# Patient Record
Sex: MALE | Race: WHITE | Employment: UNEMPLOYED | URBAN - NONMETROPOLITAN AREA
[De-identification: names, ages, dates, MRNs, and addresses within clinical notes are randomized per-mention and may not be internally consistent; named-entity substitution may affect disease eponyms.]

---

## 2020-04-24 ENCOUNTER — HOSPITAL ENCOUNTER (EMERGENCY)
Age: 65
Discharge: ANOTHER HOSPITAL | End: 2020-04-24
Attending: EMERGENCY MEDICINE
Payer: COMMERCIAL

## 2020-04-24 ENCOUNTER — APPOINTMENT (OUTPATIENT)
Dept: CT IMAGING | Age: 65
End: 2020-04-24
Payer: COMMERCIAL

## 2020-04-24 ENCOUNTER — APPOINTMENT (OUTPATIENT)
Dept: MRI IMAGING | Age: 65
DRG: 064 | End: 2020-04-24
Payer: COMMERCIAL

## 2020-04-24 ENCOUNTER — HOSPITAL ENCOUNTER (INPATIENT)
Age: 65
LOS: 3 days | Discharge: HOME | DRG: 064 | End: 2020-04-27
Attending: EMERGENCY MEDICINE | Admitting: PSYCHIATRY & NEUROLOGY
Payer: COMMERCIAL

## 2020-04-24 VITALS
RESPIRATION RATE: 18 BRPM | DIASTOLIC BLOOD PRESSURE: 73 MMHG | OXYGEN SATURATION: 97 % | TEMPERATURE: 96.9 F | SYSTOLIC BLOOD PRESSURE: 150 MMHG | WEIGHT: 235 LBS | HEART RATE: 78 BPM

## 2020-04-24 PROBLEM — I63.9 STROKE DETERMINED BY CLINICAL ASSESSMENT (HCC): Status: ACTIVE | Noted: 2020-04-24

## 2020-04-24 LAB
ABSOLUTE EOS #: 0 K/UL (ref 0–0.44)
ABSOLUTE IMMATURE GRANULOCYTE: 0 K/UL (ref 0–0.3)
ABSOLUTE LYMPH #: 0.86 K/UL (ref 1.1–3.7)
ABSOLUTE MONO #: 0.34 K/UL (ref 0.1–1.2)
ALBUMIN SERPL-MCNC: 4.5 G/DL (ref 3.5–5.2)
ALBUMIN/GLOBULIN RATIO: 2 (ref 1–2.5)
ALP BLD-CCNC: 64 U/L (ref 40–129)
ALT SERPL-CCNC: 38 U/L (ref 5–41)
ANION GAP SERPL CALCULATED.3IONS-SCNC: 16 MMOL/L (ref 9–17)
AST SERPL-CCNC: 26 U/L
ATYPICAL LYMPHOCYTE ABSOLUTE COUNT: 0.09 K/UL
ATYPICAL LYMPHOCYTES: 1 %
BASOPHILS # BLD: 0 % (ref 0–2)
BASOPHILS ABSOLUTE: 0 K/UL (ref 0–0.2)
BILIRUB SERPL-MCNC: 1.11 MG/DL (ref 0.3–1.2)
BUN BLDV-MCNC: 12 MG/DL (ref 8–23)
BUN/CREAT BLD: 14 (ref 9–20)
CALCIUM SERPL-MCNC: 9.4 MG/DL (ref 8.6–10.4)
CHLORIDE BLD-SCNC: 99 MMOL/L (ref 98–107)
CO2: 23 MMOL/L (ref 20–31)
CREAT SERPL-MCNC: 0.83 MG/DL (ref 0.7–1.2)
DIFFERENTIAL TYPE: ABNORMAL
EKG ATRIAL RATE: 74 BPM
EKG P AXIS: 47 DEGREES
EKG P-R INTERVAL: 180 MS
EKG Q-T INTERVAL: 458 MS
EKG QRS DURATION: 96 MS
EKG QTC CALCULATION (BAZETT): 508 MS
EKG R AXIS: 63 DEGREES
EKG T AXIS: 76 DEGREES
EKG VENTRICULAR RATE: 74 BPM
EOSINOPHILS RELATIVE PERCENT: 0 % (ref 1–4)
GFR AFRICAN AMERICAN: >60 ML/MIN
GFR NON-AFRICAN AMERICAN: >60 ML/MIN
GFR SERPL CREATININE-BSD FRML MDRD: ABNORMAL ML/MIN/{1.73_M2}
GFR SERPL CREATININE-BSD FRML MDRD: ABNORMAL ML/MIN/{1.73_M2}
GLUCOSE BLD-MCNC: 225 MG/DL (ref 70–99)
GLUCOSE BLD-MCNC: 251 MG/DL (ref 75–110)
GLUCOSE BLD-MCNC: 254 MG/DL (ref 75–110)
HCT VFR BLD CALC: 47.6 % (ref 40.7–50.3)
HEMOGLOBIN: 17.2 G/DL (ref 13–17)
IMMATURE GRANULOCYTES: 0 %
INR BLD: 1 (ref 0.9–1.2)
LYMPHOCYTES # BLD: 10 % (ref 24–43)
MAGNESIUM: 1.9 MG/DL (ref 1.6–2.6)
MCH RBC QN AUTO: 32.7 PG (ref 25.2–33.5)
MCHC RBC AUTO-ENTMCNC: 36.1 G/DL (ref 28.4–34.8)
MCV RBC AUTO: 90.5 FL (ref 82.6–102.9)
MONOCYTES # BLD: 4 % (ref 3–12)
MORPHOLOGY: ABNORMAL
NRBC AUTOMATED: 0 PER 100 WBC
PARTIAL THROMBOPLASTIN TIME: 21.7 SEC (ref 23.2–34.4)
PDW BLD-RTO: 11.9 % (ref 11.8–14.4)
PLATELET # BLD: ABNORMAL K/UL (ref 138–453)
PLATELET ESTIMATE: ABNORMAL
PLATELET, FLUORESCENCE: 83 K/UL (ref 138–453)
PLATELET, IMMATURE FRACTION: 2.4 % (ref 1.1–10.3)
PMV BLD AUTO: ABNORMAL FL (ref 8.1–13.5)
POTASSIUM SERPL-SCNC: 3.3 MMOL/L (ref 3.7–5.3)
PROTHROMBIN TIME: 10.6 SEC (ref 9.7–12.2)
RBC # BLD: 5.26 M/UL (ref 4.21–5.77)
RBC # BLD: ABNORMAL 10*6/UL
SEG NEUTROPHILS: 85 % (ref 36–65)
SEGMENTED NEUTROPHILS ABSOLUTE COUNT: 7.31 K/UL (ref 1.5–8.1)
SODIUM BLD-SCNC: 138 MMOL/L (ref 135–144)
TOTAL PROTEIN: 6.8 G/DL (ref 6.4–8.3)
WBC # BLD: 8.6 K/UL (ref 3.5–11.3)
WBC # BLD: ABNORMAL 10*3/UL

## 2020-04-24 PROCEDURE — 85025 COMPLETE CBC W/AUTO DIFF WBC: CPT

## 2020-04-24 PROCEDURE — 6370000000 HC RX 637 (ALT 250 FOR IP): Performed by: STUDENT IN AN ORGANIZED HEALTH CARE EDUCATION/TRAINING PROGRAM

## 2020-04-24 PROCEDURE — 85055 RETICULATED PLATELET ASSAY: CPT

## 2020-04-24 PROCEDURE — 2060000000 HC ICU INTERMEDIATE R&B

## 2020-04-24 PROCEDURE — 96376 TX/PRO/DX INJ SAME DRUG ADON: CPT

## 2020-04-24 PROCEDURE — 99285 EMERGENCY DEPT VISIT HI MDM: CPT

## 2020-04-24 PROCEDURE — 80053 COMPREHEN METABOLIC PANEL: CPT

## 2020-04-24 PROCEDURE — 6360000002 HC RX W HCPCS: Performed by: STUDENT IN AN ORGANIZED HEALTH CARE EDUCATION/TRAINING PROGRAM

## 2020-04-24 PROCEDURE — 6370000000 HC RX 637 (ALT 250 FOR IP): Performed by: EMERGENCY MEDICINE

## 2020-04-24 PROCEDURE — 70450 CT HEAD/BRAIN W/O DYE: CPT

## 2020-04-24 PROCEDURE — 82947 ASSAY GLUCOSE BLOOD QUANT: CPT

## 2020-04-24 PROCEDURE — 36415 COLL VENOUS BLD VENIPUNCTURE: CPT

## 2020-04-24 PROCEDURE — 83735 ASSAY OF MAGNESIUM: CPT

## 2020-04-24 PROCEDURE — 70551 MRI BRAIN STEM W/O DYE: CPT

## 2020-04-24 PROCEDURE — 96374 THER/PROPH/DIAG INJ IV PUSH: CPT

## 2020-04-24 PROCEDURE — 70547 MR ANGIOGRAPHY NECK W/O DYE: CPT

## 2020-04-24 PROCEDURE — 99254 IP/OBS CNSLTJ NEW/EST MOD 60: CPT | Performed by: PSYCHIATRY & NEUROLOGY

## 2020-04-24 PROCEDURE — 99222 1ST HOSP IP/OBS MODERATE 55: CPT | Performed by: PSYCHIATRY & NEUROLOGY

## 2020-04-24 PROCEDURE — 2580000003 HC RX 258: Performed by: STUDENT IN AN ORGANIZED HEALTH CARE EDUCATION/TRAINING PROGRAM

## 2020-04-24 PROCEDURE — 6360000002 HC RX W HCPCS: Performed by: EMERGENCY MEDICINE

## 2020-04-24 PROCEDURE — 93010 ELECTROCARDIOGRAM REPORT: CPT | Performed by: INTERNAL MEDICINE

## 2020-04-24 PROCEDURE — 93005 ELECTROCARDIOGRAM TRACING: CPT | Performed by: EMERGENCY MEDICINE

## 2020-04-24 PROCEDURE — 85610 PROTHROMBIN TIME: CPT

## 2020-04-24 PROCEDURE — 85730 THROMBOPLASTIN TIME PARTIAL: CPT

## 2020-04-24 PROCEDURE — 70544 MR ANGIOGRAPHY HEAD W/O DYE: CPT

## 2020-04-24 PROCEDURE — 72141 MRI NECK SPINE W/O DYE: CPT

## 2020-04-24 RX ORDER — PROMETHAZINE HYDROCHLORIDE 25 MG/ML
12.5 INJECTION, SOLUTION INTRAMUSCULAR; INTRAVENOUS ONCE
Status: COMPLETED | OUTPATIENT
Start: 2020-04-24 | End: 2020-04-24

## 2020-04-24 RX ORDER — ONDANSETRON 2 MG/ML
4 INJECTION INTRAMUSCULAR; INTRAVENOUS EVERY 6 HOURS PRN
Status: DISCONTINUED | OUTPATIENT
Start: 2020-04-24 | End: 2020-04-24 | Stop reason: SDUPTHER

## 2020-04-24 RX ORDER — CLOPIDOGREL BISULFATE 75 MG/1
75 TABLET ORAL DAILY
Status: DISCONTINUED | OUTPATIENT
Start: 2020-04-25 | End: 2020-04-25

## 2020-04-24 RX ORDER — MAGNESIUM SULFATE 1 G/100ML
2 INJECTION INTRAVENOUS ONCE
Status: COMPLETED | OUTPATIENT
Start: 2020-04-24 | End: 2020-04-24

## 2020-04-24 RX ORDER — SODIUM CHLORIDE 0.9 % (FLUSH) 0.9 %
10 SYRINGE (ML) INJECTION EVERY 12 HOURS SCHEDULED
Status: DISCONTINUED | OUTPATIENT
Start: 2020-04-24 | End: 2020-04-27 | Stop reason: HOSPADM

## 2020-04-24 RX ORDER — ACETAMINOPHEN 650 MG/1
650 SUPPOSITORY RECTAL EVERY 6 HOURS PRN
Status: DISCONTINUED | OUTPATIENT
Start: 2020-04-24 | End: 2020-04-27 | Stop reason: HOSPADM

## 2020-04-24 RX ORDER — POLYETHYLENE GLYCOL 3350 17 G/17G
17 POWDER, FOR SOLUTION ORAL DAILY PRN
Status: DISCONTINUED | OUTPATIENT
Start: 2020-04-24 | End: 2020-04-24 | Stop reason: SDUPTHER

## 2020-04-24 RX ORDER — SODIUM CHLORIDE 0.9 % (FLUSH) 0.9 %
10 SYRINGE (ML) INJECTION PRN
Status: DISCONTINUED | OUTPATIENT
Start: 2020-04-24 | End: 2020-04-27 | Stop reason: HOSPADM

## 2020-04-24 RX ORDER — ASPIRIN 81 MG/1
81 TABLET ORAL DAILY
Status: DISCONTINUED | OUTPATIENT
Start: 2020-04-24 | End: 2020-04-27 | Stop reason: HOSPADM

## 2020-04-24 RX ORDER — ONDANSETRON 2 MG/ML
4 INJECTION INTRAMUSCULAR; INTRAVENOUS EVERY 6 HOURS PRN
Status: DISCONTINUED | OUTPATIENT
Start: 2020-04-24 | End: 2020-04-26

## 2020-04-24 RX ORDER — CLOPIDOGREL BISULFATE 75 MG/1
300 TABLET ORAL ONCE
Status: COMPLETED | OUTPATIENT
Start: 2020-04-24 | End: 2020-04-24

## 2020-04-24 RX ORDER — ONDANSETRON 2 MG/ML
4 INJECTION INTRAMUSCULAR; INTRAVENOUS ONCE
Status: COMPLETED | OUTPATIENT
Start: 2020-04-24 | End: 2020-04-24

## 2020-04-24 RX ORDER — ASPIRIN 325 MG
325 TABLET ORAL ONCE
Status: COMPLETED | OUTPATIENT
Start: 2020-04-24 | End: 2020-04-24

## 2020-04-24 RX ORDER — ACETAMINOPHEN 325 MG/1
650 TABLET ORAL EVERY 6 HOURS PRN
Status: DISCONTINUED | OUTPATIENT
Start: 2020-04-24 | End: 2020-04-27 | Stop reason: HOSPADM

## 2020-04-24 RX ORDER — ATORVASTATIN CALCIUM 80 MG/1
80 TABLET, FILM COATED ORAL NIGHTLY
Status: DISCONTINUED | OUTPATIENT
Start: 2020-04-24 | End: 2020-04-27 | Stop reason: HOSPADM

## 2020-04-24 RX ORDER — ASPIRIN 300 MG/1
300 SUPPOSITORY RECTAL DAILY
Status: DISCONTINUED | OUTPATIENT
Start: 2020-04-24 | End: 2020-04-27 | Stop reason: HOSPADM

## 2020-04-24 RX ORDER — PROMETHAZINE HYDROCHLORIDE 25 MG/1
12.5 TABLET ORAL EVERY 6 HOURS PRN
Status: DISCONTINUED | OUTPATIENT
Start: 2020-04-24 | End: 2020-04-24 | Stop reason: SDUPTHER

## 2020-04-24 RX ORDER — PROMETHAZINE HYDROCHLORIDE 25 MG/1
12.5 TABLET ORAL EVERY 6 HOURS PRN
Status: DISCONTINUED | OUTPATIENT
Start: 2020-04-24 | End: 2020-04-27 | Stop reason: HOSPADM

## 2020-04-24 RX ORDER — POLYETHYLENE GLYCOL 3350 17 G/17G
17 POWDER, FOR SOLUTION ORAL DAILY PRN
Status: DISCONTINUED | OUTPATIENT
Start: 2020-04-24 | End: 2020-04-27 | Stop reason: HOSPADM

## 2020-04-24 RX ADMIN — CLOPIDOGREL BISULFATE 300 MG: 75 TABLET ORAL at 12:08

## 2020-04-24 RX ADMIN — ATORVASTATIN CALCIUM 80 MG: 80 TABLET, FILM COATED ORAL at 20:32

## 2020-04-24 RX ADMIN — Medication 81 MG: at 20:32

## 2020-04-24 RX ADMIN — ONDANSETRON 4 MG: 2 INJECTION INTRAMUSCULAR; INTRAVENOUS at 11:34

## 2020-04-24 RX ADMIN — ENOXAPARIN SODIUM 40 MG: 40 INJECTION SUBCUTANEOUS at 20:32

## 2020-04-24 RX ADMIN — ACETAMINOPHEN 650 MG: 325 TABLET ORAL at 20:33

## 2020-04-24 RX ADMIN — ASPIRIN 325 MG: 325 TABLET, FILM COATED ORAL at 12:08

## 2020-04-24 RX ADMIN — SODIUM CHLORIDE, PRESERVATIVE FREE 10 ML: 5 INJECTION INTRAVENOUS at 20:32

## 2020-04-24 RX ADMIN — ONDANSETRON 4 MG: 2 INJECTION INTRAMUSCULAR; INTRAVENOUS at 13:34

## 2020-04-24 RX ADMIN — MAGNESIUM SULFATE 2 G: 1 INJECTION INTRAVENOUS at 15:32

## 2020-04-24 RX ADMIN — PROMETHAZINE HYDROCHLORIDE 12.5 MG: 25 INJECTION INTRAMUSCULAR; INTRAVENOUS at 15:26

## 2020-04-24 ASSESSMENT — PAIN DESCRIPTION - DESCRIPTORS: DESCRIPTORS: HEADACHE

## 2020-04-24 ASSESSMENT — ENCOUNTER SYMPTOMS
WHEEZING: 0
PHOTOPHOBIA: 0
BACK PAIN: 0
COUGH: 0
STRIDOR: 0
EYE REDNESS: 0
ABDOMINAL PAIN: 0
CONSTIPATION: 0
NAUSEA: 1
SHORTNESS OF BREATH: 0
VOMITING: 0

## 2020-04-24 ASSESSMENT — PAIN SCALES - GENERAL
PAINLEVEL_OUTOF10: 7
PAINLEVEL_OUTOF10: 7
PAINLEVEL_OUTOF10: 0
PAINLEVEL_OUTOF10: 0

## 2020-04-24 ASSESSMENT — PAIN DESCRIPTION - ORIENTATION
ORIENTATION: RIGHT
ORIENTATION: POSTERIOR

## 2020-04-24 ASSESSMENT — PAIN DESCRIPTION - PAIN TYPE
TYPE: ACUTE PAIN
TYPE: CHRONIC PAIN

## 2020-04-24 ASSESSMENT — PAIN DESCRIPTION - LOCATION
LOCATION: NECK;SHOULDER
LOCATION: HEAD

## 2020-04-24 ASSESSMENT — PAIN DESCRIPTION - FREQUENCY: FREQUENCY: INTERMITTENT

## 2020-04-24 NOTE — ED NOTES
Bed: 26  Expected date:   Expected time:   Means of arrival:   Comments:  [address] Cristian Madera RN  04/24/20 9016

## 2020-04-24 NOTE — H&P
Neurology Inpatient History and Physical Note     History Obtained From:  patient, electronic medical record    CHIEF COMPLAINT:       Vertigo  Right sided weakness     HISTORY OF PRESENT ILLNESS:       The patient is a 60 y.o. male with history of Meniere's disease who presented with vertigo, right sided weakness and numbness for 2 weeks. LKW Wednesday 4/22/2020. He fell on his back from standing position on Wednesday. He didn't injure his head, no LOC. He has chronic neck pain but nothing unusual. Soon after that he noticed right sided weakness and numbness. He presented to [place] ED today with vertigo he frequently get due to Meniere's disease. NIHSS at [place] was 4 due to right sided weakness, facial droop, incoordination. He's not on blood thinners. CTH at [place] showed encephalomalacia over the left frontal area.       PAST MEDICAL HISTORY :       Past Medical History:        Diagnosis Date    [facility]     Meniere disease     MI (myocardial infarction) ([facility] [facility]        Past Surgical History:        Procedure Laterality Date    CORONARY ANGIOPLASTY WITH STENT PLACEMENT         Social History:   Social History     Socioeconomic History    Marital status: Single     Spouse name: Not on file    Number of children: Not on file    Years of education: Not on file    Highest education level: Not on file   Occupational History    Not on file   Social Needs    Financial resource strain: Not on file    Food insecurity     Worry: Not on file     Inability: Not on file    Transportation needs     Medical: Not on file     Non-medical: Not on file   Tobacco Use    Smoking status: Never Smoker    Smokeless tobacco: Never Used   Substance and Sexual Activity    Alcohol use: Not on file    Drug use: Not on file    Sexual activity: Not on file   Lifestyle    Physical activity     Days per week: Not on file     Minutes per session: Not on file    Stress: Not on file   Relationships Results   Component Value Date    WBC 8.6 04/24/2020    RBC 5.26 04/24/2020    HGB 17.2 04/24/2020    HCT 47.6 04/24/2020    PLT See Reflexed IPF Result 04/24/2020    MCV 90.5 04/24/2020    MCH 32.7 04/24/2020    MCHC 36.1 04/24/2020    RDW 11.9 04/24/2020    LYMPHOPCT 10 04/24/2020    MONOPCT 4 04/24/2020    BASOPCT 0 04/24/2020    MONOSABS 0.34 04/24/2020    LYMPHSABS 0.86 04/24/2020    EOSABS 0.00 04/24/2020    BASOSABS 0.00 04/24/2020    DIFFTYPE NOT REPORTED 04/24/2020     BMP:    Lab Results   Component Value Date     04/24/2020    K 3.3 04/24/2020    CL 99 04/24/2020    CO2 23 04/24/2020    BUN 12 04/24/2020    LABALBU 4.5 04/24/2020    CREATININE 0.83 04/24/2020    CALCIUM 9.4 04/24/2020    GFRAA >60 04/24/2020    LABGLOM >60 04/24/2020    GLUCOSE 225 04/24/2020       Radiology Review:    1.) CT brain without contrast:  No acute intracranial abnormality. Mild left frontal lobe encephalomalacia. ASSESSMENT AND PLAN:       Patient Active Problem List   Diagnosis    Stroke determined by clinical assessment [facility])       Assessment                 60 y.o. male who presents with right sided weakness, current NIHSS is zero. Possible TIA. ABCD2 Score  (Estimate Risk of Stroke after TIA)   POINTS   Age    < 60   ? 60     [] 0  [x] 1   BP:     SBP <140 or DBP < 90   SBP ? 140 or DBP ? 90       [] 0  [x] 1   Clinical Features of TIA     Other Symptoms                 Speech Disturbances W/O Weakness   Unilateral Weakness     [] 0  [] 1  [x] 2   Duration of symptoms     < 10 Minutes                                     10-59 Minutes   ?  61 Minutes     [] 0  [] 1  [x] 2   Diabetes     No                    Yes     [] 0  [x] 1   TOTAL 7   0-3 Points: Low Risk -> Work up could be done OPD   2-Day Stroke Risk: 1%   7- Day Stroke Risk: 1.2%   90 Days Stroke Risk: 3.1%    4-5 Points: Moderate Risk    2-Day Stroke Risk: 4.1%   7- Day Stroke Risk: 5.9%    90 Days Stroke Risk: 9.8%    6-7 Points: High Risk ->

## 2020-04-24 NOTE — ED NOTES
Pt has dizziness/nausea with movement, sitting up.  Pt states \"when I lay flat, I'm fine\"       Randell Wang, ALVA  04/24/20 4488

## 2020-04-24 NOTE — ED PROVIDER NOTES
vertigo or any ataxia. No dysarthria or speech changes. No headache. No head trauma or change in his chronic neck pain. No chest pain. He did have nausea and epigastric discomfort intermittently. He was seen in outside hospital for possible stroke symptoms and transferred here. CT brain without acute bleed or acute stroke symptoms but mild left frontal lobe encephalomalacia. On exam he is nontoxic afebrile vital signs normal with exception of significant hypertension. GCS is 15. Normal speech mentation memory. Normal pupils extraocular movements. Several beats of right fast beating nystagmus with rightward gaze. Abnormal finger-nose movements on the right. Abnormal heel-to-shin on the right. Negative drift of arms or legs. Tongue is midline. Face is symmetrical.  Right shoulder is tender anteriorly with palpation and passive range of movement. Impression is posterior fossa stroke, consider dissection, chronic Ménière's, hypertension, fall, right shoulder bursitis/arthritis, muscle strain. Plan is stroke consultation, I would recommend CT angiogram of head and neck to exclude dissection, MRI brain, admission with antiplatelet therapy. [place].  Janna Arthur MD, [address] [facility]  Attending Emergency  Physician                Donovan Alcantar MD  04/24/20 3387
were completed with a voice recognition program.Efforts were made to edit the dictations but occasionally words are mis-transcribed.)        Xin Owen,   Resident  04/24/20 6338 [facility] Suhail,   Resident  04/25/20 2102

## 2020-04-24 NOTE — ED PROVIDER NOTES
[facility] ED  [address] [facility]   Chief Complaint   Patient presents with    Fall     Onset 2 days ago, causing right neck/shoulder pain    Nausea     Pt states it may be related to his Miniere's Disease or hernia. Onset yesterday      HPI   Romulo Durham is a 60 y.o. male who presents with complaint of Ménière's flare. He says that he is dizzy. He also says he fell a few days ago and is not sure if he has had. Today in addition to the being dizzy he notes that he also has felt a little bit weak in the arm and the leg on the right. He also noted that his speech seemed a little bit weird to him. He tried to blame all these things on the dizziness he was experiencing. He, according to me actually    Full strength but he was ataxic on the right. No other current complaints except for some nausea. He has not had this exact constellation of symptoms previously    REVIEW OF SYSTEMS   Cardiac: No chest pain or palpitations  Respiratory: No shortness of breath or new cough  General: No fevers   : No dysuria or hematuria  GI: nausea  See HPI for further details. All other systems reviewed and are negative. Brooklyn Witt PAST MEDICAL OR SURGICAL HISTORY   Past Medical History:   Diagnosis Date    Diabetes mellitus ([address]     Meniere disease     MI (myocardial infarction) ([facility] [address]      Past Surgical History:   Procedure Laterality Date    CORONARY ANGIOPLASTY WITH STENT PLACEMENT         CURRENT MEDICATIONS   Current Outpatient Rx   Medication Sig Dispense Refill    insulin aspart protamine-insulin aspart (NOVOLOG 70/30) (70-30) 100 UNIT/ML injection Inject 30 Units into the skin 2 times daily (with meals)         ALLERGIES   No Known Allergies    FAMILY OR SOCIAL HISTORY   History reviewed. No pertinent family history.   Social History     Socioeconomic History    Marital status: Single     Spouse name: Not on file    Number of children: Not on file    Years of education: lip.  Full strength and sensation of the extremities. He was noted to be ataxic on finger-nose-finger on the right as well as heel slide on the right. And he had nystagmus and went to the right on exam  Psychiatric: pleasant affect, does not appear anxious     nihss = 4  Points for atxia, facial droop , dysarthria    EKG (Interpreted by me)  NSR , sinus rhythm at 74 bpm        RADIOLOGY/PROCEDURES   CT Head WO Contrast   Final Result   No acute intracranial abnormality. Mild left frontal lobe encephalomalacia. The results of the examination were discussed with Dr. Erika Gorman on 4/24/2020   at 11:09 a.m. ED COURSE & MEDICAL DECISION MAKING   Pertinent Labs & Imaging studies reviewed and interpreted. (See chart for details)  See chart for details of medications given during the ED stay. Vitals:    04/24/20 1115 04/24/20 1130 04/24/20 1145 04/24/20 1200   BP: (!) 151/81 (!) 184/92 (!) 145/77 (!) 150/74   Pulse: 75 82 74 77   Resp:       Temp:       TempSrc:       SpO2:       Weight:           Differential diagnosis: Dehydration, potentially/metabolic problem, anemia, infection, hypotension, Stroke, Structural CNS mass lesion, other    MDM: Based on patient's presentation I suspect the patient has had a stroke. I think this is an exacerbation of Ménière's disease. He will be transferred to [facility] for neuro work-up    FINAL IMPRESSION   1.  Cerebrovascular accident (CVA), unspecified mechanism ([facility] [address]        PLAN  xfer to [facility]          Genaro Sanchez MD  04/24/20 3307

## 2020-04-24 NOTE — ED NOTES
Pt updated that he is going to ER and transportation will be here approximately [address] [facility]  04/24/20 2195

## 2020-04-24 NOTE — CONSULTS
use: Not on file    Drug use: Not on file    Sexual activity: Not on file   Lifestyle    Physical activity     Days per week: Not on file     Minutes per session: Not on file    Stress: Not on file   Relationships    Social connections     Talks on phone: Not on file     Gets together: Not on file     Attends Amish service: Not on file     Active member of club or organization: Not on file     Attends meetings of clubs or organizations: Not on file     Relationship status: Not on file    Intimate partner violence     Fear of current or ex partner: Not on file     Emotionally abused: Not on file     Physically abused: Not on file     Forced sexual activity: Not on file   Other Topics Concern    Not on file   Social History Narrative    Not on file       Family History:   History reviewed. No pertinent family history. Allergies:  Patient has no known allergies. Home Medications:  Prior to Admission medications    Medication Sig Start Date End Date Taking? Authorizing Provider   insulin aspart protamine-insulin aspart (NOVOLOG 70/30) (70-30) 100 UNIT/ML injection Inject 30 Units into the skin 2 times daily (with meals)    Historical Provider, MD       Current Medications:   Current Facility-Administered Medications: magnesium sulfate 1 g in dextrose 5% 100 mL IVPB, 2 g, Intravenous, Once    REVIEW OF SYSTEMS:       Review of Systems   Constitutional: Negative for diaphoresis, fatigue, fever and unexpected weight change. Eyes: Negative for photophobia, redness and visual disturbance. Respiratory: Negative for cough, shortness of breath, wheezing and stridor. Cardiovascular: Negative for palpitations. Gastrointestinal: Positive for nausea. Negative for constipation and vomiting. Genitourinary: Negative. Musculoskeletal: Positive for arthralgias (right shoulder), gait problem and neck pain (chronic). Negative for back pain, joint swelling, myalgias and neck stiffness.    Neurological:

## 2020-04-24 NOTE — FLOWSHEET NOTE
[facility] CARE DEPARTMENT - [address]     Emergency/Trauma Note    PATIENT NAME: Suman Tubbs    Shift date:4/24/2020  Shift day: Friday   Shift # 2    Room # 26/26   Name: Suman Tubbs            Age: 60 y.o. Gender: male          Mormon: Shinto  Place of Hinduism:     Trauma/Incident type: Stroke Consult    Admit Date & Time: 4/24/2020  2:46 PM  TRAUMA NAME:         PATIENT/EVENT DESCRIPTION:  Suman Tubbs is a 60 y.o. male who arrived via [facility] transport from [place] [address] as a Stroke Consult. Patient fell on Wednesday, complaining of right sided weakness to his arm and leg. Patient stated his speech seems different to him. Patient to be admitted to 26/26. SPIRITUAL ASSESSMENT/INTERVENTION:   responded to Stroke Consult.  went to ED #26.  was ministry of presence. Patient was lying in bed awake and alert. Patient was trying to contact his sister Geovanny Dietz on his cell phone.  offered to  try to contact Geovanny Dietz at [phone] no response to initial efforts. Patient stated he is Shinto and professes [place].  offered prayer and patient accepted. Griselda Gate will follow up during patient's stay in the hospital.      PATIENT BELONGINGS:  With patient    ANY BELONGINGS OF SIGNIFICANT VALUE NOTED:  None noted    REGISTRATION STAFF NOTIFIED? No      WHAT IS YOUR SPIRITUAL CARE PLAN FOR THIS PATIENT?:   Chaplains will remain available for spiritual and emotional support as needed and chaplains may be paged for a specific request and or visit at any time.   Electronically signed by Yovany Giles Resident      04/24/20 2583   Encounter Summary   Services provided to: Patient   Referral/Consult From: Multi-disciplinary team   Support System Family members   Place of Catholic   (Shinto)   Continue Visiting   (4/24/2020)   Complexity of Encounter Moderate   Length of Encounter 30 minutes   Spiritual Assessment Completed Yes

## 2020-04-25 LAB
ABSOLUTE EOS #: 0.05 K/UL (ref 0–0.44)
ABSOLUTE EOS #: 0.1 K/UL (ref 0–0.44)
ABSOLUTE IMMATURE GRANULOCYTE: <0.03 K/UL (ref 0–0.3)
ABSOLUTE IMMATURE GRANULOCYTE: <0.03 K/UL (ref 0–0.3)
ABSOLUTE LYMPH #: 1.12 K/UL (ref 1.1–3.7)
ABSOLUTE LYMPH #: 1.45 K/UL (ref 1.1–3.7)
ABSOLUTE MONO #: 0.56 K/UL (ref 0.1–1.2)
ABSOLUTE MONO #: 0.59 K/UL (ref 0.1–1.2)
ABSOLUTE RETIC #: 0.14 M/UL (ref 0.03–0.08)
ABSOLUTE RETIC #: 0.15 M/UL (ref 0.03–0.08)
ANION GAP SERPL CALCULATED.3IONS-SCNC: 15 MMOL/L (ref 9–17)
BASOPHILS # BLD: 1 % (ref 0–2)
BASOPHILS # BLD: 1 % (ref 0–2)
BASOPHILS ABSOLUTE: 0.04 K/UL (ref 0–0.2)
BASOPHILS ABSOLUTE: 0.04 K/UL (ref 0–0.2)
BUN BLDV-MCNC: 14 MG/DL (ref 8–23)
BUN/CREAT BLD: ABNORMAL (ref 9–20)
CALCIUM SERPL-MCNC: 8.7 MG/DL (ref 8.6–10.4)
CHLORIDE BLD-SCNC: 101 MMOL/L (ref 98–107)
CHOLESTEROL/HDL RATIO: 4.7
CHOLESTEROL: 200 MG/DL
CO2: 22 MMOL/L (ref 20–31)
CREAT SERPL-MCNC: 0.86 MG/DL (ref 0.7–1.2)
DIFFERENTIAL TYPE: ABNORMAL
DIFFERENTIAL TYPE: ABNORMAL
EOSINOPHILS RELATIVE PERCENT: 1 % (ref 1–4)
EOSINOPHILS RELATIVE PERCENT: 2 % (ref 1–4)
FOLATE: 18.1 NG/ML
GFR AFRICAN AMERICAN: >60 ML/MIN
GFR NON-AFRICAN AMERICAN: >60 ML/MIN
GFR SERPL CREATININE-BSD FRML MDRD: ABNORMAL ML/MIN/{1.73_M2}
GFR SERPL CREATININE-BSD FRML MDRD: ABNORMAL ML/MIN/{1.73_M2}
GLUCOSE BLD-MCNC: 275 MG/DL (ref 70–99)
GLUCOSE BLD-MCNC: 290 MG/DL (ref 75–110)
GLUCOSE BLD-MCNC: 325 MG/DL (ref 75–110)
GLUCOSE BLD-MCNC: 386 MG/DL (ref 75–110)
HAPTOGLOBIN: 18 MG/DL (ref 30–200)
HCT VFR BLD CALC: 47.7 % (ref 40.7–50.3)
HCT VFR BLD CALC: 48.1 % (ref 40.7–50.3)
HCT VFR BLD CALC: 49.3 % (ref 40.7–50.3)
HDLC SERPL-MCNC: 43 MG/DL
HEMOGLOBIN: 16.3 G/DL (ref 13–17)
HEMOGLOBIN: 16.8 G/DL (ref 13–17)
HEMOGLOBIN: 17.6 G/DL (ref 13–17)
IMMATURE GRANULOCYTES: 0 %
IMMATURE GRANULOCYTES: 0 %
IMMATURE RETIC FRACT: 10.6 % (ref 2.7–18.3)
IMMATURE RETIC FRACT: 8.7 % (ref 2.7–18.3)
LACTATE DEHYDROGENASE: 209 U/L (ref 135–225)
LDL CHOLESTEROL: 114 MG/DL (ref 0–130)
LV EF: 35 %
LVEF MODALITY: NORMAL
LYMPHOCYTES # BLD: 22 % (ref 24–43)
LYMPHOCYTES # BLD: 31 % (ref 24–43)
MAGNESIUM: 2.3 MG/DL (ref 1.6–2.6)
MCH RBC QN AUTO: 32.5 PG (ref 25.2–33.5)
MCH RBC QN AUTO: 33.1 PG (ref 25.2–33.5)
MCH RBC QN AUTO: 33.3 PG (ref 25.2–33.5)
MCHC RBC AUTO-ENTMCNC: 34.2 G/DL (ref 28.4–34.8)
MCHC RBC AUTO-ENTMCNC: 34.9 G/DL (ref 28.4–34.8)
MCHC RBC AUTO-ENTMCNC: 35.7 G/DL (ref 28.4–34.8)
MCV RBC AUTO: 93.4 FL (ref 82.6–102.9)
MCV RBC AUTO: 94.7 FL (ref 82.6–102.9)
MCV RBC AUTO: 95.2 FL (ref 82.6–102.9)
MONOCYTES # BLD: 11 % (ref 3–12)
MONOCYTES # BLD: 12 % (ref 3–12)
NRBC AUTOMATED: 0 PER 100 WBC
PDW BLD-RTO: 12.1 % (ref 11.8–14.4)
PDW BLD-RTO: 12.2 % (ref 11.8–14.4)
PDW BLD-RTO: 12.2 % (ref 11.8–14.4)
PHOSPHORUS: 3 MG/DL (ref 2.5–4.5)
PLATELET # BLD: 86 K/UL (ref 138–453)
PLATELET # BLD: ABNORMAL K/UL (ref 138–453)
PLATELET # BLD: NORMAL K/UL (ref 138–453)
PLATELET ESTIMATE: ABNORMAL
PLATELET ESTIMATE: ABNORMAL
PLATELET, FLUORESCENCE: 93 K/UL (ref 138–453)
PLATELET, FLUORESCENCE: 96 K/UL (ref 138–453)
PLATELET, IMMATURE FRACTION: 1.7 % (ref 1.1–10.3)
PLATELET, IMMATURE FRACTION: 2.1 % (ref 1.1–10.3)
PMV BLD AUTO: 9.8 FL (ref 8.1–13.5)
PMV BLD AUTO: ABNORMAL FL (ref 8.1–13.5)
PMV BLD AUTO: NORMAL FL (ref 8.1–13.5)
POTASSIUM SERPL-SCNC: 3.8 MMOL/L (ref 3.7–5.3)
RBC # BLD: 5.01 M/UL (ref 4.21–5.77)
RBC # BLD: 5.08 M/UL (ref 4.21–5.77)
RBC # BLD: 5.28 M/UL (ref 4.21–5.77)
RBC # BLD: ABNORMAL 10*6/UL
RBC # BLD: ABNORMAL 10*6/UL
RETIC %: 2.8 % (ref 0.5–1.9)
RETIC %: 2.8 % (ref 0.5–1.9)
RETIC HEMOGLOBIN: 38.1 PG (ref 28.2–35.7)
RETIC HEMOGLOBIN: 38.3 PG (ref 28.2–35.7)
SEG NEUTROPHILS: 54 % (ref 36–65)
SEG NEUTROPHILS: 65 % (ref 36–65)
SEGMENTED NEUTROPHILS ABSOLUTE COUNT: 2.59 K/UL (ref 1.5–8.1)
SEGMENTED NEUTROPHILS ABSOLUTE COUNT: 3.35 K/UL (ref 1.5–8.1)
SODIUM BLD-SCNC: 138 MMOL/L (ref 135–144)
TRIGL SERPL-MCNC: 213 MG/DL
TSH SERPL DL<=0.05 MIU/L-ACNC: 1.58 MIU/L (ref 0.3–5)
VITAMIN B-12: 551 PG/ML (ref 232–1245)
VLDLC SERPL CALC-MCNC: ABNORMAL MG/DL (ref 1–30)
WBC # BLD: 4.8 K/UL (ref 3.5–11.3)
WBC # BLD: 5.2 K/UL (ref 3.5–11.3)
WBC # BLD: 7 K/UL (ref 3.5–11.3)
WBC # BLD: ABNORMAL 10*3/UL
WBC # BLD: ABNORMAL 10*3/UL

## 2020-04-25 PROCEDURE — 97166 OT EVAL MOD COMPLEX 45 MIN: CPT

## 2020-04-25 PROCEDURE — 99233 SBSQ HOSP IP/OBS HIGH 50: CPT | Performed by: PSYCHIATRY & NEUROLOGY

## 2020-04-25 PROCEDURE — 83010 ASSAY OF HAPTOGLOBIN QUANT: CPT

## 2020-04-25 PROCEDURE — 84443 ASSAY THYROID STIM HORMONE: CPT

## 2020-04-25 PROCEDURE — 93306 TTE W/DOPPLER COMPLETE: CPT

## 2020-04-25 PROCEDURE — 85055 RETICULATED PLATELET ASSAY: CPT

## 2020-04-25 PROCEDURE — 99223 1ST HOSP IP/OBS HIGH 75: CPT | Performed by: INTERNAL MEDICINE

## 2020-04-25 PROCEDURE — 82947 ASSAY GLUCOSE BLOOD QUANT: CPT

## 2020-04-25 PROCEDURE — 97162 PT EVAL MOD COMPLEX 30 MIN: CPT

## 2020-04-25 PROCEDURE — 84100 ASSAY OF PHOSPHORUS: CPT

## 2020-04-25 PROCEDURE — 36415 COLL VENOUS BLD VENIPUNCTURE: CPT

## 2020-04-25 PROCEDURE — 83036 HEMOGLOBIN GLYCOSYLATED A1C: CPT

## 2020-04-25 PROCEDURE — 99255 IP/OBS CONSLTJ NEW/EST HI 80: CPT | Performed by: INTERNAL MEDICINE

## 2020-04-25 PROCEDURE — 82746 ASSAY OF FOLIC ACID SERUM: CPT

## 2020-04-25 PROCEDURE — 2060000000 HC ICU INTERMEDIATE R&B

## 2020-04-25 PROCEDURE — 6360000002 HC RX W HCPCS: Performed by: STUDENT IN AN ORGANIZED HEALTH CARE EDUCATION/TRAINING PROGRAM

## 2020-04-25 PROCEDURE — 85025 COMPLETE CBC W/AUTO DIFF WBC: CPT

## 2020-04-25 PROCEDURE — 83735 ASSAY OF MAGNESIUM: CPT

## 2020-04-25 PROCEDURE — 85045 AUTOMATED RETICULOCYTE COUNT: CPT

## 2020-04-25 PROCEDURE — 82607 VITAMIN B-12: CPT

## 2020-04-25 PROCEDURE — 2580000003 HC RX 258: Performed by: STUDENT IN AN ORGANIZED HEALTH CARE EDUCATION/TRAINING PROGRAM

## 2020-04-25 PROCEDURE — 6370000000 HC RX 637 (ALT 250 FOR IP): Performed by: INTERNAL MEDICINE

## 2020-04-25 PROCEDURE — 97535 SELF CARE MNGMENT TRAINING: CPT

## 2020-04-25 PROCEDURE — 92523 SPEECH SOUND LANG COMPREHEN: CPT

## 2020-04-25 PROCEDURE — 97116 GAIT TRAINING THERAPY: CPT

## 2020-04-25 PROCEDURE — 6370000000 HC RX 637 (ALT 250 FOR IP): Performed by: STUDENT IN AN ORGANIZED HEALTH CARE EDUCATION/TRAINING PROGRAM

## 2020-04-25 PROCEDURE — 85027 COMPLETE CBC AUTOMATED: CPT

## 2020-04-25 PROCEDURE — 80048 BASIC METABOLIC PNL TOTAL CA: CPT

## 2020-04-25 PROCEDURE — 83615 LACTATE (LD) (LDH) ENZYME: CPT

## 2020-04-25 PROCEDURE — 80061 LIPID PANEL: CPT

## 2020-04-25 RX ORDER — DEXTROSE MONOHYDRATE 50 MG/ML
100 INJECTION, SOLUTION INTRAVENOUS PRN
Status: DISCONTINUED | OUTPATIENT
Start: 2020-04-25 | End: 2020-04-27 | Stop reason: HOSPADM

## 2020-04-25 RX ORDER — LISINOPRIL 10 MG/1
10 TABLET ORAL DAILY
Status: DISCONTINUED | OUTPATIENT
Start: 2020-04-25 | End: 2020-04-25

## 2020-04-25 RX ORDER — NICOTINE POLACRILEX 4 MG
15 LOZENGE BUCCAL PRN
Status: DISCONTINUED | OUTPATIENT
Start: 2020-04-25 | End: 2020-04-27 | Stop reason: HOSPADM

## 2020-04-25 RX ORDER — INSULIN GLARGINE 100 [IU]/ML
15 INJECTION, SOLUTION SUBCUTANEOUS NIGHTLY
Status: DISCONTINUED | OUTPATIENT
Start: 2020-04-25 | End: 2020-04-26

## 2020-04-25 RX ORDER — CLOPIDOGREL BISULFATE 75 MG/1
75 TABLET ORAL DAILY
Status: DISCONTINUED | OUTPATIENT
Start: 2020-04-26 | End: 2020-04-27 | Stop reason: HOSPADM

## 2020-04-25 RX ORDER — LISINOPRIL 10 MG/1
10 TABLET ORAL DAILY
Status: DISCONTINUED | OUTPATIENT
Start: 2020-04-25 | End: 2020-04-27 | Stop reason: HOSPADM

## 2020-04-25 RX ORDER — DEXTROSE MONOHYDRATE 25 G/50ML
12.5 INJECTION, SOLUTION INTRAVENOUS PRN
Status: DISCONTINUED | OUTPATIENT
Start: 2020-04-25 | End: 2020-04-27 | Stop reason: HOSPADM

## 2020-04-25 RX ADMIN — Medication 81 MG: at 09:34

## 2020-04-25 RX ADMIN — INSULIN LISPRO 30 UNITS: 100 INJECTION, SUSPENSION SUBCUTANEOUS at 16:58

## 2020-04-25 RX ADMIN — ENOXAPARIN SODIUM 40 MG: 40 INJECTION SUBCUTANEOUS at 09:34

## 2020-04-25 RX ADMIN — INSULIN LISPRO 10 UNITS: 100 INJECTION, SOLUTION INTRAVENOUS; SUBCUTANEOUS at 16:59

## 2020-04-25 RX ADMIN — INSULIN GLARGINE 15 UNITS: 100 INJECTION, SOLUTION SUBCUTANEOUS at 21:00

## 2020-04-25 RX ADMIN — INSULIN LISPRO 30 UNITS: 100 INJECTION, SUSPENSION SUBCUTANEOUS at 09:34

## 2020-04-25 RX ADMIN — LISINOPRIL 10 MG: 10 TABLET ORAL at 21:00

## 2020-04-25 RX ADMIN — CLOPIDOGREL 75 MG: 75 TABLET, FILM COATED ORAL at 09:34

## 2020-04-25 RX ADMIN — SODIUM CHLORIDE, PRESERVATIVE FREE 10 ML: 5 INJECTION INTRAVENOUS at 21:00

## 2020-04-25 RX ADMIN — ATORVASTATIN CALCIUM 80 MG: 80 TABLET, FILM COATED ORAL at 21:00

## 2020-04-25 RX ADMIN — SODIUM CHLORIDE, PRESERVATIVE FREE 10 ML: 5 INJECTION INTRAVENOUS at 09:34

## 2020-04-25 ASSESSMENT — PAIN SCALES - GENERAL
PAINLEVEL_OUTOF10: 8
PAINLEVEL_OUTOF10: 8
PAINLEVEL_OUTOF10: 3
PAINLEVEL_OUTOF10: 0
PAINLEVEL_OUTOF10: 8

## 2020-04-25 ASSESSMENT — PAIN DESCRIPTION - LOCATION
LOCATION: SHOULDER
LOCATION: NECK
LOCATION: SHOULDER
LOCATION: SHOULDER

## 2020-04-25 ASSESSMENT — PAIN DESCRIPTION - DESCRIPTORS
DESCRIPTORS: DISCOMFORT
DESCRIPTORS: DISCOMFORT

## 2020-04-25 ASSESSMENT — PAIN DESCRIPTION - PAIN TYPE
TYPE: CHRONIC PAIN

## 2020-04-25 ASSESSMENT — PAIN - FUNCTIONAL ASSESSMENT: PAIN_FUNCTIONAL_ASSESSMENT: 0-10

## 2020-04-25 NOTE — PROGRESS NOTES
level tile  Device: No Device  Assistance: Minimal assistance  Quality of Gait: Narrow MICHELLE; LE crossing midline, ataxic gait  Gait Deviations: Slow Rosa Elena; Increased MICHELLE;Shuffles  Distance: 50ft  Ambulation 2  Surface - 2: level tile  Device 2: Rolling Walker  Assistance 2: Contact guard assistance  Quality of Gait 2: Decreased step length RLE; increased steadiness; mild ataxia  Gait Deviations: Slow Rosa Elena  Distance: 100ft  Comments: Pt required verbal cues to maintain MICHELLE within RW with good return demo. Stairs/Curb  Stairs?: No     Balance  Posture: Fair  Sitting - Static: Good;-  Sitting - Dynamic: Good;-  Standing - Static: Fair;+  Standing - Dynamic: Fair;-  Comments: Standing balance assessed w/ RW        Plan   Plan  Times per week: 5x/week  Current Treatment Recommendations: Strengthening, Transfer Training, Endurance Training, Patient/Caregiver Education & Training, ROM, Balance Training, Gait Training, Home Exercise Program, Functional Mobility Training, Stair training, Safety Education & Training  Safety Devices  Type of devices: Gait belt, Call light within reach, Nurse notified, Patient at risk for falls, Left in bed  Restraints  Initially in place: No    AM-PAC Score     AM-PAC Inpatient Mobility without Stair Climbing Raw Score : 15 (04/25/20 1528)  AM-PAC Inpatient without Stair Climbing T-Scale Score : 43.03 (04/25/20 1528)  Mobility Inpatient CMS 0-100% Score: 47.43 (04/25/20 1528)  Mobility Inpatient without Stair CMS G-Code Modifier : CK (04/25/20 1528)       Goals  Short term goals  Time Frame for Short term goals: 14  Short term goal 1: Pt to perform bed mobility and functional transfers independently  Short term goal 2: Ambulate 300ft w/ least restrictive AD SBA  Short term goal 3: Tolerate 45 minutes of therapy to demo increased endurance   Short term goal 4: Demonstrate dynamic standing balance of good - to demo increased endurance  Patient Goals   Patient goals :  To go home Therapy Time   Individual Concurrent Group Co-treatment   Time In 9931         Time Out 1049         Minutes 31         Timed Code Treatment Minutes: 10 Minutes       Pawan Sarmiento PT

## 2020-04-25 NOTE — CONSULTS
[facility]     Department of Internal Medicine - Staff Internal Medicine Teaching Service          ADMISSION NOTE/HISTORY AND PHYSICAL EXAMINATION   Date: 4/25/2020  Patient Name: Valeriy Thompson  Date of admission: 4/24/2020  2:46 PM  YOB: 1955  PCP: No primary care provider on file. History Obtained From:  Patient    Consult Reason:     Consult Reason: Diabetic Management. HISTORY OF PRESENTING ILLNESS     The patient is a pleasant 60 y.o. male with a past medical history of Hypertension, Diabetes, Myocardial Infarctions and CAD s/p PCI presented to [facility] with a chief complaint of dizziness with associated right arm and leg weakness and dysarthria. CT head without contrast demonstrated no acute intracranial process but based on the patients presentation there was concern for CVA so he was transferred to [facility] for further evaluation. Stroke Alert was called and Neurology evaluated the patient. MRI brain, MRI Head and Neck and MRI C Spine were ordered. Demonstrating multiple acute infarcts in the right cerebellar hemisphere with additional punctate acute infarct in the right lateral kateryna. Neurology consulted Internal Medicine for management of uncontrolled diabetes. On examination the patient is resting comfortably in bed. States his strength has returned to his right arm and leg. Complains of tinnitus but attributes it to his Ménière disease. On further evaluation he admits to hypertension, diabetes and a history of myocardial infarctions with no cardiology follow up after PCI. He was given 5 cardiologist to choose from but instead chose natural medicine with a family medicine physician in [place]. Cardiology was consulted due to patients echo with EF 35%. Will start patient on Lantus 15 units nightly with medium dose sliding scale.  Denies fevers, chills, chest pain, shortness of breath, abdominal pain, nausea or No involuntary movements are noted. Skin:  Warm and dry. Good color, turgor and pigmentation. No lesions or scars.   No cyanosis or clubbing  Neurological/Psychiatric: The patient's general behavior, level of consciousness, thought content and emotional status is normal.      INVESTIGATIONS     Laboratory Testing:     Recent Results (from the past 24 hour(s))   POC Glucose Fingerstick    Collection Time: 04/24/20  6:33 PM   Result Value Ref Range    POC Glucose 251 (H) 75 - 110 mg/dL   POC Glucose Fingerstick    Collection Time: 04/24/20  7:38 PM   Result Value Ref Range    POC Glucose 254 (H) 75 - 110 mg/dL   Lipid panel - fasting    Collection Time: 04/25/20  4:30 AM   Result Value Ref Range    Cholesterol 200 (H) <200 mg/dL    HDL 43 >40 mg/dL    LDL Cholesterol 114 0 - 130 mg/dL    Chol/HDL Ratio 4.7 <5    Triglycerides 213 (H) <150 mg/dL    VLDL NOT REPORTED (H) 1 - 30 mg/dL   CBC    Collection Time: 04/25/20  4:30 AM   Result Value Ref Range    WBC 7.0 3.5 - 11.3 k/uL    RBC 5.01 4.21 - 5.77 m/uL    Hemoglobin 16.3 13.0 - 17.0 g/dL    Hematocrit 47.7 40.7 - 50.3 %    MCV 95.2 82.6 - 102.9 fL    MCH 32.5 25.2 - 33.5 pg    MCHC 34.2 28.4 - 34.8 g/dL    RDW 12.2 11.8 - 14.4 %    Platelets See Reflexed IPF Result 138 - 453 k/uL    MPV NOT REPORTED 8.1 - 13.5 fL    NRBC Automated 0.0 0.0 per 100 WBC   Immature Platelet Fraction    Collection Time: 04/25/20  4:30 AM   Result Value Ref Range    Platelet, Immature Fraction 2.1 1.1 - 10.3 %    Platelet, Fluorescence 96 (L) 138 - 453 k/uL   Basic Metabolic Panel    Collection Time: 04/25/20  4:30 AM   Result Value Ref Range    Glucose 275 (H) 70 - 99 mg/dL    BUN 14 8 - 23 mg/dL    CREATININE 0.86 0.70 - 1.20 mg/dL    Bun/Cre Ratio NOT REPORTED 9 - 20    Calcium 8.7 8.6 - 10.4 mg/dL    Sodium 138 135 - 144 mmol/L    Potassium 3.8 3.7 - 5.3 mmol/L    Chloride 101 98 - 107 mmol/L    CO2 22 20 - 31 mmol/L    Anion Gap 15 9 - 17 mmol/L    GFR Non-African American >60 >60 Collection Time: 04/25/20  3:23 PM   Result Value Ref Range    POC Glucose 386 (H) 75 - 110 mg/dL     Imaging:   Ct Head Wo Contrast    Result Date: 4/24/2020  No acute intracranial abnormality. Mild left frontal lobe encephalomalacia. The results of the examination were discussed with Dr. Shelle Rinne on 4/24/2020 at 11:09 a.m.     Mra Head Wo Contrast    Result Date: 4/24/2020  Head: Multiple acute infarcts in the right cerebellar hemisphere. Additional punctate acute infarct right lateral kateryna. Old infarct left frontal lobe Minimal small-vessel ischemic change No focal significant arterial narrowing in the head. Detail in the periphery is limited due to artifact No focal significant arterial narrowing in the neck. Detail is limited due to artifact     Mri Cervical Spine Wo Contrast    Result Date: 4/24/2020  Multilevel degenerative disc disease, greatest at C5-6 and C6-7. See above for details. Mra Neck Wo Contrast    Result Date: 4/24/2020  Head: Multiple acute infarcts in the right cerebellar hemisphere. Additional punctate acute infarct right lateral kateryna. Old infarct left frontal lobe Minimal small-vessel ischemic change No focal significant arterial narrowing in the head. Detail in the periphery is limited due to artifact No focal significant arterial narrowing in the neck. Detail is limited due to artifact     Mri Brain Wo Contrast    Result Date: 4/24/2020  Head: Multiple acute infarcts in the right cerebellar hemisphere. Additional punctate acute infarct right lateral kateryna. Old infarct left frontal lobe Minimal small-vessel ischemic change No focal significant arterial narrowing in the head. Detail in the periphery is limited due to artifact No focal significant arterial narrowing in the neck. Detail is limited due to artifact       ASSESSMENT & PLAN     ASSESSMENT / PLAN:     Thank you for allowing us to see Rashawn Huber in consultation for management of Diabetes.     1. Type II Diabetes Mellitus  - Lantus 15 units nightly. - Medium Dose Insulin Sliding Scale. - Will monitor and adjust accordingly. 2. Hypertensive Urgency, Improved. - Currently on Lisinopril 10 mg daily. 3. Thrombocytopenia  - Hematology Oncology consultation per Neuro. 4. Right Cerebellar Hemisphere Acute Infarct. - Management per Neurology. 5. Right Lateral Reina Acute Infarct. - Management per Neurology. Thank you for the consultation and allowing us to be a part of Mr Soto's care. Heidy Chawla DO  Internal Medicine Resident, PGY-1  [address], [place]  4/25/2020, 5:29 PM  Attending Physician Statement  I have discussed the care of Trisha Acuna  89. and I have examined the patient myselft and taken ros and hpi , including pertinent history and exam findings,  with the resident. I have reviewed the key elements of all parts of the encounter with the resident. I agree with the assessment, plan and orders as documented by the resident.       Electronically signed by Jocelyn Neal MD

## 2020-04-25 NOTE — PROGRESS NOTES
where it feels like throat closes up and/or doesn't clear. Recommend GI consultation to r/o/confirm esophageal dysfunction. Recommendations:  Requires SLP Intervention: No     D/C Recommendations: No therapy recommended at discharge. Referral To: GI    Subjective:    General  Chart Reviewed: Yes  Family / Caregiver Present: No     Vision  Vision: Impaired  Vision Exceptions: Wears glasses at all times  Hearing  Hearing: Within functional limits           Objective:     Oral/Motor  Oral Motor: Within functional limits    Auditory Comprehension  Comprehension: Within Functional Limits         Expression  Primary Mode of Expression: Verbal    Verbal Expression  Verbal Expression: Within functional limits         Motor Speech  Motor Speech:  Within Functional Limits         Cognition:      Orientation  Overall Orientation Status: Within Normal Limits  Attention  Attention: Within Functional Limits  Memory  Memory: Within Funtional Limits  Problem Solving  Problem Solving: Within Functional Limits  Abstract Reasoning  Abstract Reasoning: Within Functional Limits  Safety/Judgement  Safety/Judgement: Within Functional Limits  Verbal Sequencing: WFL  Thought Organization: WFL  Word Generation: WFL    Prognosis:  Speech Therapy Prognosis  Prognosis: Good  Individuals consulted  Consulted and agree with results and recommendations: Patient    Education:  Patient Education: yes  Patient Education Response: Verbalizes understanding          Therapy Time:   Individual Concurrent Group Co-treatment   Time In 0957         Time Out 1012         Minutes 15                 Yael Cruz M.S. [id number] [facility]    4/25/2020 11:10 AM

## 2020-04-25 NOTE — PROGRESS NOTES
NEUROLOGY INPATIENT PROGRESS NOTE    4/25/2020         Subjective: Alex Godoy is a  60 y.o. male admitted on 4/24/2020 with Stroke determined by clinical assessment [facility]) [I63.9]  [facility] [I63.9]    Briefly, this patient is a 71-year-old male patient past medical history of diabetes, myocardial infarction x 4 & 1 stent (2004), Ménière's disease, old stroke who presented with a complaint of right-sided weakness for about 2 days and continuous vertigo that is worse from his Ménière disease. He states that he fell 2 days ago and he describes it as a mechanical fall. No syncopal event. He has an association of nausea. At arrival to the [facility] ED blood pressure was in the 170s and he also was loaded with dual antiplatelets. Head CT done in [facility] was unremarkable for any new pathology and the NIH at [facility] was a 4. At [facility] was 2 for right upper and right lower extremity dysmetria. She was admitted for stroke work-up with brain MRI, MRA head and neck, MRI cervical spine due to fall    Mesd at home: Aspirin 81mg PO D, Insulin. Used to be on Lisinopril long time ago     Today patient was evaluated at bedside and found oriented x3, no acute distress, no fevers. Patient refer feeling better but still refer feels uncoordinated on his right side. Brain MRI that show right cerebellar stroke and lateral right kateryna stroke. This will explain his right-sided uncoordination. Will have stroke work-up today    No current facility-administered medications on file prior to encounter. Current Outpatient Medications on File Prior to Encounter   Medication Sig Dispense Refill    insulin 70-30 (HUMULIN 70/30) (70-30) 100 UNIT per ML injection vial Inject 30 Units into the skin 2 times daily Takes during breakfast and after supper      NONFORMULARY Take 250 mg by mouth 2 times daily Leceithin    Over the counter medication to decrease cholesterol level.      

## 2020-04-25 NOTE — PLAN OF CARE
Problem: Falls - Risk of:  Goal: Will remain free from falls  Description: Will remain free from falls  Outcome: Met This Shift  Note: Fall risk precautions in place. Bed in lowest position with wheels locked, bed alarm in place and activated, non-skid socks on pt, fall risk id on pt, call light in reach, pt encouraged to call before getting out of bed and for any other needs or c/o.

## 2020-04-25 NOTE — CONSULTS
Today's Date: 4/25/2020  Patient Name: Noemi Hastings  Date of admission: 4/24/2020  2:46 PM  Patient's age: 60 y. o., 1955  Admission Dx: Stroke determined by clinical assessment ([address] [I63.9]  Stroke determined by clinical assessment ([address] [I63.9]    Reason for Consult: management recommendations  Requesting Physician: Halle Alfaro DO    CHIEF COMPLAINT:  Thrombocytopenia     History Obtained From:  patient    HISTORY OF PRESENT ILLNESS:      The patient is a 60 y.o.  male who is admitted to the hospital  with vertigo and work-up suggested the presence of a stroke with multiple infarcts seen on MRI. The patient was seen by neurology and the plan was for double antiplatelet therapy with aspirin and Plavix. Initial CBC suggested that the platelets are 85,628. The patient states that he knew about the low platelets for many years. We were consulted to help understand his thrombocytopenia and also to clear him for the dual antiplatelet therapy. The patient seen and evaluated. He states that he was told by his primary care physician many years ago close about 10 years ago that he has low platelets. He was watched for a while and his platelets continue to fluctuate and they went back to normal on the future testing so he was never referred to hematology. He states that his sister has no platelets but she was diagnosed with SLOO. Also his brother had some liver dysfunction. The patient himself is diabetic. He has not been very compliant on diet and he has moderately controlled blood sugars. Past Medical History:   has a past medical history of Diabetes mellitus ([address] Meniere disease, MI [facility]myocardial infarction) ([facility] and Thrombocytopenia ([facility] Past Surgical History:   has a past surgical history that includes Coronary angioplasty with stent. Medications:    Reviewed in Epic     Allergies:  Patient has no known allergies. Social History:   reports that he has never smoked.  He has never used smokeless tobacco.     Family History: family history is not on file. REVIEW OF SYSTEMS:    Constitutional: No fever or chills. No night sweats, no weight loss   Eyes: No eye discharge, double vision, or eye pain   HEENT: negative for sore mouth, sore throat, hoarseness and voice change   Respiratory: negative for cough , sputum, dyspnea, wheezing, hemoptysis, chest pain   Cardiovascular: negative for chest pain, dyspnea, palpitations, orthopnea, PND   Gastrointestinal: negative for nausea, vomiting, diarrhea, constipation, abdominal pain, Dysphagia, hematemesis and hematochezia   Genitourinary: negative for frequency, dysuria, nocturia, urinary incontinence, and hematuria   Integument: negative for rash, skin lesions, bruises. Hematologic/Lymphatic: negative for easy bruising, bleeding, lymphadenopathy, or petechiae   Endocrine: Directly controlled diabetes.   No weight loss  Musculoskeletal: negative for myalgias, arthralgias, pain, joint swelling,and bone pain   Neurological: Dizziness and vertigo as discussed    PHYSICAL EXAM:      BP (!) 155/81   Pulse 70   Temp 98.8 °F (37.1 °C) (Oral)   Resp 20   Ht 6' 1\" (1.854 m)   Wt 235 lb (106.6 kg)   SpO2 91%   BMI 31.00 kg/m²    Temp (24hrs), Av °F (36.7 °C), Min:97.7 °F (36.5 °C), Max:98.8 °F (37.1 °C)    General appearance - well appearing, no in pain or distress   Mental status - alert and cooperative   Eyes - pupils equal and reactive, extraocular eye movements intact   Ears - bilateral TM's and external ear canals normal   Mouth - mucous membranes moist, pharynx normal without lesions   Neck - supple, no significant adenopathy   Lymphatics - no palpable lymphadenopathy, no hepatosplenomegaly   Chest - clear to auscultation, no wheezes, rales or rhonchi, symmetric air entry   Heart - normal rate, regular rhythm, normal S1, S2, no murmurs  Abdomen - soft, nontender, nondistended, no masses or organomegaly   Neurological - alert, Nurse.      Thank you for asking us to see this patient.     ZOLTAN CASSIDY [facility] MD Magalie  Hematologist/Medical Oncologist  Cell: [phone]

## 2020-04-26 LAB
ESTIMATED AVERAGE GLUCOSE: 235 MG/DL
GLUCOSE BLD-MCNC: 172 MG/DL (ref 75–110)
GLUCOSE BLD-MCNC: 267 MG/DL (ref 75–110)
GLUCOSE BLD-MCNC: 268 MG/DL (ref 75–110)
GLUCOSE BLD-MCNC: 289 MG/DL (ref 75–110)
HBA1C MFR BLD: 9.8 % (ref 4–6)

## 2020-04-26 PROCEDURE — 6370000000 HC RX 637 (ALT 250 FOR IP): Performed by: STUDENT IN AN ORGANIZED HEALTH CARE EDUCATION/TRAINING PROGRAM

## 2020-04-26 PROCEDURE — 99232 SBSQ HOSP IP/OBS MODERATE 35: CPT | Performed by: PSYCHIATRY & NEUROLOGY

## 2020-04-26 PROCEDURE — 97535 SELF CARE MNGMENT TRAINING: CPT

## 2020-04-26 PROCEDURE — 2060000000 HC ICU INTERMEDIATE R&B

## 2020-04-26 PROCEDURE — 99232 SBSQ HOSP IP/OBS MODERATE 35: CPT | Performed by: INTERNAL MEDICINE

## 2020-04-26 PROCEDURE — 6370000000 HC RX 637 (ALT 250 FOR IP): Performed by: INTERNAL MEDICINE

## 2020-04-26 PROCEDURE — 2580000003 HC RX 258: Performed by: STUDENT IN AN ORGANIZED HEALTH CARE EDUCATION/TRAINING PROGRAM

## 2020-04-26 PROCEDURE — 97116 GAIT TRAINING THERAPY: CPT

## 2020-04-26 PROCEDURE — 82947 ASSAY GLUCOSE BLOOD QUANT: CPT

## 2020-04-26 PROCEDURE — 97110 THERAPEUTIC EXERCISES: CPT

## 2020-04-26 PROCEDURE — 6360000002 HC RX W HCPCS: Performed by: STUDENT IN AN ORGANIZED HEALTH CARE EDUCATION/TRAINING PROGRAM

## 2020-04-26 RX ORDER — ONDANSETRON 2 MG/ML
4 INJECTION INTRAMUSCULAR; INTRAVENOUS EVERY 6 HOURS PRN
Status: DISCONTINUED | OUTPATIENT
Start: 2020-04-26 | End: 2020-04-27 | Stop reason: HOSPADM

## 2020-04-26 RX ORDER — CARVEDILOL 3.12 MG/1
3.12 TABLET ORAL 2 TIMES DAILY WITH MEALS
Status: DISCONTINUED | OUTPATIENT
Start: 2020-04-26 | End: 2020-04-27 | Stop reason: HOSPADM

## 2020-04-26 RX ORDER — INSULIN GLARGINE 100 [IU]/ML
20 INJECTION, SOLUTION SUBCUTANEOUS NIGHTLY
Status: DISCONTINUED | OUTPATIENT
Start: 2020-04-26 | End: 2020-04-27

## 2020-04-26 RX ADMIN — INSULIN LISPRO 6 UNITS: 100 INJECTION, SOLUTION INTRAVENOUS; SUBCUTANEOUS at 12:06

## 2020-04-26 RX ADMIN — INSULIN LISPRO 9 UNITS: 100 INJECTION, SOLUTION INTRAVENOUS; SUBCUTANEOUS at 17:36

## 2020-04-26 RX ADMIN — PROMETHAZINE HYDROCHLORIDE 12.5 MG: 25 TABLET ORAL at 12:04

## 2020-04-26 RX ADMIN — Medication 81 MG: at 08:38

## 2020-04-26 RX ADMIN — LISINOPRIL 10 MG: 10 TABLET ORAL at 08:38

## 2020-04-26 RX ADMIN — INSULIN LISPRO 5 UNITS: 100 INJECTION, SOLUTION INTRAVENOUS; SUBCUTANEOUS at 20:14

## 2020-04-26 RX ADMIN — SODIUM CHLORIDE, PRESERVATIVE FREE 10 ML: 5 INJECTION INTRAVENOUS at 20:14

## 2020-04-26 RX ADMIN — CLOPIDOGREL 75 MG: 75 TABLET, FILM COATED ORAL at 08:38

## 2020-04-26 RX ADMIN — ONDANSETRON 4 MG: 2 INJECTION INTRAMUSCULAR; INTRAVENOUS at 14:11

## 2020-04-26 RX ADMIN — ENOXAPARIN SODIUM 40 MG: 40 INJECTION SUBCUTANEOUS at 08:39

## 2020-04-26 RX ADMIN — SODIUM CHLORIDE, PRESERVATIVE FREE 10 ML: 5 INJECTION INTRAVENOUS at 09:00

## 2020-04-26 RX ADMIN — INSULIN GLARGINE 20 UNITS: 100 INJECTION, SOLUTION SUBCUTANEOUS at 20:14

## 2020-04-26 RX ADMIN — ATORVASTATIN CALCIUM 80 MG: 80 TABLET, FILM COATED ORAL at 20:13

## 2020-04-26 RX ADMIN — INSULIN LISPRO 2 UNITS: 100 INJECTION, SOLUTION INTRAVENOUS; SUBCUTANEOUS at 08:41

## 2020-04-26 RX ADMIN — CARVEDILOL 3.12 MG: 3.12 TABLET, FILM COATED ORAL at 18:09

## 2020-04-26 ASSESSMENT — ENCOUNTER SYMPTOMS
EYE REDNESS: 0
GASTROINTESTINAL NEGATIVE: 1
CHEST TIGHTNESS: 0
PHOTOPHOBIA: 0
VOICE CHANGE: 0
SHORTNESS OF BREATH: 0
COUGH: 0

## 2020-04-26 ASSESSMENT — PAIN SCALES - GENERAL
PAINLEVEL_OUTOF10: 0

## 2020-04-26 NOTE — PROGRESS NOTES
NEUROLOGY INPATIENT PROGRESS NOTE    4/26/2020         Subjective: Paty Kimbrough is a  60 y.o. male admitted on 4/24/2020 with Stroke determined by clinical assessment [facility]) [I63.9]  Stroke determined by clinical assessment ([facility] [I63.9] after he presented with right lower sided weakness and imbalance. He was found to have a right cerebellar and pontine acute infarcts. Today, he reports feeling heaviness over the right upper and lower extremities. Has been able to walk with PT. Improved nausea. No vertigo. Has mild dysarthria     No current facility-administered medications on file prior to encounter. Current Outpatient Medications on File Prior to Encounter   Medication Sig Dispense Refill    insulin 70-30 (HUMULIN 70/30) (70-30) 100 UNIT per ML injection vial Inject 30 Units into the skin 2 times daily Takes during breakfast and after supper      NONFORMULARY Take 250 mg by mouth 2 times daily Leceithin    Over the counter medication to decrease cholesterol level.  NONFORMULARY Take 1 tablet by mouth 2 times daily Liver Cleanse    Over the counter medication to help maintain normal blood glucose level.  aspirin 81 MG tablet Take 81 mg by mouth daily      insulin aspart protamine-insulin aspart (NOVOLOG 70/30) (70-30) 100 UNIT/ML injection Inject 30 Units into the skin 2 times daily (with meals)         Allergies: Chester Soto has No Known Allergies.     Past Medical History:   Diagnosis Date    [facility]     Meniere disease     [facility]     [facility]rombocytopenia ([facility]        Past Surgical History:   Procedure Laterality Date    CORONARY ANGIOPLASTY WITH STENT PLACEMENT         Medications:     clopidogrel  75 mg Oral Daily    lisinopril  10 mg Oral Daily    insulin lispro  0-12 Units Subcutaneous TID WC    insulin glargine  15 Units Subcutaneous Nightly    sodium chloride flush  10 mL Intravenous 2 times per day    enoxaparin  40 mg Subcutaneous Neurological:      Mental Status: He is alert. Coordination: Finger-Nose-Finger Test abnormal.      Deep Tendon Reflexes: Strength normal.     .  Neurologic Exam     Cranial Nerves     CN II   Visual fields full to confrontation. CN III, IV, VI   Pupils are equal, round, and reactive to light. Extraocular motions are normal.     CN V   Facial sensation intact. CN VII   Facial expression full, symmetric. CN VIII   CN VIII normal.     CN IX, X   CN IX normal.   CN X normal.     CN XI   CN XI normal.     CN XII   CN XII normal.     Motor Exam   Muscle bulk: normal  Overall muscle tone: normal  Right arm tone: normal  Left arm tone: normal    Strength   Strength 5/5 throughout. Sensory Exam   Light touch normal.   Pinprick normal.     Gait, Coordination, and Reflexes     Coordination   Finger to nose coordination: abnormal    Tremor   Resting tremor: absent  Intention tremor: absent       Lab Results:   CBC:   Recent Labs     04/25/20  0430 04/25/20  1116 04/25/20  1822   WBC 7.0 5.2 4.8   HGB 16.3 16.8 17.6*   PLT See Reflexed IPF Result 86* See Reflexed IPF Result     BMP:    Recent Labs     04/24/20  1100 04/25/20  0430    138   K 3.3* 3.8   CL 99 101   CO2 23 22   BUN 12 14   CREATININE 0.83 0.86   GLUCOSE 225* 275*         Lab Results   Component Value Date    CHOL 200 (H) 04/25/2020    LDLCHOLESTEROL 114 04/25/2020    HDL 43 04/25/2020    TRIG 213 (H) 04/25/2020    ALT 38 04/24/2020    AST 26 04/24/2020    TSH 1.58 04/25/2020    INR 1.0 04/24/2020    LABA1C 9.8 (H) 04/25/2020    EIECCOHR70 551 04/25/2020       No results found for: PHENYTOIN, PHENYTOIN, VALPROATE, CBMZ    IMAGING  MRI brain w/o   MRA H/N w/o    Multiple acute infarcts in the right cerebellar hemisphere. Additional punctate acute infarct right lateral kateryna. Old infarct left frontal lobe  Minimal small-vessel ischemic change No focal significant arterial narrowing in the head.   Detail in the periphery is limited due to artifact  No focal significant arterial narrowing in the neck.   Detail is limited due to artifact          ASSESSMENT  Acute ischemic stroke, posterior circulation, right cerebellar and pontine  Ataxia due to above   Uncontrolled DM II    Plan  Continue aspirin and Plavix for 3 weeks followed by aspirin monotherapy  Lantus 15 units at bedtime as per IM  Continue PT OT  Discharge home tomorrow with home or outpatient physical therapy       Manual MD Víctor  PGY-3 Neurology Resident

## 2020-04-26 NOTE — PROGRESS NOTES
Orientation  Orientation  Overall Orientation Status: Within Functional Limits  Objective    ADL  UE Bathing: Stand by assistance(seated/standing)  LE Bathing: Stand by assistance(seated/standing)  UE Dressing: Stand by assistance(to doff/don gown)  LE Dressing: Stand by assistance(to doff/don underwear)  Toileting: Stand by assistance  Additional Comments: decreased balance noted throughout session  Balance  Sitting Balance: Supervision  Standing Balance: Stand by assistance  Standing Balance  Time: pt tolerated approx 20 min  Activity: during ADLs and mobility  Comment: without AD utilizing furniture and walls for intermitent support  Functional Mobility  Functional - Mobility Device: No device  Activity: To/from bathroom  Assist Level: Contact guard assistance  Functional Mobility Comments: 1 LOB noted pt able to self correct; pt declined utilizing RW however educated on importance  Toilet Transfers  Toilet - Technique: Ambulating  Equipment Used: Raised toilet seat with rails  Toilet Transfer: Stand by assistance  Shower Transfers  Shower - Transfer From: [employer] - Transfer Type: To and From  Shower - Transfer To: Standing(and sitting intermitently)  Shower - Technique: Ambulating  Shower Transfers: Stand by assistance  Shower Transfers Comments: 0 LOB noted pt utilizing grab bars  Bed mobility  Rolling to Left: Supervision  Rolling to Right: Supervision  Supine to Sit: Supervision  Sit to Supine: Supervision  Scooting: Supervision  Transfers  Stand Step Transfers: Stand by assistance  Sit to stand: Stand by assistance  Stand to sit: Stand by assistance  Transfer Comments: without AD  Cognition  Overall Cognitive Status: Exceptions  Safety Judgement: Decreased awareness of need for assistance;Decreased awareness of need for safety  Insights: Decreased awareness of deficits    Pt and RN agreeable to shower this day.  ADL tasks of dressing, bathing and toielting completed see above for LOF. 1 LOB noted during

## 2020-04-26 NOTE — CONSULTS
[facility] Cardiology Cardiology    Inpatient Consultation Note               Today's Date: 4/26/2020  Patient Name: Xavier Davey  Date of admission: 4/24/2020  2:46 PM  Patient's age: 60 y. o., 1955  Admission Dx: Stroke determined by clinical assessment ([facility] [I63.9]  Stroke determined by clinical assessment ([facility] [I63.9]    Reason for  Consult: EF 30 %    Requesting Physician: Buster Cook DO    CHIEF COMPLAINT:     Chief Complaint   Patient presents with    Fall    Nausea       History Obtained From:  patient, electronic medical record    HISTORY OF PRESENT ILLNESS:      The patient is a 60 y.o.  male who is admitted to the hospital for stroke. He has a history of essential hypertension/diabetes mellitus type 2/coronary artery disease s/p stents/ chronic systolic heart failure with ejection fraction of 30% in the past he presented initially to the event with dizziness. Work-up and MRI was done and showed multiple acute infarcts in the right cerebellar hemisphere and additional punctuate acute infarct in the right lateral kateryna and he was admitted for further work-up. Echocardiogram was done and it showed ejection fraction 30%. Per patient he has history of chronic systolic heart failure with ejection fraction of 30% and this is not new. Patient denies currently any chest pain or shortness of breath. No nausea or vomiting. No diaphoresis. No syncope. Past Medical History:   has a past medical history of Diabetes mellitus ([facility] Meniere disease, MI (myocardial infarction) ([facility] and Thrombocytopenia ([facility] Past Surgical History:   has a past surgical history that includes Coronary angioplasty with stent. Home Medications:    Prior to Admission medications    Medication Sig Start Date End Date Taking?  Authorizing Provider   insulin 70-30 (HUMULIN 70/30) (70-30) 100 UNIT per ML injection vial Inject 30 Units into the skin 2 times daily Takes during breakfast and after supper Yes Historical Provider, MD   NONFORMULARY Take 250 mg by mouth 2 times daily Leceithin    Over the counter medication to decrease cholesterol level. Yes Historical Provider, MD   NONFORMULARY Take 1 tablet by mouth 2 times daily Liver Cleanse    Over the counter medication to help maintain normal blood glucose level.    Yes Historical Provider, MD   aspirin 81 MG tablet Take 81 mg by mouth daily   Yes Historical Provider, MD   insulin aspart protamine-insulin aspart (NOVOLOG 70/30) (70-30) 100 UNIT/ML injection Inject 30 Units into the skin 2 times daily (with meals)    Historical Provider, MD        Current Facility-Administered Medications: glucose (GLUTOSE) 40 % oral gel 15 g, 15 g, Oral, PRN  dextrose 50 % IV solution, 12.5 g, Intravenous, PRN  glucagon (rDNA) injection 1 mg, 1 mg, Intramuscular, PRN  dextrose 5 % solution, 100 mL/hr, Intravenous, PRN  clopidogrel (PLAVIX) tablet 75 mg, 75 mg, Oral, Daily  lisinopril (PRINIVIL;ZESTRIL) tablet 10 mg, 10 mg, Oral, Daily  insulin lispro (HUMALOG) injection vial 0-12 Units, 0-12 Units, Subcutaneous, TID WC  insulin glargine (LANTUS) injection vial 15 Units, 15 Units, Subcutaneous, Nightly  sodium chloride flush 0.9 % injection 10 mL, 10 mL, Intravenous, 2 times per day  sodium chloride flush 0.9 % injection 10 mL, 10 mL, Intravenous, PRN  polyethylene glycol (GLYCOLAX) packet 17 g, 17 g, Oral, Daily PRN  promethazine (PHENERGAN) tablet 12.5 mg, 12.5 mg, Oral, Q6H PRN **OR** ondansetron (ZOFRAN) injection 4 mg, 4 mg, Intravenous, Q6H PRN  enoxaparin (LOVENOX) injection 40 mg, 40 mg, Subcutaneous, Daily  aspirin EC tablet 81 mg, 81 mg, Oral, Daily **OR** aspirin suppository 300 mg, 300 mg, Rectal, Daily  atorvastatin (LIPITOR) tablet 80 mg, 80 mg, Oral, Nightly  sodium chloride flush 0.9 % injection 10 mL, 10 mL, Intravenous, 2 times per day  sodium chloride flush 0.9 % injection 10 mL, 10 mL, Intravenous, PRN  acetaminophen (TYLENOL) tablet 650 mg, 650 mg, Oral, Q6H PRN **OR** acetaminophen (TYLENOL) suppository 650 mg, 650 mg, Rectal, Q6H PRN    Allergies:  Patient has no known allergies. Social History:   does not have a smoking history on file. He has never used smokeless tobacco.     Family History: family history is not on file. REVIEW OF SYSTEMS:      · Constitutional: there has been no unanticipated weight loss. · Eyes: No visual changes or diplopia. · ENT: No Headaches  · Cardiovascular:  Remaining as above  · Respiratory: No cough  · Gastrointestinal: No abdominal pain. No change in bowel or bladder habits. · Genitourinary: No dysuria, trouble voiding, or hematuria. · Musculoskeletal: No joint complaints. · Neurological: No headache  · Hematologic/Lymphatic: No abnormal bruising or bleeding      PHYSICAL EXAM:      /72   Pulse 68   Temp 97.5 °F (36.4 °C) (Oral)   Resp 13   Ht 6' 1\" (1.854 m)   Wt 235 lb (106.6 kg)   SpO2 97%   BMI 31.00 kg/m²    No intake or output data in the 24 hours ending 04/26/20 1139      Constitutional and General Appearance: Alert, cooperative, no distress and appears stated age  HEENT: PERRL, no cervical lymphadenopathy. No masses palpable. Normal oral mucosa  Respiratory:  · Normal excursion and expansion without use of accessory muscles  · Resp Auscultation: Good respiratory effort. No for increased work of breathing. On auscultation: clear to auscultation bilaterally  Cardiovascular:  · The apical impulse is not displaced  · Heart tones are crisp and normal. regular S1 and S2. No murmurs   · Jugular venous pulsation Normal  · Peripheral pulses are symmetrical and full   Abdomen:   · No masses or tenderness  · Bowel sounds present  Extremities:  ·  No Cyanosis or Clubbing  ·  Lower extremity edema:   ·  Skin: Warm and dry  Neurological:  · Alert and oriented.   · Moves all extremities well    DATA:    Diagnostics:    EKG: normal sinus rhythm, Septal infarct   ECHO:  Technically difficult study due to poor mg.  Will add beta-blocker for blood pressure and low systolic heart failure. 3. Follow-up as an outpatient. Discussed with patient and Nurse. Gunner Meraz M.D. Fellow, [address] [place] [address]      Attending Cardiologist Addendum: I have reviewed and performed the history, physical, subjective, objective, assessment, and plan with the resident/fellow and agree with the note. I performed the history and physical personally. I have made changes to the note above as needed. Thank you for allowing me to participate in the care of this patient, please do not hesitate to call if you have any questions. Jeanine Mariee, [id number] [facility]  [facility]. [facility]  [phone]

## 2020-04-26 NOTE — DISCHARGE INSTR - COC
Manager/ signature: {Esignature:[id number]}    PHYSICIAN SECTION    Prognosis: Good    Condition at Discharge: Stable    Rehab Potential (if transferring to Rehab): Good    Recommended Labs or Other Treatments After Discharge:     Physician Certification: I certify the above information and transfer of [facility] [place]  is necessary for the continuing treatment of the diagnosis listed and that he requires Home Care for greater 30 days.      Update Admission H&P: No change in H&P    PHYSICIAN SIGNATURE:  Electronically signed by Michela Miguel DO on 4/26/20 at 1:12 PM EDT

## 2020-04-26 NOTE — PROGRESS NOTES
Chest - clear to auscultation, no wheezes, rales or rhonchi, symmetric air entry   Heart - normal rate, regular rhythm, normal S1, S2, no murmurs  Abdomen - soft, nontender, nondistended, no masses or organomegaly   Neurological - alert, oriented, normal speech, no focal findings or movement disorder noted   Musculoskeletal - no joint tenderness, deformity or swelling   Extremities - peripheral pulses normal, no pedal edema, no clubbing or cyanosis   Skin - normal coloration and turgor, no rashes, no suspicious skin lesions noted ,    DATA:    Labs:   CBC:   Recent Labs     04/25/20  1116 04/25/20  1822   WBC 5.2 4.8   HGB 16.8 17.6*   HCT 48.1 49.3   PLT 86* See Reflexed IPF Result     BMP:   Recent Labs     04/24/20  1100 04/25/20  0430    138   K 3.3* 3.8   CO2 23 22   BUN 12 14   CREATININE 0.83 0.86   LABGLOM >60 >60   GLUCOSE 225* 275*     PT/INR:   Recent Labs     04/24/20  1100   PROTIME 10.6   INR 1.0       IMAGING DATA:      Primary Problem  Cerebellar stroke [facility])    Active Hospital Problems    Diagnosis Date Noted    Cerebellar stroke ([facility] [address] [I63.9] 04/24/2020    Right sided weakness [R53.1]     Abnormal CT of the head [R93.0]     Hypertensive urgency [I16.0]          IMPRESSION:   1. Acute stroke  2. Thrombocytopenia, evaluation suggests chronic changes possibly related to liver dysfunction related to diabetes. 3. Nausea and gastric upset. RECOMMENDATIONS:  1. Work-up is now fruitful so far. I think he has a clinic thrombocytopenia. I think is low haptoglobin is secondary to liver dysfunction. Hemolysis is highly doubtful. Smear does not suggest TTP. 2. Based on his platelet count, he is okay to undergo go dual antiplatelet therapy and will watch him closely for bleeding, we will discuss with neurology. 3. We will continue the work-up and continue to follow with you. We will watch the platelets closely as the patient is already started on dual antiplatelet agents.       Discussed

## 2020-04-26 NOTE — PROGRESS NOTES
Physical Therapy  Facility/Department: [facility] NEURO  Daily Treatment Note  NAME: Noemi Hastings  : 1955  MRN: [id number]    Date of Service: 2020    Discharge Recommendations:      Further therapy recommended at discharge. PT Equipment Recommendations  Equipment Needed: Yes  Mobility Devices: Archie Keylack: Rolling   Home PT for balance and strengthening. Assessment     Body structures, Functions, Activity limitations: Decreased functional mobility ; Decreased safe awareness;Decreased balance;Decreased ROM; Decreased endurance;Decreased strength; Increased pain  Assessment: Pt ambulated 300ft w/ RW CGA, Pt demonstrates balance deficits and unsteadiness noted with ambulation. Pt would benefit from 24hr support upon discharge. Recommending continued skilled physical therapy to address functional mobility deficits, high level balance and to return pt to prior level of independence. Prognosis: Good  Decision Making: Medium Complexity  PT Education: Goals;PT Role;Plan of Care  REQUIRES PT FOLLOW UP: Yes  Activity Tolerance  Activity Tolerance: Patient Tolerated treatment well         Patient Diagnosis(es): The encounter diagnosis was Right sided weakness. has a past medical history of Diabetes mellitus ([facility] [address] Meniere disease, MI (myocardial infarction) ([facility] [address] and Thrombocytopenia ([facility] [address] has a past surgical history that includes Coronary angioplasty with stent. Restrictions  Restrictions/Precautions  Restrictions/Precautions: Up as Tolerated, Fall Risk  Required Braces or Orthoses?: No  Position Activity Restriction  Other position/activity restrictions: Up w/assist  Subjective   Pt laying in bed. Pt willing to go for a walk. Pt has no c/o pain. Only feeling nauseated. RN informed.    Orientation  Overall Orientation Status: Within Functional Limits   Objective    Bed Mobility  Supine to sit: SBA  Scooting: SBA    Transfers  Sit to Stand: Contact guard assistance  Stand to sit: Contact guard

## 2020-04-27 VITALS
SYSTOLIC BLOOD PRESSURE: 150 MMHG | BODY MASS INDEX: 31.14 KG/M2 | HEART RATE: 75 BPM | RESPIRATION RATE: 18 BRPM | TEMPERATURE: 97.6 F | HEIGHT: 73 IN | OXYGEN SATURATION: 96 % | WEIGHT: 235 LBS | DIASTOLIC BLOOD PRESSURE: 89 MMHG

## 2020-04-27 LAB
ABSOLUTE EOS #: 0.11 K/UL (ref 0–0.44)
ABSOLUTE IMMATURE GRANULOCYTE: <0.03 K/UL (ref 0–0.3)
ABSOLUTE LYMPH #: 1.24 K/UL (ref 1.1–3.7)
ABSOLUTE MONO #: 0.61 K/UL (ref 0.1–1.2)
ANION GAP SERPL CALCULATED.3IONS-SCNC: 14 MMOL/L (ref 9–17)
BASOPHILS # BLD: 1 % (ref 0–2)
BASOPHILS ABSOLUTE: 0.04 K/UL (ref 0–0.2)
BUN BLDV-MCNC: 15 MG/DL (ref 8–23)
BUN/CREAT BLD: ABNORMAL (ref 9–20)
CALCIUM SERPL-MCNC: 8.7 MG/DL (ref 8.6–10.4)
CHLORIDE BLD-SCNC: 103 MMOL/L (ref 98–107)
CO2: 22 MMOL/L (ref 20–31)
CREAT SERPL-MCNC: 0.53 MG/DL (ref 0.7–1.2)
DIFFERENTIAL TYPE: ABNORMAL
EOSINOPHILS RELATIVE PERCENT: 2 % (ref 1–4)
GFR AFRICAN AMERICAN: >60 ML/MIN
GFR NON-AFRICAN AMERICAN: >60 ML/MIN
GFR SERPL CREATININE-BSD FRML MDRD: ABNORMAL ML/MIN/{1.73_M2}
GFR SERPL CREATININE-BSD FRML MDRD: ABNORMAL ML/MIN/{1.73_M2}
GLUCOSE BLD-MCNC: 243 MG/DL (ref 70–99)
GLUCOSE BLD-MCNC: 267 MG/DL (ref 75–110)
GLUCOSE BLD-MCNC: 303 MG/DL (ref 75–110)
HCT VFR BLD CALC: 48.7 % (ref 40.7–50.3)
HEMOGLOBIN: 16.7 G/DL (ref 13–17)
IMMATURE GRANULOCYTES: 0 %
LYMPHOCYTES # BLD: 26 % (ref 24–43)
MCH RBC QN AUTO: 32.1 PG (ref 25.2–33.5)
MCHC RBC AUTO-ENTMCNC: 34.3 G/DL (ref 28.4–34.8)
MCV RBC AUTO: 93.5 FL (ref 82.6–102.9)
MONOCYTES # BLD: 13 % (ref 3–12)
NRBC AUTOMATED: 0 PER 100 WBC
PDW BLD-RTO: 12 % (ref 11.8–14.4)
PLATELET # BLD: ABNORMAL K/UL (ref 138–453)
PLATELET ESTIMATE: ABNORMAL
PLATELET, FLUORESCENCE: 89 K/UL (ref 138–453)
PLATELET, IMMATURE FRACTION: 1.9 % (ref 1.1–10.3)
PMV BLD AUTO: ABNORMAL FL (ref 8.1–13.5)
POTASSIUM SERPL-SCNC: 3.9 MMOL/L (ref 3.7–5.3)
RBC # BLD: 5.21 M/UL (ref 4.21–5.77)
RBC # BLD: ABNORMAL 10*6/UL
SEG NEUTROPHILS: 58 % (ref 36–65)
SEGMENTED NEUTROPHILS ABSOLUTE COUNT: 2.82 K/UL (ref 1.5–8.1)
SODIUM BLD-SCNC: 139 MMOL/L (ref 135–144)
SURGICAL PATHOLOGY REPORT: NORMAL
WBC # BLD: 4.8 K/UL (ref 3.5–11.3)
WBC # BLD: ABNORMAL 10*3/UL

## 2020-04-27 PROCEDURE — 99232 SBSQ HOSP IP/OBS MODERATE 35: CPT | Performed by: INTERNAL MEDICINE

## 2020-04-27 PROCEDURE — 85025 COMPLETE CBC W/AUTO DIFF WBC: CPT

## 2020-04-27 PROCEDURE — 6370000000 HC RX 637 (ALT 250 FOR IP): Performed by: STUDENT IN AN ORGANIZED HEALTH CARE EDUCATION/TRAINING PROGRAM

## 2020-04-27 PROCEDURE — 6360000002 HC RX W HCPCS: Performed by: STUDENT IN AN ORGANIZED HEALTH CARE EDUCATION/TRAINING PROGRAM

## 2020-04-27 PROCEDURE — 36415 COLL VENOUS BLD VENIPUNCTURE: CPT

## 2020-04-27 PROCEDURE — 99232 SBSQ HOSP IP/OBS MODERATE 35: CPT | Performed by: PSYCHIATRY & NEUROLOGY

## 2020-04-27 PROCEDURE — 2580000003 HC RX 258: Performed by: STUDENT IN AN ORGANIZED HEALTH CARE EDUCATION/TRAINING PROGRAM

## 2020-04-27 PROCEDURE — 97535 SELF CARE MNGMENT TRAINING: CPT

## 2020-04-27 PROCEDURE — 85055 RETICULATED PLATELET ASSAY: CPT

## 2020-04-27 PROCEDURE — 82947 ASSAY GLUCOSE BLOOD QUANT: CPT

## 2020-04-27 PROCEDURE — 80048 BASIC METABOLIC PNL TOTAL CA: CPT

## 2020-04-27 PROCEDURE — 6370000000 HC RX 637 (ALT 250 FOR IP): Performed by: INTERNAL MEDICINE

## 2020-04-27 RX ORDER — INSULIN GLARGINE 100 [IU]/ML
10 INJECTION, SOLUTION SUBCUTANEOUS ONCE
Status: COMPLETED | OUTPATIENT
Start: 2020-04-27 | End: 2020-04-27

## 2020-04-27 RX ORDER — INSULIN GLARGINE 100 [IU]/ML
30 INJECTION, SOLUTION SUBCUTANEOUS NIGHTLY
Status: DISCONTINUED | OUTPATIENT
Start: 2020-04-27 | End: 2020-04-27 | Stop reason: HOSPADM

## 2020-04-27 RX ORDER — LANCETS 30 GAUGE
1 EACH MISCELLANEOUS 3 TIMES DAILY
Qty: 600 EACH | Refills: 1 | Status: SHIPPED | OUTPATIENT
Start: 2020-04-27 | End: 2020-09-03

## 2020-04-27 RX ORDER — ATORVASTATIN CALCIUM 80 MG/1
80 TABLET, FILM COATED ORAL NIGHTLY
Qty: 30 TABLET | Refills: 3 | Status: SHIPPED | OUTPATIENT
Start: 2020-04-27 | End: 2020-06-23 | Stop reason: SDUPTHER

## 2020-04-27 RX ORDER — INSULIN GLARGINE 100 [IU]/ML
30 INJECTION, SOLUTION SUBCUTANEOUS NIGHTLY
Qty: 5 PEN | Refills: 3 | Status: SHIPPED | OUTPATIENT
Start: 2020-04-27 | End: 2020-09-03

## 2020-04-27 RX ORDER — ISOPROPYL ALCOHOL 0.7 ML/1
1 SWAB TOPICAL
Qty: 1 EACH | Refills: 1 | Status: SHIPPED | OUTPATIENT
Start: 2020-04-27 | End: 2020-09-03

## 2020-04-27 RX ORDER — CARVEDILOL 3.12 MG/1
3.12 TABLET ORAL 2 TIMES DAILY WITH MEALS
Qty: 60 TABLET | Refills: 3 | Status: SHIPPED | OUTPATIENT
Start: 2020-04-27 | End: 2020-09-03

## 2020-04-27 RX ORDER — LISINOPRIL 10 MG/1
10 TABLET ORAL DAILY
Qty: 30 TABLET | Refills: 3 | Status: SHIPPED | OUTPATIENT
Start: 2020-04-27 | End: 2020-09-03

## 2020-04-27 RX ORDER — BLOOD SUGAR DIAGNOSTIC
1 STRIP MISCELLANEOUS 3 TIMES DAILY
Qty: 100 EACH | Refills: 3 | Status: SHIPPED | OUTPATIENT
Start: 2020-04-27 | End: 2020-09-03

## 2020-04-27 RX ORDER — CLOPIDOGREL BISULFATE 75 MG/1
75 TABLET ORAL DAILY
Qty: 21 TABLET | Refills: 0 | Status: SHIPPED | OUTPATIENT
Start: 2020-04-28 | End: 2020-06-23 | Stop reason: SDUPTHER

## 2020-04-27 RX ADMIN — INSULIN GLARGINE 10 UNITS: 100 INJECTION, SOLUTION SUBCUTANEOUS at 09:30

## 2020-04-27 RX ADMIN — ACETAMINOPHEN 650 MG: 325 TABLET ORAL at 09:43

## 2020-04-27 RX ADMIN — INSULIN LISPRO 6 UNITS: 100 INJECTION, SOLUTION INTRAVENOUS; SUBCUTANEOUS at 09:42

## 2020-04-27 RX ADMIN — LISINOPRIL 10 MG: 10 TABLET ORAL at 09:43

## 2020-04-27 RX ADMIN — ENOXAPARIN SODIUM 40 MG: 40 INJECTION SUBCUTANEOUS at 09:42

## 2020-04-27 RX ADMIN — CARVEDILOL 3.12 MG: 3.12 TABLET, FILM COATED ORAL at 09:43

## 2020-04-27 RX ADMIN — CLOPIDOGREL 75 MG: 75 TABLET, FILM COATED ORAL at 09:43

## 2020-04-27 RX ADMIN — INSULIN LISPRO 8 UNITS: 100 INJECTION, SOLUTION INTRAVENOUS; SUBCUTANEOUS at 12:13

## 2020-04-27 RX ADMIN — SODIUM CHLORIDE, PRESERVATIVE FREE 10 ML: 5 INJECTION INTRAVENOUS at 10:30

## 2020-04-27 RX ADMIN — Medication 81 MG: at 09:43

## 2020-04-27 ASSESSMENT — PAIN DESCRIPTION - PAIN TYPE: TYPE: ACUTE PAIN

## 2020-04-27 ASSESSMENT — PAIN SCALES - GENERAL: PAINLEVEL_OUTOF10: 2

## 2020-04-27 ASSESSMENT — PAIN DESCRIPTION - DESCRIPTORS: DESCRIPTORS: DISCOMFORT

## 2020-04-27 ASSESSMENT — PAIN DESCRIPTION - LOCATION: LOCATION: HEAD

## 2020-04-27 NOTE — PROGRESS NOTES
[facility]  Internal Medicine Teaching Residency Program  Inpatient Daily Progress Note  ______________________________________________________________________________    Patient: Milad Domínguez  YOB: 1955   ZHB:[id number]    Acct: [de-identified]     Room: [facility]  Admit date: 4/24/2020  Today's date: 04/27/20  Number of days in the hospital: 3    SUBJECTIVE   Admitting Diagnosis: Cerebellar stroke ([facility] [address]  Consulted for Diabetic Management. Pt examined at bedside. Chart & results reviewed. Vitals reviewed, afebrile and hemodynamically stable. Labs reviewed, 243 glucose this morning. No overnight events per patient. Ordered an additional 10 units of lantus. Adjusted nightly dose 30 units. On Examination patient is resting comfortably in bed. States he slept great no complaints. Denies fevers, chills, chest pain or shortness of breath. ROS:  Constitutional:  negative for chills, fevers, sweats  Respiratory:  negative for cough, dyspnea on exertion, hemoptysis, shortness of breath, wheezing  Cardiovascular:  negative for chest pain, chest pressure/discomfort, lower extremity edema, palpitations  Gastrointestinal:  negative for abdominal pain, constipation, diarrhea, nausea, vomiting  Neurological:  negative for dizziness, headache  BRIEF HISTORY     The patient is a pleasant 60 y.o. male with a past medical history of Hypertension, Diabetes, Myocardial Infarctions and CAD s/p PCI presented to [facility] with a chief complaint of dizziness with associated right arm and leg weakness and dysarthria. CT head without contrast demonstrated no acute intracranial process but based on the patients presentation there was concern for CVA so he was transferred to [address] [place] [address] for further evaluation. Stroke Alert was called and Neurology evaluated the patient. MRI brain, MRI Head and Neck and MRI C Spine were ordered. Units Subcutaneous Once    insulin glargine  30 Units Subcutaneous Nightly    carvedilol  3.125 mg Oral BID WC    insulin lispro  0-18 Units Subcutaneous TID WC    insulin lispro  0-9 Units Subcutaneous Nightly    clopidogrel  75 mg Oral Daily    lisinopril  10 mg Oral Daily    sodium chloride flush  10 mL Intravenous 2 times per day    enoxaparin  40 mg Subcutaneous Daily    aspirin  81 mg Oral Daily    Or    aspirin  300 mg Rectal Daily    atorvastatin  80 mg Oral Nightly    sodium chloride flush  10 mL Intravenous 2 times per day     Continuous Infusions:    dextrose       PRN Medicationsondansetron, 4 mg, Q6H PRN  glucose, 15 g, PRN  dextrose, 12.5 g, PRN  glucagon (rDNA), 1 mg, PRN  dextrose, 100 mL/hr, PRN  sodium chloride flush, 10 mL, PRN  polyethylene glycol, 17 g, Daily PRN  promethazine, 12.5 mg, Q6H PRN  sodium chloride flush, 10 mL, PRN  acetaminophen, 650 mg, Q6H PRN    Or  acetaminophen, 650 mg, Q6H PRN        Diagnostic Labs:  CBC:   Recent Labs     04/25/20  1116 04/25/20  1822 04/27/20  0529   WBC 5.2 4.8 4.8   RBC 5.08 5.28 5.21   HGB 16.8 17.6* 16.7   HCT 48.1 49.3 48.7   MCV 94.7 93.4 93.5   RDW 12.1 12.2 12.0   PLT 86* See Reflexed IPF Result See Reflexed IPF Result     BMP:   Recent Labs     04/24/20  1100 04/25/20  0430 04/27/20  0529    138 139   K 3.3* 3.8 3.9   CL 99 101 103   CO2 23 22 22   PHOS  --  3.0  --    BUN 12 14 15   CREATININE 0.83 0.86 0.53*     BNP: No results for input(s): BNP in the last 72 hours. PT/INR:   Recent Labs     04/24/20  1100   PROTIME 10.6   INR 1.0     APTT:   Recent Labs     04/24/20  1100   APTT 21.7*     CARDIAC ENZYMES: No results for input(s): CKMB, CKMBINDEX, TROPONINI in the last 72 hours.     Invalid input(s): CKTOTAL;3  FASTING LIPID PANEL:  Lab Results   Component Value Date    CHOL 200 (H) 04/25/2020    HDL 43 04/25/2020    TRIG 213 (H) 04/25/2020     LIVER PROFILE:   Recent Labs     04/24/20  1100   AST 26   ALT 38   BILITOT 1.11

## 2020-04-27 NOTE — PROGRESS NOTES
Occupational Therapy  Facility/Department: [facility] NEURO  Daily Treatment Note  NAME: José Miguel Day  : 1955  MRN: [id number]    Date of Service: 2020    Discharge Recommendations:  Patient would benefit from continued therapy after discharge   Ed on OT services, transfer safety, ADLs, EC/WS, DME/AE, fall prevention tips- good return       Assessment   Performance deficits / Impairments: Decreased functional mobility ; Decreased ADL status; Decreased high-level IADLs  Prognosis: Good  REQUIRES OT FOLLOW UP: Yes  Activity Tolerance  Activity Tolerance: Patient Tolerated treatment well  Safety Devices  Safety Devices in place: Yes  Type of devices: All fall risk precautions in place;Call light within reach; Left in chair;Nurse notified         Patient Diagnosis(es): The encounter diagnosis was Right sided weakness. has a past medical history of Diabetes mellitus ([facility] [address] Meniere disease, MI (myocardial infarction) ([facility] [address] and Thrombocytopenia ([facility] [address] has a past surgical history that includes Coronary angioplasty with stent. Restrictions  Restrictions/Precautions  Restrictions/Precautions: Up as Tolerated, Fall Risk  Required Braces or Orthoses?: No  Position Activity Restriction  Other position/activity restrictions: up with assist  Subjective   General  Patient assessed for rehabilitation services?: Yes  Family / Caregiver Present: No    Pre Treatment Pain Screening  Intervention List: Patient able to continue with treatment  Vital Signs  Patient Currently in Pain: No   Orientation  Orientation  Overall Orientation Status: Within Functional Limits  Objective     pt receptive to ed and demo functional transfers. Pt stood at sink to complete ADLs. ADL  Grooming: Setup;Supervision; Increased time to complete(stood at sink)  UE Bathing: Supervision(stood at sink)  LE Dressing: Supervision(seated )  Toileting: Independent     Balance  Sitting Balance: Independent  Standing Balance: Supervision  Toilet Transfers  Toilet - Technique: Ambulating  Equipment Used: Raised toilet seat with rails  Toilet Transfer: Independent  Bed mobility  Rolling to Left: Supervision  Rolling to Right: Supervision  Supine to Sit: Supervision  Sit to Supine: Supervision  Scooting: Supervision  Transfers  Stand Step Transfers: Stand by assistance  Sit to stand: Stand by assistance  Stand to sit: Stand by assistance  Attendance  Participation: Active participation      Plan   Plan  Times per week: 3-5 x/wk  Current Treatment Recommendations: Balance Training, Functional Mobility Training, Endurance Training, Pain Management, Safety Education & Training, Patient/Caregiver Education & Training, Equipment Evaluation, Education, & procurement, Self-Care / ADL, Home Management Training      Goals  Short term goals  Time Frame for Short term goals: By discharge, pt will:  Short term goal 1: Demo Mod I with use of LRD PRN for functional  transfers and functional mobility  Short term goal 2: Demo I with UB ADLs and grooming tasks with setup provided  Short term goal 3: Demo SBA with use of AE PRN for LB ADLs and toileting tasks with setup provided  Short term goal 4: Demo 10+ minutes dynamic standing tolerance to increase safety and independence with functional ADLs  Short term goal 5: Demo I with safety awareness throughout therapy session       Therapy Time   Individual Concurrent Group Co-treatment   Time In  9:32         Time Out  10:00         Minutes  28         Timed Coded Minutes: NAZIA Maldonado/L

## 2020-04-27 NOTE — PROGRESS NOTES
CLINICAL PHARMACY NOTE: MEDS TO [address] Select Patient?: No  Total # of Prescriptions Filled: 9   The following medications were delivered to the patient:  · plavix   · lipitor   · Pen needles   · Lisinopril   · Coreg   · lantus   · Alcohol swabs   · lantus   · Syringes        Total # of Interventions Completed: 0  Time Spent (min): 0    Additional Documentation:

## 2020-04-27 NOTE — PROGRESS NOTES
Objective:   Vitals: /79   Pulse 69   Temp 96.7 °F (35.9 °C) (Oral)   Resp 13   Ht 6' 1\" (1.854 m)   Wt 235 lb (106.6 kg)   SpO2 95%   BMI 31.00 kg/m²   General appearance: alert and cooperative with exam  HEENT: Head: Normocephalic, no lesions, without obvious abnormality. Neck:no JVD, trachea midline, no adenopathy  Lungs: Clear to auscultation throughout, on RA  Heart: Regular rate and rhythm, s1/s2 auscultated, no murmurs, SR  Abdomen: soft, non-tender, bowel sounds active  Extremities: trace edema  Neurologic: not done    Echo 4/25/2020  Summary  Technically difficult study due to poor acoustic windows (significant lung  interference.)  Left ventricle is normal in size, mild left ventricular hypertrophy, global  left ventricular systolic function is moderately reduced, estimated ejection  fraction is 35%. [place] appears hypokinetic. Evidence of mild (grade I) diastolic dysfunction. Negative bubble study, no shunt noted. Assessment / Acute Cardiac Problems:   1. Chronic systolic CHF  2. CVA  3. HTN    Patient Active Problem List:     Cerebellar stroke [facility])     Right sided weakness     Abnormal CT of the head     Hypertensive urgency      Plan of Treatment:   1. Continue present POC & medications. Clinically no volume overload. Long discussion with patient regarding cardiology f/u as his cardiologist, Dr. Alexandr Owen, just retired. He states he has a list at home from his prior cardio recommendations and will call and make f/u close to his home ([place], [place]) soon as he gets home. Requests meds-to-beds prior to discharge  2. OK for d/c from CV standpoint.      Electronically signed by GAIL Pedroza CNP on 4/27/2020 at 9:30 AM  [address]  [phone]

## 2020-04-27 NOTE — PROGRESS NOTES
NEUROLOGY INPATIENT PROGRESS NOTE    4/27/2020         Subjective: Jenaro Thompson is a  60 y.o. male admitted on 4/24/2020 with Stroke determined by clinical assessment [facility]) [I63.9]  Stroke determined by clinical assessment ([facility] [address] [I63.9]    Briefly, this patient is a 21-year-old male patient past medical history of diabetes, myocardial infarction x 4 & 1 stent (2004), Ménière's disease, old stroke who presented with a complaint of right-sided weakness for about 2 days and continuous vertigo that is worse from his Ménière disease. He states that he fell 2 days ago and he describes it as a mechanical fall. No syncopal event. He has an association of nausea. At arrival to the [place] ED blood pressure was in the 170s and he also was loaded with dual antiplatelets. Head CT done in [place] was unremarkable for any new pathology and the NIH at [place] was a 4. At [facility] was 2 for right upper and right lower extremity dysmetria. She was admitted for stroke work-up with brain MRI, MRA head and neck, MRI cervical spine due to fall    Mesd at home: Aspirin 81mg PO D, Insulin. Used to be on Lisinopril long time ago      Patient was found to have thrombocytopenia. Hematology oncology consult which reviewed all his lab work, smear refer he most likely have clinic thrombocytopenia. Probably liver dysfunction. He was okay going for dual antiplatelet for 21 days and then monotherapy aspirin. He will follow with hematology outpatient     Patient was found to have an ejection fraction of 35. Patient knows he has a low ejection fraction due to his 4 myocardial infarction +1 stent. Cardiology evaluated patient since his cardiology retired.   Patient will be seen by [place] cardiology consultants for recommended to continue on ACE inhibitor (Lisinopril 10mg) and they added a beta-blocker (Carvedilol 3/125mg BID) and will follow outpatient     Today was evaluated at bedside and found oriented x3, no acute distress, Left Bicep (C5, C6): 2/2   Right Brachiocephalic (C6): 2/2                               Left Brachiocephalic (C6): 2/2     Right Patella (L4): 2/2                                              Left Patella (L4): 2/2   Right Achilles (S1): 2/2                                            Left Achilles (S1): 2/2      Maria Sign: none   Plantar (Babinski) Response: Downward     Negative Kerning & Brudzinski   STATION and GAIT  Normal gait, unstable on tandem gait     Data:    Lab Results:   CBC:   Recent Labs     04/25/20  1116 04/25/20  1822 04/27/20  0529   WBC 5.2 4.8 4.8   HGB 16.8 17.6* 16.7   HCT 48.1 49.3 48.7   PLT 86* See Reflexed IPF Result See Reflexed IPF Result      Ref.  Range 4/24/2020 11:00 4/25/2020 04:30   Platelet, Fluorescence Latest Ref Range: 138 - 453 k/uL 83 (L) 96 (L)     BMP:    Recent Labs     04/24/20  1100 04/25/20  0430    138   K 3.3* 3.8   CL 99 101   CO2 23 22   BUN 12 14   CREATININE 0.83 0.86   GLUCOSE 225* 275*     Lab Results   Component Value Date    CHOL 200 (H) 04/25/2020    LDLCHOLESTEROL 114 04/25/2020    HDL 43 04/25/2020    TRIG 213 (H) 04/25/2020    ALT 38 04/24/2020    AST 26 04/24/2020    TSH 1.58 04/25/2020    INR 1.0 04/24/2020    LABA1C 9.8 (H) 04/25/2020    [id number] 551 04/25/2020     IMAGING    Head CT WO  Impression   No acute intracranial abnormality.       Mild left frontal lobe encephalomalacia.       The results of the examination were discussed with Dr. Tessie Montgomery on 4/24/2020   at 11:09 a.m.         Brain MRI WO & MRA H/N  Impression   Head:       Multiple acute infarcts in the right cerebellar hemisphere.  Additional   punctate acute infarct right lateral kateryna.       Old infarct left frontal lobe       Minimal small-vessel ischemic change       No focal significant arterial narrowing in the head.  Detail in the periphery   is limited due to artifact       No focal significant arterial narrowing in the neck.  Detail is limited due retired a long time ago and will follow-up with them.    //Thrombocytopenia //   Patient with a platelet of 83 on 0/80/6096 in 96 on 4/25/2020. There is no other records of platelets in the system and in care everywhere there is no records for thrombocytopenia. Multiple lab work was sent and hematology consult was placed. Hematology evaluated for well reviewed lab refer he most likely have clinical thrombocytopenia most likely from liver dysfunction.   He was okay for patient to be on dual antiplatelet for short period and then will follow-up outpatient with patient    PLAN  Aspirin 81 mg p.o. daily  Clopidogrel 75 mg p.o. daily for 21 days  Atorvastatin 80 mg p.o. nightly  And Neupro 10 mg p.o. daily  Clopidogrel 3.125 mg p.o. twice daily  Internal medicine correct diabetes  PT, OT, speech  Discharge planning -most likely discharged today    Orlin Kee [place] [id number] [facility]  Adult General Neurology Resident PGY-3  4/27/2020 at 7:11 AM

## 2020-04-28 LAB — PATHOLOGIST REVIEW: NORMAL

## 2020-04-28 NOTE — CARE COORDINATION
.    Stroke Follow up Phone Call    Bobbynick this is Lida Johnston from [facility] stroke team calling to see how you are doing since your d/c. Medication List      START taking these medications    Alcohol Wipes 70 % Pads  1 packet by Does not apply route 3 times daily (with meals)     atorvastatin 80 MG tablet  Commonly known as:  LIPITOR  Take 1 tablet by mouth nightly     carvedilol 3.125 MG tablet  Commonly known as:  COREG  Take 1 tablet by mouth 2 times daily (with meals)     clopidogrel 75 MG tablet  Commonly known as:  PLAVIX  Take 1 tablet by mouth daily for 21 days     * insulin lispro 100 UNIT/ML injection vial  Commonly known as:  HUMALOG  Inject 0-12 Units into the skin 3 times daily (with meals)     * insulin lispro 100 UNIT/ML injection vial  Commonly known as:  HUMALOG  Inject 0-6 Units into the skin nightly     Insulin Pen Needle 32G X 4 MM Misc  1 each by Does not apply route daily     Insulin Syringe-Needle U-100 31G X 5/16\" 1 ML Misc  Commonly known as:  [employer] Insulin Syringe  1 each by Does not apply route 3 times daily     Lancets Misc  1 each by Does not apply route 3 times daily     Lantus SoloStar 100 UNIT/ML injection pen  Generic drug:  insulin glargine  Inject 30 Units into the skin nightly     lisinopril 10 MG tablet  Commonly known as:  PRINIVIL;ZESTRIL  Take 1 tablet by mouth daily         * This list has 2 medication(s) that are the same as other medications prescribed for you. Read the directions carefully, and ask your doctor or other care provider to review them with you.             CONTINUE taking these medications    aspirin 81 MG tablet     NONFORMULARY     NONFORMULARY        STOP taking these medications    HumuLIN 70/30 (70-30) 100 UNIT per ML injection vial  Generic drug:  insulin 70-30     insulin aspart protamine-insulin aspart (70-30) 100 UNIT/ML injection  Commonly known as:  NOVOLOG 70/30           Where to Get Your Medications      These

## 2020-05-15 LAB
GLUCOSE BLD-MCNC: 136 MG/DL (ref 75–110)
GLUCOSE BLD-MCNC: 228 MG/DL (ref 75–110)

## 2020-05-21 ENCOUNTER — TELEPHONE (OUTPATIENT)
Dept: NEUROLOGY | Age: 65
End: 2020-05-21

## 2020-05-27 NOTE — TELEPHONE ENCOUNTER
Per Dr. Monique Altamirano through [employer]:  5/27/20 1:37 PM   For micky Soto letter is in the chart. I mentioned 5 days per week but cant state weekends should be off. Thanks     Called and left a VM informing patient of this letter has been faxed.

## 2020-06-23 ENCOUNTER — OFFICE VISIT (OUTPATIENT)
Dept: NEUROLOGY | Age: 65
End: 2020-06-23
Payer: COMMERCIAL

## 2020-06-23 VITALS
DIASTOLIC BLOOD PRESSURE: 93 MMHG | RESPIRATION RATE: 16 BRPM | TEMPERATURE: 97.6 F | SYSTOLIC BLOOD PRESSURE: 159 MMHG | HEART RATE: 87 BPM

## 2020-06-23 PROCEDURE — 3017F COLORECTAL CA SCREEN DOC REV: CPT | Performed by: STUDENT IN AN ORGANIZED HEALTH CARE EDUCATION/TRAINING PROGRAM

## 2020-06-23 PROCEDURE — 1036F TOBACCO NON-USER: CPT | Performed by: STUDENT IN AN ORGANIZED HEALTH CARE EDUCATION/TRAINING PROGRAM

## 2020-06-23 PROCEDURE — 99214 OFFICE O/P EST MOD 30 MIN: CPT | Performed by: STUDENT IN AN ORGANIZED HEALTH CARE EDUCATION/TRAINING PROGRAM

## 2020-06-23 PROCEDURE — G8417 CALC BMI ABV UP PARAM F/U: HCPCS | Performed by: STUDENT IN AN ORGANIZED HEALTH CARE EDUCATION/TRAINING PROGRAM

## 2020-06-23 PROCEDURE — G8427 DOCREV CUR MEDS BY ELIG CLIN: HCPCS | Performed by: STUDENT IN AN ORGANIZED HEALTH CARE EDUCATION/TRAINING PROGRAM

## 2020-06-23 RX ORDER — MECLIZINE HYDROCHLORIDE 25 MG/1
25 TABLET ORAL 3 TIMES DAILY PRN
Qty: 60 TABLET | Refills: 0 | Status: SHIPPED | OUTPATIENT
Start: 2020-06-23 | End: 2020-07-23

## 2020-06-23 RX ORDER — CLOPIDOGREL BISULFATE 75 MG/1
75 TABLET ORAL DAILY
Qty: 90 TABLET | Refills: 3 | Status: SHIPPED | OUTPATIENT
Start: 2020-06-23 | End: 2020-09-03

## 2020-06-23 RX ORDER — MECLIZINE HYDROCHLORIDE 25 MG/1
25 TABLET ORAL 3 TIMES DAILY PRN
Qty: 60 TABLET | Refills: 0 | Status: SHIPPED | OUTPATIENT
Start: 2020-06-23 | End: 2020-06-23

## 2020-06-23 RX ORDER — ATORVASTATIN CALCIUM 80 MG/1
80 TABLET, FILM COATED ORAL NIGHTLY
Qty: 30 TABLET | Refills: 3 | Status: SHIPPED | OUTPATIENT
Start: 2020-06-23 | End: 2020-09-03

## 2020-06-23 ASSESSMENT — ENCOUNTER SYMPTOMS
WHEEZING: 0
STRIDOR: 0
NAUSEA: 1
SHORTNESS OF BREATH: 0
CONSTIPATION: 0
COUGH: 0
BACK PAIN: 0
SINUS PAIN: 0
VOMITING: 0
PHOTOPHOBIA: 0

## 2020-06-23 NOTE — PROGRESS NOTES
[address] [facility] [address] [id number]  Dept: [phone]  Dept Fax: [phone]    NEUROLOGY FOLLOW UP NOTE                                              PATIENT NAME: Keven Patton   PATIENT MRN: [id number]  FOLLOW UP TODAY: 6/23/2020        INITIAL & INTERVAL HISTORY:     26-year-old male with medical history of coronary artery disease, hypertension, insulin-dependent diabetes, dyslipidemia, who has been managed in the hospital 2 months ago for acute ischemic stroke involving the right cerebellar hemisphere and right kateryna.  Patient presented that time with new onset dizziness andd right-sided weakness for 2 days.  He was out of TPA window. Bong Rocha had MRI and MRA of the head and neck that showed acute strokes as mentioned and old stroke in the left frontal lobe. .  Currently patient reports fluctuation of symptoms.  He has intermittent dizziness associated with nausea that is provoked by changing his posture and upward gaze.  He reports no significant weakness of his arms and legs.  He reports difficulty with controlling his right arm and leg.  He has unstable gait especially when he tried to rotate. Bong Rocha uses a cane occasionally     PMH    has a past medical history of Diabetes mellitus ([facility] [address] Meniere disease, MI (myocardial infarction) ([facility] [address] and Thrombocytopenia ([facility] [address] PSH/SH/FMH: Remain unchanged since last visit 5/21/2020.     ALLERGIES:   No Known Allergies    MEDICATIONS:   Current Outpatient Medications   Medication Sig Dispense Refill    atorvastatin (LIPITOR) 80 MG tablet Take 1 tablet by mouth nightly 30 tablet 3    lisinopril (PRINIVIL;ZESTRIL) 10 MG tablet Take 1 tablet by mouth daily 30 tablet 3    carvedilol (COREG) 3.125 MG tablet Take 1 tablet by mouth 2 times daily (with meals) 60 tablet 3    Alcohol Swabs (ALCOHOL WIPES) 70 % PADS 1 packet by Does not apply route 3 times daily (with meals) 1 each 1  Lancets MISC 1 each by Does not apply route 3 times daily 600 each 1    Insulin Pen Needle 32G X 4 MM MISC 1 each by Does not apply route daily 100 each 3    insulin glargine (LANTUS SOLOSTAR) 100 UNIT/ML injection pen Inject 30 Units into the skin nightly 5 pen 3    insulin lispro (HUMALOG) 100 UNIT/ML injection vial Inject 0-12 Units into the skin 3 times daily (with meals) 1 vial 3    insulin lispro (HUMALOG) 100 UNIT/ML injection vial Inject 0-6 Units into the skin nightly 1 vial 3    Insulin Syringe-Needle U-100 ([employer] INSULIN SYRINGE) 31G X 5/16\" 1 ML MISC 1 each by Does not apply route 3 times daily 100 each 3    NONFORMULARY Take 250 mg by mouth 2 times daily Leceithin    Over the counter medication to decrease cholesterol level.  NONFORMULARY Take 1 tablet by mouth 2 times daily Liver Cleanse    Over the counter medication to help maintain normal blood glucose level.  aspirin 81 MG tablet Take 81 mg by mouth daily      clopidogrel (PLAVIX) 75 MG tablet Take 1 tablet by mouth daily for 21 days 21 tablet 0     No current facility-administered medications for this visit. PREVIOUS WORKUP:  A1C: 9.8  Lipid profile: , HDL 43, Cholesterol 200    MRI brain w/o   MRA H/N w/o     Multiple acute infarcts in the right cerebellar hemisphere. Additional punctate acute infarct right lateral kateryna. Old infarct left frontal lobe  Minimal small-vessel ischemic change No focal significant arterial narrowing in the head. Detail in the periphery is limited due to artifact  No focal significant arterial narrowing in the neck. Detail is limited due to artifact               LABS & TESTS:      Lab Results   Component Value Date    WBC 4.8 04/27/2020    HGB 16.7 04/27/2020    HCT 48.7 04/27/2020    MCV 93.5 04/27/2020    PLT See Reflexed IPF Result 04/27/2020       REVIEW OF SYSTEMS:     Review of Systems   Constitutional: Negative for chills, fatigue and fever.    HENT: Negative for hearing loss and sinus pain. Eyes: Positive for visual disturbance. Negative for photophobia. Respiratory: Negative for cough, shortness of breath, wheezing and stridor. Cardiovascular: Positive for palpitations. Negative for chest pain and leg swelling. Gastrointestinal: Positive for nausea. Negative for constipation and vomiting. Musculoskeletal: Positive for gait problem and neck pain. Negative for arthralgias, back pain, joint swelling, myalgias and neck stiffness. Skin: Negative. Neurological: Positive for dizziness and light-headedness. Negative for tremors, seizures, syncope, facial asymmetry, speech difficulty, weakness, numbness and headaches. VITALS  BP (!) 159/93 (Site: Right Upper Arm, Position: Sitting, Cuff Size: Medium Adult)   Pulse 87   Temp 97.6 °F (36.4 °C)   Resp 16     PHYSICAL EXAMINATION:     Physical Exam  Neurological:      Mental Status: He is oriented to person, place, and time. Coordination: Finger-Nose-Finger Test abnormal and Heel to [facility] Test abnormal.      Deep Tendon Reflexes: Strength normal.      Reflex Scores:       Bicep reflexes are 1+ on the right side and 1+ on the left side. Patellar reflexes are 1+ on the right side and 1+ on the left side. Psychiatric:         Speech: Speech normal.          General appearance: cooperative  Skin: no rash or skin lesions. HEENT: normocephalic  Optic Fundi: deferred  Neck: supple, no cervcical adenopathy or carotid bruit  Lungs: clear to auscultation  Heart: Regular rate and rhythm, normal S1, S2. No murmurs, clicks or gallops. Peripheral pulses: radial pulses palpable  Abdominal: BS present, soft, NT, ND  Extremities: no edema    NEUROLOGICAL EXAMINATION:      Neurologic Exam     Mental Status   Oriented to person, place, and time. Attention: normal.   Speech: speech is normal   Level of consciousness: alert    Cranial Nerves   Cranial nerves II through XII intact.      Motor Exam   Muscle bulk: normal  Overall muscle tone: normal  Right arm tone: normal  Left arm tone: normal  Right arm pronator drift: present  Left arm pronator drift: absent  Right leg tone: normal  Left leg tone: normal    Strength   Strength 5/5 throughout. Sensory Exam   Light touch normal.   Pinprick normal.     Gait, Coordination, and Reflexes     Gait  Gait: wide-based    Coordination   Finger to nose coordination: abnormal  Heel to shin coordination: abnormal    Reflexes   Right biceps: 1+  Left biceps: 1+  Right patellar: 1+  Left patellar: 1+  Right Maria: absent  Left Maria: absent        ASSESSMENT / PLAN:      Cerebellar ataxia due to recent right cerebellar hemisphere stroke   Patient has intermittent palpitation   Loop recorder. Patient prefers to discuss with PCP prior to having a loop recorder   Meclizine 25 mg TID PRN for dizziness  Risk factors: IDDM, dyslipidemia,   Counseled about control of DM, Blood pressure  Counseled about using a cane for unstable gait. He reports having a cane at home  Switching aspirin to plavix  Continue atorvastatin 80 mg HS  Blood pressure goal < 140/90  LDL goal less than 70     Meniere's disease, currently asymptomatic   CAD          Mr. Jessica Joseph received counseling on the following healthy behaviors: medical compliance, smoking cessation, blood pressure control, regular follow up with primary doctor.         Electronically signed by Carylon Frankel, MD on 6/23/2020 at 1:26 PM

## 2020-09-17 ENCOUNTER — HOSPITAL ENCOUNTER (OUTPATIENT)
Dept: DIABETES SERVICES | Age: 65
Setting detail: THERAPIES SERIES
Discharge: HOME | End: 2020-09-17
Payer: MEDICAID

## 2020-09-17 PROCEDURE — G0108 DIAB MANAGE TRN  PER INDIV: HCPCS

## 2020-09-17 RX ORDER — MULTIVITAMIN WITH IRON
500 TABLET ORAL EVERY MORNING
Status: ON HOLD | COMMUNITY
End: 2021-09-03

## 2020-09-17 RX ORDER — ASCORBIC ACID 1000 MG
TABLET ORAL DAILY
COMMUNITY

## 2020-09-17 SDOH — ECONOMIC STABILITY: FOOD INSECURITY: ADDITIONAL INFORMATION: NO

## 2020-09-17 NOTE — PROGRESS NOTES
Diabetes Self-Management Education Record    Progress Note:  Patient arrived for diabetes education initial assessment by self. He reports having Type 2 diabetes for about 20 years. His A1C 09/14/20 was 9.8. He denies family history of Type 2 diabetes but there is significant family history of heart disease. He has had 4 MI's and a stroke. He has seen a Naturopathic physician and was not taking Lantus due to cost.  He is currently unemployed due to stroke and does have issues with balance and dizziness. He states he is taking Lantus insulin BID which after reviewing the chart discuss the order states daily. He has an appointment today with NP and informed him he needs to discuss this and make sure he is taking the correct amount. He had been using Humulin N and he does mention if his glucose is high he takes some of this in addition. Discuss this can be very dangerous and to only take medications that are currently being prescribed. He had previously been on Metformin and was unable to tolerate due to stomach issues. Currently is on Trulicity as well. Avery Buck does report he has had previous diabetes education and did see Dr. Liberty Valdivia (endocrinology) but quit going due to cost and travel. Currently is checking blood glucose 4 times a day with wide ranges, however, he is sometimes checking glucose 1/2-1 hour after breakfast and sometimes before supper. His logsheet has no times or indication of what exact time of day he is checking and meter is not brought to appointment. His glucose is checked in office at 2 pm and is 212. He did need to leave for appointment with NP and then returned to finish appointment. He was told at appointment that Lantus is daily and now dose is changed to 50 units and sliding scale is added at meals. Fasting glucose has been  and others range from 127-380. Instructed to check glucose fasting and before meals for sliding scale.     There is no specific eating or meal plan and often is having one meal a day. He just started including breakfast and remind him of the importance of being consistent with eating in timing and amounts. He does report making some positive changes like eliminating regular soda, ice cream, and sweets. He is rather inconsistent on information regarding diet. Highly encourage plate method, decreasing portions, and eating consistently. Discuss the importance of physical activity and finding activities that he can perform safely with his limitations. Encourage him to look at You Tube for videos he can do at home and to consider chair exercises due to issues with right leg from the stroke and dizziness and balance issues. Encourage hm to limit sitting for no more than 60 minutes. He will also try to use his stationary bike however he is unsure if he can perform with his leg. Again encourage him to start with some chair exercises or stand holding onto a chair. Some episodes of hypoglycemia are noted and reviewed proper treatment of hypoglycemia. Verbalizes understanding. He reports he thinks he has enough information about diabetes at home and states \"I know what to do and have the knowledge I just need to start doing it.'  Highly encourage him to return and he agrees to come back in a month. Highly encourage support group which he declined at this time. Declined dietitian appointment. We did discuss natural supplements he is using and reminded him of the importance of having a pharmacist review with his prescribed medications to ensure safety. Office number is given to call with questions or concerns.   Participant Name: Lisa Kinney   Referring Provider:  GAIL Feldman CNP    Keys to learning:  Considerations: []Language []Emotional []Health Literacy  []Cognitive []Memory changes [x]Financial []Cultural   []Druze []Vision []Hearing  []Speech []Lack of desire  []Literacy  []Psycho-social  []None  If considerations are noted, accommodations made:     Identified barriers to learning/self management: financial concerns, decreased mobility and function right leg, dizziness and balance issues    The following information was discussed:    [x] Diabetes disease process and treatment options   [x] Healthy nutrition, carbohydrate counting, meal planning  [] Monitoring blood glucose and other parameters; interpreting and using results  [x] Acute complications--prevention, detection and treatment  [x] Medication management and safety   [x] Incorporating physical activity into lifestyle   [x] Exercise for Health, Reducing Risks for Heart Disease, Diabetes and Heart Health  [] Preventing, through risk reduction behaviors, detecting, and treating chronic complications  [] Sick Day management  [] Developing personalized strategies to address psychosocial issues and concerns  [x] Developing personalized strategies to promote health and behavior change through goalsetting, behavior change strategies aimed at risk reduction  [] Special situations--disaster planning, travel, social activities    Session Assessment & Evaluation Ratings:  1=Needs Instruction  2=Needs Review  3=Comprehends Key Points  4=Demonstrates Understanding/Competency  NC=Not Covered   N/A=Not Applicable Initial  Assess    Date:  09/17/20 2nd  Visit     Date:   3rd  Visit    Date: 4th  Visit    Date: Comments  S.O.C=Stage of Change/Readiness to change:  · Pre=Pre-contemplation stage--not thinking about changing  · C=Contemplation stage--ambivalent about changing  · P=Preparation stage--prepared to made a specific change  · A=Action stage--committed to modify behaviors  · M=Maintenance and relapse prevention--incorporating new behavior   Participant Stated  Goal      I will eat 3 meals a day. Participant Stated Goal  I will exercise by doing a video at least 3 days a week then increase to 5 days a week.      S.O.C  [] PRE  [x] C  [] P      [] A  [] M                    S.O.C  [] PRE  [x] C  [] P      [] A  [] M     S.O.C  [] PRE  [] C  [] P      [] A  [] M                     S.O.C  [] PRE  [] C  [] P      [] A  [] M      S.O.C  [] PRE  [] C  [] P      [] A  [] M                    S.O.C  [] PRE  [] C  [] P      [] A  [] M     S.O.C  [] PRE  [] C  [] P      [] A  [] M                    S.O.C  [] PRE  [] C  [] P      [] A  [] M   Current main goal attainment frequency:   [x] Never  [] Some  [] Half  [] Most  [] All    Participant confidence to master goal this visit:   [x] Excellent  [] Good  [] Claudean Patrick  [] Poor            Current main goal attainment frequency:   [x] Never  [] Some  [] Half  [] Most  [] All    Participant confidence to master goal this visit:   [] Excellent  [x] Good [] Fair  [] Poor                  Session  Topics & Learning Objectives:      Comments:   Diabetes disease process & Treatment process: Define diabetes & prediabetes; identify own type of diabetes; role of the pancreas; signs/symptoms; diagnostic criteria; prevention and treatment options; contributing factors. Rating  [] 1  [] 2  [x] 3  [] 4      [] NC  [] N/A   Rating  [] 1  [] 2  [] 3  [] 4  [] NC  [] N/A     Rating  [] 1  [] 2  [] 3  [] 4  [] NC  [] N/A     Rating  [] 1  [] 2  [] 3  [] 4  [] NC  [] N/A           Incorporating nutritional management into lifestyle: Describe effect of type, amount & timing of food on blood glucose; Describe basic meal planning techniques & current nutrition guidelines; Correctly read food labels & demonstrate CHO counting & portion control with personalized meal plan. Rating  [] 1  [x] 2  [] 3  [] 4  [] NC  [] N/A     Rating  [] 1  [] 2  [] 3  [] 4  [] NC  [] N/A     Rating  [] 1  [] 2  [] 3  [] 4  [] NC  [] N/A     Rating  [] 1  [] 2  [] 3  [] 4  [] NC  [] N/A                 Incorporating physical activity into lifestyle:   State effect of exercise on blood glucose levels.   Identifies personal exercise plan and contraindications. Discussed safety tips while exercising. Rating  [] 1  [x] 2  [] 3  [] 4  [] NC  [] N/A     Rating  [] 1  [] 2  [] 3  [] 4  [] NC  [] N/A       Rating  [] 1  [] 2  [] 3  [] 4  [] NC  [] N/A     Rating  [] 1  [] 2  [] 3  [] 4  [] NC  [] N/A           Using medications safely: State effect of diabetes medicines on diabetes;   Name diabetes medication taking, action, timing & side effects. Rating  [x] 1  [] 2  [] 3  [] 4  [] NC  [] N/A       Rating  [] 1  [] 2  [] 3  [] 4  [] NC  [] N/A         Rating  [] 1  [] 2  [] 3  [] 4  [] NC  [] N/A   Rating  [] 1  [] 2  [] 3  [] 4  [] NC  [] N/A      ________             Insulin/injectable-  Appropriate injection site; proper storage; proper technique; safe needle disposal.   Rating  [x] 1  [] 2  [] 3  [] 4  [] NC  [] N/A Rating  [] 1  [] 2  [] 3  [] 4  [] NC  [] N/A Rating  [] 1  [] 2  [] 3  [] 4  [] NC  [] N/A Rating  [] 1  [] 2  [] 3  [] 4  [] NC  [] N/A        Monitoring blood glucose, interpreting and using results: Identify recommended blood glucose targets, personal targets, A1C target, importance of logging glucose,appropriate techniques and problem solving. Safe lancet disposal.    Rating  [] 1  [x] 2  [] 3  [] 4  [] NC  [] N/A     Rating  [] 1  [] 2  [] 3  [] 4  [] NC  [] N/A       Rating  [] 1  [] 2  [] 3  [] 4  [] NC  [] N/A     Rating  [] 1  [] 2  [] 3  [] 4  [] NC  [] N/A         Prevention, detection & treatment of acute complications: List symptoms of hyper- and hypoglycemia; Describe how to treat low blood sugar & actions for lowering high blood glucose levels. Prevention and treatment strategies. Rating  [] 1  [x] 2  [] 3  [] 4  [] NC  [] N/A   Rating  [] 1  [] 2  [] 3  [] 4  [] NC  [] N/A   Rating  [] 1  [] 2  [] 3  [] 4  [] NC  [] N/A Rating  [] 1  [] 2  [] 3  [] 4  [] NC  [] N/A                   Describe sick day guidelines and indications for physician notification.    Rating  [] 1  [] 2  [] 3  [] 4  [x] NC  [] N/A Rating  [] 1  [] 2  [] 3  [] 4  [] NC  [] N/A     Rating  [] 1  [] 2  [] 3  [] 4  [] NC  [] N/A     Rating  [] 1  [] 2  [] 3  [] 4  [] NC  [] N/A        Prevention, detection & treatment of chronic complications: Define the natural course of diabetes & describe the relationship of blood glucose levels to long term complications of diabetes. Identify preventative measures and standard of care. Rating  [x] 1  [] 2  [] 3  [] 4  [] NC  [] N/A     Rating  [] 1  [] 2  [] 3  [] 4  [] NC  [] N/A     Rating  [] 1  [] 2  [] 3  [] 4  [] NC  [] N/A     Rating  [] 1  [] 2  [] 3  [] 4  [] NC  [] N/A      Developing strategies to address psychosocial issues: Describe feelings about living with diabetes; Identify support needed & support network. Describe how stress, depression, and anxiety affect blood glucose. Identify coping strategies. Rating  [x] 1  [] 2  [] 3  [] 4  [] NC  [] N/A       Rating  [] 1  [] 2  [] 3  [] 4  [] NC  [] N/A     Rating  [] 1  [] 2  [] 3  [] 4  [] NC  [] N/A     Rating  [] 1  [] 2  [] 3  [] 4  [] NC  [] N/A          Developing strategies to promote health/change behavior:  Define the ABCs of diabetes; Identify appropriate screenings, schedule & personal plan for screenings. Identify 7 self-care behaviors. Benefits, challenges and strategies for behavioral change. Rating  [x] 1  [] 2  [] 3  [] 4  [] NC  [] N/A     Rating  [] 1  [] 2  [] 3  [] 4  [] NC  [] N/A     Rating  [] 1  [] 2  [] 3  [] 4  [] NC  [] N/A     Rating  [] 1  [] 2  [] 3  [] 4  [] NC  [] N/A             Time spent with patient: [id number], [id number]    Next Appointment: one month     Instruction Method: [x]Lecture/Discussion  []Power Point Presentation  [x]Handouts  []Return Demonstration     Education Materials/Equipment Provided:      [x]Self-Management - Initial assessment - ADA  Where do I Begin?  Living with Type 2 diabetes\" booklet;  Diet meal planning basics, handout on diabetes education classes, hyper/hypoglycemia signs/symptoms and treatment handout; Diabetes zones; Support plan resource list.      []Self-Management  Class 1 - Diabetes: Your Take Control Guide\" booklet. Healthy Interactions [facility] \"On The Road To Better Managing Your Diabetes\". [] Self-Management  Class 2 -  \"Traveling with Diabetes\", Dining Out With Diabetes, \"Coping with Diabetes\", \"Diabetes Disaster Planning\", \"Know Your Numbers/Diabetes Care Checklist\", [address] [place] [address] Blood Sugar, Low Blood Sugar. Healthy Interactions Map \"Monitoring Your Blood Glucose. \"     [] Self-Management  Class 3 - \"Risk Factors for CVD\", foot care tips sheet and \"How to pick the right shoe\", \"Medications Used To Treat Diabetes\", How To Care For Your Teeth and Gums\", \"Vaccinations For Adults with Diabetes\", \"Type 2 diabetes and the role of GLP-1\". Healthy Interactions Map \"Continuing Your Journey\". []Self-Management - 3 month follow-up      []Glucose Meter      []Insulin Kit      []Other        Handouts/Booklets given:     [] Diabetes-Your Take Control Guide   [] Daily Diabetic Meal Planning Guide   [] Nutrition in the [facility] [address]    [] Resources for People With Diabetes   [] Other       Diabetes Self Management Support Plan: To assist and support your continued progress in managing your diabetes following education-    ( X )  Gym or exercise program of your choice. (Suggestions:  [employer], Circuit training, any exercise facility and your local Physical Therapy or Cardiac Rehabilitation exercise Program)      (  )   Library    ( X )    ADA website:  BrowserReview.ca. org    (  )   Http: DotProtection.gl    (  )   Diabetes Forecast [place] you may get this information on the ADA web site.     (  )  Diabetes Interview - [place]   [phone]    (  )  Diabetes Self Management (bi-monthly magazine)  [phone]    (  ) Support group: [place] first Tuesday of the month at 9 am and [facility] third Wednesday of the month at 9 am    (  ) Health Journeys [employer]  (relaxation tapes for people with Diabetes)  [phone]    (  )  Your suggestions:

## 2020-09-30 ENCOUNTER — HOSPITAL ENCOUNTER (OUTPATIENT)
Dept: NON INVASIVE DIAGNOSTICS | Age: 65
Discharge: HOME | End: 2020-09-30
Payer: MEDICAID

## 2020-09-30 ENCOUNTER — OFFICE VISIT (OUTPATIENT)
Dept: CARDIOLOGY | Age: 65
End: 2020-09-30
Payer: MEDICAID

## 2020-09-30 VITALS
WEIGHT: 259.8 LBS | DIASTOLIC BLOOD PRESSURE: 73 MMHG | BODY MASS INDEX: 34.43 KG/M2 | HEART RATE: 69 BPM | HEIGHT: 73 IN | SYSTOLIC BLOOD PRESSURE: 121 MMHG | RESPIRATION RATE: 17 BRPM | OXYGEN SATURATION: 99 %

## 2020-09-30 PROBLEM — E66.9 OBESITY: Status: ACTIVE | Noted: 2020-09-30

## 2020-09-30 PROBLEM — I25.2 HISTORY OF MYOCARDIAL INFARCTION: Status: ACTIVE | Noted: 2020-09-30

## 2020-09-30 PROBLEM — Z82.49 FAMILY HISTORY OF HEART DISEASE: Status: ACTIVE | Noted: 2020-09-30

## 2020-09-30 PROBLEM — I25.5 ISCHEMIC CARDIOMYOPATHY: Status: ACTIVE | Noted: 2020-09-30

## 2020-09-30 PROBLEM — I10 ESSENTIAL HYPERTENSION: Status: ACTIVE | Noted: 2020-09-30

## 2020-09-30 PROBLEM — I25.10 ASHD (ARTERIOSCLEROTIC HEART DISEASE): Status: ACTIVE | Noted: 2020-09-30

## 2020-09-30 PROBLEM — Z86.73 HISTORY OF STROKE: Status: ACTIVE | Noted: 2020-09-30

## 2020-09-30 PROBLEM — E78.2 MIXED HYPERLIPIDEMIA: Status: ACTIVE | Noted: 2020-09-30

## 2020-09-30 PROBLEM — Z95.820 S/P ANGIOPLASTY WITH STENT: Status: ACTIVE | Noted: 2020-09-30

## 2020-09-30 PROCEDURE — 99244 OFF/OP CNSLTJ NEW/EST MOD 40: CPT | Performed by: INTERNAL MEDICINE

## 2020-09-30 PROCEDURE — 0296T HC EXT ECG RECORDING 2-21 DAY HOOKUP: CPT

## 2020-09-30 PROCEDURE — 93000 ELECTROCARDIOGRAM COMPLETE: CPT | Performed by: INTERNAL MEDICINE

## 2020-09-30 PROCEDURE — G8427 DOCREV CUR MEDS BY ELIG CLIN: HCPCS | Performed by: INTERNAL MEDICINE

## 2020-09-30 PROCEDURE — G8417 CALC BMI ABV UP PARAM F/U: HCPCS | Performed by: INTERNAL MEDICINE

## 2020-09-30 RX ORDER — EZETIMIBE 10 MG/1
10 TABLET ORAL DAILY
Qty: 90 TABLET | Refills: 1 | Status: SHIPPED | OUTPATIENT
Start: 2020-09-30 | End: 2021-04-29

## 2020-09-30 NOTE — PATIENT INSTRUCTIONS
SURVEY:    You may be receiving a survey from [employer] regarding your visit today. Please complete the survey to enable us to provide the highest quality of care to you and your family. If you cannot score us a very good on any question, please call the office to discuss how we could have made your experience a very good one. Thank you.       Your Medical Assistant today was [facility] :)

## 2020-09-30 NOTE — PROGRESS NOTES
Adelaida Moody am scribing for and in the presence of Walter Bunch MD.    Patient: Eze Rios  : 1955  Date of Visit: 2020    REASON FOR VISIT / CONSULTATION: Establish Cardiologist (Referral from Vannessa Kwok with Stroke & Myocardial Infarction. Echo & EKG on . He has trouble with crystals in his ears, dizziness (all day long for the last 5 months). SOB often. Very rare palpitations related to high sugar. Denies: CP. )      History of Present Illness:        Dear Cecily Corrigan, APRN - CNP    I had the pleasure of seeing Eze Rios in my office today. Mr. Ino Garsia is a 60 y.o. male presented for evaluation and to establish care. Patient has extensive medical history. He reported having multiple heart attacks with the first one at age 31 and the second one in , he had a cardiac cath and stent placed at that time. He followed up with his cardiologist up until 2020. History of ischemic cardiomyopathy secondary to multiple heart attacks, his most recent ejection fraction was 35% by echo back in 2020. He told me that he was offered defibrillator at some point but he was told that his heart function is good enough and no defibrillator needed. He has history of longstanding diabetes, hypertension and dyslipidemia he reported that he is not taking statin therapy because it does cause muscle pain. He said none of his family members were able to tolerate statins as well. He is lifelong non-smoker. He also reported having a history of Ménière's disease and chronic dizziness. He was admitted to [facility] from 2020 to 2020 because of acute stroke. As per discharge summary from [facility] V[facility], NIH score was 2 for right upper and lower extremity dysmetria. He got up MRI of the brain, MRA of the head and neck and MRI of the cervical spine done.   Patient found to have multiple acute infarcts in the right cerebellar hemisphere. Additional punctate acute infarct of the right lateral kateryna with old infarction of the left frontal lobe in addition to small vessel disease. No focal significant arterial narrowing in the neck. During that same hospital admission he was evaluated by hematology because of thrombocytopenia, currently tolerating dual antiplatelet therapy with no bleeding complications. He was also evaluated by cardiology for ischemic cardiomyopathy and lisinopril started in addition to adding carvedilol twice daily. He was later seen by Dr. Juancarlos Paul and recommended loop recorder but patient refused, he wanted to discuss with Florinda Soulier, APRN - CNP first.     Family history includes his siblings who have extensive heart disease including heart attacks and bypass surgery. Echocardiogram done on 4/24/2020 showed mild left ventricular hypertrophy, global left ventricular systolic function is moderately reduced, estimated EF is 35%. [place] appears hypokinetic. Evidence of mild (grade I) diastolic dysfunction. Negative bubble study, no shunt noted. EKG done today in office on 9/30/2020 showed sinus rhythm with evidence of old anterior myocardial infarction. No acute ischemic changes. Mr. Penny Archibald is here today to establish care as a referral from Florinda Soulier, APRN - CNP. He reports that he has severe lightheaded/dizziness due to the stroke and Meniere's disease. It has worsened within the last 5 months and it happens all day everyday. First thing in the morning, he feels nauseous. He battles with his weight. He denies history of sleep apnea. He sleeps well at night, does not wake up short of breath or gasping for air. H denies any chest pain, pressure or tightness. He denies any increased shortness of breath or palpitations. He denies any abdominal pain, bleeding problems, bowel issues, problems with his medications or any other concerns at this time.       Exercise Tolerance: Mr. Penny Archibald reports that he has a fairly good exercise tolerance. His says that he could walk 1 mile without developing chest discomfort or significant shortness of breath. He leg was effected by the stroke and will bother him sometimes. PAST MEDICAL HISTORY:        Past Medical History:   Diagnosis Date    [facility] ([facility]     Hyperlipidemia     Meniere disease     [facility]ocardial infarction[facility]     [facility]     Thrombocytopenia (HCC)        CURRENT ALLERGIES: Metformin and related REVIEW OF SYSTEMS: 14 systems were reviewed. Pertinent positives and negatives as above, all else negative. Past Surgical History:   Procedure Laterality Date    CORONARY ANGIOPLASTY WITH STENT PLACEMENT      Social History:  Social History     Tobacco Use    Smoking status: Never Smoker    Smokeless tobacco: Never Used   Substance Use Topics    Alcohol use: Not Currently    Drug use: Never        CURRENT MEDICATIONS:        Outpatient Medications Marked as Taking for the 9/30/20 encounter (Office Visit) with Kashif Taveras MD   Medication Sig Dispense Refill    blood glucose test strips (RELION GLUCOSE TEST STRIPS) strip Test qid. Dx--E11.9 insulin dependent 100 each 5    insulin aspart (NOVOLOG FLEXPEN) 100 UNIT/ML injection pen INJECT TID AS DIRECTED PER SLIDING SCALE. MAX AMOUNT OF 30 UNITS PER DAY 5 pen 3    insulin glargine (LANTUS SOLOSTAR) 100 UNIT/ML injection pen Inject 50 Units into the skin nightly 15 pen 0    magnesium (MAGNESIUM-OXIDE) 250 MG TABS tablet Take 250 mg by mouth daily      Coenzyme Q10 (CO Q 10) 10 MG CAPS Take by mouth daily      PSYLLIUM HUSK PO Take by mouth      LECITHIN CONCENTRATE PO Take 2 tablets by mouth daily      glucose monitoring kit (FREESTYLE) monitoring kit 1 kit by Does not apply route daily TEST BLOOD SUGAR QID. WHATEVER METER IS COVERED BY GALVEZ. DIAGNOSIS E11.9 1 kit 0    Lancets [facility] TEST BLOOD SUGAR QID. WHATEVER METER IS COVERED BY GALVEZ.  DIAGNOSIS E11.9 100 each 3    Insulin Pen Needle (MEIJER PEN NEEDLES) 31G X 6 MM MISC 1 each by Does not apply route daily 100 each 3    aspirin 81 MG EC tablet Take 81 mg by mouth daily      lisinopril (PRINIVIL;ZESTRIL) 10 MG tablet Take 1 tablet by mouth daily 90 tablet 0    clopidogrel (PLAVIX) 75 MG tablet Take 1 tablet by mouth daily 90 tablet 0    Dulaglutide (TRULICITY) 1.91 WY/6.8ZG SOPN Inject 0.75 mg into the skin once a week 12 pen 0    carvedilol (COREG) 6.25 MG tablet Take 1 tablet by mouth 2 times daily 180 tablet 0       FAMILY HISTORY: family history includes Diabetes in his mother; Heart Attack in his brother, maternal grandfather, mother, and sister; Heart Disease in his father; Prostate Cancer in his father. Physical Examination:      /73 (Site: Left Upper Arm, Position: Sitting, Cuff Size: Large Adult)   Pulse 69   Resp 17   Ht 6' 1\" (1.854 m)   Wt 259 lb 12.8 oz (117.8 kg)   SpO2 99%   BMI 34.28 kg/m²  Body mass index is 34.28 kg/m². Constitutional: He is oriented to person. He appears well-developed and well-nourished. In no acute distress. HEENT: Normocephalic and atraumatic. No JVD present. Carotid bruit is not present. No mass and no thyromegaly present. No lymphadenopathy present. Cardiovascular: Normal rate, regular rhythm, normal heart sounds. Exam reveals no gallop and no friction rubs. No murmur was heard. .   Pulmonary/Chest: Effort normal and breath sounds normal. No respiratory distress. He has no wheezes, rhonchi or rales. Abdominal: Soft, non-tender. Bowel sounds and aorta are normal. He exhibits no organomegaly, mass or bruit. Extremities: No edema. No cyanosis or clubbing. 2+ radial and carotid pulses. Distal extremity pulses: 2+ bilaterally. .  Neurological: He is alert and oriented to person. No evidence of gross cranial nerve deficit. Coordination appeared normal.   Skin: Skin is warm and dry. There is no rash or diaphoresis.    Psychiatric: He has a normal mood and affect. His speech is normal and behavior is normal.      MOST RECENT LABS ON RECORD:   Lab Results   Component Value Date    WBC 4.8 04/27/2020    HGB 16.7 04/27/2020    HCT 48.7 04/27/2020    PLT See Reflexed IPF Result 04/27/2020    CHOL 199 09/14/2020    TRIG 180 (H) 09/14/2020    HDL 44 09/14/2020    ALT 36 09/14/2020    AST 30 09/14/2020     09/14/2020    K 3.6 09/14/2020     09/14/2020    CREATININE 0.93 09/14/2020    BUN 12 09/14/2020    CO2 25 09/14/2020    TSH 1.58 04/25/2020    PSA 3.04 06/19/2020    INR 1.0 04/24/2020    GLUF 202 06/19/2020    LABA1C 9.8 (H) 09/14/2020       ASSESSMENT:     1. ASHD (arteriosclerotic heart disease)    2. S/P angioplasty with stent    3. History of MI (myocardial infarction)    4. History of stroke    5. Ischemic cardiomyopathy    6. Essential hypertension    7. Mixed hyperlipidemia    8. Statin intolerance    9. Class 1 obesity due to excess calories with body mass index (BMI) of 34.0 to 34.9 in adult, unspecified whether serious comorbidity present       PLAN:        Patient with extensive medical history as outlined in HPI. History of multiple heart attacks, stent placement 2004, ischemic cardiomyopathy. History of uncontrolled diabetes, hypertension and dyslipidemia. History of intolerance to statin therapy. Multiple strokes, the pattern of his multiple ischemic strokes is suggestive of embolic etiology. No prior history of atrial fibrillation. Atherosclerotic Heart Disease: S/P Stenting in 2004. Antiplatelet Agent: Continue aspirin 81 mg daily and Plavix 75 mg daily. Beta Blocker: Continue Carvedilol (Coreg) 6.25 mg twice daily. Anti-anginal medications: DECREASE to nitroglycerin 0.4 mg tablets as needed for chest pain. Cholesterol Reduction Therapy: STOP atorvastatin (Lipitor) and START ezetimide (Zetia) 10 mg daily.   Additional counseling: I advised them to call our office or go to the emergency room if they developed worsening or persistent exercise strategies for him to continue to loses weight and he was very receptive to this. In the meantime, I encouraged Mr. Renaldo Joe to continue to take his other medications. FOLLOW UP:   I told Mr. Renaldo Joe to call my office if he had any problems, but otherwise I asked him to Return in about 3 weeks (around 10/21/2020). However, I would be happy to see him sooner should the need arise. Sincerely,  Jay Alvarado MD, F.A.C.C. [facility] Cardiology Specialist    [address] [place] [address]  Phone: [phone], Fax: [phone]     I believe that the risk of significant morbidity and mortality related to the patient's current medical conditions are: intermediate-high. >60 minutes were spent with the patient and all of their questions were answered. The documentation recorded by the scribe, accurately and completely reflects the services I personally performed and the decisions made by me. Jay Alvarado MD, F.A.C.C.  September 30, 2020

## 2020-10-01 RX ORDER — ALIROCUMAB 75 MG/ML
75 INJECTION, SOLUTION SUBCUTANEOUS
COMMUNITY
End: 2020-10-16

## 2020-10-12 NOTE — PROCEDURES
[address], [place] [address]                                 EVENT MONITOR    PATIENT NAME: Gabriel Morales                   :        1955  MED REC NO:   [id number]                              ROOM:  ACCOUNT NO:   [de-identified]                           ADMIT DATE: 2020  PROVIDER:     Kobe Mejia. Ember Frank    CARDIOVASCULAR DIAGNOSTIC DEPARTMENT    DATE OF STUDY:  2020    ORDERING PROVIDER:  Kobe Mejia. Ember Frank MD    PRIMARY CARE PROVIDER:  Yuriy Jaramillo CNP    INTERPRETING PHYSICIAN: Kobe Mejia. Ember Frank MD    DIAGNOSIS:  Atherosclerotic heart disease. Palpitations. PHYSICIAN INTERPRETATION:  Recording Length: 5 days, 21 hours. 1. Sinus rhythm with average heart rate of 72 bpm, ranging between 56  and 122 bpm.  2. Atrial tachycardia (AT) 9 episodes. Longest 8 beats at 117 bpm.  Fastest 6 beats at 137 bpm.  3. Ventricular tachycardia (VT) 2 episodes. Longest/Fastest 5 beats at  142 bpm.  4. Premature atrial contractions <1%. 5. Premature ventricular contractions <1%. 6. No patient activated events. OKSANA LARIOS    D: 10/12/2020 12:13:46       T: 10/12/2020 12:15:32     KENIA  Job#: [id number]     Doc#: Unknown    CC:  Sakina Stanley

## 2020-10-13 ENCOUNTER — TELEPHONE (OUTPATIENT)
Dept: CARDIOLOGY | Age: 65
End: 2020-10-13

## 2020-10-13 ENCOUNTER — HOSPITAL ENCOUNTER (OUTPATIENT)
Dept: NON INVASIVE DIAGNOSTICS | Age: 65
Discharge: HOME | End: 2020-10-13
Payer: MEDICAID

## 2020-10-13 LAB
LV EF: 35 %
LVEF MODALITY: NORMAL

## 2020-10-13 PROCEDURE — A9500 TC99M SESTAMIBI: HCPCS | Performed by: INTERNAL MEDICINE

## 2020-10-13 PROCEDURE — 3430000000 HC RX DIAGNOSTIC RADIOPHARMACEUTICAL: Performed by: INTERNAL MEDICINE

## 2020-10-13 PROCEDURE — 93306 TTE W/DOPPLER COMPLETE: CPT

## 2020-10-13 PROCEDURE — C8929 TTE W OR WO FOL WCON,DOPPLER: HCPCS

## 2020-10-13 PROCEDURE — 6360000002 HC RX W HCPCS: Performed by: INTERNAL MEDICINE

## 2020-10-13 PROCEDURE — 93017 CV STRESS TEST TRACING ONLY: CPT

## 2020-10-13 RX ADMIN — REGADENOSON 0.4 MG: 0.08 INJECTION, SOLUTION INTRAVENOUS at 08:54

## 2020-10-13 RX ADMIN — Medication 30 MILLICURIE: at 08:55

## 2020-10-13 NOTE — TELEPHONE ENCOUNTER
----- Message from Adilson Downey MD sent at 10/13/2020  9:00 AM EDT -----  Heart monitors not read. Few abnormal heartbeats recorded. We will discuss details on follow-up.   Thank you

## 2020-10-14 ENCOUNTER — HOSPITAL ENCOUNTER (OUTPATIENT)
Dept: NON INVASIVE DIAGNOSTICS | Age: 65
Discharge: HOME | End: 2020-10-14
Payer: MEDICAID

## 2020-10-14 PROCEDURE — 6360000004 HC RX CONTRAST MEDICATION: Performed by: INTERNAL MEDICINE

## 2020-10-14 PROCEDURE — 78452 HT MUSCLE IMAGE SPECT MULT: CPT

## 2020-10-14 PROCEDURE — A9500 TC99M SESTAMIBI: HCPCS | Performed by: INTERNAL MEDICINE

## 2020-10-14 PROCEDURE — 3430000000 HC RX DIAGNOSTIC RADIOPHARMACEUTICAL: Performed by: INTERNAL MEDICINE

## 2020-10-14 RX ADMIN — Medication 30 MILLICURIE: at 13:08

## 2020-10-14 RX ADMIN — PERFLUTREN 2.2 MG: 6.52 INJECTION, SUSPENSION INTRAVENOUS at 13:55

## 2020-10-14 NOTE — PROCEDURES
[address], [place] [address]                              CARDIAC STRESS TEST    PATIENT NAME: Josué Romero                   :        1955  MED REC NO:   [id number]                              ROOM:  ACCOUNT NO:   [de-identified]                           ADMIT DATE: 10/13/2020  PROVIDER:     Theodore Del Real    CARDIOVASCULAR DIAGNOSTIC DEPARTMENT    DATE OF STUDY:  10/13/2020    ORDERING PROVIDER:  Theodore Rubio. Tati Cade MD    PRIMARY CARE PROVIDER:  MARGOT Argueta    INTERPRETING PHYSICIAN:  Theodore Rubio. Tati Cade MD    PHARMACOLOGIC MYOCARDIAL PERFUSION STRESS TESTING    Stress/rest single isotope SPECT imaging with exercise stress and gated  SPECT imaging. INDICATIONS:  Assessment of a cardiac cause:  Dizziness. CLINICAL HISTORY:  The patient is a 79-year-old man with known coronary  artery disease. Previous cardiac history includes:  Coronary artery disease, stress  test, cardiac catheterization, percutaneous transluminal coronary  angioplasty and myocardial infarction. Other previous history includes:  Palpitations, fatigue, CVA,  lightheadedness, diabetes mellitus, hypertension. Symptoms just prior to testing include:  None. Relevant medications:  None. PROCEDURE:  The heart rate was 83 at baseline and faiza to 107 beats per  minute during the regadenoson infusion. The rest blood pressure was  134/70 mm/Hg and decreased to 120/72 mm/Hg. The patient did not  complain of any significant symptoms following infusion. Pharmacologic stress testing was performed with regadenoson at a dose of  0.4 mg. Additionally, low level exercise using slow treadmill walking  was performed along with vasodilator infusion. MYOCARDIAL PERFUSION IMAGING:  Imaging was performed at rest 30-45  minutes following the injection of 30 mCi of sestamibi.   Approximately  10 seconds after Lexiscan injection, the patient was injected with 30  mCi of sestamibi. Gating post-stress tomographic imaging was performed  30-45 minutes after stress. STRESS ECG RESULTS:  The resting electrocardiogram demonstrated normal  sinus rhythm without significant ST-segment abnormalities that may  impair accurate ECG detection of stress induced cardiac ischemia. During vasodilator infusion and during recovery, the patient developed:    No significant ST segment changes suggestive of myocardial ischemia with  occasional premature atrial contractions (PACs) and no premature  ventricular contractions (PVCs). NUCLEAR IMAGING RESULTS:  The overall quality of the study is fair. Mild attenuation artifact was seen. There is no evidence of abnormal  lung uptake. Additionally, the right ventricle appears normal.  The  left ventricular cavity is noted to be enlarged in size on the stress  images. There is no evidence of transient ischemic dilatation (TID) of  the left ventricle. Gated SPECT imaging demonstrates akinesis of the apical, anteroapical  and inferoapical regions. The calculated left ventricular ejection  fraction was 28%. The rest images demonstrated a moderate perfusion abnormality of severe  intensity in the apical, anteroapical and inferoapical regions. On stress imaging, a moderate perfusion abnormality of severe intensity  in the apical, anteroapical and inferoapical regions. IMPRESSION:  1. Abnormal myocardial perfusion study. There is a moderate perfusion  defect of severe intensity in the apical, anteroapical and inferoapical  regions during stress and rest imaging which is most consistent with an  old myocardial infarction. 2.  Global left ventricular systolic function was abnormal with an EF of  28% with regional wall motion abnormalities. 3.  No significant electrocardiographic evidence of myocardial ischemia  during EKG monitoring without significant associated arrhythmias.     Depending on the patient's symptoms and

## 2020-10-15 ENCOUNTER — TELEPHONE (OUTPATIENT)
Dept: CARDIOLOGY | Age: 65
End: 2020-10-15

## 2020-10-15 ENCOUNTER — HOSPITAL ENCOUNTER (OUTPATIENT)
Dept: DIABETES SERVICES | Age: 65
Setting detail: THERAPIES SERIES
Discharge: HOME | End: 2020-10-15
Payer: MEDICAID

## 2020-10-15 PROCEDURE — G0108 DIAB MANAGE TRN  PER INDIV: HCPCS

## 2020-10-15 NOTE — PROGRESS NOTES
GAIL Daly - CNP    Keys to learning:  Considerations: []Language []Emotional []Health Literacy  []Cognitive []Memory changes [x]Financial []Cultural   []Scientologist []Vision []Hearing  []Speech []Lack of desire  []Literacy  []Psycho-social  []None  If considerations are noted, accommodations made:     Identified barriers to learning/self management: financial concerns, decreased mobility and function right leg, dizziness and balance issues    The following information was discussed:    [x] Diabetes disease process and treatment options   [x] Healthy nutrition, carbohydrate counting, meal planning  [x] Monitoring blood glucose and other parameters; interpreting and using results  [] Acute complications--prevention, detection and treatment  [x] Medication management and safety   [x] Incorporating physical activity into lifestyle   [x] Exercise for Health, Reducing Risks for Heart Disease, Diabetes and Heart Health  [] Preventing, through risk reduction behaviors, detecting, and treating chronic complications  [] Sick Day management  [] Developing personalized strategies to address psychosocial issues and concerns  [x] Developing personalized strategies to promote health and behavior change through goalsetting, behavior change strategies aimed at risk reduction  [] Special situations--disaster planning, travel, social activities    Session Assessment & Evaluation Ratings:  1=Needs Instruction  2=Needs Review  3=Comprehends Key Points  4=Demonstrates Understanding/Competency  NC=Not Covered   N/A=Not Applicable Initial  Assess    Date:  09/17/20 2nd  Visit     Date:  10/15/20 3rd  Visit    Date: 4th  Visit    Date: Comments  S.O.C=Stage of Change/Readiness to change:  · Pre=Pre-contemplation stage--not thinking about changing  · C=Contemplation stage--ambivalent about changing  · P=Preparation stage--prepared to made a specific change  · A=Action stage--committed to modify behaviors  · M=Maintenance and 4  [] NC  [] N/A     Rating  [] 1  [] 2  [] 3  [] 4  [] NC  [] N/A     Rating  [] 1  [] 2  [] 3  [] 4  [] NC  [] N/A                 Incorporating physical activity into lifestyle:   State effect of exercise on blood glucose levels. Identifies personal exercise plan and contraindications. Discussed safety tips while exercising. Rating  [] 1  [x] 2  [] 3  [] 4  [] NC  [] N/A     Rating  [] 1  [x] 2  [] 3  [] 4  [] NC  [] N/A       Rating  [] 1  [] 2  [] 3  [] 4  [] NC  [] N/A     Rating  [] 1  [] 2  [] 3  [] 4  [] NC  [] N/A           Using medications safely: State effect of diabetes medicines on diabetes;   Name diabetes medication taking, action, timing & side effects. Rating  [x] 1  [] 2  [] 3  [] 4  [] NC  [] N/A       Rating  [] 1  [] 2  [x] 3  [] 4  [] NC  [] N/A         Rating  [] 1  [] 2  [] 3  [] 4  [] NC  [] N/A   Rating  [] 1  [] 2  [] 3  [] 4  [] NC  [] N/A      ________             Insulin/injectable-  Appropriate injection site; proper storage; proper technique; safe needle disposal.   Rating  [x] 1  [] 2  [] 3  [] 4  [] NC  [] N/A Rating  [] 1  [] 2  [x] 3  [] 4  [] NC  [] N/A Rating  [] 1  [] 2  [] 3  [] 4  [] NC  [] N/A Rating  [] 1  [] 2  [] 3  [] 4  [] NC  [] N/A        Monitoring blood glucose, interpreting and using results: Identify recommended blood glucose targets, personal targets, A1C target, importance of logging glucose,appropriate techniques and problem solving. Safe lancet disposal.    Rating  [] 1  [x] 2  [] 3  [] 4  [] NC  [] N/A     Rating  [] 1  [] 2  [x] 3  [] 4  [] NC  [] N/A       Rating  [] 1  [] 2  [] 3  [] 4  [] NC  [] N/A     Rating  [] 1  [] 2  [] 3  [] 4  [] NC  [] N/A         Prevention, detection & treatment of acute complications: List symptoms of hyper- and hypoglycemia; Describe how to treat low blood sugar & actions for lowering high blood glucose levels. Prevention and treatment strategies.    Rating  [] 1  [x] 2  [] 3  [] 4  [] NC  [] N/A Rating  [] 1  [] 2  [x] 3  [] 4  [] NC  [] N/A   Rating  [] 1  [] 2  [] 3  [] 4  [] NC  [] N/A Rating  [] 1  [] 2  [] 3  [] 4  [] NC  [] N/A                   Describe sick day guidelines and indications for physician notification. Rating  [] 1  [] 2  [] 3  [] 4  [x] NC  [] N/A     Rating  [x] 1  [] 2  [] 3  [] 4  [] NC  [] N/A     Rating  [] 1  [] 2  [] 3  [] 4  [] NC  [] N/A     Rating  [] 1  [] 2  [] 3  [] 4  [] NC  [] N/A        Prevention, detection & treatment of chronic complications: Define the natural course of diabetes & describe the relationship of blood glucose levels to long term complications of diabetes. Identify preventative measures and standard of care. Rating  [x] 1  [] 2  [] 3  [] 4  [] NC  [] N/A     Rating  [x] 1  [] 2  [] 3  [] 4  [] NC  [] N/A     Rating  [] 1  [] 2  [] 3  [] 4  [] NC  [] N/A     Rating  [] 1  [] 2  [] 3  [] 4  [] NC  [] N/A      Developing strategies to address psychosocial issues: Describe feelings about living with diabetes; Identify support needed & support network. Describe how stress, depression, and anxiety affect blood glucose. Identify coping strategies. Rating  [x] 1  [] 2  [] 3  [] 4  [] NC  [] N/A       Rating  [x] 1  [] 2  [x] 3  [] 4  [] NC  [] N/A     Rating  [] 1  [] 2  [] 3  [] 4  [] NC  [] N/A     Rating  [] 1  [] 2  [] 3  [] 4  [] NC  [] N/A          Developing strategies to promote health/change behavior:  Define the ABCs of diabetes; Identify appropriate screenings, schedule & personal plan for screenings. Identify 7 self-care behaviors. Benefits, challenges and strategies for behavioral change.       Rating  [x] 1  [] 2  [] 3  [] 4  [] NC  [] N/A     Rating  [] 1  [] 2  [x] 3  [] 4  [] NC  [] N/A     Rating  [] 1  [] 2  [] 3  [] 4  [] NC  [] N/A     Rating  [] 1  [] 2  [] 3  [] 4  [] NC  [] N/A             Time spent with patient: [id number]    Next Appointment:      Instruction Method: [x]Lecture/Discussion  []Power Point Presentation  [x]Handouts []Return Demonstration     Education Materials/Equipment Provided:      []Self-Management - Initial assessment - ADA  Where do I Begin? Living with Type 2 diabetes\" booklet;  Diet meal planning basics, handout on diabetes education classes, hyper/hypoglycemia signs/symptoms and treatment handout; Diabetes zones; Support plan resource list.      []Self-Management  Class 1 - Diabetes: Your Take Control Guide\" booklet. Healthy Interactions [facility] \"On The Road To Better Managing Your Diabetes\". [] Self-Management  Class 2 -  \"Traveling with Diabetes\", Dining Out With Diabetes, \"Coping with Diabetes\", \"Diabetes Disaster Planning\", \"Know Your Numbers/Diabetes Care Checklist\", [address] [place] [address] Blood Sugar, Low Blood Sugar. Healthy Interactions Map \"Monitoring Your Blood Glucose. \"     [] Self-Management  Class 3 - \"Risk Factors for CVD\", foot care tips sheet and \"How to pick the right shoe\", \"Medications Used To Treat Diabetes\", How To Care For Your Teeth and Gums\", \"Vaccinations For Adults with Diabetes\", \"Type 2 diabetes and the role of GLP-1\". Healthy Interactions Map \"Continuing Your Journey\". []Self-Management - 3 month follow-up      []Glucose Meter      []Insulin Kit      []Other        Handouts/Booklets given:     [] Diabetes-Your Take Control Guide   [] Daily Diabetic Meal Planning Guide   [] Nutrition in the [facility]    [] Resources for People With Diabetes   [] Other       Diabetes Self Management Support Plan: To assist and support your continued progress in managing your diabetes following education-    ( X )  Gym or exercise program of your choice. (Suggestions:  [employer], Circuit training, any exercise facility and your local Physical Therapy or Cardiac Rehabilitation exercise Program)      (  )   Library    ( X )    ADA website:  BrowserReview.ca. org    (  )   Http: DotProtection.gl    (  )   Diabetes Forecast [place] you may get this information on the ADA web

## 2020-10-15 NOTE — TELEPHONE ENCOUNTER
----- Message from Bhavana Dale MD sent at 10/14/2020  8:09 PM EDT -----  He has an appointment on 10/22/2020, his stress test as well as his echo are abnormal.  Please let me see him Friday afternoon.   Thank you

## 2020-10-16 ENCOUNTER — OFFICE VISIT (OUTPATIENT)
Dept: CARDIOLOGY | Age: 65
End: 2020-10-16
Payer: MEDICAID

## 2020-10-16 VITALS
HEART RATE: 70 BPM | DIASTOLIC BLOOD PRESSURE: 76 MMHG | OXYGEN SATURATION: 98 % | HEIGHT: 73 IN | WEIGHT: 259 LBS | RESPIRATION RATE: 18 BRPM | SYSTOLIC BLOOD PRESSURE: 136 MMHG | BODY MASS INDEX: 34.33 KG/M2

## 2020-10-16 PROCEDURE — G8417 CALC BMI ABV UP PARAM F/U: HCPCS | Performed by: INTERNAL MEDICINE

## 2020-10-16 PROCEDURE — 1036F TOBACCO NON-USER: CPT | Performed by: INTERNAL MEDICINE

## 2020-10-16 PROCEDURE — 2022F DILAT RTA XM EVC RTNOPTHY: CPT | Performed by: INTERNAL MEDICINE

## 2020-10-16 PROCEDURE — G8484 FLU IMMUNIZE NO ADMIN: HCPCS | Performed by: INTERNAL MEDICINE

## 2020-10-16 PROCEDURE — 3017F COLORECTAL CA SCREEN DOC REV: CPT | Performed by: INTERNAL MEDICINE

## 2020-10-16 PROCEDURE — G8427 DOCREV CUR MEDS BY ELIG CLIN: HCPCS | Performed by: INTERNAL MEDICINE

## 2020-10-16 PROCEDURE — 3046F HEMOGLOBIN A1C LEVEL >9.0%: CPT | Performed by: INTERNAL MEDICINE

## 2020-10-16 PROCEDURE — 99214 OFFICE O/P EST MOD 30 MIN: CPT | Performed by: INTERNAL MEDICINE

## 2020-10-16 RX ORDER — WARFARIN SODIUM 5 MG/1
5 TABLET ORAL DAILY
Qty: 90 TABLET | Refills: 3 | Status: SHIPPED | OUTPATIENT
Start: 2020-10-16 | End: 2021-04-29

## 2020-10-16 NOTE — PATIENT INSTRUCTIONS
SURVEY:    You may be receiving a survey from [employer] regarding your visit today. Please complete the survey to enable us to provide the highest quality of care to you and your family. If you cannot score us a very good on any question, please call the office to discuss how we could have made your experience a very good one. Thank you. Faith Moreira was your MA today!

## 2020-10-16 NOTE — PROGRESS NOTES
Carloz Pfeiffer am scribing for and in the presence of Migdalia Montgomery MD, F.A.C.C. Patient: Keila Delaney  : 1955  Date of Visit: 2020    REASON FOR VISIT / CONSULTATION: Follow-up (HX: CAD S/p Stent, MI, Stoke, Isc Cardiomyoapthy, HTN, HLD, Obesity, Statin Intollerance. C/o: Surger Issues. C/o: Dizziness. Denies: CP, Palpitations, SOB.)      History of Present Illness:        Dear Adrian Fortune, APRN - CNP    I had the pleasure of seeing Keila Delaney in my office today. Mr. Esau Kendall is a 63 y.o. male presented for follow up. Patient has extensive medical history. He reported having multiple heart attacks with the first one at age 31 and the second one in , he had a cardiac cath and stent placed at that time. He followed up with his cardiologist up until 2020. Family history includes his siblings who have extensive heart disease including heart attacks and bypass surgery    History of ischemic cardiomyopathy secondary to multiple heart attacks, his most recent ejection fraction was 35% by echo back in 2020. He told me that he was offered defibrillator at some point but he was told that his heart function is good enough and no defibrillator needed. He has history of longstanding diabetes, hypertension and dyslipidemia he reported that he is not taking statin therapy because it does cause muscle pain. He said none of his family members were able to tolerate statins as well. He is lifelong non-smoker. He also reported having a history of Ménière's disease and chronic dizziness. He was admitted to [facility] from 2020 to 2020 because of acute stroke. As per discharge summary from [facility], NIH score was 2 for right upper and lower extremity dysmetria. He got up MRI of the brain, MRA of the head and neck and MRI of the cervical spine done. Patient found to have multiple acute infarcts in the right cerebellar hemisphere. Additional punctate acute infarct of the right lateral kateryna with old infarction of the left frontal lobe in addition to small vessel disease. No focal significant arterial narrowing in the neck. During that same hospital admission he was evaluated by hematology because of thrombocytopenia, currently tolerating dual antiplatelet therapy with no bleeding complications. He was also evaluated by cardiology for ischemic cardiomyopathy and lisinopril started in addition to adding carvedilol twice daily. He was later seen by Dr. Tiara Agee and recommended loop recorder but patient refused, he wanted to discuss with GAIL Multani CNP first.      Echocardiogram done on 4/24/2020 showed mild left ventricular hypertrophy, global left ventricular systolic function is moderately reduced, estimated EF is 35%. [place] appears hypokinetic. Evidence of mild (grade I) diastolic dysfunction. Negative bubble study, no shunt noted. EKG done in office on 9/30/2020 showed sinus rhythm with evidence of old anterior myocardial infarction. No acute ischemic changes. Echo done on 10/13/2020: Because of poor image quality, definity echo contrast was used to better assess left ventricular wall motion and thickness. EF was 35%. Mild left ventricular hypertrophy. Akinetic apex with filling defect seen on contrast echo consistent with left ventricular apical thrombus. The left atrium is mildly dilated (29-33) with a left atrial volume index of 29 ml/m2. Evidence of moderate (grade II) diastolic dysfunction is seen. The aortic root is considered to be at the upper limits of normal in size when corrected for body surface area. Stress Test done on 10/13/2020: Abnormal myocardial perfusion study. There is a moderate perfusion defect of severe intensity in the apical, anteroapical and inferoapical regions during stress and rest imaging which is most consistent with an old myocardial infarction. EF was 28%.     Mr. Olga Lidia Buchanan is here today for follow up after his cardiac testing. He reports that he has severe lightheaded/dizziness due to the stroke and Meniere's disease. It has stayed the same since last visit. He is struggling to maintain a good blood sugar. He said his blood pressure is always running in the 300-400 range. He battles with his weight. He denies history of sleep apnea. He sleeps well at night, does not wake up short of breath or gasping for air. He denies any chest pain, pressure or tightness. He denies any increased shortness of breath or palpitations. He denies any abdominal pain, bleeding problems, bowel issues, problems with his medications or any other concerns at this time. PAST MEDICAL HISTORY:        Past Medical History:   Diagnosis Date    Diabetes mellitus ([facility] [address]     Hyperlipidemia     Meniere disease     [facility]) [facility]     St[facility]     Thrombocytopenia ([facility])        CURRENT ALLERGIES: Statins and Metformin and related REVIEW OF SYSTEMS: 14 systems were reviewed. Pertinent positives and negatives as above, all else negative.      Past Surgical History:   Procedure Laterality Date    CORONARY ANGIOPLASTY WITH STENT PLACEMENT      Social History:  Social History     Tobacco Use    Smoking status: Never Smoker    Smokeless tobacco: Never Used   Substance Use Topics    Alcohol use: Not Currently    Drug use: Never        CURRENT MEDICATIONS:        Outpatient Medications Marked as Taking for the 10/16/20 encounter (Office Visit) with Johny Vo MD   Medication Sig Dispense Refill    warfarin (COUMADIN) 5 MG tablet Take 1 tablet by mouth daily 90 tablet 3    insulin glargine (LANTUS) 100 UNIT/ML injection vial Inject into the skin nightly      insulin glargine (LANTUS SOLOSTAR) 100 UNIT/ML injection pen Inject 56 Units into the skin nightly AND 4 UNITS WITH EACH MEAL      ezetimibe (ZETIA) 10 MG tablet Take 1 tablet by mouth daily 90 tablet 1    blood glucose test strips (RELION GLUCOSE TEST STRIPS) strip Test qid. Dx--E11.9 insulin dependent 100 each 5    insulin aspart (NOVOLOG FLEXPEN) 100 UNIT/ML injection pen INJECT TID AS DIRECTED PER SLIDING SCALE. MAX AMOUNT OF 30 UNITS PER DAY 5 pen 3    magnesium (MAGNESIUM-OXIDE) 250 MG TABS tablet Take 250 mg by mouth daily      Coenzyme Q10 (CO Q 10) 10 MG CAPS Take by mouth daily      PSYLLIUM HUSK PO Take by mouth      LECITHIN CONCENTRATE PO Take 2 tablets by mouth daily      glucose monitoring kit (FREESTYLE) monitoring kit 1 kit by Does not apply route daily TEST BLOOD SUGAR QID. WHATEVER METER IS COVERED BY GALVEZ. DIAGNOSIS E11.9 1 kit 0    Lancets MISC TEST BLOOD SUGAR QID. WHATEVER METER IS COVERED BY GALVEZ. DIAGNOSIS E11.9 100 each 3    Insulin Pen Needle (MEIJER PEN NEEDLES) 31G X 6 MM MISC 1 each by Does not apply route daily 100 each 3    aspirin 81 MG EC tablet Take 81 mg by mouth daily      lisinopril (PRINIVIL;ZESTRIL) 10 MG tablet Take 1 tablet by mouth daily 90 tablet 0    Dulaglutide (TRULICITY) 0.27 UX/7.5YP SOPN Inject 0.75 mg into the skin once a week 12 pen 0    carvedilol (COREG) 6.25 MG tablet Take 1 tablet by mouth 2 times daily 180 tablet 0       FAMILY HISTORY: family history includes Diabetes in his mother; Heart Attack in his brother, maternal grandfather, mother, and sister; Heart Disease in his father; Prostate Cancer in his father. Physical Examination:      /76 (Site: Left Upper Arm, Position: Sitting, Cuff Size: Large Adult)   Pulse 70   Resp 18   Ht 6' 1\" (1.854 m)   Wt 259 lb (117.5 kg)   SpO2 98%   BMI 34.17 kg/m²  Body mass index is 34.17 kg/m². Constitutional: He is oriented to person. He appears well-developed and well-nourished. In no acute distress. HEENT: Normocephalic and atraumatic. No JVD present. Carotid bruit is not present. No mass and no thyromegaly present. No lymphadenopathy present. Cardiovascular: Normal rate, regular rhythm, normal heart sounds. Exam reveals no gallop and no friction rubs. No murmur was heard. Pulmonary/Chest: Effort normal and breath sounds normal. No respiratory distress. He has no wheezes, rhonchi or rales. Abdominal: Soft, non-tender. Bowel sounds and aorta are normal. He exhibits no organomegaly, mass or bruit. Extremities: No edema. No cyanosis or clubbing. 2+ radial and carotid pulses. Distal extremity pulses: 2+ bilaterally. .  Neurological: He is alert and oriented to person. No evidence of gross cranial nerve deficit. Coordination appeared normal.   Skin: Skin is warm and dry. There is no rash or diaphoresis. Psychiatric: He has a normal mood and affect. His speech is normal and behavior is normal.      MOST RECENT LABS ON RECORD:   Lab Results   Component Value Date    WBC 4.8 04/27/2020    HGB 16.7 04/27/2020    HCT 48.7 04/27/2020    PLT See Reflexed IPF Result 04/27/2020    CHOL 199 09/14/2020    TRIG 180 (H) 09/14/2020    HDL 44 09/14/2020    ALT 36 09/14/2020    AST 30 09/14/2020     09/14/2020    K 3.6 09/14/2020     09/14/2020    CREATININE 0.93 09/14/2020    BUN 12 09/14/2020    CO2 25 09/14/2020    TSH 1.58 04/25/2020    PSA 3.04 06/19/2020    INR 1.0 04/24/2020    GLUF 202 06/19/2020    LABA1C 9.8 (H) 09/14/2020       ASSESSMENT:     1. ASHD (arteriosclerotic heart disease)    2. Left ventricular apical thrombus    3. S/P angioplasty with stent    4. History of stroke    5. Abnormal stress test    6. Atypical chest pain    7. Ischemic cardiomyopathy    8. Lightheaded    9. Essential hypertension    10. Mixed hyperlipidemia    11. Type 2 diabetes mellitus with other specified complication, with long-term current use of insulin ([address]    12. Obesity, unspecified classification, unspecified obesity type, unspecified whether serious comorbidity present       PLAN:        Patient with extensive medical history as outlined in HPI.   History of multiple heart attacks, stent placement 2004, ischemic cardiomyopathy. History of uncontrolled diabetes, hypertension and dyslipidemia. History of intolerance to statin therapy. Multiple strokes, the pattern of his multiple ischemic strokes is suggestive of embolic etiology. No prior history of atrial fibrillation. Recent echo showed left ventricular apical thrombus which is clearly the source of multiple embolic strokes. Apical akinesis with left ventricular apical thrombus seen by Echo from 10/13/2020  We discussed this in great length and answered all of his questions that he had at this time. Antiplatelet Agent: STOP clopidogrel (Plavix) and Continue Aspirin 81 mg daily. I also reminded them to watch for signs of bloody or black tarry stools and stop the medication immediately if this develops as this could be life threatening. Anticoagulation: START warfarin (Coumadin) take as directed (goal INR 2-3) I also reminded them to watch for signs of bloody or black tarry stools and stop the medication immediately if this develops as this could be life threatening. Atherosclerotic Heart Disease: S/P Stenting in 2004 and History of Stroke and MI  His most recent cardiac cath showed chronic total occlusion of proximal LAD. I reviewed his most recent stress test.  Large apical infarction with minimal dakota-infarction ischemia. Antiplatelet Agent: STOP clopidogrel (Plavix) as above and Continue Aspirin 81 mg daily. Beta Blocker: Continue Carvedilol (Coreg) 6.25 mg twice daily. Anti-anginal medications: Continue to nitroglycerin 0.4 mg tablets as needed for chest pain. Cholesterol Reduction Therapy: Continue ezetimide (Zetia) 10 mg daily. We are trying to get him approved for purulent injections to better control his dyslipidemia. Unfortunately the co-pay is very high we will give it another try to get him approved for PCSK9 inhibitors.     Abnormal Stress Test: Atypical Chest Pain  For these reasons, I recommended that the patient consider undergoing a Hypertension: Controlled  Beta Blocker: Continue Carvedilol (Coreg) 6.25 mg twice daily. ACE Inibitor/ARB: Continue lisinopril 10 mg daily. Hyperlipidemia: Mixed - Last LDL on 6/19/2020 was 102 mg/dL  Cholesterol Reduction Therapy: Absolutely refusing to take statin therapy because of severe muscle pain  Continue Zetia 10 mg daily for optimal LDL control. He was unable to afford purulent at this time. Diabetes:   Continue current medications as directed. Follow with GAIL Moody CNP and also follow up with his Endocrinologist at [facility]. Obesity: Body mass index is 34.17 kg/m². I also briefly discussed both diet and exercise strategies for him to continue to loses weight and he was very receptive to this. In the meantime, I encouraged Mr. Dima Ruiz to continue to take his other medications. FOLLOW UP:   I told Mr. Dima Ruiz to call my office if he had any problems, but otherwise I asked him to Return in about 2 months (around 12/16/2020) for Follow up. However, I would be happy to see him sooner should the need arise. Sincerely,  Nico Jarquin MD, F.A.C.C. [facility] [place] [place] [facility]    [address] [place] [address], [address] [facility]  Phone: [phone], Fax: [phone]     I believe that the risk of significant morbidity and mortality related to the patient's current medical conditions are: high. The documentation recorded by the scribe, accurately and completely reflects the services I personally performed and the decisions made by me. Nico Jarquin MD, F.A.C.C.  October 16, 2020

## 2020-10-19 ENCOUNTER — ANTI-COAG VISIT (OUTPATIENT)
Dept: PHARMACY | Age: 65
End: 2020-10-19

## 2020-10-19 PROBLEM — I51.3 LEFT VENTRICULAR THROMBUS: Status: ACTIVE | Noted: 2020-10-19

## 2020-10-22 ENCOUNTER — TELEPHONE (OUTPATIENT)
Dept: PHARMACY | Age: 65
End: 2020-10-22

## 2020-10-22 NOTE — TELEPHONE ENCOUNTER
Recent Travel Screening and Travel History documentation:     Travel Screening     Question   Response    In the last month, have you been in contact with someone who was confirmed or suspected to have Coronavirus / COVID-19? Unable to assess    Have you had a COVID-19 viral test in the last 14 days? Unable to assess    Do you have any of the following new or worsening symptoms? Unable to assess    Have you traveled internationally in the last month? Unable to assess      Travel History   Travel since 09/22/20     No documented travel since 09/22/20         Left message for patient and provided instruction for coumadin clinic [facility] INR check/ appointment. Instructed patient to notify clinic if fever or s/s respiratory illness.

## 2020-10-23 ENCOUNTER — HOSPITAL ENCOUNTER (OUTPATIENT)
Dept: PHARMACY | Age: 65
Setting detail: THERAPIES SERIES
Discharge: HOME | End: 2020-10-23
Payer: MEDICAID

## 2020-10-23 ENCOUNTER — HOSPITAL ENCOUNTER (OUTPATIENT)
Age: 65
Discharge: HOME | End: 2020-10-23
Payer: MEDICAID

## 2020-10-23 LAB
ANION GAP SERPL CALCULATED.3IONS-SCNC: 11 MMOL/L (ref 9–17)
BUN BLDV-MCNC: 16 MG/DL (ref 8–23)
BUN/CREAT BLD: 21 (ref 9–20)
CALCIUM SERPL-MCNC: 9 MG/DL (ref 8.6–10.4)
CHLORIDE BLD-SCNC: 104 MMOL/L (ref 98–107)
CO2: 22 MMOL/L (ref 20–31)
CREAT SERPL-MCNC: 0.77 MG/DL (ref 0.7–1.2)
GFR AFRICAN AMERICAN: >60 ML/MIN
GFR NON-AFRICAN AMERICAN: >60 ML/MIN
GFR SERPL CREATININE-BSD FRML MDRD: ABNORMAL ML/MIN/{1.73_M2}
GFR SERPL CREATININE-BSD FRML MDRD: ABNORMAL ML/MIN/{1.73_M2}
GLUCOSE BLD-MCNC: 213 MG/DL (ref 70–99)
HCT VFR BLD CALC: 44.9 % (ref 40.7–50.3)
HEMOGLOBIN: 15.8 G/DL (ref 13–17)
INR BLD: 1.1
MCH RBC QN AUTO: 32.5 PG (ref 25.2–33.5)
MCHC RBC AUTO-ENTMCNC: 35.2 G/DL (ref 28.4–34.8)
MCV RBC AUTO: 92.4 FL (ref 82.6–102.9)
NRBC AUTOMATED: 0 PER 100 WBC
PDW BLD-RTO: 12.2 % (ref 11.8–14.4)
PLATELET # BLD: ABNORMAL K/UL (ref 138–453)
PLATELET, FLUORESCENCE: 91 K/UL (ref 138–453)
PLATELET, IMMATURE FRACTION: 2.4 % (ref 1.1–10.3)
PMV BLD AUTO: ABNORMAL FL (ref 8.1–13.5)
POTASSIUM SERPL-SCNC: 3.9 MMOL/L (ref 3.7–5.3)
RBC # BLD: 4.86 M/UL (ref 4.21–5.77)
SODIUM BLD-SCNC: 137 MMOL/L (ref 135–144)
WBC # BLD: 3.9 K/UL (ref 3.5–11.3)

## 2020-10-23 PROCEDURE — 36415 COLL VENOUS BLD VENIPUNCTURE: CPT

## 2020-10-23 PROCEDURE — 85610 PROTHROMBIN TIME: CPT | Performed by: FAMILY MEDICINE

## 2020-10-23 PROCEDURE — 80048 BASIC METABOLIC PNL TOTAL CA: CPT

## 2020-10-23 PROCEDURE — 99211 OFF/OP EST MAY X REQ PHY/QHP: CPT | Performed by: FAMILY MEDICINE

## 2020-10-23 PROCEDURE — 85027 COMPLETE CBC AUTOMATED: CPT

## 2020-10-23 PROCEDURE — 85055 RETICULATED PLATELET ASSAY: CPT

## 2020-10-23 RX ORDER — ACETAMINOPHEN 500 MG
500 TABLET ORAL EVERY 6 HOURS PRN
COMMUNITY
End: 2021-01-07

## 2020-10-23 RX ORDER — INSULIN ASPART 100 [IU]/ML
30 INJECTION, SOLUTION INTRAVENOUS; SUBCUTANEOUS 2 TIMES DAILY
COMMUNITY

## 2020-10-23 RX ORDER — CLOPIDOGREL BISULFATE 75 MG/1
75 TABLET ORAL DAILY
COMMUNITY
End: 2020-11-06 | Stop reason: ALTCHOICE

## 2020-10-23 RX ORDER — MECLIZINE HYDROCHLORIDE 25 MG/1
25 TABLET ORAL 3 TIMES DAILY PRN
Status: ON HOLD | COMMUNITY
End: 2021-09-03

## 2020-10-23 NOTE — PATIENT INSTRUCTIONS
Please take an additional 5mg warfarin tablet today  Please take 2 tablets (10mg) warfarin Saturday   Please take 1 tablet (5mg) warfarin on Sunday. Continue to monitor for signs of bleeding. Return to coumadin clinic for INR check on Monday October 29 at 1100.

## 2020-10-23 NOTE — PROGRESS NOTES
[facility]  Medication Management  ANTICOAGULATION    Referring Doctor: Nasima    GOAL INR: 2.0-3.0    TODAY'S INR: 1.1    WARFARIN Dosage: patient is a new start. Took 5mg warfarin on 10/21, 10/22 and 5mg this morning    INR (no units)   Date Value   10/23/2020 1.1   04/24/2020 1.0       Hemoglobin (g/dL)   Date Value   04/27/2020 16.7   04/25/2020 17.6 (H)   04/25/2020 16.8     Hematocrit (%)   Date Value   04/27/2020 48.7   04/25/2020 49.3   04/25/2020 48.1     Platelets (k/uL)   Date Value   04/27/2020 See Reflexed IPF Result   04/25/2020 See Reflexed IPF Result   04/25/2020 86 (L)         Notes:    Fingerstick INR drawn per clinic protocol. Patient states no visible blood in urine and no black tarry stool. Denies any missed doses of warfarin. Patient is new to warfarin. Patient is a 20+ year diabetic with a history of MI and stroke. Initial MI was at age of 47. Second MI was significant in 2004. Patient was put on warfarin at that time but discontinued and replaced with a \"natural medication that he is no longer taking\". Patient was admitted to [facility] via [facility] ER 4/2020 due to acute stroke. MRI of cervical spine at that time and was found to have multiple acute infarcts in right cerebellar hemisphere and evidence of of infarct in left frontal lobe. Patient states the location of strokes has affected his Meniere's disease and increased dizziness. Patient was on dual antiplatelet therapy at that time and no anticoagulation started. Patient had ECHO on 10/13/20 and found to have a kinetic apex with filling defect seen on contrast echo consistent with left ventricular apical thrombus. Stress Test on 10/13/20 showed EF of 28%. Patient was seen yesterday by Jose Snyder CNP for diabetes and Trulicity is being increased to 1.5mg weekly, starting today. Other insulin dosing adjustments are being made as well.   All medications have been reviewed and updated in medication list.  Patient states he was given prescriptions for Atorvastatin in September and has been taking it until yesterday. He stopped taking it due to significant muscle pain and cramping and will discuss with Dr Lindsay Aragon on 10/29 at cath procedure. (Patient did not start warfarin immediately upon prescription order due to pharmacy not having medication for 3 days). Patient has taken warfarin 5mg on Wednesday morning, Thursday morning and this morning. Patient instructed to take additional 5mg tablet today. Patient instructed to take 10mg warfarin (2 tablets) Saturday and 5mg (1 tablet) warfarin Sunday and return to coumadin clinic on Monday for INR check. Patient is also scheduled for BMP, CBC that day. Patient is to have cardiac cath on 10/29 and has paperwork instruction to hold warfarin 2 days prior to procedure and take 1/2 dose on morning of procedure. Depending on INR Monday, this may need altered. Patient states he does not eat much by way of Vitamin K rich foods, tends to eat carrots, corn and green beans. Patient does routinely drink Green tea daily and will continue at this time. Denies smoking and alcohol consumption. Patient would like all visit notes to be sent to Dr Negro Myers and diabetic specialist, Ananya Constantino [address] [facility],  [phone] and fax [phone]. All educational material presented, discussed and all questions answered. Patient acknowledges working in consult agreement with pharmacist as referred by his/her physician. Warfarin Patient Education: The following education has been conducted during the clinic appointment as indicated. Yumiko Kent 10/23/2020, 4:29 PM  1. Patient has been educated that warfarin is a blood thinner. 2. Patient has been educated on why he or she has been prescribed warfarin. 3. Patient has been educated that warfarin can cause bleeding. 4. Patient has been educated on the necessity of regular INR monitoring.     5. Patient has been educated to take the exact amount of warfarin prescribed each day and that the dosing regimen could possibly vary from day to day. 6. Patient has been educated that medication aids such as pill boxes, calendars, etc., are a good idea to aid with warfarin therapy. 7. Patient has been educated on his or her target INR range. 8. Patient has been educated on the signs of bleeding problems. 9. Patient has been educated to seek immediate medical attention for severe bleeding issues. 10. Patient has been educated that warfarin has many drug interactions. 11. Patient has been educated to notify their provider managing warfarin prior to taking any new medications, including over-the-counter products and herbal products. 12. Patient has been educated to always keep a current medications list.    13. Patient has been educated on the effect of vitamin K on warfarin. 14. Patient has been educated on what foods contain high amounts of vitamin K.    15. Patient has been encouraged to eat a diet with a stable amount of vitamin K intake. 16. Patient has been educated on the effects of alcohol (ethanol) on warfarin. 16. Patient has been educated that warfarin is not intended for pregnant patients or those who plan to become pregnant. 18. Patient has been educated to take warfarin at the same time every day, preferably in the evening. 19. Patient has been educated on what to do if he or she misses a dose of warfarin. 20. Patient has been educated to call the healthcare provider managing warfarin is he or she gets diarrhea, has an infection, or has a fever, as this can affect warfarin responsiveness. 21. Patient has been educated to contact the healthcare provider managing warfarin if he or she will have any planned surgeries or other medical and dental procedures. 22. Patient has been educated to seek medical attention for serious falls, particularly if the fall injures his or her head. 23. Patient has been educated to avoid contact sports and activities that may cause serious injury. 24. Patient has been educated that he or she should either carry an ID badge or bracelet saying he or she is on warfarin. 25. Patient has been instructed that special arrangements may need to be made for monitoring during extended travel and that all extended travel plans should be shared early with his or her anticoagulation provider. 26. Patient has been given written warfarin education materials to supplement verbal education.           CLINICAL PHARMACY CONSULT: MED RECONCILIATION/REVIEW ADDENDUM    For Pharmacy Admin Tracking Only    PHSO: Yes  Total # of Interventions Recommended: 4  - Increased Dose #: 2  - Maintenance Safety Lab Monitoring #: 1    Total Interventions Accepted: 4  Time Spent (min): [facility]

## 2020-10-26 ENCOUNTER — HOSPITAL ENCOUNTER (OUTPATIENT)
Dept: PHARMACY | Age: 65
Setting detail: THERAPIES SERIES
Discharge: HOME | End: 2020-10-26
Payer: MEDICAID

## 2020-10-26 VITALS
HEART RATE: 68 BPM | SYSTOLIC BLOOD PRESSURE: 152 MMHG | BODY MASS INDEX: 34.7 KG/M2 | DIASTOLIC BLOOD PRESSURE: 70 MMHG | WEIGHT: 263 LBS | TEMPERATURE: 97 F

## 2020-10-26 LAB — INR BLD: 1.7

## 2020-10-26 PROCEDURE — 99212 OFFICE O/P EST SF 10 MIN: CPT

## 2020-10-26 PROCEDURE — 85610 PROTHROMBIN TIME: CPT

## 2020-10-26 NOTE — PATIENT INSTRUCTIONS
Continue to monitor urine and stool. Continue to monitor for signs of bleeding. Return to clinic in 1 week. Hold warfarin for 2 days prior to heart cath and half tablet day of procedure and after heart cath take 2 tablets for 10 mg on 10-30 and 10-31  and then warfarin 5 mg tablet daily.

## 2020-10-26 NOTE — PROGRESS NOTES
[address] [address] [place]  Medication Management  ANTICOAGULATION    Referring Doctor: Nasima     GOAL INR: 2-3    TODAY'S INR: 1.7    WARFARIN Dosage:  Patient is to have cardiac cath on 10/29 and has paperwork instruction to hold warfarin 2 days prior to procedure and take 1/2 dose on morning of procedure per Mavis's previous note. Patient will take 2 tablets for 10 mg on 10-30 and 10-31 after heart cath and then warfarin 5 mg tablet daily. INR (no units)   Date Value   10/26/2020 1.7   10/23/2020 1.1   04/24/2020 1.0       Hemoglobin (g/dL)   Date Value   10/23/2020 15.8   04/27/2020 16.7   04/25/2020 17.6 (H)     Hematocrit (%)   Date Value   10/23/2020 44.9   04/27/2020 48.7   04/25/2020 49.3     Platelets (k/uL)   Date Value   10/23/2020 See Reflexed IPF Result   04/27/2020 See Reflexed IPF Result   04/25/2020 See Reflexed IPF Result       Notes:    Fingerstick INR drawn per clinic protocol. Patient states no visible blood in urine and no black tarry stool. Denies any missed doses of warfarin. No change in other maintenance medications or in diet. Will recheck INR in 1 weeks. Patient acknowledges working in consult agreement with pharmacist as referred by his/her physician. Patient complains of constipation and that his BS is high and not well controlled. CLINICAL PHARMACY CONSULT: MED RECONCILIATION/REVIEW ADDENDUM    For Pharmacy Admin Tracking Only    PHSO: Yes  Total # of Interventions Recommended: 5  - Increased Dose #: 2  - Decreased Dose #: 3  - Maintenance Safety Lab Monitoring #: 1  Recommended intervention potential cost savings:   Accepted intervention potential cost savings:    Total Interventions Accepted: 6  Time Spent (min): 30    Ishan DanielD

## 2020-10-29 ENCOUNTER — HOSPITAL ENCOUNTER (OUTPATIENT)
Dept: CARDIAC CATH/INVASIVE PROCEDURES | Age: 65
Discharge: HOME | End: 2020-10-29
Attending: FAMILY MEDICINE | Admitting: FAMILY MEDICINE
Payer: MEDICAID

## 2020-10-29 VITALS
SYSTOLIC BLOOD PRESSURE: 144 MMHG | TEMPERATURE: 97.6 F | OXYGEN SATURATION: 96 % | HEIGHT: 73 IN | HEART RATE: 70 BPM | BODY MASS INDEX: 34.19 KG/M2 | DIASTOLIC BLOOD PRESSURE: 71 MMHG | RESPIRATION RATE: 18 BRPM | WEIGHT: 258 LBS

## 2020-10-29 LAB
GLUCOSE BLD-MCNC: 145 MG/DL (ref 74–100)
INR BLD: 1.2
PROTHROMBIN TIME: 14.9 SEC (ref 11.5–14.2)

## 2020-10-29 PROCEDURE — 6360000002 HC RX W HCPCS

## 2020-10-29 PROCEDURE — 85610 PROTHROMBIN TIME: CPT

## 2020-10-29 PROCEDURE — 2500000003 HC RX 250 WO HCPCS

## 2020-10-29 PROCEDURE — 2709999900 HC NON-CHARGEABLE SUPPLY

## 2020-10-29 PROCEDURE — 82947 ASSAY GLUCOSE BLOOD QUANT: CPT

## 2020-10-29 PROCEDURE — C1894 INTRO/SHEATH, NON-LASER: HCPCS

## 2020-10-29 PROCEDURE — 2580000003 HC RX 258: Performed by: FAMILY MEDICINE

## 2020-10-29 PROCEDURE — 36415 COLL VENOUS BLD VENIPUNCTURE: CPT

## 2020-10-29 PROCEDURE — C1887 CATHETER, GUIDING: HCPCS

## 2020-10-29 PROCEDURE — 93458 L HRT ARTERY/VENTRICLE ANGIO: CPT | Performed by: FAMILY MEDICINE

## 2020-10-29 PROCEDURE — 6360000004 HC RX CONTRAST MEDICATION: Performed by: FAMILY MEDICINE

## 2020-10-29 PROCEDURE — C1769 GUIDE WIRE: HCPCS

## 2020-10-29 RX ORDER — DIPHENHYDRAMINE HCL 25 MG
50 CAPSULE ORAL ONCE
Status: DISCONTINUED | OUTPATIENT
Start: 2020-10-29 | End: 2020-10-29 | Stop reason: HOSPADM

## 2020-10-29 RX ORDER — ACETAMINOPHEN 325 MG/1
650 TABLET ORAL EVERY 4 HOURS PRN
Status: DISCONTINUED | OUTPATIENT
Start: 2020-10-29 | End: 2020-10-29 | Stop reason: HOSPADM

## 2020-10-29 RX ORDER — SODIUM CHLORIDE 9 MG/ML
INJECTION, SOLUTION INTRAVENOUS CONTINUOUS
Status: DISCONTINUED | OUTPATIENT
Start: 2020-10-29 | End: 2020-10-29 | Stop reason: HOSPADM

## 2020-10-29 RX ORDER — SODIUM CHLORIDE 0.9 % (FLUSH) 0.9 %
10 SYRINGE (ML) INJECTION PRN
Status: DISCONTINUED | OUTPATIENT
Start: 2020-10-29 | End: 2020-10-29 | Stop reason: HOSPADM

## 2020-10-29 RX ORDER — NITROGLYCERIN 0.4 MG/1
0.4 TABLET SUBLINGUAL EVERY 5 MIN PRN
Status: DISCONTINUED | OUTPATIENT
Start: 2020-10-29 | End: 2020-10-29 | Stop reason: HOSPADM

## 2020-10-29 RX ORDER — SODIUM CHLORIDE 0.9 % (FLUSH) 0.9 %
10 SYRINGE (ML) INJECTION EVERY 12 HOURS SCHEDULED
Status: DISCONTINUED | OUTPATIENT
Start: 2020-10-29 | End: 2020-10-29 | Stop reason: HOSPADM

## 2020-10-29 RX ADMIN — SODIUM CHLORIDE: 9 INJECTION, SOLUTION INTRAVENOUS at 08:45

## 2020-10-29 RX ADMIN — SODIUM CHLORIDE 1000 ML: 9 INJECTION, SOLUTION INTRAVENOUS at 10:00

## 2020-10-29 RX ADMIN — IOPAMIDOL 50 ML: 755 INJECTION, SOLUTION INTRAVENOUS at 09:35

## 2020-10-29 NOTE — PROGRESS NOTES
Patient A&Ox3 with no c/o pain. Is experiencing some dizziness. No redness or swelling noted at site, just slightly discolored d/t pressure dressing in place. Numbness in RUE is normal for patient.

## 2020-10-29 NOTE — OP NOTE
-  Coronary Angiography Brief Post Operative Note:    Severe three vessel coronary artery disease involving a 70% proximal and multiple 50-80% mid and distal LAD stenosis, 70-80% large mid OM1 stenosis and proximal 100% stenosis in the right coronary artery which did show left to right filling of a pretty good size PDA and PL branch of the RCA. Normal left ventricular end diastolic pressure. Consider Consult to cardiothoracic surgery for consideration of multi-vessel coronary artery bypass surgery. Mr. Olga Lidia Buchanan seemed resistant to the idea of CABG so I will leave this decision to Dr. Aidee Robb and Dr. Mario Alcala. Case reviewed and discussed with Dr. Aidee Robb.

## 2020-10-29 NOTE — PROGRESS NOTES
Discharge instructions reviewed with patient. F/u appt with Dr. Elizabeth Santos by Rigo Barnes and patient aware of when this is. No questions at this time. Patient d/c'd off unit ambulatory by self to home. Patient did not have any medications for procedure to where he cannot drive or sign paperwork. Belongings in hand. No issues noted.

## 2020-11-02 ENCOUNTER — TELEPHONE (OUTPATIENT)
Dept: PHARMACY | Age: 65
End: 2020-11-02

## 2020-11-03 ENCOUNTER — HOSPITAL ENCOUNTER (OUTPATIENT)
Dept: PHARMACY | Age: 65
Setting detail: THERAPIES SERIES
Discharge: HOME | End: 2020-11-03
Payer: COMMERCIAL

## 2020-11-03 VITALS
WEIGHT: 261.8 LBS | HEIGHT: 73 IN | DIASTOLIC BLOOD PRESSURE: 76 MMHG | BODY MASS INDEX: 34.7 KG/M2 | HEART RATE: 71 BPM | SYSTOLIC BLOOD PRESSURE: 138 MMHG

## 2020-11-03 LAB — INR BLD: 1.1

## 2020-11-03 PROCEDURE — 99212 OFFICE O/P EST SF 10 MIN: CPT | Performed by: FAMILY MEDICINE

## 2020-11-03 PROCEDURE — 85610 PROTHROMBIN TIME: CPT | Performed by: FAMILY MEDICINE

## 2020-11-03 NOTE — PATIENT INSTRUCTIONS
Please take 7.5mg (1 1/2 tablets) warfarin today then increase your dosing to 7.25mg (1 1/2 tablets) on Monday, Wednesday and Friday and 1 tablet (5mg) all other days. Continue to monitor for signs of bleeding. Return to coumadin clinic in 1 week.

## 2020-11-06 ENCOUNTER — OFFICE VISIT (OUTPATIENT)
Dept: CARDIOLOGY | Age: 65
End: 2020-11-06
Payer: COMMERCIAL

## 2020-11-06 VITALS
BODY MASS INDEX: 35.39 KG/M2 | RESPIRATION RATE: 18 BRPM | HEIGHT: 73 IN | DIASTOLIC BLOOD PRESSURE: 88 MMHG | OXYGEN SATURATION: 98 % | WEIGHT: 267 LBS | SYSTOLIC BLOOD PRESSURE: 146 MMHG | HEART RATE: 64 BPM

## 2020-11-06 PROCEDURE — 1036F TOBACCO NON-USER: CPT | Performed by: INTERNAL MEDICINE

## 2020-11-06 PROCEDURE — 2022F DILAT RTA XM EVC RTNOPTHY: CPT | Performed by: INTERNAL MEDICINE

## 2020-11-06 PROCEDURE — G8484 FLU IMMUNIZE NO ADMIN: HCPCS | Performed by: INTERNAL MEDICINE

## 2020-11-06 PROCEDURE — G8417 CALC BMI ABV UP PARAM F/U: HCPCS | Performed by: INTERNAL MEDICINE

## 2020-11-06 PROCEDURE — 3046F HEMOGLOBIN A1C LEVEL >9.0%: CPT | Performed by: INTERNAL MEDICINE

## 2020-11-06 PROCEDURE — 99214 OFFICE O/P EST MOD 30 MIN: CPT | Performed by: INTERNAL MEDICINE

## 2020-11-06 PROCEDURE — G8427 DOCREV CUR MEDS BY ELIG CLIN: HCPCS | Performed by: INTERNAL MEDICINE

## 2020-11-06 PROCEDURE — 3017F COLORECTAL CA SCREEN DOC REV: CPT | Performed by: INTERNAL MEDICINE

## 2020-11-06 RX ORDER — ALIROCUMAB 75 MG/ML
75 INJECTION, SOLUTION SUBCUTANEOUS
Qty: 1.96 ML | Refills: 5 | Status: SHIPPED | OUTPATIENT
Start: 2020-11-06 | End: 2021-01-07

## 2020-11-06 NOTE — PROGRESS NOTES
Romero Quinn am scribing for and in the presence of Joanna Rondon MD, F.A.C.C. Patient: Estella Herrera  : 1955  Date of Visit: 2020    REASON FOR VISIT / CONSULTATION: Follow-up (HX: CAD S/p Stent, Stoke, Iscemic Cardiomyopathy, HTN, HLD. Pt states he is doing okay but having very bad dizziness today. Denies: CP, SOB, palps. . he is having clear sinus drainage and sinus pressure)      History of Present Illness:        Dear Ирина Franks, APRN - CNP    I had the pleasure of seeing Estella Herrera in my office today. Mr. Amna Siddiqui is a 63 y.o. male presented for follow up. Patient has extensive medical history. He reported having multiple heart attacks with the first one at age 60 [facility] and the second one in , he had a cardiac cath and stent placed at that time. He followed up with his cardiologist up until 2020. Family history includes his siblings who have extensive heart disease including heart attacks and bypass surgery    History of ischemic cardiomyopathy secondary to multiple heart attacks, his most recent ejection fraction was 35% by echo back in 2020. He told me that he was offered defibrillator at some point but he was told that his heart function is good enough and no defibrillator needed. He has history of longstanding diabetes, hypertension and dyslipidemia he reported that he is not taking statin therapy because it does cause muscle pain. He said none of his family members were able to tolerate statins as well. He is lifelong non-smoker. He also reported having a history of Ménière's disease and chronic dizziness. He was admitted to [facility] from 2020 to 2020 because of acute stroke. As per discharge summary from [facility], NIH score was 2 for right upper and lower extremity dysmetria. He got up MRI of the brain, MRA of the head and neck and MRI of the cervical spine done.   Patient found to have multiple acute infarcts in the right cerebellar hemisphere. Additional punctate acute infarct of the right lateral kateryna with old infarction of the left frontal lobe in addition to small vessel disease. No focal significant arterial narrowing in the neck. During that same hospital admission he was evaluated by hematology because of thrombocytopenia, currently tolerating dual antiplatelet therapy with no bleeding complications. He was also evaluated by cardiology for ischemic cardiomyopathy and lisinopril started in addition to adding carvedilol twice daily. He was later seen by Dr. Kori Warren and recommended loop recorder but patient refused, he wanted to discuss with GAIL Mitchell CNP first.      Echocardiogram done on 4/24/2020 showed mild left ventricular hypertrophy, global left ventricular systolic function is moderately reduced, estimated EF is 35%. Elmo appears hypokinetic. Evidence of mild (grade I) diastolic dysfunction. Negative bubble study, no shunt noted. EKG done in office on 9/30/2020 showed sinus rhythm with evidence of old anterior myocardial infarction. No acute ischemic changes. Echo done on 10/13/2020: Because of poor image quality, definity echo contrast was used to better assess left ventricular wall motion and thickness. EF was 35%. Mild left ventricular hypertrophy. Akinetic apex with filling defect seen on contrast echo consistent with left ventricular apical thrombus. The left atrium is mildly dilated (29-33) with a left atrial volume index of 29 ml/m2. Evidence of moderate (grade II) diastolic dysfunction is seen. The aortic root is considered to be at the upper limits of normal in size when corrected for body surface area. Stress Test done on 10/13/2020: Abnormal myocardial perfusion study.   There is a moderate perfusion defect of severe intensity in the apical, anteroapical and inferoapical regions during stress and rest imaging which is most consistent with an old myocardial infarction. EF was 28%. Heart cath done on 10/29/2020: Severe three vessel coronary artery disease involving a 70% proximal and multiple 50-80% mid and distal LAD stenosis, 70-80% large mid OM1 stenosis and proximal 100% stenosis in the right coronary artery which did show left to right filling of a pretty good size PDA and PL branch of the RCA. Normal left ventricular end diastolic pressure. Consider Consult to cardiothoracic surgery for consideration of multi-vessel coronary artery bypass surgery. Mr. Roosevelt Ramey seemed resistant to the idea of CABG so I will leave this decision to Dr. Abiodun Avila. Mr. Roosevelt Ramey is here today for a follow up. He said he is suffering from constipation from taking Trulicity. He said Dr Patel Marmolejo in [facility] \"dropped\" him as a patient because they never received his records and he missed a phone call from him. He is having severe dizziness but not any worse than before. He denies history of sleep apnea. He sleeps well at night, does not wake up short of breath or gasping for air. He denies any chest pain, pressure or tightness. He denies any increased shortness of breath or palpitations. He denies any abdominal pain, bleeding problems, bowel issues, problems with his medications or any other concerns at this time. No pains in his legs or thighs when he walks around. Patient is a strong believer of homeopathic medicine/chelation therapy. I clearly stated to him that he has left ventricular apical thrombus and that has caused his stroke but Dr. Patel Marmolejo told him that this is not true and this is secondary to \"free radicals??\". He is also adamant about not taking any statins because of severe muscle pain. He said none of his family member will be able to tolerate statin therapy. He takes lecithin to dissolve the atherosclerotic plaque and he is a strong believer of this. His blood sugar is uncontrolled.   His INR still not therapeutic but he comes to our anticoagulation clinic regularly for recheck and dose adjustment. He still resistant to the idea of having an open heart surgery but would like to discuss this option first before going for possible PCI. PAST MEDICAL HISTORY:        Past Medical History:   Diagnosis Date    CAD (coronary artery disease)     [facility]     Hyperlipidemia     Hypertension     Meniere disease     [facility]ocardial infarction) [facility]     [facility]     Thrombocytopenia ([facility]        CURRENT ALLERGIES: Statins and Metformin and related REVIEW OF SYSTEMS: 14 systems were reviewed. Pertinent positives and negatives as above, all else negative.      Past Surgical History:   Procedure Laterality Date    COLONOSCOPY      CORONARY ANGIOPLASTY WITH STENT PLACEMENT      Social History:  Social History     Tobacco Use    Smoking status: Never Smoker    Smokeless tobacco: Never Used   Substance Use Topics    Alcohol use: Not Currently    Drug use: Never        CURRENT MEDICATIONS:        Outpatient Medications Marked as Taking for the 11/6/20 encounter (Office Visit) with Greg Rubio MD   Medication Sig Dispense Refill    insulin glargine (LANTUS SOLOSTAR) 100 UNIT/ML injection pen Inject 56 Units into the skin nightly (Patient taking differently: Inject 60 Units into the skin nightly ) 5 pen 5    Insulin Aspart FlexPen 100 UNIT/ML SOPN Inject 50 Units into the skin daily SLIDING SCALE      clopidogrel (PLAVIX) 75 MG tablet Take 75 mg by mouth daily      acetaminophen (TYLENOL) 500 MG tablet Take 500 mg by mouth every 6 hours as needed for Pain      meclizine (ANTIVERT) 25 MG tablet Take 25 mg by mouth 3 times daily as needed for Dizziness      Dulaglutide (TRULICITY) 1.5 NU/8.7QK SOPN Inject 1.5 mg into the skin once a week 4 pen 3    warfarin (COUMADIN) 5 MG tablet Take 1 tablet by mouth daily 90 tablet 3    ezetimibe (ZETIA) 10 MG tablet Take 1 tablet by mouth daily 90 tablet 1    blood glucose test strips (RELION GLUCOSE TEST STRIPS) strip Test qid. Dx--E11.9 insulin dependent 100 each 5    magnesium (MAGNESIUM-OXIDE) 250 MG TABS tablet Take 500 mg by mouth every morning       Coenzyme Q10 (CO Q 10) 10 MG CAPS Take by mouth daily      PSYLLIUM HUSK PO Take 2 capsules by mouth Takes 2-3 capsules every morning      LECITHIN CONCENTRATE PO Take 2 tablets by mouth daily \"decrease cholesterol\"      glucose monitoring kit (FREESTYLE) monitoring kit 1 kit by Does not apply route daily TEST BLOOD SUGAR QID. WHATEVER METER IS COVERED BY GALVEZ. DIAGNOSIS E11.9 1 kit 0    Lancets MISC TEST BLOOD SUGAR QID. WHATEVER METER IS COVERED BY GALVEZ. DIAGNOSIS E11.9 100 each 3    Insulin Pen Needle (MEIJER PEN NEEDLES) 31G X 6 MM MISC 1 each by Does not apply route daily 100 each 3    aspirin 81 MG EC tablet Take 81 mg by mouth daily      lisinopril (PRINIVIL;ZESTRIL) 10 MG tablet Take 1 tablet by mouth daily 90 tablet 0    carvedilol (COREG) 6.25 MG tablet Take 1 tablet by mouth 2 times daily 180 tablet 0       FAMILY HISTORY: family history includes Diabetes in his mother; Heart Attack in his brother, maternal grandfather, mother, and sister; Heart Disease in his father; Prostate Cancer in his father. Physical Examination:      BP (!) 146/88 (Site: Left Upper Arm, Position: Sitting, Cuff Size: Large Adult)   Pulse 64   Resp 18   Ht 6' 1\" (1.854 m)   Wt 267 lb (121.1 kg)   SpO2 98%   BMI 35.23 kg/m²  Body mass index is 35.23 kg/m². Constitutional: He is oriented to person. He appears well-developed and well-nourished. In no acute distress. HEENT: Normocephalic and atraumatic. No JVD present. Carotid bruit is not present. No mass and no thyromegaly present. No lymphadenopathy present. Cardiovascular: Normal rate, regular rhythm, normal heart sounds. Exam reveals no gallop and no friction rubs. No murmur was heard. Pulmonary/Chest: Effort normal and breath sounds normal. No respiratory distress.  He has no wheezes, rhonchi or rales. Abdominal: Soft, non-tender. Bowel sounds and aorta are normal. He exhibits no organomegaly, mass or bruit. Extremities: No edema. No cyanosis or clubbing. 2+ radial and carotid pulses. Distal extremity pulses: 2+ bilaterally. .  Neurological: He is alert and oriented to person. No evidence of gross cranial nerve deficit. Coordination appeared normal.   Skin: Skin is warm and dry. There is no rash or diaphoresis. Psychiatric: He has a normal mood and affect. His speech is normal and behavior is normal.      MOST RECENT LABS ON RECORD:   Lab Results   Component Value Date    WBC 3.9 10/23/2020    HGB 15.8 10/23/2020    HCT 44.9 10/23/2020    PLT See Reflexed IPF Result 10/23/2020    CHOL 199 09/14/2020    TRIG 180 (H) 09/14/2020    HDL 44 09/14/2020    ALT 36 09/14/2020    AST 30 09/14/2020     10/23/2020    K 3.9 10/23/2020     10/23/2020    CREATININE 0.77 10/23/2020    BUN 16 10/23/2020    CO2 22 10/23/2020    TSH 1.58 04/25/2020    PSA 3.04 06/19/2020    INR 1.1 11/03/2020    GLUF 202 06/19/2020    LABA1C 9.8 (H) 09/14/2020       ASSESSMENT:     1. Left ventricular apical thrombus    2. ASHD (arteriosclerotic heart disease)    3. S/P angioplasty with stent    4. History of stroke    5. Abnormal stress test    6. Atypical chest pain    7. Ischemic cardiomyopathy    8. Lightheaded    9. Essential hypertension    10. Mixed hyperlipidemia    11. Type 2 diabetes mellitus with other specified complication, with long-term current use of insulin ([facility]    12. Obesity, unspecified classification, unspecified obesity type, unspecified whether serious comorbidity present    13. History of MI (myocardial infarction)       PLAN:        Patient with extensive medical history as outlined in HPI. History of multiple heart attacks, stent placement 2004, ischemic cardiomyopathy. History of uncontrolled diabetes, hypertension and dyslipidemia. History of intolerance to statin therapy.   Multiple strokes, the pattern of his multiple ischemic strokes is suggestive of embolic etiology. No prior history of atrial fibrillation. Recent echo showed left ventricular apical thrombus which is clearly the source of multiple embolic strokes. Apical akinesis with left ventricular apical thrombus seen by Echo from 10/13/2020  We discussed this in great length and answered all of his questions that he had at this time. Antiplatelet Agent: STOP clopidogrel (Plavix) and Continue Aspirin 81 mg daily. I also reminded them to watch for signs of bloody or black tarry stools and stop the medication immediately if this develops as this could be life threatening. Anticoagulation: Continue warfarin (Coumadin) take as directed (goal INR 2-3) thankfully he is compliant with warfarin and comes to our anticoagulation clinic for blood work and dose adjustment. Atherosclerotic Heart Disease: S/P Stenting in 2004 and History of Stroke and MI, heart cath on 10/29/2020 70% proximal and multiple 50-80% mid and distal LAD stenosis, 70-80% large mid OM1 stenosis and proximal 100% stenosis in the right coronary artery which did show left to right filling of a pretty good size PDA and PL branch of the RCA  I reviewed his most recent stress test.  Large apical infarction with minimal dakota-infarction ischemia. Antiplatelet Agent: STOP clopidogrel (Plavix) as above and Continue Aspirin 81 mg daily. Continue warfarin as above. Beta Blocker: Continue Carvedilol (Coreg) 6.25 mg twice daily. Anti-anginal medications: Continue to nitroglycerin 0.4 mg tablets as needed for chest pain. Cholesterol Reduction Therapy: Continue ezetimide (Zetia) 10 mg daily. He is adamant about not taking statin therapy. He claims that all of his family member have problems with statin therapy. I told him I will start her on PCSK9 inhibitors we will try to get him approved for Praluent. Patient is a strong believer of chelation/homeopathic medicine.   He has seen Dr. Cristi Bianchi for long time and finally Dr. Cristi Bianchi dropped him as a patient. I told him that he has serious cardiovascular disease and his multiple strokes are clearly secondary to the left ventricular apical thrombus. I will refer him to Cardiothoracic Surgeon Dr. Kt Ramirez to discuss bypass surgery plus or minus apical aneurysmectomy and reconstruction. If he is not a good candidate for surgery then we will asked Dr. So Carrera to try multivessel PCI for him. I counseled the patient extensively to come to the emergency room immediately if he has any chest pain or worsening shortness of breath that this can be life-threatening. Patient verbalized understanding. Ischemic Cardiomyopathy:   Beta Blocker: Continue Carvedilol (Coreg) 6.25 mg twice daily. ACE Inibitor/ARB: Continue lisinopril 10 mg daily. Heart failure counseling: I advised them to try and keep their legs up whenever possible and to limit salt in their diet. Lightheadedness/dizziness: Could be secondary to his Meniere Disease versus cerebellar stroke. Nonpharmacologic counseling: Because of his condition, I reminded him to try and keep himself well-hydrated and to take extra time when moving from laying to sitting, sitting to standing and standing to walking. I also explained to him to help improve his symptoms he should include 3 g sodium diet, 1 or 2 L of sports drinks daily, knee-high compressions stockings. Essential Hypertension: Borderline controlled, patient said his blood pressures controlled at home. Beta Blocker: Continue Carvedilol (Coreg) 6.25 mg twice daily. ACE Inibitor/ARB: Continue lisinopril 10 mg daily. I asked him to continue monitor blood pressure and call me in 1 to 2 weeks. We may need to adjust his blood pressure medicines if needed.     Hyperlipidemia: Mixed - Last LDL on 9/14/2020 was 119 mg/dL  Cholesterol Reduction Therapy: Absolutely refusing to take statin therapy because of severe muscle pain  Continue Zetia 10 mg daily for optimal LDL control. Cholesterol Reduction Therapy: START alirocumab (Praluent) 75 mg every 2 weeks. Diabetes:   Continue current medications as directed. Follow with GAIL Whaley CNP and also follow up with his Endocrinologist at [facility]. Obesity: Body mass index is 35.23 kg/m². I also briefly discussed both diet and exercise strategies for him to continue to loses weight and he was very receptive to this. Patient does not believe that he has sleep apnea syndrome. In the meantime, I encouraged Mr. Maye Early to continue to take his other medications. FOLLOW UP:   I told Mr. Maye Early to call my office if he had any problems, but otherwise I asked him to Return in about 3 months (around 2/6/2021). However, I would be happy to see him sooner should the need arise. Sincerely,  Rocky Sweet MD, F.A.C.C. [facility] Cardiology Specialist    [address] [place] [address]  Phone: [phone], Fax: [phone]     I believe that the risk of significant morbidity and mortality related to the patient's current medical conditions are: high. The documentation recorded by the scribe, accurately and completely reflects the services I personally performed and the decisions made by me. Rocky Sweet MD, F.A.C.C.  November 6, 2020

## 2020-11-06 NOTE — PATIENT INSTRUCTIONS
SURVEY:    You may be receiving a survey from [employer] regarding your visit today. Please complete the survey to enable us to provide the highest quality of care to you and your family. If you cannot score us a very good on any question, please call the office to discuss how we could have made your experience a very good one. Thank you.

## 2020-11-09 ENCOUNTER — TELEPHONE (OUTPATIENT)
Dept: PHARMACY | Age: 65
End: 2020-11-09

## 2020-11-09 NOTE — TELEPHONE ENCOUNTER
Recent Travel Screening and Travel History documentation:     Travel Screening     Question   Response    In the last month, have you been in contact with someone who was confirmed or suspected to have Coronavirus / COVID-19? Unable to assess    Have you had a COVID-19 viral test in the last 14 days? Unable to assess    Do you have any of the following new or worsening symptoms? Unable to assess    Have you traveled internationally in the last month? Unable to assess      Travel History   Travel since 10/09/20     No documented travel since 10/09/20         Left message for patient and provided instruction for curide INR check/appointment. Instructed patient to notify clinic if fever or s/s respiratory illness.

## 2020-11-10 ENCOUNTER — HOSPITAL ENCOUNTER (OUTPATIENT)
Dept: PHARMACY | Age: 65
Setting detail: THERAPIES SERIES
Discharge: HOME | End: 2020-11-10
Payer: COMMERCIAL

## 2020-11-10 VITALS
HEART RATE: 66 BPM | BODY MASS INDEX: 34.88 KG/M2 | WEIGHT: 264.4 LBS | SYSTOLIC BLOOD PRESSURE: 132 MMHG | DIASTOLIC BLOOD PRESSURE: 74 MMHG

## 2020-11-10 LAB — INR BLD: 1.2

## 2020-11-10 PROCEDURE — 99212 OFFICE O/P EST SF 10 MIN: CPT | Performed by: FAMILY MEDICINE

## 2020-11-10 PROCEDURE — 85610 PROTHROMBIN TIME: CPT | Performed by: FAMILY MEDICINE

## 2020-11-10 NOTE — PROGRESS NOTES
[facility]  Medication Management  ANTICOAGULATION    Referring Doctor: Nasima    GOAL INR: 2.0-3.0    TODAY'S INR: 1.2    WARFARIN Dosage: increase 23% to 7.5mg daily    INR (no units)   Date Value   11/10/2020 1.2   11/03/2020 1.1   10/29/2020 1.2   10/26/2020 1.7   10/23/2020 1.1   04/24/2020 1.0       Hemoglobin (g/dL)   Date Value   10/23/2020 15.8   04/27/2020 16.7   04/25/2020 17.6 (H)     Hematocrit (%)   Date Value   10/23/2020 44.9   04/27/2020 48.7   04/25/2020 49.3     Platelets (k/uL)   Date Value   10/23/2020 See Reflexed IPF Result   04/27/2020 See Reflexed IPF Result   04/25/2020 See Reflexed IPF Result         Notes:    Fingerstick INR drawn per clinic protocol. Patient states no visible blood in urine and no black tarry stool. Denies any missed doses of warfarin. No change in other maintenance medications or in diet. Will recheck INR in 1 week. Patient has discontinued clopidogrel per instruction from Dr Deny Garcia. Patient confident he has missed no warfarin doses. INR increased from 1.1 to 1.2 despite a 17% dose increase from last week. Will increase weekly dosage 23% to 7.5mg daily. Patient did again discuss homeopathic options. I had a candid discussion with him about making sure he is telling us about all medications, natural, OTC and prescription and any changes he makes. Anika Mclain confirms he has changed nothing. Patient acknowledges working in consult agreement with pharmacist as referred by his/her physician.                   CLINICAL PHARMACY CONSULT: MED RECONCILIATION/REVIEW ADDENDUM    For Pharmacy Admin Tracking Only    PHSO: Yes  Total # of Interventions Recommended: 2  - Increased Dose #: 4  - Maintenance Safety Lab Monitoring #: 1    Total Interventions Accepted: 2  Time Spent (min): Enoc

## 2020-11-16 ENCOUNTER — OFFICE VISIT (OUTPATIENT)
Dept: NEUROLOGY | Age: 65
End: 2020-11-16
Payer: COMMERCIAL

## 2020-11-16 ENCOUNTER — HOSPITAL ENCOUNTER (OUTPATIENT)
Dept: PHARMACY | Age: 65
Setting detail: THERAPIES SERIES
Discharge: HOME | End: 2020-11-16
Payer: COMMERCIAL

## 2020-11-16 VITALS
SYSTOLIC BLOOD PRESSURE: 129 MMHG | HEART RATE: 62 BPM | BODY MASS INDEX: 35.36 KG/M2 | TEMPERATURE: 97.3 F | DIASTOLIC BLOOD PRESSURE: 66 MMHG | WEIGHT: 268 LBS

## 2020-11-16 VITALS
HEIGHT: 73 IN | HEART RATE: 62 BPM | SYSTOLIC BLOOD PRESSURE: 117 MMHG | RESPIRATION RATE: 20 BRPM | DIASTOLIC BLOOD PRESSURE: 85 MMHG | BODY MASS INDEX: 35.47 KG/M2 | TEMPERATURE: 98 F | WEIGHT: 267.6 LBS

## 2020-11-16 LAB — INR BLD: 1.8

## 2020-11-16 PROCEDURE — 99212 OFFICE O/P EST SF 10 MIN: CPT

## 2020-11-16 PROCEDURE — 3017F COLORECTAL CA SCREEN DOC REV: CPT | Performed by: NEUROMUSCULOSKELETAL MEDICINE, SPORTS MEDICINE

## 2020-11-16 PROCEDURE — 85610 PROTHROMBIN TIME: CPT

## 2020-11-16 PROCEDURE — 99203 OFFICE O/P NEW LOW 30 MIN: CPT | Performed by: NEUROMUSCULOSKELETAL MEDICINE, SPORTS MEDICINE

## 2020-11-16 PROCEDURE — G8428 CUR MEDS NOT DOCUMENT: HCPCS | Performed by: NEUROMUSCULOSKELETAL MEDICINE, SPORTS MEDICINE

## 2020-11-16 PROCEDURE — G8484 FLU IMMUNIZE NO ADMIN: HCPCS | Performed by: NEUROMUSCULOSKELETAL MEDICINE, SPORTS MEDICINE

## 2020-11-16 PROCEDURE — 1036F TOBACCO NON-USER: CPT | Performed by: NEUROMUSCULOSKELETAL MEDICINE, SPORTS MEDICINE

## 2020-11-16 PROCEDURE — G8417 CALC BMI ABV UP PARAM F/U: HCPCS | Performed by: NEUROMUSCULOSKELETAL MEDICINE, SPORTS MEDICINE

## 2020-11-16 NOTE — PROGRESS NOTES
NEUROLOGY CONSULT    Patient Name:  Leslie Andino  :   1955  Clinic Visit Date: 2020    I saw Mr. Leslie Andino  in the neurology clinic today for patient evaluation of chronic dizziness and chronic neck pain. The patient is a 71-year-old right-handed gentleman with a history of hypertension diabetes, hyperlipidemia, coronary artery disease history of a right cerebellar ischemic stroke in 2020, presents with complaints of persistent intermittent neck pain, dizzy spells, vertigo. According to the patient's history he has always had chronic intermittent dizziness for many years including history of carsickness as a child. The dizzy spells are intermittent, spontaneous in onset especially with sudden head movements. The spells can be brief or prolonged in duration and sometimes very severe and long enough lasting several days. He has seen a therapist for vestibular dysfunction and had temporary improvement after a few sessions of therapy a few weeks ago. Neck pain is chronic and constant especially early in the mornings. He also has right shoulder pain with no cervical radicular symptoms. No weakness in the upper or lower limbs. In April, he says he fell backward afterwards he developed right-sided weakness and came to the emergency room at [facility] and was transferred to [place] where he was found to have a right cerebellar ischemic stroke, presumed to be embolic in origin from his heart and was started on Coumadin. Diabetes is under poor control adding to his history      REVIEW OF SYSTEMS    Constitutional Weight changes: present, change in appetite: absent Fatigue: absent; Fevers : absent, Any recent hospitalizations:  absent   HEENT Ears: normal,  Visual disturbance: present   Respiratory Shortness of breath: absent, choking:  absent, Cough: absent, Snoring : absent   Cardiovascular Chest pain: absent, Leg swelling :absent, palpitations : absent, fainting : absent   GI Constipation: present, Diarrhea: present, Swallowing change: present    Urinary frequency: present, Urinary urgency: present, Urinary incontinence: present   Musculoskeletal Neck pain: present, Back pain: present, Stiffness: present, Muscle pain: present, Joint pain: absent, restless leg : present   Dermatological Hair loss: absent, Skin changes: absent   Neurological Confusion: present, Trouble concentrating: present, Seizures: absent;  Memory loss: present, balance problem: present, Dizziness: present, vertigo: present, Weakness: present, Numbness absent, Tremor: absent, Spasm: absent, involuntary movement: present, Speech difficulty: present, Headache: present, Light sensitivity: present   Psychiatric Anxiety: absent, Depression  absent, drug abuse: absent, Hallucination: absent, mood disorder: present, Suicidal ideations absent   Hematologic Abnormal bleeding: absent, Anemia: absent, Lymph gland changes: absent Clotting disorder: present Pt is on Coumadin     Past Medical History:   Diagnosis Date    CAD (coronary artery disease)     [facility]     Hyperlipidemia     Hypertension     Meniere disease     MI [facility]myocardial infarction) [facility]     Stroke ([facility]     Thrombocytopenia ([facility]        Past Surgical History:   Procedure Laterality Date    COLONOSCOPY      CORONARY ANGIOPLASTY WITH STENT PLACEMENT         Social History     Socioeconomic History    Marital status: Single     Spouse name: Not on file    Number of children: Not on file    Years of education: Not on file    Highest education level: Not on file   Occupational History    Not on file   Social Needs    Financial resource strain: Not on file    Food insecurity     Worry: Not on file     Inability: Not on file    Transportation needs     Medical: Not on file     Non-medical: Not on file   Tobacco Use    Smoking status: Never Smoker    Smokeless tobacco: Never Used   Substance and Sexual Activity    Alcohol use: Not Currently    Drug use: Never    Sexual activity: Not Currently   Lifestyle    Physical activity     Days per week: Not on file     Minutes per session: Not on file    Stress: Not on file   Relationships    Social connections     Talks on phone: Not on file     Gets together: Not on file     Attends Sabianism service: Not on file     Active member of club or organization: Not on file     Attends meetings of clubs or organizations: Not on file     Relationship status: Not on file    Intimate partner violence     Fear of current or ex partner: Not on file     Emotionally abused: Not on file     Physically abused: Not on file     Forced sexual activity: Not on file   Other Topics Concern    Not on file   Social History Narrative    Not on file       Family History   Problem Relation Age of Onset    Heart Attack Mother     Diabetes Mother     Heart Disease Father     Prostate Cancer Father     Heart Attack Sister     Heart Attack Brother     Heart Attack Maternal Grandfather        Current Outpatient Medications   Medication Sig Dispense Refill    insulin NPH (NOVOLIN N) 100 UNIT/ML injection vial Inject into the skin nightly Pt mostly takes this at HS \"To help bring down my sugar. \" Pt states to take approx 30 Units nightly.  insulin glargine (LANTUS SOLOSTAR) 100 UNIT/ML injection pen Inject 56 Units into the skin nightly (Patient taking differently: Inject 60 Units into the skin nightly ) 5 pen 5    Insulin Aspart FlexPen 100 UNIT/ML SOPN Inject 20 Units into the skin Pt to take as 20 units after each meal as sliding scale. Pt states \"The 20 Units are not usually enough. \"      acetaminophen (TYLENOL) 500 MG tablet Take 500 mg by mouth every 6 hours as needed for Pain      warfarin (COUMADIN) 5 MG tablet Take 1 tablet by mouth daily (Patient taking differently: Take 7.5 mg by mouth daily ) 90 tablet 3    ezetimibe (ZETIA) 10 MG tablet Take 1 tablet by mouth daily 90 tablet 1    magnesium (MAGNESIUM-OXIDE) 250 MG TABS tablet Take 500 mg by mouth every morning       Coenzyme Q10 (CO Q 10) 10 MG CAPS Take by mouth daily      PSYLLIUM HUSK PO Take 2 capsules by mouth Takes 2-3 capsules every morning      LECITHIN CONCENTRATE PO Take 2 tablets by mouth daily \"decrease cholesterol\"      aspirin 81 MG EC tablet Take 81 mg by mouth daily      lisinopril (PRINIVIL;ZESTRIL) 10 MG tablet Take 1 tablet by mouth daily 90 tablet 0    carvedilol (COREG) 6.25 MG tablet Take 1 tablet by mouth 2 times daily 180 tablet 0    alirocumab (PRALUENT) 75 MG/ML SOAJ injection pen Inject 1 mL into the skin every 14 days (Patient not taking: Reported on 11/16/2020) 1.96 mL 5    meclizine (ANTIVERT) 25 MG tablet Take 25 mg by mouth 3 times daily as needed for Dizziness      Dulaglutide (TRULICITY) 1.5 NI/0.4HS SOPN Inject 1.5 mg into the skin once a week (Patient not taking: Reported on 11/16/2020) 4 pen 3    blood glucose test strips (RELION GLUCOSE TEST STRIPS) strip Test qid. Dx--E11.9 insulin dependent 100 each 5    glucose monitoring kit (FREESTYLE) monitoring kit 1 kit by Does not apply route daily TEST BLOOD SUGAR QID. WHATEVER METER IS COVERED BY GALVEZ. DIAGNOSIS E11.9 1 kit 0    Lancets MISC TEST BLOOD SUGAR QID. WHATEVER METER IS COVERED BY GALVEZ. DIAGNOSIS E11.9 100 each 3    Insulin Pen Needle (MEIJER PEN NEEDLES) 31G X 6 MM MISC 1 each by Does not apply route daily 100 each 3     No current facility-administered medications for this visit.          DATA:  Lab Results   Component Value Date    WBC 3.9 10/23/2020    HGB 15.8 10/23/2020    PLT See Reflexed IPF Result 10/23/2020    CHOL 199 09/14/2020    TRIG 180 (H) 09/14/2020    HDL 44 09/14/2020    ALT 36 09/14/2020    AST 30 09/14/2020     10/23/2020    K 3.9 10/23/2020     10/23/2020    CREATININE 0.77 10/23/2020    BUN 16 10/23/2020    CO2 22 10/23/2020    TSH 1.58 04/25/2020    INR 1.8 11/16/2020    GLUF 202 06/19/2020    LABA1C 9.8 (H) 09/14/2020       /85 (Site: Left Upper Arm, Position: Sitting, Cuff Size: Medium Adult)   Pulse 62   Temp 98 °F (36.7 °C) (Tympanic)   Resp 20   Ht 6' 1\" (1.854 m)   Wt 267 lb 9.6 oz (121.4 kg)   BMI 35.31 kg/m²     NEUROLOGICAL EXAMINATION:     MENTAL STATUS: Patient is alert and oriented x3. There is no confusion or aphasia. Memory is normal.     CRANIAL NERVES: Pupils are equal and reactive. EOMS are equal in all directions. There is no nystagmus or any other abnormal eye movements. Facial sensation is normal.  There is no facial weakness. There is no loss of hearing. Palate and tongue movements are normal.     MOTOR EXAMINATION: Muscle tone is normal in all the limbs. There is no focal weakness. Muscle strength is 5/5 in both upper and lower limbs. There are no abnormal limb movements. SENSORY EXAMINATION: Normal.     STRETCH REFLEXES: 1+ symmetrical in both the upper and lower limbs. GAIT: Unable to walk tandem    Romberg sign is negative. IMPRESSION:    1. Chronic dizzy spells and episodic vertigo, along with chronic neck pain. Clinically it appears that he has chronic cervicogenic dizziness, as possible underlying BPPV,   2.  Diabetes  3. Hypertension. 4.  History of right cerebellar ischemic infarction, on anticoagulation    PLAN:    1. Recommended physical therapy for his chronic neck pain as well as evaluation for BPPV at [facility]  2. Follow-up in 6 weeks    NOTE: This neurology evaluation is part of outpatient coverage at [facility]  1-2 days per week. Patients requiring frequent evaluations or uncomfortable with potential 3-4 day turnaround on questions or calls  may be better served by a neurologist in the area full time. [facility] at [facility]. Adonis/Vinicio is available for outpatient visits and procedures including EMG/NCS.   Non-[facility] system neurologists also practice in [facility] (Dr. Karyle Socks) and [facility] (Lisa Villalobos, Benedikt).        Delia Loera MD   11/16/2020  4:36 PM      CC: Shilpa REYNOLDS-CNP

## 2020-11-16 NOTE — PATIENT INSTRUCTIONS
Continue to monitor urine and stool. Continue to monitor for signs of bleeding. Return to clinic in 1 week. Continue warfarin 1.5 tablets for 7.5 mg daily.

## 2020-11-16 NOTE — PATIENT INSTRUCTIONS
SURVEY:    You may be receiving a survey from [employer] regarding your visit today. Please complete the survey to enable us to provide the highest quality of care to you and your family. If you cannot score us a very good on any question, please call the office to discuss how we could have made your experience a very good one. Thank you. Patient Education        Learning About Coronavirus [phone])  Coronavirus [phone]): Overview  What is coronavirus ([id number])? The coronavirus disease (COVID-19) is caused by a virus. It is an illness that was first found in December 2019. It has since spread worldwide. The virus can cause fever, cough, and trouble breathing. In severe cases, it can cause pneumonia and make it hard to breathe without help. It can cause death. This virus spreads person-to-person through droplets from coughing and sneezing. It can also spread when you are close to someone who is infected. And it can spread when you touch something that has the virus on it, such as a doorknob or a tabletop. Coronaviruses are a large group of viruses. They cause the common cold. They also cause more serious illnesses like Middle East respiratory syndrome (MERS) and severe acute respiratory syndrome (SARS). COVID-19 is caused by a novel coronavirus. That means it's a new type that has not been seen in people before. How is COVID-19 treated? Mild illness can be treated at home, but more serious illness needs to be treated in the hospital. Treatment may include medicines to reduce symptoms, plus breathing support such as oxygen therapy or a ventilator. Other treatments, such as antiviral medicines, may help people who have COVID-19. What can you do to protect yourself from COVID-19? The best way to protect yourself from getting sick is to:  · Avoid areas where there is an outbreak. · Avoid contact with people who may be infected.   · Avoid crowds and try to stay at least 6 feet away from other people. · Wash your hands often, especially after you cough or sneeze. Use soap and water, and scrub for at least 20 seconds. If soap and water aren't available, use an alcohol-based hand . · Avoid touching your mouth, nose, and eyes. What can you do to avoid spreading the virus to others? To help avoid spreading the virus to others:  · Wash your hands often with soap or alcohol-based hand sanitizers. · Cover your mouth with a tissue when you cough or sneeze. Then throw the tissue in the trash. · Use a disinfectant to clean things that you touch often. These include doorknobs, remote controls, phones, and handles on your refrigerator and microwave. And don't forget countertops, tabletops, bathrooms, and computer keyboards. · Wear a cloth face cover if you have to go to public areas. If you know or suspect that you have COVID-19:  · Stay home. Don't go to school, work, or public areas. And don't use public transportation, ride-shares, or taxis unless you have no choice. · Leave your home only if you need to get medical care or testing. But call the doctor's office first so they know you're coming. And wear a face cover. · Limit contact with people in your home. If possible, stay in a separate bedroom and use a separate bathroom. · Wear a face cover whenever you're around other people. It can help stop the spread of the virus when you cough or sneeze. · Clean and disinfect your home every day. Use household  and disinfectant wipes or sprays. Take special care to clean things that you grab with your hands. · Self-isolate until it's safe to be around others again. ? If you have symptoms, it's safe when you haven't had a fever for 3 days and your symptoms have improved and it's been at least 10 days since your symptoms started. ? If you were exposed to the virus but don't have symptoms, it's safe to be around others 14 days after exposure.   ? Talk to your doctor about whether you also need testing, especially if you have a weakened immune system. When to call for help  Call 911 anytime you think you may need emergency care. For example, call if:  · You have severe trouble breathing. (You can't talk at all.)  · You have constant chest pain or pressure. · You are severely dizzy or lightheaded. · You are confused or can't think clearly. · Your face and lips have a blue color. · You passed out (lost consciousness) or are very hard to wake up. Call your doctor now if you develop symptoms such as:  · Shortness of breath. · Fever. · Cough. If you need to get care, call ahead to the doctor's office for instructions before you go. Make sure you wear a face cover to prevent exposing other people to the virus. Where can you get the latest information? The following health organizations are tracking and studying this virus. Their websites contain the most up-to-date information. Tabitha Chao also learn what to do if you think you may have been exposed to the virus. · U.S. Centers for Disease Control and Prevention (CDC): The CDC provides updated news about the disease and travel advice. The website also tells you how to prevent the spread of infection. www.cdc.gov  · World Health Organization [facility]): WHO offers information about the virus outbreaks. WHO also has travel advice. www.who.int  Current as of: July 10, 2020               Content Version: 12.6  © 6649-9497 [employer]. Care instructions adapted under license by [facility] ([facility]). If you have questions about a medical condition or this instruction, always ask your healthcare professional. [place] [address] any warranty or liability for your use of this information.

## 2020-11-16 NOTE — PROGRESS NOTES
[facility] [facility]  Medication Management  ANTICOAGULATION    Referring Doctor: Nasima    GOAL INR: 2-3    TODAY'S INR: 1.8    WARFARIN Dosage: Continue warfarin 1.5 tablets for 7.5 mg daily. INR (no units)   Date Value   11/16/2020 1.8   11/10/2020 1.2   11/03/2020 1.1   10/29/2020 1.2   10/26/2020 1.7   10/23/2020 1.1   04/24/2020 1.0       Hemoglobin (g/dL)   Date Value   10/23/2020 15.8   04/27/2020 16.7   04/25/2020 17.6 (H)     Hematocrit (%)   Date Value   10/23/2020 44.9   04/27/2020 48.7   04/25/2020 49.3     Platelets (k/uL)   Date Value   10/23/2020 See Reflexed IPF Result   04/27/2020 See Reflexed IPF Result   04/25/2020 See Reflexed IPF Result       Notes:    Fingerstick INR drawn per clinic protocol. Patient states no visible blood in urine and no black tarry stool. Denies any missed doses of warfarin. No change in other maintenance medications or in diet. Will recheck INR in 1.5 weeks. Patient acknowledges working in consult agreement with pharmacist as referred by his/her physician. Patient claims he is on a second blood thinner but he doesn't know the name of it. He claims Des Wilks put him on it. I don't see another \"blood thinner\" on his list.           Patient stopped taking Trulicity because of constipation. Patient claims all medications give him terrible side effects. Patient is a talker. CLINICAL PHARMACY CONSULT: MED RECONCILIATION/REVIEW ADDENDUM    For Pharmacy Admin Tracking Only    PHSO: Yes  Total # of Interventions Recommended: 0    - Maintenance Safety Lab Monitoring #: 1  Recommended intervention potential cost savings:   Accepted intervention potential cost savings:    Total Interventions Accepted: 1  Time Spent (min): 30    Ishan StricklandD

## 2020-11-19 ENCOUNTER — HOSPITAL ENCOUNTER (OUTPATIENT)
Dept: PHYSICAL THERAPY | Age: 65
Setting detail: THERAPIES SERIES
Discharge: HOME | End: 2020-11-19
Payer: COMMERCIAL

## 2020-11-19 PROCEDURE — 97112 NEUROMUSCULAR REEDUCATION: CPT

## 2020-11-19 PROCEDURE — 97162 PT EVAL MOD COMPLEX 30 MIN: CPT

## 2020-11-23 ENCOUNTER — TELEPHONE (OUTPATIENT)
Dept: PHARMACY | Age: 65
End: 2020-11-23

## 2020-11-23 NOTE — PATIENT INSTRUCTIONS
Patient Education     Follow up with [facility] group regarding stent therapy   Follow up with general surgery regarding hernia      Bypass Surgery for Coronary Artery Disease (02:12)  Your health professional recommends that you watch this short online health video. Learn what bypass surgery does for your heart and what will happen during surgery. How to watch the video    Scan the QR code   OR Visit the website    https://hwi. se/r/[email or web address]   Current as of: December 16, 2019               Content Version: 12.6  © 7114-2461 [employer]. Care instructions adapted under license by [facility] ([facility]). If you have questions about a medical condition or this instruction, always ask your healthcare professional. [address] any warranty or liability for your use of this information.

## 2020-11-23 NOTE — PROGRESS NOTES
Phone: [phone]          Fax: [phone]                      Outpatient Physical Therapy                                                                      Evaluation  Date: 2020  Patient: Annie Mcmillan  : 1955  [facility] #: [id number]  Referring Practitioner: Carmel Molina MD    Referral Date : 20     Medical Diagnosis: Chronic neck pain, M54.2, G89.29, Dizzy spells, R42    Treatment Diagnosis: Cervicogenic dizziness, neck pain  PT Insurance Information: [employer]  Total # of Visits Approved: 10   Total # of Visits to Date: 1  No Show: 0  Canceled Appointment: 0     Subjective  Subjective: The patient reports a long standing history of dizziness, but reports dizziness has increased since his stroke in April. The patient reports he was diagnosed with Meineire's in the , but reports a recent hearing test came back normal.  He reports looking up causes vertigo which lasts for a few seconds. He reports a history of neck soreness that when he cracks it throughout the day his dizziness improves. He reports pain ranges from 1/10 to 6/10 at worst.  He also reports LBP, and shoulder pain.   Comments: He is currently unemployed and is filing for disability  Additional Pertinent Hx: CAD, hx of CVA, DMII, HTN, OA, hiatal hernia    Objective     Observation/Palpation  Posture: Fair  Body Mechanics: Pt ambulates with wide MICHELLE with increased bilateral ER    Spine  Cervical: Flexion: WNL, extension 25% (pt reports vertigo), L rotation: 52*, R rotation: 45*      Strength RUE  Strength RUE: WFL  Strength LUE  Strength LUE: WFL    Functional Outcome Measures  75-80% disabled  Assessment  Body structures, Functions, Activity limitations: Decreased functional mobility , Decreased balance, Decreased ROM, Decreased endurance, Decreased high-level IADLs  Assessment: The patient is a 63 y.o. male with a long standing history of dizziness, which he reports has increased since his CVA in April. He reports he was diagnosed with Meniere's disease in the 90's and does report tinnitus. He reports a recent hearing test did not find deficits. He reports he's been treated for BPPV in the past and hadn't had issues until his recent CVA. He reports dizziness is reduced thorughout the day as he \"cracks\" his neck. He also reports he had a manipulation several years ago which abolished dizziness for 5 years. On exam, he demonstrates decreased cervical spine ROM, and reported dizziness with cervical spine extension. He had no abnormal oculomotor exam tests and no nystagmus was observed with Franklin-Hallpike testing and roll testing. He did report mild symptoms when he was in the R Shauna-Hallpike position. He was repositioned and given the Celina Ngoc to continue with his HEP. Symptoms are likely cervicogenic and he would benefit from skilled PT to address his deficits to decrease pain and dizziness. Prognosis: Fair        Decision Making: Medium Complexity    Patient Education  Patient Education: Exam findings, HEP, post-Epley precautions  Pt verbalized/demonstrated good understanding:     [X] Yes         [] No, pt required further clarification. Goals  Short term goals  Time Frame for Short term goals: 2 weeks  Short term goal 1: Patient will be initiated with a HEP  Short term goal 2: Patient will tolerate manual interventions to cervical spine to decrease pain and decrease dizziness. Long term goals  Time Frame for Long term goals : 5 weeks  Long term goal 1: Patient will be independent and compliant with a HEP  Long term goal 2: Patient will improve bilateral cervical spine rotation to >50*  Long term goal 3: Patient will improve cervical spine extension ROM to 75% without symptoms of vertigo  Long term goal 4: Patient will report 60% improvement in symptoms.     Patient goals : Eliminate dizziness and neck pain    Minutes Tracking:  Time In: 2101  Time Out:

## 2020-11-23 NOTE — PLAN OF CARE
[facility]           Phone: [phone]             Outpatient Physical Therapy  Fax: [phone]                                           Date: 2020  Patient: Royal Rey : 1955 [facility] #: [id number]   Referring Practitioner:  Delio Cox MD Referral Date:  20       [x] Plan of Care   [] Updated Plan of Care    Dates of Service to Include: 2020 to 21    Diagnosis:  Chronic neck pain, M54.2, G89.29, Dizzy spells, R42    Rehab (Treatment) Diagnosis:  Cervicogenic dizziness, neck pain             Onset Date:       Attendance  Total # of Visits to Date: 1 No Show: 0 Canceled Appointment: 0    Assessment  Body structures, Functions, Activity limitations: Decreased functional mobility , Decreased balance, Decreased ROM, Decreased endurance, Decreased high-level IADLs  Assessment: The patient is a 63 y.o. male with a long standing history of dizziness, which he reports has increased since his CVA in April. He reports he was diagnosed with Meniere's disease in the  and does report tinnitus. He reports a recent hearing test did not find deficits. He reports he's been treated for BPPV in the past and hadn't had issues until his recent CVA. He reports dizziness is reduced thorughout the day as he \"cracks\" his neck. He also reports he was manipulated by a chiropractor several years ago which abolished dizziness for a couple of years. On exam, he demonstrates decreased cervical spine ROM, and reported dizziness with cervical spine extension. He had no abnormal oculomotor exam tests and no nystagmus was observed with Shauna-Hallpike testing and roll testing. He did report mild symptoms when he was in the R Shauna-Hallpike position. He was repositioned and given the [place] to continue with his HEP.   Symptoms are likely cervicogenic and he would benefit from skilled PT to address his deficits to decrease pain and dizziness. Goals  Short term goals  Time Frame for Short term goals: 2 weeks  Short term goal 1: Patient will be initiated with a HEP  Short term goal 2: Patient will tolerate manual interventions to cervical spine to decrease pain and decrease dizziness. Long term goals  Time Frame for Long term goals : 5 weeks  Long term goal 1: Patient will be independent and compliant with a HEP  Long term goal 2: Patient will improve bilateral cervical spine rotation to >50*  Long term goal 3: Patient will improve cervical spine extension ROM to 75% without symptoms of vertigo  Long term goal 4: Patient will report 60% improvement in symptoms.      Prognosis  Prognosis: Fair    Treatment Plan   Times per week: 2  Plan weeks: 5  [x] HP/CP      [x] Electrical Stim   [x] Therapeutic Exercise      [x] Gait Training  [] Aquatics   [x] Ultrasound         [x] Patient Education/HEP   [x] Manual Therapy  [x] Traction    [x] Neuro-matias        [x] Soft Tissue Mobs            [] Home TENS  [] Iontophoresis    [] Orthotic casting/fitting      [] Dry Needling             Electronically signed by: Ruth Vargas PT, DPT    Date: 11/19/2020      ______________________________________ Date: 11/19/2020   Physician Signature

## 2020-11-24 ENCOUNTER — HOSPITAL ENCOUNTER (OUTPATIENT)
Dept: PHYSICAL THERAPY | Age: 65
Setting detail: THERAPIES SERIES
Discharge: HOME | End: 2020-11-24
Payer: COMMERCIAL

## 2020-11-24 ENCOUNTER — HOSPITAL ENCOUNTER (OUTPATIENT)
Dept: PHARMACY | Age: 65
Setting detail: THERAPIES SERIES
Discharge: HOME | End: 2020-11-24
Payer: COMMERCIAL

## 2020-11-24 VITALS
SYSTOLIC BLOOD PRESSURE: 138 MMHG | DIASTOLIC BLOOD PRESSURE: 68 MMHG | HEART RATE: 71 BPM | WEIGHT: 269.4 LBS | BODY MASS INDEX: 35.54 KG/M2

## 2020-11-24 LAB — INR BLD: 1.2

## 2020-11-24 PROCEDURE — 97140 MANUAL THERAPY 1/> REGIONS: CPT

## 2020-11-24 PROCEDURE — 99212 OFFICE O/P EST SF 10 MIN: CPT | Performed by: FAMILY MEDICINE

## 2020-11-24 PROCEDURE — 97112 NEUROMUSCULAR REEDUCATION: CPT

## 2020-11-24 PROCEDURE — 85610 PROTHROMBIN TIME: CPT | Performed by: FAMILY MEDICINE

## 2020-11-24 NOTE — PATIENT INSTRUCTIONS
Increase your warfarin dosing to 10mg (2 tablets) on Tuesdays, Thursdays and Saturdays, and 7.5mg ( 1 1/2 tablets) all other days. Continue to monitor for signs of bleeding. Return to coumadin clinic in 1 1/2 weeks.

## 2020-11-24 NOTE — PROGRESS NOTES
[facility] [address] [place] [address]/[place]  Medication Management  ANTICOAGULATION    Referring Doctor: Dr Trupti Rodriguez INR: 2.0-3.0    TODAY'S INR: 1.2    WARFARIN Dosage: will increase dosing by 15% to 10mg TRSat, 7.5mg all other days    INR (no units)   Date Value   11/24/2020 1.2   11/16/2020 1.8   11/10/2020 1.2   11/03/2020 1.1   10/29/2020 1.2   10/26/2020 1.7   10/23/2020 1.1     Notes:    Fingerstick INR drawn per clinic protocol. Patient states no visible blood in urine and no black tarry stool. Denies any missed doses of warfarin. No change in other maintenance medications or in diet. Will recheck INR in 1.5 weeks. Patient contradicts details of medication usage so am uncertain of when he truly last used Trulicity. Patient states he is not using medication because it doesn't help him since he is still using the same amount of insulin and it is causing constipation. He states the [facility] at pharmacy told him to contact MD about not taking it and I have as well but Jorje Yen alludes to not discussing this with provider. Patient states he is now taking warfarin in the evening. Patient acknowledges working in consult agreement with pharmacist as referred by his/her physician.                   CLINICAL PHARMACY CONSULT: MED RECONCILIATION/REVIEW ADDENDUM    For Pharmacy Admin Tracking Only    PHSO: Yes  Total # of Interventions Recommended: 1  - Increased Dose #: 3  - Maintenance Safety Lab Monitoring #: 1    Total Interventions Accepted: 2  Time Spent (min): [address] Yana Ambriz

## 2020-11-25 ENCOUNTER — INITIAL CONSULT (OUTPATIENT)
Dept: CARDIOTHORACIC SURGERY | Age: 65
End: 2020-11-25
Payer: COMMERCIAL

## 2020-11-25 VITALS
HEIGHT: 73 IN | DIASTOLIC BLOOD PRESSURE: 95 MMHG | RESPIRATION RATE: 16 BRPM | WEIGHT: 266 LBS | SYSTOLIC BLOOD PRESSURE: 159 MMHG | BODY MASS INDEX: 35.25 KG/M2 | HEART RATE: 74 BPM | OXYGEN SATURATION: 98 % | TEMPERATURE: 98.2 F

## 2020-11-25 PROCEDURE — 99204 OFFICE O/P NEW MOD 45 MIN: CPT | Performed by: NURSE PRACTITIONER

## 2020-11-25 PROCEDURE — G8417 CALC BMI ABV UP PARAM F/U: HCPCS | Performed by: NURSE PRACTITIONER

## 2020-11-25 PROCEDURE — 3017F COLORECTAL CA SCREEN DOC REV: CPT | Performed by: NURSE PRACTITIONER

## 2020-11-25 PROCEDURE — 1036F TOBACCO NON-USER: CPT | Performed by: NURSE PRACTITIONER

## 2020-11-25 PROCEDURE — G8427 DOCREV CUR MEDS BY ELIG CLIN: HCPCS | Performed by: NURSE PRACTITIONER

## 2020-11-25 PROCEDURE — G8484 FLU IMMUNIZE NO ADMIN: HCPCS | Performed by: NURSE PRACTITIONER

## 2020-11-25 NOTE — PROGRESS NOTES
[id number] [facility] Cardiothoracic Surgery  Consult    Patient's Name/Date of Birth: Mejia Najera / 1955 (30 y.o.)    Date: November 25, 2020     Chief Complaint:   Chief Complaint   Patient presents with   [facility] Consultation      referral from Dr. Rohan Mosley for CAD       HPI: Mejia Najera is a 63 y.o.  male who   Presented from cardiology Dr. Maggie Nye for evaluation of CABG. Patient was noted to have multivessel CAD after recent cardiac catheterization. Currently patient has been asymptomatic. No chest pain. No shortness of breath. No syncopal episodes. After reviewing films patient has poor targets.         ROS:   CONSTITUTIONAL:    Respiratory: negative  Cardiovascular: negative  Gastrointestinal: negative  Genitourinary:negative  Hematologic/lymphatic: negative  Musculoskeletal:negative  Neurological: negative  Endocrine: negative    Past Medical History:   Diagnosis Date    CAD (coronary artery disease)     Diabetes mellitus ([address]     Hyperlipidemia     Hypertension     Meniere disease     MI (myocardial infarction) ([address]     Stroke ([address]     Thrombocytopenia ([address]      Past Surgical History:   Procedure Laterality Date    COLONOSCOPY      CORONARY ANGIOPLASTY WITH STENT PLACEMENT       Allergies   Allergen Reactions    Statins      Cramping  Muscle aches    Metformin And Related Nausea And Vomiting     Family History   Problem Relation Age of Onset    Heart Attack Mother     Diabetes Mother     Heart Disease Father     Prostate Cancer Father     Heart Attack Sister     Heart Attack Brother     Heart Attack Maternal Grandfather      Social History     Socioeconomic History    Marital status: Single     Spouse name: Not on file    Number of children: Not on file    Years of education: Not on file    Highest education level: Not on file   Occupational History    Not on file   Social Needs    Financial resource strain: Not on file    Food insecurity     Worry: Not on file Inability: Not on file    Transportation needs     Medical: Not on file     Non-medical: Not on file   Tobacco Use    Smoking status: Never Smoker    Smokeless tobacco: Never Used   Substance and Sexual Activity    Alcohol use: Not Currently    Drug use: Never    Sexual activity: Not Currently   Lifestyle    Physical activity     Days per week: Not on file     Minutes per session: Not on file    Stress: Not on file   Relationships    Social connections     Talks on phone: Not on file     Gets together: Not on file     Attends Islam service: Not on file     Active member of club or organization: Not on file     Attends meetings of clubs or organizations: Not on file     Relationship status: Not on file    Intimate partner violence     Fear of current or ex partner: Not on file     Emotionally abused: Not on file     Physically abused: Not on file     Forced sexual activity: Not on file   Other Topics Concern    Not on file   Social History Narrative    Not on file       Current Outpatient Medications   Medication Sig Dispense Refill    ondansetron (ZOFRAN) 4 MG tablet Take 1 tablet by mouth 3 times daily as needed for Nausea or Vomiting 30 tablet 0    insulin NPH (NOVOLIN N) 100 UNIT/ML injection vial Inject into the skin nightly Pt mostly takes this at HS \"To help bring down my sugar. \" Pt states to take approx 30 Units nightly.  alirocumab (PRALUENT) 75 MG/ML SOAJ injection pen Inject 1 mL into the skin every 14 days 1.96 mL 5    insulin glargine (LANTUS SOLOSTAR) 100 UNIT/ML injection pen Inject 56 Units into the skin nightly (Patient taking differently: Inject 60 Units into the skin nightly ) 5 pen 5    Insulin Aspart FlexPen 100 UNIT/ML SOPN Inject 20 Units into the skin Pt to take as 20 units after each meal as sliding scale. Pt states \"The 20 Units are not usually enough. \"      acetaminophen (TYLENOL) 500 MG tablet Take 500 mg by mouth every 6 hours as needed for Pain      meclizine (ANTIVERT) 25 MG tablet Take 25 mg by mouth 3 times daily as needed for Dizziness      Dulaglutide (TRULICITY) 1.5 DY/9.7SH SOPN Inject 1.5 mg into the skin once a week 4 pen 3    warfarin (COUMADIN) 5 MG tablet Take 1 tablet by mouth daily (Patient taking differently: Take 7.5 mg by mouth daily ) 90 tablet 3    ezetimibe (ZETIA) 10 MG tablet Take 1 tablet by mouth daily 90 tablet 1    blood glucose test strips (RELION GLUCOSE TEST STRIPS) strip Test qid. Dx--E11.9 insulin dependent 100 each 5    magnesium (MAGNESIUM-OXIDE) 250 MG TABS tablet Take 500 mg by mouth every morning       Coenzyme Q10 (CO Q 10) 10 MG CAPS Take by mouth daily      PSYLLIUM HUSK PO Take 2 capsules by mouth Takes 2-3 capsules every morning      LECITHIN CONCENTRATE PO Take 2 tablets by mouth daily \"decrease cholesterol\"      glucose monitoring kit (FREESTYLE) monitoring kit 1 kit by Does not apply route daily TEST BLOOD SUGAR QID. WHATEVER METER IS COVERED BY GALVEZ. DIAGNOSIS E11.9 1 kit 0    Lancets MISC TEST BLOOD SUGAR QID. WHATEVER METER IS COVERED BY GALVEZ. DIAGNOSIS E11.9 100 each 3    Insulin Pen Needle (MEIJER PEN NEEDLES) 31G X 6 MM MISC 1 each by Does not apply route daily 100 each 3    aspirin 81 MG EC tablet Take 81 mg by mouth daily      lisinopril (PRINIVIL;ZESTRIL) 10 MG tablet Take 1 tablet by mouth daily 90 tablet 0    carvedilol (COREG) 6.25 MG tablet Take 1 tablet by mouth 2 times daily 180 tablet 0     No current facility-administered medications for this visit. Physical Exam:  Vitals:    11/25/20 0821   BP: (!) 159/95   Pulse:    Resp:    Temp:    SpO2:      Weight: Weight: 266 lb (120.7 kg)    Weight: 266 lb (120.7 kg)        General: Alert and Oriented x3. Sitting up in bed. No apparent distress. HEENT:  Normocephalic and atraumatic. PERRL. EOMI. Lips and oral mucosa moist and without lesions. Neck:  Supple. Trachea midline. Chest:  No abnormality.   Equal and symmetric expansion with respiration. Lungs:  Clear to auscultation. Cardiac:  Regular rate and rhythm without murmurs, rubs or gallops. Abdomen: Small ventral hernia. Patient states to have mild discomfort with hernia. Still passing stool. Still passing gas. Extremities:  No cyanosis, clubbing, or edema. Intact pulses in all four extremities. Musculoskeletal:  Intact range of motion of peripheral joints. Normal muscular strength. Neurologic:  Cranial nerves are grossly intact. Non-focal sensory deficits on exam.  Psychiatric: Mood and affect are appropriate. Imaging Studies:    Cardiac Cath:   Cardiac Arteries and Lesion Findings  LMCA: Normal 0% stenosis. LAD:    Lesion on Prox LAD: Mid subsection. 75% stenosis. Comments: In stent restenosis  LCx:    Lesion on 1st Ob Geraldine: Mid subsection. 70% stenosis. RCA:    Lesion on Mid RCA: Distal subsection. 100% stenosis. Comments: The distal portions of the RCA fill by left to right    collateralization. Coronary Tree         Echo:Because of poor image quality, Definity echo contrast was used to better  assess left ventricular wall motion and thickness. Poor image quality, likely due to patient body habitus and/or lung disease. Global left ventricular function is difficult to assess but appears severely  reduced with an estimated EF of 35%. Mild left ventricular hypertrophy and with normal left ventricular cavity  size. Akinetic apex with filling defect seen on contrast echo consistent with left  ventricular apical thrombus. The left atrium is mildly dilated (29-33) with a left atrial volume index of  29 ml/m2. No significant valvular valvular abnormalities. Evidence of moderate (grade II) diastolic dysfunction is seen. The aortic root is considered to be at the upper limits of normal in size  when corrected for body surface area.       Assessment & Plan:  Patient Active Problem List   Diagnosis    Cerebellar stroke ([address]    Right sided weakness    Abnormal CT of the head    Hypertensive urgency    ASHD (arteriosclerotic heart disease)    S/P angioplasty with stent    History of stroke    History of myocardial infarction    Ischemic cardiomyopathy    Essential hypertension    Family history of heart disease    Mixed hyperlipidemia    Obesity    Left ventricular thrombus     PLAN:  Refer to [facility] Dr Miladis Ellison group for stents  Targets are not good for CABG and would not benefit patient LAD is very narrow with no targets. Diag. Does not cover large territory   EF 35%  [facility] will reach back out to [facility] if stents are not an option. Pt had complaints of a hernia causing some discomfort.  We will place for general surgery consult        Radha Jay CNP  Phone: [phone]

## 2020-11-26 NOTE — PROGRESS NOTES
Phone: Audrey           Fax: [phone]                           Outpatient Physical Therapy                                                                            Daily Note    Patient: Yared Raymond : 1955  [facility] #: [id number]   Referring Practitioner:  Soco Null MD    Referral Date : 20     Date: 2020    Diagnosis: Chronic neck pain, M54.2, G89.29, Dizzy spells, R42  Treatment Diagnosis: Cervicogenic dizziness, neck pain    PT Insurance Information: [place]  Total # of Visits Approved: 10 Per Physician Order  Total # of Visits to Date: 2  No Show: 0  Canceled Appointment: 0      Pre-Treatment Pain:  4/10  Subjective: Pt states he still cannot look up without feeling dizzy. Also continues to feel cracking in neck with turning head. Exercises:  Exercise 2: head still, eyes moving - 10x each  Exercise 3: eyes fixed, head moving - 10x    Manual:  Joint mobilization: grade III to right c-spine for pain and ext  Manual traction: c-spine in supine  Soft Tissue Mobalization: STM with heated thermoprobe x 8 mins in sitting    Modalities:  Moist heat: 15 mins following treat       Assessment  Assessment: Pt instructed in VOR ex with good laura. Pt requires occasional cueing during ex to stay on task. Will continue to progress as pt tolerates. Activity Tolerance  Activity Tolerance: Patient Tolerated treatment well    Patient Education  Patient Education: Progression of HEP  Pt verbalized/demonstrated good understanding:     [x] Yes         [] No, pt required further clarification.        Post Treatment Pain:  /10      Plan  Times per week: 2  Plan weeks: 5      Goals  (Total # of Visits to Date: 2)      Short term goals  Time Frame for Short term goals: 2 weeks  Short term goal 1: Patient will be initiated with a HEP - met  Short term goal 2: Patient will tolerate manual interventions to cervical spine to decrease pain and decrease dizziness. - met    Long term goals  Time Frame for Long term goals : 5 weeks  Long term goal 1: Patient will be independent and compliant with a HEP  Long term goal 2: Patient will improve bilateral cervical spine rotation to >50*  Long term goal 3: Patient will improve cervical spine extension ROM to 75% without symptoms of vertigo  Long term goal 4: Patient will report 60% improvement in symptoms.     Minutes Tracking:  Time In: 8551  Time Out: 4116  Minutes: 59  Timed Code Treatment Minutes: [address] , PT, DPT, CMPT      Date: 11/24/2020

## 2020-11-27 ENCOUNTER — APPOINTMENT (OUTPATIENT)
Dept: PHYSICAL THERAPY | Age: 65
End: 2020-11-27
Payer: COMMERCIAL

## 2020-11-29 ENCOUNTER — HOSPITAL ENCOUNTER (EMERGENCY)
Age: 65
Discharge: HOME | End: 2020-11-29
Payer: COMMERCIAL

## 2020-11-29 ENCOUNTER — APPOINTMENT (OUTPATIENT)
Dept: CT IMAGING | Age: 65
End: 2020-11-29
Payer: COMMERCIAL

## 2020-11-29 VITALS
WEIGHT: 265 LBS | BODY MASS INDEX: 35.12 KG/M2 | RESPIRATION RATE: 20 BRPM | SYSTOLIC BLOOD PRESSURE: 164 MMHG | TEMPERATURE: 97.4 F | DIASTOLIC BLOOD PRESSURE: 85 MMHG | OXYGEN SATURATION: 97 % | HEART RATE: 76 BPM | HEIGHT: 73 IN

## 2020-11-29 LAB
-: ABNORMAL
ABSOLUTE EOS #: 0.06 K/UL (ref 0–0.44)
ABSOLUTE IMMATURE GRANULOCYTE: 0.03 K/UL (ref 0–0.3)
ABSOLUTE LYMPH #: 0.82 K/UL (ref 1.1–3.7)
ABSOLUTE MONO #: 0.39 K/UL (ref 0.1–1.2)
ALBUMIN SERPL-MCNC: 4.4 G/DL (ref 3.5–5.2)
ALBUMIN/GLOBULIN RATIO: 2 (ref 1–2.5)
ALP BLD-CCNC: 54 U/L (ref 40–129)
ALT SERPL-CCNC: 60 U/L (ref 5–41)
AMORPHOUS: ABNORMAL
ANION GAP SERPL CALCULATED.3IONS-SCNC: 12 MMOL/L (ref 9–17)
AST SERPL-CCNC: 40 U/L
BACTERIA: ABNORMAL
BASOPHILS # BLD: 1 % (ref 0–2)
BASOPHILS ABSOLUTE: 0.04 K/UL (ref 0–0.2)
BILIRUB SERPL-MCNC: 1.02 MG/DL (ref 0.3–1.2)
BILIRUBIN URINE: NEGATIVE
BUN BLDV-MCNC: 13 MG/DL (ref 8–23)
BUN/CREAT BLD: 17 (ref 9–20)
CALCIUM SERPL-MCNC: 9.3 MG/DL (ref 8.6–10.4)
CASTS UA: ABNORMAL /LPF
CHLORIDE BLD-SCNC: 104 MMOL/L (ref 98–107)
CO2: 23 MMOL/L (ref 20–31)
COLOR: YELLOW
COMMENT UA: ABNORMAL
CREAT SERPL-MCNC: 0.75 MG/DL (ref 0.7–1.2)
CRYSTALS, UA: ABNORMAL /HPF
DIFFERENTIAL TYPE: ABNORMAL
EOSINOPHILS RELATIVE PERCENT: 1 % (ref 1–4)
EPITHELIAL CELLS UA: ABNORMAL /HPF (ref 0–5)
GFR AFRICAN AMERICAN: >60 ML/MIN
GFR NON-AFRICAN AMERICAN: >60 ML/MIN
GFR SERPL CREATININE-BSD FRML MDRD: ABNORMAL ML/MIN/{1.73_M2}
GFR SERPL CREATININE-BSD FRML MDRD: ABNORMAL ML/MIN/{1.73_M2}
GLUCOSE BLD-MCNC: 151 MG/DL
GLUCOSE BLD-MCNC: 151 MG/DL (ref 74–100)
GLUCOSE BLD-MCNC: 212 MG/DL (ref 70–99)
GLUCOSE URINE: ABNORMAL
HCT VFR BLD CALC: 45.9 % (ref 40.7–50.3)
HEMOGLOBIN: 17.1 G/DL (ref 13–17)
IMMATURE GRANULOCYTES: 1 %
INR BLD: 1.9
KETONES, URINE: NEGATIVE
LACTIC ACID, WHOLE BLOOD: NORMAL MMOL/L (ref 0.7–2.1)
LACTIC ACID: 1.8 MMOL/L (ref 0.5–2.2)
LEUKOCYTE ESTERASE, URINE: NEGATIVE
LIPASE: 28 U/L (ref 13–60)
LYMPHOCYTES # BLD: 13 % (ref 24–43)
MCH RBC QN AUTO: 34 PG (ref 25.2–33.5)
MCHC RBC AUTO-ENTMCNC: 37.3 G/DL (ref 28.4–34.8)
MCV RBC AUTO: 91.3 FL (ref 82.6–102.9)
MONOCYTES # BLD: 6 % (ref 3–12)
MUCUS: ABNORMAL
NITRITE, URINE: NEGATIVE
NRBC AUTOMATED: 0 PER 100 WBC
OTHER OBSERVATIONS UA: ABNORMAL
PARTIAL THROMBOPLASTIN TIME: 29.8 SEC (ref 23.9–33.8)
PDW BLD-RTO: 12.3 % (ref 11.8–14.4)
PH UA: 5.5 (ref 5–9)
PLATELET # BLD: 120 K/UL (ref 138–453)
PLATELET ESTIMATE: ABNORMAL
PMV BLD AUTO: 9.9 FL (ref 8.1–13.5)
POTASSIUM SERPL-SCNC: 3.7 MMOL/L (ref 3.7–5.3)
PROTEIN UA: NEGATIVE
PROTHROMBIN TIME: 21.6 SEC (ref 11.5–14.2)
RBC # BLD: 5.03 M/UL (ref 4.21–5.77)
RBC # BLD: ABNORMAL 10*6/UL
RBC UA: ABNORMAL /HPF (ref 0–2)
RENAL EPITHELIAL, UA: ABNORMAL /HPF
SEG NEUTROPHILS: 79 % (ref 36–65)
SEGMENTED NEUTROPHILS ABSOLUTE COUNT: 5.16 K/UL (ref 1.5–8.1)
SODIUM BLD-SCNC: 139 MMOL/L (ref 135–144)
SPECIFIC GRAVITY UA: >1.03 (ref 1.01–1.02)
TOTAL PROTEIN: 6.6 G/DL (ref 6.4–8.3)
TRICHOMONAS: ABNORMAL
TURBIDITY: CLEAR
URINE HGB: NEGATIVE
UROBILINOGEN, URINE: NORMAL
WBC # BLD: 6.5 K/UL (ref 3.5–11.3)
WBC # BLD: ABNORMAL 10*3/UL
WBC UA: ABNORMAL /HPF (ref 0–5)
YEAST: ABNORMAL

## 2020-11-29 PROCEDURE — 74177 CT ABD & PELVIS W/CONTRAST: CPT

## 2020-11-29 PROCEDURE — 83690 ASSAY OF LIPASE: CPT

## 2020-11-29 PROCEDURE — 6360000004 HC RX CONTRAST MEDICATION: Performed by: NURSE PRACTITIONER

## 2020-11-29 PROCEDURE — 80053 COMPREHEN METABOLIC PANEL: CPT

## 2020-11-29 PROCEDURE — 99284 EMERGENCY DEPT VISIT MOD MDM: CPT

## 2020-11-29 PROCEDURE — 81001 URINALYSIS AUTO W/SCOPE: CPT

## 2020-11-29 PROCEDURE — 51798 US URINE CAPACITY MEASURE: CPT

## 2020-11-29 PROCEDURE — 85610 PROTHROMBIN TIME: CPT

## 2020-11-29 PROCEDURE — 85730 THROMBOPLASTIN TIME PARTIAL: CPT

## 2020-11-29 PROCEDURE — 85025 COMPLETE CBC W/AUTO DIFF WBC: CPT

## 2020-11-29 PROCEDURE — 82947 ASSAY GLUCOSE BLOOD QUANT: CPT

## 2020-11-29 PROCEDURE — 83605 ASSAY OF LACTIC ACID: CPT

## 2020-11-29 RX ADMIN — IOPAMIDOL 75 ML: 755 INJECTION, SOLUTION INTRAVENOUS at 12:59

## 2020-11-29 ASSESSMENT — PAIN SCALES - GENERAL
PAINLEVEL_OUTOF10: 10
PAINLEVEL_OUTOF10: 10

## 2020-11-29 ASSESSMENT — PAIN DESCRIPTION - LOCATION
LOCATION: ABDOMEN;RECTUM
LOCATION: ABDOMEN

## 2020-11-29 ASSESSMENT — PAIN DESCRIPTION - FREQUENCY: FREQUENCY: INTERMITTENT

## 2020-11-29 ASSESSMENT — PAIN DESCRIPTION - DESCRIPTORS: DESCRIPTORS: CRAMPING;SHARP;PRESSURE

## 2020-11-29 ASSESSMENT — PAIN DESCRIPTION - PAIN TYPE
TYPE: ACUTE PAIN
TYPE: ACUTE PAIN

## 2020-11-29 NOTE — ED PROVIDER NOTES
[facility] ED  EMERGENCY DEPARTMENT ENCOUNTER      Pt Name: Mejia Najera  MRN: [id number]  [place] 1955  Date of evaluation: 11/29/2020  Provider: Val Dunn, Erma Johnson       Chief Complaint   Patient presents with    Constipation     c/o no BM x4 days and abdominal pain. Has been using laxatives for about two weeks.  Urinary Retention     Reports difficulty urinating. HISTORY OF PRESENT ILLNESS   (Location/Symptom, Timing/Onset, Context/Setting, Quality, Duration, Modifying Factors, Severity)  Note limiting factors. Mejia Najera is a 63 y.o. male who presents to the emergency department for a complaint of constipation and urinary retention. Patient states that he has been having abdominal pain and constipation for the past 2 weeks. The patient states that he has been using laxatives during this 2-week period which he states did help initially but during the past 4 days he has been unable to have a bowel movement. Patient states that he has been a diabetic for 27 years. Patient states that whenever his blood sugar runs high he generally has high frequency but low volume urination. He states that when his blood sugar is normal, his urination is normal.  He states that he is currently experiencing episodes of urinary retention with his constipation despite his blood sugar being relatively low at 151 at today's visit. Patient describes the pain as sharp and rates the pain as a 10 out of 10 on pain scale. Patient localizes the pain to the lower left lower mid quadrant. Patient states that he has felt nauseous but has not actually vomited. Patient states that he can feel his stomach make noises and does feel as though he has to use the restroom. Patient states that he is unable to pass gas. Patient denies any previous abdominal surgical history, fever, history of hernia, blood in the urine, or blood in the stool.   Of note, patient did state he felt like he had to go to bathroom and tried to have a bowel movement during today's ED visit but was unable. Nursing Notes were reviewed. REVIEW OF SYSTEMS    (2-9 systems for level 4, 10 or more for level 5)   Constitutional: Negative for fever, chills  Skin: Negative for rash, itching  Cardiovascular: Negative for chest pain, dyspnea on exertion  Respiratory: Negative for cough, shortness of breath, wheezing or stridor. Gastrointestinal: See HPI  Genitourinary: See HPI   Musculoskeletal: Negative for myalgias, back pain, falls. Neurological: Negative for tingling, headaches. Except as noted above the remainder of the review of systems was reviewed and negative. PAST MEDICAL HISTORY     Past Medical History:   Diagnosis Date    CAD (coronary artery disease)     Diabetes mellitus ([facility] [address]     Hyperlipidemia     Hypertension     Meniere disease     MI (myocardial infarction) ([facility] [address]     Stroke ([facility] [address]     Thrombocytopenia ([facility] [address]          SURGICAL HISTORY       Past Surgical History:   Procedure Laterality Date    COLONOSCOPY      CORONARY ANGIOPLASTY WITH STENT PLACEMENT           CURRENT MEDICATIONS       Current Discharge Medication List      CONTINUE these medications which have NOT CHANGED    Details   ondansetron (ZOFRAN) 4 MG tablet Take 1 tablet by mouth 3 times daily as needed for Nausea or Vomiting  Qty: 30 tablet, Refills: 0      insulin NPH (NOVOLIN N) 100 UNIT/ML injection vial Inject into the skin nightly Pt mostly takes this at HS \"To help bring down my sugar. \" Pt states to take approx 30 Units nightly. alirocumab (PRALUENT) 75 MG/ML SOAJ injection pen Inject 1 mL into the skin every 14 days  Qty: 1.96 mL, Refills: 5    Associated Diagnoses: Left ventricular apical thrombus; ASHD (arteriosclerotic heart disease); S/P angioplasty with stent; History of stroke;  Abnormal stress test; Atypical chest pain; Ischemic cardiomyopathy; Lightheaded; Essential hypertension; Mixed hyperlipidemia; Type 2 diabetes mellitus with other specified complication, with long-term current use of insulin ([facility] [address] Obesity, unspecified classification, unspecified obesity type, unspecified whether serious comorbidity present; History of MI (myocardial infarction)      insulin glargine (LANTUS SOLOSTAR) 100 UNIT/ML injection pen Inject 56 Units into the skin nightly  Qty: 5 pen, Refills: 5      Insulin Aspart FlexPen 100 UNIT/ML SOPN Inject 20 Units into the skin Pt to take as 20 units after each meal as sliding scale. Pt states \"The 20 Units are not usually enough. \"      acetaminophen (TYLENOL) 500 MG tablet Take 500 mg by mouth every 6 hours as needed for Pain      meclizine (ANTIVERT) 25 MG tablet Take 25 mg by mouth 3 times daily as needed for Dizziness      Dulaglutide (TRULICITY) 1.5 JG/6.5XQ SOPN Inject 1.5 mg into the skin once a week  Qty: 4 pen, Refills: 3      warfarin (COUMADIN) 5 MG tablet Take 1 tablet by mouth daily  Qty: 90 tablet, Refills: 3    Associated Diagnoses: Abnormal stress test; ASHD (arteriosclerotic heart disease); S/P angioplasty with stent; History of stroke; Ischemic cardiomyopathy; Essential hypertension; Mixed hyperlipidemia; Type 2 diabetes mellitus with other specified complication, with long-term current use of insulin ([facility] [address] Obesity, unspecified classification, unspecified obesity type, unspecified whether serious comorbidity present; Lightheaded      ezetimibe (ZETIA) 10 MG tablet Take 1 tablet by mouth daily  Qty: 90 tablet, Refills: 1    Associated Diagnoses: ASHD (arteriosclerotic heart disease); S/P angioplasty with stent; History of MI (myocardial infarction);  History of stroke; Ischemic cardiomyopathy; Essential hypertension; Mixed hyperlipidemia; Statin intolerance; Class 1 obesity due to excess calories with body mass index (BMI) of 34.0 to 34.9 in adult, unspecified whether serious comorbidity present      blood glucose test strips (RELION GLUCOSE TEST STRIPS) strip Test qid. Dx--E11.9 insulin dependent  Qty: 100 each, Refills: 5      magnesium (MAGNESIUM-OXIDE) 250 MG TABS tablet Take 500 mg by mouth every morning       Coenzyme Q10 (CO Q 10) 10 MG CAPS Take by mouth daily      PSYLLIUM HUSK PO Take 2 capsules by mouth Takes 2-3 capsules every morning      LECITHIN CONCENTRATE PO Take 2 tablets by mouth daily \"decrease cholesterol\"      glucose monitoring kit (FREESTYLE) monitoring kit 1 kit by Does not apply route daily TEST BLOOD SUGAR QID. WHATEVER METER IS COVERED BY GALVEZ. DIAGNOSIS E11.9  Qty: 1 kit, Refills: 0      Lancets MISC TEST BLOOD SUGAR QID. WHATEVER METER IS COVERED BY GALVEZ.  DIAGNOSIS E11.9  Qty: 100 each, Refills: 3      Insulin Pen Needle (MEIJER PEN NEEDLES) 31G X 6 MM MISC 1 each by Does not apply route daily  Qty: 100 each, Refills: 3      aspirin 81 MG EC tablet Take 81 mg by mouth daily      lisinopril (PRINIVIL;ZESTRIL) 10 MG tablet Take 1 tablet by mouth daily  Qty: 90 tablet, Refills: 0      carvedilol (COREG) 6.25 MG tablet Take 1 tablet by mouth 2 times daily  Qty: 180 tablet, Refills: 0             ALLERGIES     Statins and Metformin and related    FAMILY HISTORY       Family History   Problem Relation Age of Onset    Heart Attack Mother     Diabetes Mother     Heart Disease Father     Prostate Cancer Father     Heart Attack Sister     Heart Attack Brother     Heart Attack Maternal Grandfather           SOCIAL HISTORY       Social History     Socioeconomic History    Marital status: Single     Spouse name: Not on file    Number of children: Not on file    Years of education: Not on file    Highest education level: Not on file   Occupational History    Not on file   Social Needs    Financial resource strain: Not on file    Food insecurity     Worry: Not on file     Inability: Not on file    Transportation needs     Medical: Not on file     Non-medical: Not on file   Tobacco Use    Smoking status: Never Smoker    Smokeless tobacco: Never Used   Substance and Sexual Activity    Alcohol use: Not Currently    Drug use: Never    Sexual activity: Not Currently   Lifestyle    Physical activity     Days per week: Not on file     Minutes per session: Not on file    Stress: Not on file   Relationships    Social connections     Talks on phone: Not on file     Gets together: Not on file     Attends Islam service: Not on file     Active member of club or organization: Not on file     Attends meetings of clubs or organizations: Not on file     Relationship status: Not on file    Intimate partner violence     Fear of current or ex partner: Not on file     Emotionally abused: Not on file     Physically abused: Not on file     Forced sexual activity: Not on file   Other Topics Concern    Not on file   Social History Narrative    Not on file       PHYSICAL EXAM    (up to 7 for level 4, 8 or more for level 5)     ED Triage Vitals [11/29/20 1030]   BP Temp Temp Source Pulse Resp SpO2 Height Weight   (!) 190/89 97.4 °F (36.3 °C) Oral 76 20 100 % 6' 1\" (1.854 m) 265 lb (120.2 kg)     Constitutional: Oriented and well-developed, well-nourished, and in no distress. Non-toxic appearance. Patient is sitting up on side of bed as this position causes no exacerbation of his abdominal pain. Head: Normocephalic and atraumatic. Nose: No nasal deformity or septal deviation. Eyes: Extra ocular muscles intact. No discharge. No scleral icterus. Neck: Normal range of motion and phonation normal.  Cardiovascular: Regular rate and rhythm, normal heart sounds and intact distal pulses. No murmur heard. Pulmonary/Chest: Effort normal and breath sounds normal. No accessory muscle usage or stridor. Not tachypneic. No respiratory distress. No tenderness and no retraction. Abdominal: Abdomen is distended. Increased bowel sounds noted. No masses or organomegaly. Tenderness in the lower mid to lower left quadrants noted.  No rebound, no guarding and no CVA tenderness. No appreciable hernia. Musculoskeletal: Normal range of motion. No edema and no tenderness. Neurological: Alert and oriented. Skin: Skin is warm and dry. No rash noted. No cyanosis or erythema. No pallor. Nails show no clubbing. Psychiatric: Mood, memory, affect and judgment normal.  Exhibits ordered thought content. Affect appears normal.    DIAGNOSTIC RESULTS     EKG: All EKG's are interpreted by the Emergency Department Physician who either signs or Co-signs this chart in the absence of a cardiologist.      RADIOLOGY:   Non-plain film images such as CT, Ultrasound and MRI are read by the radiologist. Plain radiographic images are visualized and preliminarily interpreted by the emergency physician with the below findings:    Interpretation per the Radiologist below, if available at the time of this note:    CT ABDOMEN PELVIS W IV CONTRAST Additional Contrast? None   Final Result   1. Large amount stool burden throughout the colon to the rectum. Mild   inflammatory change around the descending colon suggestive of nonspecific   colitis. 2.  Few scattered colonic diverticula. 3.  Sequela of left ventricular apical infarct with associated sequestered   thrombus. 4.  Cholelithiasis. 5.  Prostatomegaly. Avalos catheter decompresses the bladder. 6.  Right inguinal hernia containing the normal appendix.                ED BEDSIDE ULTRASOUND:   Performed by ED Physician - none    LABS:  Results for orders placed or performed during the hospital encounter of 11/29/20   CBC Auto Differential   Result Value Ref Range    WBC 6.5 3.5 - 11.3 k/uL    RBC 5.03 4.21 - 5.77 m/uL    Hemoglobin 17.1 (H) 13.0 - 17.0 g/dL    Hematocrit 45.9 40.7 - 50.3 %    MCV 91.3 82.6 - 102.9 fL    MCH 34.0 (H) 25.2 - 33.5 pg    MCHC 37.3 (H) 28.4 - 34.8 g/dL    RDW 12.3 11.8 - 14.4 %    Platelets 058 (L) 465 - 453 k/uL    MPV 9.9 8.1 - 13.5 fL    NRBC Automated 0.0 0.0 per 100 WBC    Differential Type NOT REPORTED     WBC Morphology NOT REPORTED     RBC Morphology NOT REPORTED     Platelet Estimate NOT REPORTED     Seg Neutrophils 79 (H) 36 - 65 %    Lymphocytes 13 (L) 24 - 43 %    Monocytes 6 3 - 12 %    Eosinophils % 1 1 - 4 %    Basophils 1 0 - 2 %    Immature Granulocytes 1 (H) 0 %    Segs Absolute 5.16 1.50 - 8.10 k/uL    Absolute Lymph # 0.82 (L) 1.10 - 3.70 k/uL    Absolute Mono # 0.39 0.10 - 1.20 k/uL    Absolute Eos # 0.06 0.00 - 0.44 k/uL    Basophils Absolute 0.04 0.00 - 0.20 k/uL    Absolute Immature Granulocyte 0.03 0.00 - 0.30 k/uL   Comprehensive Metabolic Panel   Result Value Ref Range    Glucose 212 (H) 70 - 99 mg/dL    BUN 13 8 - 23 mg/dL    CREATININE 0.75 0.70 - 1.20 mg/dL    Bun/Cre Ratio 17 9 - 20    Calcium 9.3 8.6 - 10.4 mg/dL    Sodium 139 135 - 144 mmol/L    Potassium 3.7 3.7 - 5.3 mmol/L    Chloride 104 98 - 107 mmol/L    CO2 23 20 - 31 mmol/L    Anion Gap 12 9 - 17 mmol/L    Alkaline Phosphatase 54 40 - 129 U/L    ALT 60 (H) 5 - 41 U/L    AST 40 (H) <40 U/L    Total Bilirubin 1.02 0.3 - 1.2 mg/dL    Total Protein 6.6 6.4 - 8.3 g/dL    Alb 4.4 3.5 - 5.2 g/dL    Albumin/Globulin Ratio 2.0 1.0 - 2.5    GFR Non-African American >60 >60 mL/min    GFR African American >60 >60 mL/min    GFR Comment          GFR Staging         Lactic Acid, Plasma   Result Value Ref Range    Lactic Acid 1.8 0.5 - 2.2 mmol/L    Lactic Acid, Whole Blood NOT REPORTED 0.7 - 2.1 mmol/L   Lipase   Result Value Ref Range    Lipase 28 13 - 60 U/L   Urinalysis with Microscopic   Result Value Ref Range    Color, UA YELLOW YELLOW    Turbidity UA CLEAR CLEAR    Glucose, Ur 1+ (A) NEGATIVE    Bilirubin Urine NEGATIVE NEGATIVE    Ketones, Urine NEGATIVE NEGATIVE    Specific Gravity, UA >1.030 (H) 1.010 - 1.020    Urine Hgb NEGATIVE NEGATIVE    pH, UA 5.5 5.0 - 9.0    Protein, UA NEGATIVE NEGATIVE    Urobilinogen, Urine Normal Normal    Nitrite, Urine NEGATIVE NEGATIVE    Leukocyte Esterase, Urine NEGATIVE NEGATIVE Urinalysis Comments NOT REPORTED     -          WBC, UA 0 TO 2 0 - 5 /HPF    RBC, UA 0 TO 2 0 - 2 /HPF    Casts UA NOT REPORTED /LPF    Crystals, UA NOT REPORTED None /HPF    Epithelial Cells UA 0 TO 2 0 - 5 /HPF    Renal Epithelial, UA NOT REPORTED 0 /HPF    Bacteria, UA NOT REPORTED None    Mucus, UA TRACE (A) None    Trichomonas, UA NOT REPORTED None    Amorphous, UA NOT REPORTED None    Other Observations UA NOT REPORTED NOT REQ. Yeast, UA NOT REPORTED None   APTT   Result Value Ref Range    PTT 29.8 23.9 - 33.8 sec   Protime-INR   Result Value Ref Range    Protime 21.6 (H) 11.5 - 14.2 sec    INR 1.9    Glucose, Whole Blood   Result Value Ref Range    POC Glucose 151 (H) 74 - 100 mg/dL   POCT Glucose   Result Value Ref Range    Glucose 151 mg/dL     Orders Placed This Encounter   Procedures    CT ABDOMEN PELVIS W IV CONTRAST Additional Contrast? None    CBC Auto Differential    Comprehensive Metabolic Panel    Lactic Acid, Plasma    Lipase    Urinalysis with Microscopic    APTT    Protime-INR    Glucose, Whole Blood    Bladder scan    Nursing communication    POCT Glucose    Insert peripheral IV       All other labs were within normal range or not returned as of this dictation. EMERGENCY DEPARTMENT COURSE and DIFFERENTIAL DIAGNOSIS/MDM:   Vitals:    Vitals:    11/29/20 1030   BP: (!) 190/89   Pulse: 76   Resp: 20   Temp: 97.4 °F (36.3 °C)   TempSrc: Oral   SpO2: 100%   Weight: 265 lb (120.2 kg)   Height: 6' 1\" (1.854 m)       MEDICAL DECISION MAKING:  Patient was directed to follow-up with his primary care provider and follow-up with cardiology, Dr. Aidee Robb. Patient was given a enema and his Avalos catheter was removed. The patient was evaluated during the global COVID-19 pandemic, and that diagnosis was considered upon their initial presentation.  Their evaluation, treatment and testing was consistent with current guidelines for patients who present with complaints or symptoms that may be

## 2020-12-03 ENCOUNTER — TELEPHONE (OUTPATIENT)
Dept: PHARMACY | Age: 65
End: 2020-12-03

## 2020-12-03 NOTE — TELEPHONE ENCOUNTER
COVID-19 phone screening     Call placed to screen patient prior to upcoming Medication Management visit for Anticoagulation on 12/4/20. Does patient have any of the following symptoms? [] Fever    [] Lower respiratory symptoms (SOB, difficulty breathing, cough)  [] None  Left message for patient    Travel Screening completed. Patient instructed to wear mask during visit.     Gerhardt Jasper, R.Ph., 12/3/2020,1:38 PM

## 2020-12-04 ENCOUNTER — HOSPITAL ENCOUNTER (OUTPATIENT)
Dept: PHARMACY | Age: 65
Setting detail: THERAPIES SERIES
Discharge: HOME | End: 2020-12-04
Payer: COMMERCIAL

## 2020-12-04 VITALS
TEMPERATURE: 97.1 F | WEIGHT: 266 LBS | DIASTOLIC BLOOD PRESSURE: 73 MMHG | BODY MASS INDEX: 35.1 KG/M2 | SYSTOLIC BLOOD PRESSURE: 134 MMHG | HEART RATE: 68 BPM

## 2020-12-04 LAB — INR BLD: 2.8

## 2020-12-04 PROCEDURE — 99211 OFF/OP EST MAY X REQ PHY/QHP: CPT

## 2020-12-04 PROCEDURE — 85610 PROTHROMBIN TIME: CPT

## 2020-12-04 NOTE — PROGRESS NOTES
[address] [facility]/[place]  Medication Management  ANTICOAGULATION    Referring Doctor: Nasima    GOAL INR: 2-3    TODAY'S INR: 2.8    WARFARIN Dosage:  Continue warfarin 2 tablets for 10mg Tue, Thur, Sat and 1.5 tablets for 7.5mg all other days      INR (no units)   Date Value   11/29/2020 1.9   11/24/2020 1.2   11/16/2020 1.8   11/10/2020 1.2   11/03/2020 1.1   10/29/2020 1.2   10/26/2020 1.7       Hemoglobin (g/dL)   Date Value   11/29/2020 17.1 (H)   10/23/2020 15.8   04/27/2020 16.7     Hematocrit (%)   Date Value   11/29/2020 45.9   10/23/2020 44.9   04/27/2020 48.7     Platelets (k/uL)   Date Value   11/29/2020 120 (L)   10/23/2020 See Reflexed IPF Result   04/27/2020 See Reflexed IPF Result       Notes:    Fingerstick INR drawn per clinic protocol. Patient states no visible blood in urine and no black tarry stool. Denies any missed doses of warfarin. No change in other maintenance medications or in diet. Will recheck INR in 2 weeks. Patient acknowledges working in consult agreement with pharmacist as referred by his/her physician. Brought in a book, Blood types Body Types and type A blood people having\" thicker blood\"    CLINICAL PHARMACY CONSULT: MED RECONCILIATION/REVIEW ADDENDUM    For Pharmacy Admin Tracking Only    PHSO: Yes  Total # of Interventions Recommended: 0    - Maintenance Safety Lab Monitoring #: 1  Recommended intervention potential cost savings:   Accepted intervention potential cost savings:    Total Interventions Accepted: 1  Time Spent (min): 30    Bo Nuñez, PharmD

## 2020-12-04 NOTE — PATIENT INSTRUCTIONS
Continue to monitor urine and stool. Continue to monitor for signs of bleeding. Return to clinic in 2 weeks.   Continue warfarin 2 tablets for 10mg Tue, Thur, Sat and 1.5 tablets for 7.5mg all other days

## 2020-12-08 ENCOUNTER — HOSPITAL ENCOUNTER (OUTPATIENT)
Dept: PHYSICAL THERAPY | Age: 65
Setting detail: THERAPIES SERIES
Discharge: HOME | End: 2020-12-08
Payer: COMMERCIAL

## 2020-12-08 PROCEDURE — 97110 THERAPEUTIC EXERCISES: CPT

## 2020-12-08 PROCEDURE — 97140 MANUAL THERAPY 1/> REGIONS: CPT

## 2020-12-08 NOTE — PROGRESS NOTES
Phone: Audrey           Fax: [phone]                           Outpatient Physical Therapy                                                                            Daily Note    Patient: Barron Christian : 1955  CSN #: [id number]   Referring Practitioner:  Kelsey Gross MD    Referral Date : 20     Date: 2020    Diagnosis: Chronic neck pain, M54.2, G89.29, Dizzy spells, R42  Treatment Diagnosis: Cervicogenic dizziness, neck pain    PT Insurance Information: [place]  Total # of Visits Approved: 10 Per Physician Order  Total # of Visits to Date: 3  No Show: 1  Canceled Appointment: 1      Pre-Treatment Pain:  3/10  Subjective: Pt states he was feeling pretty good but then for some reason has been feeling more dizzy today. Reports no room spinning, more lightheaded. Exercises:  Exercise 1: HEP: Guerrier Daroff maneuver;  - UT and LS stretches  Exercise 4: UT stretch / LS stretch - 20s 3x    Manual:  Joint mobilization: grade III to right c-spine for pain and ext  Manual traction: c-spine in supine  Soft Tissue Mobalization: STM with heated thermoprobe x 8 mins in sitting  Other: manual stretching bilateral UT    Modalities:  Moist heat: 10 mins following treat       Assessment  Assessment: Pt with mod tightness right UT noted with stretching; pt instructed in UT and LS stretches for HEP. Min/mod tenderness noted left upper c-spine. Will continue to progress as pt tolerates. Activity Tolerance  Activity Tolerance: Patient Tolerated treatment well    Patient Education  Patient Education: UT and LS stretching for home program  Pt verbalized/demonstrated good understanding:     [x] Yes         [] No, pt required further clarification.        Post Treatment Pain:  2/10      Plan  Times per week: 2  Plan weeks: 5      Goals  (Total # of Visits to Date: 3)      Short term goals  Time Frame for Short term goals: 2 weeks  Short term goal 1: Patient will be initiated with a HEP - met  Short term goal 2: Patient will tolerate manual interventions to cervical spine to decrease pain and decrease dizziness. - met    Long term goals  Time Frame for Long term goals : 5 weeks  Long term goal 1: Patient will be independent and compliant with a HEP  Long term goal 2: Patient will improve bilateral cervical spine rotation to >50*  Long term goal 3: Patient will improve cervical spine extension ROM to 75% without symptoms of vertigo  Long term goal 4: Patient will report 60% improvement in symptoms.     Minutes Tracking:  Time In: 1970  Time Out: [address]  Minutes: 49  Timed Code Treatment Minutes: Madiha PT, DPT, CMPT      Date: 12/8/2020

## 2020-12-11 ENCOUNTER — HOSPITAL ENCOUNTER (OUTPATIENT)
Dept: PHYSICAL THERAPY | Age: 65
Setting detail: THERAPIES SERIES
Discharge: HOME | End: 2020-12-11
Payer: COMMERCIAL

## 2020-12-11 PROCEDURE — 97140 MANUAL THERAPY 1/> REGIONS: CPT

## 2020-12-11 PROCEDURE — 97112 NEUROMUSCULAR REEDUCATION: CPT

## 2020-12-11 PROCEDURE — G0283 ELEC STIM OTHER THAN WOUND: HCPCS

## 2020-12-11 NOTE — PROGRESS NOTES
with stretching. Used manual techniques to decrease muscular restrictions and joint restrictions. He was educated to continue with his HEP. He reports he will be going to a chiropractor for his lower back and neck in the next week. Activity Tolerance  Activity Tolerance: Patient Tolerated treatment well    Patient Education  Patient Education: Continue HEP  Pt verbalized/demonstrated good understanding:     [x] Yes         [] No, pt required further clarification. Post Treatment Pain:  2-3/10      Plan  Times per week: 2  Plan weeks: 5      Goals  (Total # of Visits to Date: 4)      Short term goals  Time Frame for Short term goals: 2 weeks  Short term goal 1: Patient will be initiated with a HEP - met  Short term goal 2: Patient will tolerate manual interventions to cervical spine to decrease pain and decrease dizziness. - met    Long term goals  Time Frame for Long term goals : 5 weeks  Long term goal 1: Patient will be independent and compliant with a HEP  Long term goal 2: Patient will improve bilateral cervical spine rotation to >50*  Long term goal 3: Patient will improve cervical spine extension ROM to 75% without symptoms of vertigo  Long term goal 4: Patient will report 60% improvement in symptoms.     Minutes Tracking:  Time In: 1217  Time Out: 5075  Minutes: 40  Timed Code Treatment Minutes: Yinka JAIMES, DPT     Date: 12/11/2020

## 2020-12-15 ENCOUNTER — HOSPITAL ENCOUNTER (OUTPATIENT)
Dept: PHYSICAL THERAPY | Age: 65
Setting detail: THERAPIES SERIES
Discharge: HOME | End: 2020-12-15
Payer: COMMERCIAL

## 2020-12-15 PROCEDURE — 97140 MANUAL THERAPY 1/> REGIONS: CPT

## 2020-12-15 PROCEDURE — 97110 THERAPEUTIC EXERCISES: CPT

## 2020-12-16 NOTE — PROGRESS NOTES
Phone: Audrey           Fax: [phone]                           Outpatient Physical Therapy                                                                            Daily Note    Patient: David Christian : 1955  CSN #: [id number]   Referring Practitioner:  Delia Loera MD    Referral Date : 20     Date: 12/15/2020    Diagnosis: Chronic neck pain, M54.2, G89.29, Dizzy spells, R42  Treatment Diagnosis: Cervicogenic dizziness, neck pain    PT Insurance Information: [place]  Total # of Visits Approved: 10 Per Physician Order  Total # of Visits to Date: 5  No Show: 1  Canceled Appointment: 1      Pre-Treatment Pain:  2-3/10  Subjective: He reports dizzy spells across the front of the brain. He denies any vertigo, but reports the sensation of turning his head to the L he feels like his brain and eyes continue to move to the left. Exercises:  Exercise 2: head still, eyes moving - 10x each  Exercise 3: eyes fixed, head moving - 10x  Exercise 4: UT stretch / LS stretch - 20s 3x    Manual:  Joint mobilization: grade III to right c-spine for pain and ext  Manual traction: c-spine in supine  Soft Tissue Mobalization: STM with heated thermoprobe x 8 mins in sitting  Other: manual stretching bilateral UT    Modalities:  Moist heat: 10 mins following treat       Assessment  Body structures, Functions, Activity limitations: Decreased functional mobility , Decreased balance, Decreased ROM, Decreased endurance, Decreased high-level IADLs  Assessment: Continued with vestibular exercises along with manual techniques and ROM exercises to improve cervical spine ROM. Patient reported symptoms with VOR exercises and reports he \"hasn't been doing them as often because it reproduces his symptoms. \"  He was educated to decrease velocity, but continue with exercise for habituation.     Activity Tolerance  Activity Tolerance: Patient Tolerated treatment well    Patient Education  Patient Education: Continue HEP  Pt verbalized/demonstrated good understanding:     [x] Yes         [] No, pt required further clarification. Post Treatment Pain:  2/10      Plan  Times per week: 2  Plan weeks: 5      Goals  (Total # of Visits to Date: 5)      Short term goals  Time Frame for Short term goals: 2 weeks  Short term goal 1: Patient will be initiated with a HEP - met  Short term goal 2: Patient will tolerate manual interventions to cervical spine to decrease pain and decrease dizziness. - met    Long term goals  Time Frame for Long term goals : 5 weeks  Long term goal 1: Patient will be independent and compliant with a HEP  Long term goal 2: Patient will improve bilateral cervical spine rotation to >50*  Long term goal 3: Patient will improve cervical spine extension ROM to 75% without symptoms of vertigo  Long term goal 4: Patient will report 60% improvement in symptoms.     Minutes Tracking:  Time In: 3380  Time Out: [phone]  Minutes: 55  Timed Code Treatment Minutes: 227 [facility] PT, DPT     Date: 12/15/2020

## 2020-12-17 ENCOUNTER — OFFICE VISIT (OUTPATIENT)
Dept: UROLOGY | Age: 65
End: 2020-12-17
Payer: COMMERCIAL

## 2020-12-17 ENCOUNTER — HOSPITAL ENCOUNTER (OUTPATIENT)
Age: 65
Setting detail: SPECIMEN
Discharge: HOME | End: 2020-12-17
Payer: COMMERCIAL

## 2020-12-17 VITALS
SYSTOLIC BLOOD PRESSURE: 143 MMHG | WEIGHT: 272 LBS | TEMPERATURE: 96.9 F | DIASTOLIC BLOOD PRESSURE: 88 MMHG | HEART RATE: 67 BPM | BODY MASS INDEX: 36.84 KG/M2 | HEIGHT: 72 IN

## 2020-12-17 LAB
-: ABNORMAL
AMORPHOUS: ABNORMAL
BACTERIA: ABNORMAL
BILIRUBIN URINE: NEGATIVE
CASTS UA: ABNORMAL /LPF
COLOR: YELLOW
COMMENT UA: ABNORMAL
CRYSTALS, UA: ABNORMAL /HPF
EPITHELIAL CELLS UA: ABNORMAL /HPF (ref 0–5)
GLUCOSE URINE: ABNORMAL
KETONES, URINE: NEGATIVE
LEUKOCYTE ESTERASE, URINE: NEGATIVE
MUCUS: ABNORMAL
NITRITE, URINE: NEGATIVE
OTHER OBSERVATIONS UA: ABNORMAL
PH UA: 6 (ref 5–9)
PROTEIN UA: NEGATIVE
RBC UA: ABNORMAL /HPF (ref 0–2)
RENAL EPITHELIAL, UA: ABNORMAL /HPF
SPECIFIC GRAVITY UA: >1.03 (ref 1.01–1.02)
TRICHOMONAS: ABNORMAL
TURBIDITY: CLEAR
URINE HGB: NEGATIVE
UROBILINOGEN, URINE: NORMAL
WBC UA: ABNORMAL /HPF (ref 0–5)
YEAST: ABNORMAL

## 2020-12-17 PROCEDURE — 87086 URINE CULTURE/COLONY COUNT: CPT

## 2020-12-17 PROCEDURE — G8427 DOCREV CUR MEDS BY ELIG CLIN: HCPCS | Performed by: NURSE PRACTITIONER

## 2020-12-17 PROCEDURE — 1036F TOBACCO NON-USER: CPT | Performed by: NURSE PRACTITIONER

## 2020-12-17 PROCEDURE — 81001 URINALYSIS AUTO W/SCOPE: CPT

## 2020-12-17 PROCEDURE — G8417 CALC BMI ABV UP PARAM F/U: HCPCS | Performed by: NURSE PRACTITIONER

## 2020-12-17 PROCEDURE — 99244 OFF/OP CNSLTJ NEW/EST MOD 40: CPT | Performed by: NURSE PRACTITIONER

## 2020-12-17 PROCEDURE — 3017F COLORECTAL CA SCREEN DOC REV: CPT | Performed by: NURSE PRACTITIONER

## 2020-12-17 PROCEDURE — G8484 FLU IMMUNIZE NO ADMIN: HCPCS | Performed by: NURSE PRACTITIONER

## 2020-12-17 PROCEDURE — 51798 US URINE CAPACITY MEASURE: CPT | Performed by: NURSE PRACTITIONER

## 2020-12-17 RX ORDER — TAMSULOSIN HYDROCHLORIDE 0.4 MG/1
0.4 CAPSULE ORAL EVERY EVENING
Qty: 30 CAPSULE | Refills: 11 | Status: SHIPPED | OUTPATIENT
Start: 2020-12-17 | End: 2021-01-27 | Stop reason: ALTCHOICE

## 2020-12-17 RX ORDER — POLYETHYLENE GLYCOL 3350 17 G/17G
17 POWDER, FOR SOLUTION ORAL DAILY
Qty: 1530 G | Refills: 1 | Status: SHIPPED | OUTPATIENT
Start: 2020-12-17 | End: 2021-01-16

## 2020-12-17 NOTE — PATIENT INSTRUCTIONS
There are several changes you can make to your diet to help improve your urinary symptoms. The following have been shown to irritate the bladder and should be AVOIDED:  · Coffee  · Tea, especially green tea  · Dark colored sodas, avoid Mt. Dew also  · Alcohol  · Spicy foods  · Acidic foods      It is very important to have regular, daily, bowel movements because this will improve urinary symptoms. Take Miralax (or generic equivalent) 17 g once daily (one capful). Do not skip days. If 1 dose daily causes loose stools/diarrhea, decrease to 1/2 dose or 1/4 dose but be sure to continue taking it daily (it is not the type of medication that you can just use \"as needed,\" must be taken daily to be effective). Drink 80 ounces of water per day        SURVEY:    You may be receiving a survey from [employer] regarding your visit today. Please complete the survey to enable us to provide the highest quality of care to you and your family. If you cannot score us a very good on any question, please call the office to discuss how we could of made your experience a very good one. Thank you.

## 2020-12-17 NOTE — PROGRESS NOTES
HPI:          Patient is a 63 y.o. male in no acute distress. He is alert and oriented to person, place, and time. New patient referral from YEN Muhammad for LUTS. Complaints of urgency and urge incontinence. He has nocturia 0-2 times per night. He has frequency every 30 minutes to an hour during the day. He does change his underwear once per night due to the incontinence. He does have a weak stream and a split stream at the start of urination. He is uncircumcised, but denies any issues retracting his foreskin. He does have significant constipation and was recently in the ER for constipation. He also reports a history of fecal smearing. He will often go 4 more days without a bowel movement. While in the ER he was told he was not emptying his bladder. He did have a CT completed while in the ER on 11/2020. This film was independently reviewed and shows no evidence of  calcifications or hydronephrosis. His random bladder scan today is low, 80 mL. He is an uncontrolled insulin-dependent diabetic. He does consume a large amount of bladder irritants. He denies history of stones. He has never seen urology in the past.          Past Medical History:   Diagnosis Date    CAD (coronary artery disease)     [facility] mellitus [facility]     Diabetic retinopathy ([facility] 11/05/2020    Hyperlipidemia     Hypertension     Meniere disease     MI (myocardial infarction) ([facility]     Stroke ([facility]     Thrombocytopenia ([facility]      Past Surgical History:   Procedure Laterality Date    COLONOSCOPY      CORONARY ANGIOPLASTY WITH STENT PLACEMENT       Outpatient Encounter Medications as of 12/17/2020   Medication Sig Dispense Refill    ondansetron (ZOFRAN) 4 MG tablet Take 1 tablet by mouth 3 times daily as needed for Nausea or Vomiting 30 tablet 0    insulin NPH (NOVOLIN N) 100 UNIT/ML injection vial Inject into the skin nightly Pt mostly takes this at HS \"To help bring down my sugar. \" Pt states to take approx 30 Units nightly.  insulin glargine (LANTUS SOLOSTAR) 100 UNIT/ML injection pen Inject 56 Units into the skin nightly (Patient taking differently: Inject 60 Units into the skin nightly ) 5 pen 5    Insulin Aspart FlexPen 100 UNIT/ML SOPN Inject 20 Units into the skin Pt to take as 20 units after each meal as sliding scale. Pt states \"The 20 Units are not usually enough. \"      acetaminophen (TYLENOL) 500 MG tablet Take 500 mg by mouth every 6 hours as needed for Pain      warfarin (COUMADIN) 5 MG tablet Take 1 tablet by mouth daily (Patient taking differently: Take 7.5 mg by mouth daily ) 90 tablet 3    ezetimibe (ZETIA) 10 MG tablet Take 1 tablet by mouth daily 90 tablet 1    blood glucose test strips (RELION GLUCOSE TEST STRIPS) strip Test qid. Dx--E11.9 insulin dependent 100 each 5    magnesium (MAGNESIUM-OXIDE) 250 MG TABS tablet Take 500 mg by mouth every morning       Coenzyme Q10 (CO Q 10) 10 MG CAPS Take by mouth daily      PSYLLIUM HUSK PO Take 2 capsules by mouth Takes 2-3 capsules every morning      LECITHIN CONCENTRATE PO Take 2 tablets by mouth daily \"decrease cholesterol\"      glucose monitoring kit (FREESTYLE) monitoring kit 1 kit by Does not apply route daily TEST BLOOD SUGAR QID. WHATEVER METER IS COVERED BY GALVEZ. DIAGNOSIS E11.9 1 kit 0    Lancets MISC TEST BLOOD SUGAR QID. WHATEVER METER IS COVERED BY GALVEZ.  DIAGNOSIS E11.9 100 each 3    Insulin Pen Needle (MEIJER PEN NEEDLES) 31G X 6 MM MISC 1 each by Does not apply route daily 100 each 3    aspirin 81 MG EC tablet Take 81 mg by mouth daily      lisinopril (PRINIVIL;ZESTRIL) 10 MG tablet Take 1 tablet by mouth daily 90 tablet 0    carvedilol (COREG) 6.25 MG tablet Take 1 tablet by mouth 2 times daily 180 tablet 0    alirocumab (PRALUENT) 75 MG/ML SOAJ injection pen Inject 1 mL into the skin every 14 days (Patient not taking: Reported on 12/17/2020) 1.96 mL 5    meclizine (ANTIVERT) 25 MG tablet Take 25 mg by mouth 3 times daily as needed for Dizziness      Dulaglutide (TRULICITY) 1.5 VG/9.9VM SOPN Inject 1.5 mg into the skin once a week (Patient not taking: Reported on 12/17/2020) 4 pen 3     No facility-administered encounter medications on file as of 12/17/2020. Current Outpatient Medications on File Prior to Visit   Medication Sig Dispense Refill    ondansetron (ZOFRAN) 4 MG tablet Take 1 tablet by mouth 3 times daily as needed for Nausea or Vomiting 30 tablet 0    insulin NPH (NOVOLIN N) 100 UNIT/ML injection vial Inject into the skin nightly Pt mostly takes this at HS \"To help bring down my sugar. \" Pt states to take approx 30 Units nightly.  insulin glargine (LANTUS SOLOSTAR) 100 UNIT/ML injection pen Inject 56 Units into the skin nightly (Patient taking differently: Inject 60 Units into the skin nightly ) 5 pen 5    Insulin Aspart FlexPen 100 UNIT/ML SOPN Inject 20 Units into the skin Pt to take as 20 units after each meal as sliding scale. Pt states \"The 20 Units are not usually enough. \"      acetaminophen (TYLENOL) 500 MG tablet Take 500 mg by mouth every 6 hours as needed for Pain      warfarin (COUMADIN) 5 MG tablet Take 1 tablet by mouth daily (Patient taking differently: Take 7.5 mg by mouth daily ) 90 tablet 3    ezetimibe (ZETIA) 10 MG tablet Take 1 tablet by mouth daily 90 tablet 1    blood glucose test strips (RELION GLUCOSE TEST STRIPS) strip Test qid. Dx--E11.9 insulin dependent 100 each 5    magnesium (MAGNESIUM-OXIDE) 250 MG TABS tablet Take 500 mg by mouth every morning       Coenzyme Q10 (CO Q 10) 10 MG CAPS Take by mouth daily      PSYLLIUM HUSK PO Take 2 capsules by mouth Takes 2-3 capsules every morning      LECITHIN CONCENTRATE PO Take 2 tablets by mouth daily \"decrease cholesterol\"      glucose monitoring kit (FREESTYLE) monitoring kit 1 kit by Does not apply route daily TEST BLOOD SUGAR QID. WHATEVER METER IS COVERED BY LENNY.  DIAGNOSIS E11.9 1 kit 0    Lancets MISC TEST BLOOD SUGAR QID. WHATEVER METER IS COVERED BY GALVEZ. DIAGNOSIS E11.9 100 each 3    Insulin Pen Needle (MEIJER PEN NEEDLES) 31G X 6 MM MISC 1 each by Does not apply route daily 100 each 3    aspirin 81 MG EC tablet Take 81 mg by mouth daily      lisinopril (PRINIVIL;ZESTRIL) 10 MG tablet Take 1 tablet by mouth daily 90 tablet 0    carvedilol (COREG) 6.25 MG tablet Take 1 tablet by mouth 2 times daily 180 tablet 0    alirocumab (PRALUENT) 75 MG/ML SOAJ injection pen Inject 1 mL into the skin every 14 days (Patient not taking: Reported on 12/17/2020) 1.96 mL 5    meclizine (ANTIVERT) 25 MG tablet Take 25 mg by mouth 3 times daily as needed for Dizziness      Dulaglutide (TRULICITY) 1.5 IT/7.4RM SOPN Inject 1.5 mg into the skin once a week (Patient not taking: Reported on 12/17/2020) 4 pen 3     No current facility-administered medications on file prior to visit. Statins and Metformin and related  Family History   Problem Relation Age of Onset    Heart Attack Mother     Diabetes Mother     Heart Disease Father     Prostate Cancer Father     Heart Attack Sister     Heart Attack Brother     Heart Attack Maternal Grandfather      Social History     Tobacco Use   Smoking Status Never Smoker   Smokeless Tobacco Never Used       Social History     Substance and Sexual Activity   Alcohol Use Not Currently       Review of Systems   Constitutional: Negative for appetite change, chills and fever. Eyes: Negative for redness and visual disturbance. Respiratory: Negative for cough, shortness of breath and wheezing. Cardiovascular: Negative for chest pain and leg swelling. Gastrointestinal: Positive for constipation. Negative for abdominal pain, nausea and vomiting. Genitourinary: Positive for decreased urine volume, difficulty urinating, frequency and urgency. Negative for discharge, dysuria, enuresis, flank pain, hematuria, penile pain, scrotal swelling and testicular pain.    Musculoskeletal: Negative for back pain, joint swelling and myalgias. Skin: Negative for color change, rash and wound. Neurological: Negative for dizziness, tremors and numbness. Hematological: Negative for adenopathy. Does not bruise/bleed easily. BP (!) 143/88 (Site: Right Upper Arm, Position: Sitting, Cuff Size: Large Adult)   Pulse 67   Temp 96.9 °F (36.1 °C) (Temporal)   Ht 6' (1.829 m)   Wt 272 lb (123.4 kg)   BMI 36.89 kg/m²       PHYSICAL EXAM:  Constitutional: Patient in no acute distress; Neuro: alert and oriented to person place and time. Psych: Mood and affect normal.  Skin: Normal  Lungs: Respiratory effort normal  Cardiovascular:  Normal peripheral pulses  Abdomen: Soft, non-tender, non-distended with no CVA, flank pain, hepatosplenomegaly or hernia. Kidneys normal.  Bladder non-tender and not distended. Lymphatics: no palpable lymphadenopathy  Penis normal  Urethral meatus normal  Scrotal exam normal  Testicles normal bilaterally        Lab Results   Component Value Date    BUN 13 11/29/2020     Lab Results   Component Value Date    CREATININE 0.75 11/29/2020     Lab Results   Component Value Date    PSA 3.04 06/19/2020       ASSESSMENT:   Diagnosis Orders   1. Urge incontinence  AK MEASUREMENT,POST-VOID RESIDUAL VOLUME BY US,NON-IMAGING    Urinalysis with Microscopic    Culture, Urine   2. Urinary urgency  AK MEASUREMENT,POST-VOID RESIDUAL VOLUME BY US,NON-IMAGING    Urinalysis with Microscopic    Culture, Urine   3. Constipation, unspecified constipation type  External Referral To Gastroenterology   4. Fecal smearing  External Referral To Gastroenterology         PLAN:  We did explain the importance of soft, daily bowel movements to improve his urinary symptoms. He will start MiraLAX daily. He was sent home with written instructions on how to take this medication. We will also send to gastroenterology due to constipation and fecal smearing. We will check a UA C&S.     Discussed bladder

## 2020-12-18 ENCOUNTER — HOSPITAL ENCOUNTER (OUTPATIENT)
Dept: PHARMACY | Age: 65
Setting detail: THERAPIES SERIES
Discharge: HOME | End: 2020-12-18
Payer: COMMERCIAL

## 2020-12-18 ENCOUNTER — HOSPITAL ENCOUNTER (OUTPATIENT)
Dept: PHYSICAL THERAPY | Age: 65
Setting detail: THERAPIES SERIES
Discharge: HOME | End: 2020-12-18
Payer: COMMERCIAL

## 2020-12-18 VITALS
WEIGHT: 270.8 LBS | DIASTOLIC BLOOD PRESSURE: 82 MMHG | SYSTOLIC BLOOD PRESSURE: 129 MMHG | BODY MASS INDEX: 36.73 KG/M2 | HEART RATE: 65 BPM | TEMPERATURE: 96.2 F

## 2020-12-18 LAB
CULTURE: NO GROWTH
INR BLD: 1.7
Lab: NORMAL
SPECIMEN DESCRIPTION: NORMAL

## 2020-12-18 PROCEDURE — 97140 MANUAL THERAPY 1/> REGIONS: CPT

## 2020-12-18 PROCEDURE — 97112 NEUROMUSCULAR REEDUCATION: CPT

## 2020-12-18 PROCEDURE — 85610 PROTHROMBIN TIME: CPT

## 2020-12-18 PROCEDURE — 99212 OFFICE O/P EST SF 10 MIN: CPT

## 2020-12-18 NOTE — PROGRESS NOTES
[facility]  Medication Management  ANTICOAGULATION    Referring Doctor: Nasima    GOAL INR: 2-3    TODAY'S INR: 1.7    WARFARIN Dosage: Will increase warfarin to 7.5 mg every Tuesday, Thursday, Saturday and 10 mg all other days. INR (no units)   Date Value   12/18/2020 1.7   12/04/2020 2.8   11/29/2020 1.9   11/24/2020 1.2   11/16/2020 1.8   11/10/2020 1.2   11/03/2020 1.1       Notes:    Fingerstick INR drawn per clinic protocol. Patient states no visible blood in urine and no black tarry stool. Denies any missed doses of warfarin. No change in other maintenance medications or in diet. Will recheck INR in 10 days. Patient acknowledges working in consult agreement with pharmacist as referred by his/her physician. CLINICAL PHARMACY CONSULT: MED RECONCILIATION/REVIEW ADDENDUM    For Pharmacy Admin Tracking Only    PHSO: Yes  Total # of Interventions Recommended: 1  - Increased Dose #: 1  - Maintenance Safety Lab Monitoring #: 1  Total Interventions Accepted: 2  Time Spent (min): 997 [facility] 20.  Homer Rondon

## 2020-12-21 ENCOUNTER — TELEPHONE (OUTPATIENT)
Dept: UROLOGY | Age: 65
End: 2020-12-21

## 2020-12-21 ASSESSMENT — ENCOUNTER SYMPTOMS
VOMITING: 0
CONSTIPATION: 1
WHEEZING: 0
EYE REDNESS: 0
SHORTNESS OF BREATH: 0
ABDOMINAL PAIN: 0
COLOR CHANGE: 0
BACK PAIN: 0
NAUSEA: 0
COUGH: 0

## 2020-12-21 NOTE — TELEPHONE ENCOUNTER
Make sure he is consuming at least 64 ounces of water daily    Continue MiraLAX every day    It can take up to 3 months to improve bowel movements    [note and referral is ready for GI referral]

## 2020-12-21 NOTE — TELEPHONE ENCOUNTER
----- Message from GAIL Lopez - CNP sent at 12/21/2020 12:17 PM EST -----  Call pt - urine cx reviewed and negative for UTI & for significant microhematuria

## 2020-12-21 NOTE — TELEPHONE ENCOUNTER
Patient returned call to office. Patient made aware of urine culture results and voiced understanding. Patient states he has been taking the Miralax as ordered but he is still going 2-3 days without having a bowel movement. Patient would like to know if he should take more Miralax?

## 2020-12-22 NOTE — PROGRESS NOTES
met  Short term goal 2: Patient will tolerate manual interventions to cervical spine to decrease pain and decrease dizziness. - met    Long term goals  Time Frame for Long term goals : 5 weeks  Long term goal 1: Patient will be independent and compliant with a HEP  Long term goal 2: Patient will improve bilateral cervical spine rotation to >50*  Long term goal 3: Patient will improve cervical spine extension ROM to 75% without symptoms of vertigo  Long term goal 4: Patient will report 60% improvement in symptoms.     Minutes Tracking:  Time In: 9361  Time Out: 0615  Minutes: 45  Timed Code Treatment Minutes: [address], [place] , PT, DPT, CMPT      Date: 12/18/2020

## 2020-12-28 ENCOUNTER — HOSPITAL ENCOUNTER (OUTPATIENT)
Dept: PHARMACY | Age: 65
Setting detail: THERAPIES SERIES
Discharge: HOME | End: 2020-12-28
Payer: COMMERCIAL

## 2020-12-28 ENCOUNTER — OFFICE VISIT (OUTPATIENT)
Dept: NEUROLOGY | Age: 65
End: 2020-12-28
Payer: COMMERCIAL

## 2020-12-28 VITALS
TEMPERATURE: 94.4 F | BODY MASS INDEX: 37.05 KG/M2 | WEIGHT: 273.2 LBS | HEART RATE: 67 BPM | SYSTOLIC BLOOD PRESSURE: 134 MMHG | DIASTOLIC BLOOD PRESSURE: 73 MMHG

## 2020-12-28 VITALS
WEIGHT: 271.5 LBS | SYSTOLIC BLOOD PRESSURE: 137 MMHG | TEMPERATURE: 97.6 F | BODY MASS INDEX: 35.98 KG/M2 | HEIGHT: 73 IN | DIASTOLIC BLOOD PRESSURE: 69 MMHG | RESPIRATION RATE: 18 BRPM | HEART RATE: 68 BPM

## 2020-12-28 LAB — INR BLD: 1.7

## 2020-12-28 PROCEDURE — G8427 DOCREV CUR MEDS BY ELIG CLIN: HCPCS | Performed by: NEUROMUSCULOSKELETAL MEDICINE, SPORTS MEDICINE

## 2020-12-28 PROCEDURE — G8484 FLU IMMUNIZE NO ADMIN: HCPCS | Performed by: NEUROMUSCULOSKELETAL MEDICINE, SPORTS MEDICINE

## 2020-12-28 PROCEDURE — G8417 CALC BMI ABV UP PARAM F/U: HCPCS | Performed by: NEUROMUSCULOSKELETAL MEDICINE, SPORTS MEDICINE

## 2020-12-28 PROCEDURE — 3017F COLORECTAL CA SCREEN DOC REV: CPT | Performed by: NEUROMUSCULOSKELETAL MEDICINE, SPORTS MEDICINE

## 2020-12-28 PROCEDURE — 99213 OFFICE O/P EST LOW 20 MIN: CPT | Performed by: NEUROMUSCULOSKELETAL MEDICINE, SPORTS MEDICINE

## 2020-12-28 PROCEDURE — 99212 OFFICE O/P EST SF 10 MIN: CPT

## 2020-12-28 PROCEDURE — 1036F TOBACCO NON-USER: CPT | Performed by: NEUROMUSCULOSKELETAL MEDICINE, SPORTS MEDICINE

## 2020-12-28 PROCEDURE — 85610 PROTHROMBIN TIME: CPT

## 2020-12-28 NOTE — PATIENT INSTRUCTIONS
Please take warfarin 15 mg today. Increase current dose of warfarin as instructed on dosing calendar provided - 7.5 mg every Monday and Friday and 10 mg all other days. Continue to monitor urine and stool for signs and symptoms of bleeding. Please notify the clinic of any medication changes. Please remember to bring all medications (both prescription and OTC) to your next visit. Kindly notify the clinic if you are unable to make to your next appointment.

## 2020-12-28 NOTE — PATIENT INSTRUCTIONS
SURVEY:    You may be receiving a survey from [employer] regarding your visit today. Please complete the survey to enable us to provide the highest quality of care to you and your family. If you cannot score us a very good on any question, please call the office to discuss how we could have made your experience a very good one. Thank you.

## 2020-12-28 NOTE — PROGRESS NOTES
NEUROLOGY Follow up    Patient Name:  Carol De Leon  :   1955  Clinic Visit Date: 2020    I saw Mr. Carol De Leon  in the neurology clinic today for follow-up evaluation of dizzy spells. \"No improvement \"with vestibular evaluation and physical therapy for his neck pain. He continues have intermittent spells of sudden onset of dizziness and vertigo and occasional poor balance. He continues to perform exercises for neck and vestibular exercises as recommended by the therapist.  Other problems including suspicious CAD for which he has been scheduled to undergo cardiac catheterization soon, GI work-up for chronic constipation and hiatal hernia, and also ongoing evaluation for probable BPH. REVIEW OF SYSTEMS    Constitutional Weight changes: present, change in appetite: absent Fatigue: present; Fevers : absent, Any recent hospitalizations:  absent   HEENT Ears: normal,  Visual disturbance: absent   Respiratory Shortness of breath: absent, choking:  absent, Cough: absent, Snoring : absent   Cardiovascular Chest pain: absent, Leg swelling :absent, palpitations : absent, fainting : absent   GI Constipation: present, Diarrhea: absent, Swallowing change: absent    Urinary frequency: absent, Urinary urgency: absent, Urinary incontinence: present   Musculoskeletal Neck pain: present, Back pain: present, Stiffness: present, Muscle pain: absent, Joint pain: absent, restless leg : absent   Dermatological Hair loss: absent, Skin changes: absent   Neurological Confusion: absent, Trouble concentrating: absent, Seizures: absent;  Memory loss: present, balance problem: absent, Dizziness: present, vertigo: present, Weakness: absent, Numbness absent, Tremor: absent, Spasm: absent, involuntary movement: present, Speech difficulty: absent, Headache: absent, Light sensitivity: absent   Psychiatric Anxiety: absent, Depression  absent, drug abuse: absent, Hallucination: absent, mood disorder: absent, Suicidal ideations absent   Hematologic Abnormal bleeding: present, Anemia: absent, Lymph gland changes: absent Clotting disorder: present     Past Medical History:   Diagnosis Date    CAD (coronary artery disease)     Diabetes mellitus ([facility]     Diabetic retinopathy ([facility] 11/05/2020    Hyperlipidemia     Hypertension     Meniere disease     MI (myocardial infarction) ([facility]     Stroke ([facility]     Thrombocytopenia ([facility]        Past Surgical History:   Procedure Laterality Date    COLONOSCOPY      CORONARY ANGIOPLASTY WITH STENT PLACEMENT         Social History     Socioeconomic History    Marital status: Single     Spouse name: Not on file    Number of children: Not on file    Years of education: Not on file    Highest education level: Not on file   Occupational History    Not on file   Social Needs    Financial resource strain: Not on file    Food insecurity     Worry: Not on file     Inability: Not on file    Transportation needs     Medical: Not on file     Non-medical: Not on file   Tobacco Use    Smoking status: Never Smoker    Smokeless tobacco: Never Used   Substance and Sexual Activity    Alcohol use: Not Currently    Drug use: Never    Sexual activity: Not Currently   Lifestyle    Physical activity     Days per week: Not on file     Minutes per session: Not on file    Stress: Not on file   Relationships    Social connections     Talks on phone: Not on file     Gets together: Not on file     Attends Bahai service: Not on file     Active member of club or organization: Not on file     Attends meetings of clubs or organizations: Not on file     Relationship status: Not on file    Intimate partner violence     Fear of current or ex partner: Not on file     Emotionally abused: Not on file     Physically abused: Not on file     Forced sexual activity: Not on file   Other Topics Concern    Not on file   Social History Narrative    Not on file       Family History   Problem Relation Age of 250 MG TABS tablet Take 500 mg by mouth every morning       Coenzyme Q10 (CO Q 10) 10 MG CAPS Take by mouth daily      PSYLLIUM HUSK PO Take 2 capsules by mouth Takes 2-3 capsules every morning      LECITHIN CONCENTRATE PO Take 2 tablets by mouth daily \"decrease cholesterol\"      glucose monitoring kit (FREESTYLE) monitoring kit 1 kit by Does not apply route daily TEST BLOOD SUGAR QID. WHATEVER METER IS COVERED BY GALVEZ. DIAGNOSIS E11.9 1 kit 0    Lancets MISC TEST BLOOD SUGAR QID. WHATEVER METER IS COVERED BY GALVEZ. DIAGNOSIS E11.9 100 each 3    Insulin Pen Needle (MEIJER PEN NEEDLES) 31G X 6 MM MISC 1 each by Does not apply route daily 100 each 3    aspirin 81 MG EC tablet Take 81 mg by mouth daily      lisinopril (PRINIVIL;ZESTRIL) 10 MG tablet Take 1 tablet by mouth daily 90 tablet 0    carvedilol (COREG) 6.25 MG tablet Take 1 tablet by mouth 2 times daily 180 tablet 0     No current facility-administered medications for this visit. DATA:  Lab Results   Component Value Date    WBC 6.5 11/29/2020    HGB 17.1 (H) 11/29/2020     (L) 11/29/2020    CHOL 199 09/14/2020    TRIG 180 (H) 09/14/2020    HDL 44 09/14/2020    ALT 60 (H) 11/29/2020    AST 40 (H) 11/29/2020     11/29/2020    K 3.7 11/29/2020     11/29/2020    CREATININE 0.75 11/29/2020    BUN 13 11/29/2020    CO2 23 11/29/2020    TSH 1.58 04/25/2020    INR 1.7 12/28/2020    GLUF 202 06/19/2020    LABA1C 9.8 (H) 09/14/2020       /69 (Site: Right Upper Arm, Position: Sitting, Cuff Size: Medium Adult)   Pulse 68   Temp 97.6 °F (36.4 °C) (Temporal)   Resp 18   Ht 6' 1\" (1.854 m)   Wt 271 lb 8 oz (123.2 kg)   BMI 35.82 kg/m²     NEUROLOGICAL EXAMINATION:     MENTAL STATUS: Patient is alert and oriented. There is no confusion or aphasia. Memory is normal.     CRANIAL NERVES:III-XII are normal.  No nystagmus or visual field defect    MOTOR EXAMINATION: Muscle tone is normal in all the limbs.   There is no focal weakness. Muscle strength is 5/5 in both upper and lower limbs. There are no abnormal limb movements. SENSORY EXAMINATION: Normal.     STRETCH REFLEXES: 1+ and symmetrical in both the upper and lower limbs. GAIT: There is no ataxia. Darlynn Magic IMPRESSION:.  Vestibular dysfunction along with cervicogenic dizzy spells. Minimal improvement with physical and vestibular therapy     PLAN:    1. Recommended  to continue exercises to improve cervical range of motion and vestibular exercises as recommended by physical therapist  2. Follow-up as needed    NOTE: This neurology evaluation is part of outpatient coverage at [facility]  1-2 days per week. Patients requiring frequent evaluations or uncomfortable with potential 3-4 day turnaround on questions or calls  may be better served by a neurologist in the area full time. [facility] at [facility]. [facility] is available for outpatient visits and procedures including EMG/NCS. Non-[facility] system neurologists also practice in [facility] (Dr. Geovanna Michel) and [facility] (Lisa Marion).        Xiang Lee MD   12/28/2020  4:42 PM      CC: Sabine FROST

## 2020-12-30 ENCOUNTER — ANTI-COAG VISIT (OUTPATIENT)
Dept: PHARMACY | Age: 65
End: 2020-12-30

## 2020-12-30 NOTE — PROGRESS NOTES
Patient is having multivessel PCI with Dr. Jourdan Roach ([phone]) [facility] on 1/7/21. Patient will hold warfarin 4 days prior to procedure and bridge with Lovenox 120 mg subcutaneously BID. Warfarin hold instructions and Lovenox bridging discussed and verified with Princess (Dr. Aure Alberts office) and Dr. Inocente Valdivia (referring physician to anticoagulation clinic). A Lovenox bridge/warfarin dosing calendar was prepared and faxed to [facility]. Patient will  calendar with his Lovenox prescription. Patient voiced understanding of instructions/following calendar.   Prescription called to [facility] for Lovenox 120 mg syringes - Inject 120 mg subcutaneously every 12 hours as instructed per clinic     Qty:  20     No refill     Prescriber:  Dr. Bin Esquivel

## 2021-01-04 DIAGNOSIS — I16.0 HYPERTENSIVE URGENCY: Primary | ICD-10-CM

## 2021-01-04 RX ORDER — CARVEDILOL 6.25 MG/1
6.25 TABLET ORAL 2 TIMES DAILY
Qty: 180 TABLET | Refills: 3 | Status: SHIPPED | OUTPATIENT
Start: 2021-01-04 | End: 2022-01-26 | Stop reason: SDUPTHER

## 2021-01-06 NOTE — PROGRESS NOTES
Message left for Deb Metcalf with restrictions/one visitor policy reviewed, asked to bring medications with patient and drop off location.   Informed of medications to take in am.

## 2021-01-07 ENCOUNTER — HOSPITAL ENCOUNTER (OUTPATIENT)
Dept: CARDIAC CATH/INVASIVE PROCEDURES | Age: 66
Setting detail: OBSERVATION
Discharge: HOME | End: 2021-01-08
Attending: INTERNAL MEDICINE | Admitting: INTERNAL MEDICINE
Payer: COMMERCIAL

## 2021-01-07 DIAGNOSIS — Z98.61 S/P PTCA (PERCUTANEOUS TRANSLUMINAL CORONARY ANGIOPLASTY): ICD-10-CM

## 2021-01-07 LAB
GFR NON-AFRICAN AMERICAN: >60 ML/MIN
GFR SERPL CREATININE-BSD FRML MDRD: >60 ML/MIN
GFR SERPL CREATININE-BSD FRML MDRD: NORMAL ML/MIN/{1.73_M2}
GLUCOSE BLD-MCNC: 182 MG/DL (ref 74–100)
GLUCOSE BLD-MCNC: 221 MG/DL (ref 75–110)
PLATELET # BLD: 74 K/UL (ref 138–453)
POC CHLORIDE: 104 MMOL/L (ref 98–107)
POC CREATININE: 0.93 MG/DL (ref 0.51–1.19)
POC HEMATOCRIT: 41 % (ref 41–53)
POC HEMOGLOBIN: 13.9 G/DL (ref 13.5–17.5)
POC INR: 1.2
POC POTASSIUM: 3.7 MMOL/L (ref 3.5–4.5)
POC SODIUM: 140 MMOL/L (ref 138–146)
PROTHROMBIN TIME, POC: 13.7 SEC (ref 10.4–14.2)

## 2021-01-07 PROCEDURE — C1874 STENT, COATED/COV W/DEL SYS: HCPCS

## 2021-01-07 PROCEDURE — C1769 GUIDE WIRE: HCPCS

## 2021-01-07 PROCEDURE — 36415 COLL VENOUS BLD VENIPUNCTURE: CPT

## 2021-01-07 PROCEDURE — C1887 CATHETER, GUIDING: HCPCS

## 2021-01-07 PROCEDURE — C1894 INTRO/SHEATH, NON-LASER: HCPCS

## 2021-01-07 PROCEDURE — G0379 DIRECT REFER HOSPITAL OBSERV: HCPCS

## 2021-01-07 PROCEDURE — 84132 ASSAY OF SERUM POTASSIUM: CPT

## 2021-01-07 PROCEDURE — 82565 ASSAY OF CREATININE: CPT

## 2021-01-07 PROCEDURE — 82947 ASSAY GLUCOSE BLOOD QUANT: CPT

## 2021-01-07 PROCEDURE — 7100000001 HC PACU RECOVERY - ADDTL 15 MIN

## 2021-01-07 PROCEDURE — 85049 AUTOMATED PLATELET COUNT: CPT

## 2021-01-07 PROCEDURE — C1725 CATH, TRANSLUMIN NON-LASER: HCPCS

## 2021-01-07 PROCEDURE — 85610 PROTHROMBIN TIME: CPT

## 2021-01-07 PROCEDURE — 6360000004 HC RX CONTRAST MEDICATION

## 2021-01-07 PROCEDURE — 7100000000 HC PACU RECOVERY - FIRST 15 MIN

## 2021-01-07 PROCEDURE — G0378 HOSPITAL OBSERVATION PER HR: HCPCS

## 2021-01-07 PROCEDURE — 92928 PRQ TCAT PLMT NTRAC ST 1 LES: CPT | Performed by: INTERNAL MEDICINE

## 2021-01-07 PROCEDURE — 2580000003 HC RX 258

## 2021-01-07 PROCEDURE — 85014 HEMATOCRIT: CPT

## 2021-01-07 PROCEDURE — 82435 ASSAY OF BLOOD CHLORIDE: CPT

## 2021-01-07 PROCEDURE — 6360000002 HC RX W HCPCS

## 2021-01-07 PROCEDURE — 6370000000 HC RX 637 (ALT 250 FOR IP): Performed by: INTERNAL MEDICINE

## 2021-01-07 PROCEDURE — 2709999900 HC NON-CHARGEABLE SUPPLY

## 2021-01-07 PROCEDURE — 84295 ASSAY OF SERUM SODIUM: CPT

## 2021-01-07 PROCEDURE — 6370000000 HC RX 637 (ALT 250 FOR IP)

## 2021-01-07 PROCEDURE — 2580000003 HC RX 258: Performed by: INTERNAL MEDICINE

## 2021-01-07 PROCEDURE — 2500000003 HC RX 250 WO HCPCS

## 2021-01-07 RX ORDER — EZETIMIBE 10 MG/1
10 TABLET ORAL DAILY
Status: DISCONTINUED | OUTPATIENT
Start: 2021-01-07 | End: 2021-01-08 | Stop reason: HOSPADM

## 2021-01-07 RX ORDER — ACETAMINOPHEN 325 MG/1
650 TABLET ORAL EVERY 4 HOURS PRN
Status: DISCONTINUED | OUTPATIENT
Start: 2021-01-07 | End: 2021-01-08 | Stop reason: HOSPADM

## 2021-01-07 RX ORDER — SODIUM CHLORIDE 9 MG/ML
INJECTION, SOLUTION INTRAVENOUS CONTINUOUS
Status: DISCONTINUED | OUTPATIENT
Start: 2021-01-07 | End: 2021-01-08 | Stop reason: HOSPADM

## 2021-01-07 RX ORDER — SODIUM CHLORIDE 0.9 % (FLUSH) 0.9 %
10 SYRINGE (ML) INJECTION PRN
Status: DISCONTINUED | OUTPATIENT
Start: 2021-01-07 | End: 2021-01-08 | Stop reason: HOSPADM

## 2021-01-07 RX ORDER — INSULIN GLARGINE 100 [IU]/ML
56 INJECTION, SOLUTION SUBCUTANEOUS NIGHTLY
Status: DISCONTINUED | OUTPATIENT
Start: 2021-01-07 | End: 2021-01-08 | Stop reason: HOSPADM

## 2021-01-07 RX ORDER — NICOTINE POLACRILEX 4 MG
15 LOZENGE BUCCAL PRN
Status: DISCONTINUED | OUTPATIENT
Start: 2021-01-07 | End: 2021-01-08 | Stop reason: HOSPADM

## 2021-01-07 RX ORDER — WARFARIN SODIUM 7.5 MG/1
7.5 TABLET ORAL
Status: COMPLETED | OUTPATIENT
Start: 2021-01-07 | End: 2021-01-07

## 2021-01-07 RX ORDER — DEXTROSE MONOHYDRATE 25 G/50ML
12.5 INJECTION, SOLUTION INTRAVENOUS PRN
Status: DISCONTINUED | OUTPATIENT
Start: 2021-01-07 | End: 2021-01-08 | Stop reason: HOSPADM

## 2021-01-07 RX ORDER — CARVEDILOL 6.25 MG/1
6.25 TABLET ORAL 2 TIMES DAILY
Status: DISCONTINUED | OUTPATIENT
Start: 2021-01-07 | End: 2021-01-08 | Stop reason: HOSPADM

## 2021-01-07 RX ORDER — ASPIRIN 81 MG/1
81 TABLET ORAL DAILY
Status: DISCONTINUED | OUTPATIENT
Start: 2021-01-07 | End: 2021-01-08 | Stop reason: HOSPADM

## 2021-01-07 RX ORDER — TAMSULOSIN HYDROCHLORIDE 0.4 MG/1
0.4 CAPSULE ORAL EVERY EVENING
Status: DISCONTINUED | OUTPATIENT
Start: 2021-01-07 | End: 2021-01-08 | Stop reason: HOSPADM

## 2021-01-07 RX ORDER — SODIUM CHLORIDE 0.9 % (FLUSH) 0.9 %
10 SYRINGE (ML) INJECTION EVERY 12 HOURS SCHEDULED
Status: DISCONTINUED | OUTPATIENT
Start: 2021-01-07 | End: 2021-01-08 | Stop reason: HOSPADM

## 2021-01-07 RX ORDER — DEXTROSE MONOHYDRATE 50 MG/ML
100 INJECTION, SOLUTION INTRAVENOUS PRN
Status: DISCONTINUED | OUTPATIENT
Start: 2021-01-07 | End: 2021-01-08 | Stop reason: HOSPADM

## 2021-01-07 RX ORDER — LISINOPRIL 10 MG/1
10 TABLET ORAL DAILY
Status: DISCONTINUED | OUTPATIENT
Start: 2021-01-08 | End: 2021-01-08 | Stop reason: HOSPADM

## 2021-01-07 RX ADMIN — TICAGRELOR 90 MG: 90 TABLET ORAL at 22:03

## 2021-01-07 RX ADMIN — CARVEDILOL 6.25 MG: 6.25 TABLET, FILM COATED ORAL at 22:03

## 2021-01-07 RX ADMIN — ACETAMINOPHEN 650 MG: 325 TABLET ORAL at 17:27

## 2021-01-07 RX ADMIN — Medication 10 ML: at 22:04

## 2021-01-07 RX ADMIN — TAMSULOSIN HYDROCHLORIDE 0.4 MG: 0.4 CAPSULE ORAL at 22:03

## 2021-01-07 RX ADMIN — INSULIN GLARGINE 56 UNITS: 100 INJECTION, SOLUTION SUBCUTANEOUS at 22:05

## 2021-01-07 RX ADMIN — WARFARIN SODIUM 7.5 MG: 7.5 TABLET ORAL at 22:03

## 2021-01-07 RX ADMIN — SODIUM CHLORIDE: 9 INJECTION, SOLUTION INTRAVENOUS at 11:45

## 2021-01-07 ASSESSMENT — PAIN SCALES - GENERAL: PAINLEVEL_OUTOF10: 2

## 2021-01-07 NOTE — PROGRESS NOTES
Received post procedure to [facility] to room 2. Assessment obtained. Restrictions reviewed with patient. Post procedure pathway initiated. Right radial site soft Vasc, band  dry and intact. No hematoma noted. Patient without complaints.

## 2021-01-07 NOTE — OP NOTE
[facility]        Date:   1/7/2021  Patient name:  Edwar Melo  Date of admission:  No admission date for patient encounter. MRN:   [id number]  YOB: 1955    CARDIAC CATHETERIZATION      Operators:    Shiela Seip, MD    Procedure performed:       [] Left Heart Catheterization. [] Graft Angiography.  [] Left Ventriculography. [] Right Heart Catheterization. [] Coronary Angiography. [] Aortic Valve Studies. [x] PCI: LAD, LCX      [] Other:         Pre Procedure Conscious Sedation Data:    ASA Class:    [] I [] II [x] III [] IV    Mallampati Class:  [] I [] II [x] III [] IV        Indication:  [] STEMI      [] + Stress test  [] ACS      [] + EKG Changes  [] Non Q MI       [] Significant Risk Factors  [] Recurrent Angina             [] Diabetes Mellitus    [] New LBBB      [] Uncontrolled HTN. [] CHF / Low EF changes     [] Abnormal CTA / Ca Score  [x] Other: Known multivessel disease      Procedure:  Access:  [] Femoral  [x] Radial  artery       [x] Right  [] Left    After informed consent was obtained with explanation of the risks and benefits, patient was brought to the cath lab. The right wrist was prepped and draped in sterile fashion. 1% lidocaine was used for local block. The  right radial artery was cannulated with 6  Fr sheath with brisk arterial blood return. A premixed injection of Verapamil, Heparin and Nitrogycerin was injected through the side port. The side port was frequently flushed and aspirated with normal saline. Findings:  LAD: Lesion on Prox LAD: 80% stenosis reduced to 0% with PTCA/KENNETH Xience Hermelinda 3.5 x 18 KENNETH. 1       Lesion on Mid CX: 90% stenosis  reduced to 0% with PTCA/KENNETH Xience Hermelinda 3.5 x 33 KENNETH. Coronary Tree      Dominance: Left    The LV gram was not performed    Estimated blood loss: 10 ml    Conclusions:   Successful PCI / Drug Eluting Stent of the mid LCX.    Successful PCI / Drug Eluting Stent of the proximal LAD.         Recommendations:  1. Medical Therapy. 2. Risk Factors Modification. 3. Post Cath Protocol  4. Continue warfarin, aspirin and ticagrelor for one month, then stop aspirin and continue ticagrelor and warfarin afterwards        History and Risk Factors    [x] Hypertension     [] Family history of CAD  [x] Hyperlipidemia     [] Cerebrovascular Disease   [] Prior MI       [] Peripheral Vascular disease   [] Prior PCI              [] Diabetes Mellitus    [] Left Main PCI. [] Currently on Dialysis. [] Prior CABG. [] Currently smoker. [] Cardiac Arrest outside of healthcare facility. [] Yes    [x] No        Witnessed     [] Yes   [] No     Arrest after arrival of EMS  [] Yes   [] No     [] Cardiac Arrest at other Facility. [] Yes   [x] No    Pre-Procedure Information. Heart Failure       [x] Yes    [] No        Class  [] I      [] II  [] III    [] IV. New Diagnosis    [] Yes  [x] No    HF Type      [x] Systolic   [] Diastolic          [] Unknown. Diagnostic Test:   EKG       [x] Normal   [] Abnormal    New antiarrhythmia medications:    [] Yes   [] No   New onset atrial fibrillation / Flutter     [] Yes   [] No   ECG Abnormalities:      [] V. Fib   [] Hollie V. Tach           [] NS V. T   [] New LBBB           [] T. Inv  []  ST dev > 0.5 mm         [] PVC's freq  [] PVC's infrequent    Stress Test Performed:   [] Yes    [] No     Type:     [] Stress Echo   [] Exercise Stress Test (no imaging)      [] Stress Nuclear  [x] Stress Imaging     Results   [] Negative   [x] Positive        [] Indeterminate  [] Unavailable     If Positive/ Risk / Extent of Ischemia:       [] Low  [x] Intermediate         [] High  [] Unavailable      Cardiac CTA Performed:  [] Yes    [x] No      Results   [] CAD   [] Non obstructive CAD      [] No CAD   [] Uncertain      [] Unknown   [] Structural Disease.      Pre Procedure Medications: [x] Yes    [] No         [x] ASA  [x] Beta Blockers      [] Nitrate  [] Ca

## 2021-01-07 NOTE — PROGRESS NOTES
Patient admitted, consent signed and questions answered. Patient ready for procedure. Call light to reach with side rails up 2 of 2. Bilateral groin hair clipped. No one at bedside with patient. History and physical completed.

## 2021-01-07 NOTE — PROGRESS NOTES
Pharmacy Note  Warfarin Consult    Ledy Bryant is a 63 y.o. male for whom pharmacy has been consulted to manage warfarin therapy. Consulting Physician: Anand Gonzalez  Reason for Admission: Cardiac Cath    Warfarin dose prior to admission: 7.5 mg every Tue-Thurs-Sat and 10 mg all other days  Warfarin indication: Multiple CVAs  and TIAs  Target INR range: 2 - 3    Past Medical History:   Diagnosis Date    Abnormal cardiovascular stress test 10/2020    moderate perfusion defect of severe intensity in the apical anteroapical and inferoapical regions    CAD (coronary artery disease)     Diabetes mellitus ([facility]     Diabetic retinopathy ([facility] 11/05/2020    Hyperlipidemia     Hypertension     Meniere disease     MI (myocardial infarction) ([facility]     MULTIPLE    Obesity     Stroke ([facility]     Thrombocytopenia ([facility]     TIA (transient ischemic attack)                 Recent Labs     01/07/21  1149   INR 1.2     Recent Labs     01/07/21  1154   PLT 74*       Current warfarin drug-drug interactions: Brilinta        Date             INR        Dose   1/7/2021         1.2        7.5 mg    Daily PT/INR while inpatient. PT/INR ordered to start today and daily    We will start Coumadin 7.5 mg today. Thank you for the consult. Will continue to follow.   Nilsa Aggarwal, Pharm D.  1/7/2021  4:39 PM

## 2021-01-07 NOTE — H&P
[facility]  Pre- Procedure History and Physical/Update          Patient Name:  Narayan Noriega  MRN:    [id number]  YOB: 1955  Date of evaluation:  1/7/2021       Please refer to the consult note / H&P completed by Dr. Raul Chávez on 12/9/2020 in the medical record and note that:       [x] I have examined the patient and reviewed the H&P/Consult and there are no changes to be made to the assessment or plan. [] I have examined the patient and reviewed the H&P/Consult and have noted the following changes:        Past Medical History:   Diagnosis Date    Abnormal cardiovascular stress test 10/2020    moderate perfusion defect of severe intensity in the apical anteroapical and inferoapical regions    CAD (coronary artery disease)     Diabetes mellitus ([address]     [facility] 11/05/2020    Hyperlipidemia     Hypertension     Meniere disease     MI (myocardial infarction) ([address]     MULTIPLE    Obesity     Stroke ([address]     Thrombocytopenia ([address]     TIA (transient ischemic attack)        Past Surgical History:   Procedure Laterality Date    CARDIAC CATHETERIZATION  10/29/2020    Severe three vessel disease involving the LAD, circumflex and right coronary artery  /  DR Jordan Person  /  CT SURGERGY CONSULT    COLONOSCOPY      CORONARY ANGIOPLASTY WITH STENT PLACEMENT          reports that he has never smoked. He has never used smokeless tobacco. He reports previous alcohol use. He reports that he does not use drugs. Prior to Admission medications    Medication Sig Start Date End Date Taking?  Authorizing Provider   carvedilol (COREG) 6.25 MG tablet Take 1 tablet by mouth 2 times daily 1/4/21   Jeronimo Ibarra MD   polyethylene glycol [facility]) 17 GM/SCOOP powder Take 17 g by mouth daily 12/17/20 1/16/21  GAIL Sutton - CNP   tamsulosin [facility]) 0.4 MG capsule Take 1 capsule by mouth every evening 12/17/20   GAIL Sutton - IVONNE   ondansetron [facility]) 4 Takes 2-3 capsules every morning    Historical Provider, MD   LECITHIN CONCENTRATE PO Take 2 tablets by mouth daily \"decrease cholesterol\"    Historical Provider, MD   glucose monitoring kit (FREESTYLE) monitoring kit 1 kit by Does not apply route daily TEST BLOOD SUGAR QID. WHATEVER METER IS COVERED BY GALVEZ. DIAGNOSIS E11.9 9/4/20   GAIL Davis CNP   Lancets MISC TEST BLOOD SUGAR QID. WHATEVER METER IS COVERED BY GALVEZ. DIAGNOSIS E11.9 9/4/20   GAIL Davis CNP   Insulin Pen Needle (MEIJER PEN NEEDLES) 31G X 6 MM MISC 1 each by Does not apply route daily 9/4/20   GAIL Davis CNP   aspirin 81 MG EC tablet Take 81 mg by mouth daily    Historical Provider, MD   lisinopril (PRINIVIL;ZESTRIL) 10 MG tablet Take 1 tablet by mouth daily 9/3/20   GAIL Davis CNP       Allergies   Allergen Reactions    Statins      Cramping  Muscle aches    Metformin And Related Nausea And Vomiting         REVIEW OF SYSTEMS:     A detailed review of system was performed as already noted and is otherwise as above. PHYSICAL EXAM:     There were no vitals filed for this visit. Constitutional and General Appearance: alert, cooperative, no distress and appears stated age  [de-identified]: PERRL, no cervical lymphadenopathy. No masses palpable. Normal oral mucosa  Respiratory:  · Normal excursion and expansion without use of accessory muscles  · Resp Auscultation: Good respiratory effort. No for increased work of breathing.  On auscultation: clear to auscultation bilaterally  Cardiovascular:  · The apical impulse is not displaced  · Heart tones are crisp and normal. regular S1 and S2.  · Jugular venous pulsation Normal  · The carotid upstroke is normal in amplitude and contour without delay or bruit  · Peripheral pulses are symmetrical and full  Abdomen:  · No masses or tenderness  · Bowel sounds present  Extremities:  ·  No Cyanosis or Clubbing  ·  Lower extremity edema: No  · Skin: Warm and dry  Neurological:  · Alert and oriented. · Moves all extremities well  · No abnormalities of mood, affect, memory, mentation, or behavior are noted      Active Problems:    * No active hospital problems. *  Resolved Problems:    * No resolved hospital problems. *      Assessment:  1. CAD s/p prior LAD stenting - cath 10/29/2020 showed 75% proximal in stent LAD stenosis with severe diffuse mid and distal LAD, 80% mid LCX stenosis and occluded proximal RCA with left-to-right collaterals. 2. Patient considered not a candidate for CABG due to poor target vessel. 3. uclear stress test 10/2020 showed moderate perfusion defect of severe intensity in the apical, anteroapical and inferoapical regions during stress and rest imaging which is most consistent with an old myocardial infarction. EF of 28%  4. Multiple CVAs and TIAs thought to be related to his apical thrombus none since he has been on Coumadin. 5. H/O Atrial tachycardia   6. DM  7. FH of CAD        Plan:  1. Proceed with planned PCI . 2. Further orders to follow. Risk, benefits and alternatives of PCI were discussed in detail. Risk of bleeding, requiring blood transfusion, vascular complication requiring surgery, renal insufficieny with need of dialysis, CVA, MI, death and anesthesia complications including intubation were discussed. Patient agrees to proceed and verbalizes understanding. Elsie Ospina MD  Fellow, Cardiovascular Diseases    [address] [facility]       Attending Physician Statement  I have discussed the case of Ledy Bryant including pertinent history and exam findings with the resident. I have seen and examined the patient and the key elements of the encounter have been performed by me. I agree with the assessment, plan and orders as documented by the resident With changes made to the note.      Electronically signed by Abrahan Moran MD on 1/8/2021 at 9:34 PM.    [facility]      [phone]

## 2021-01-08 VITALS
BODY MASS INDEX: 35.78 KG/M2 | WEIGHT: 270 LBS | RESPIRATION RATE: 14 BRPM | DIASTOLIC BLOOD PRESSURE: 73 MMHG | HEIGHT: 73 IN | TEMPERATURE: 97.7 F | OXYGEN SATURATION: 98 % | HEART RATE: 68 BPM | SYSTOLIC BLOOD PRESSURE: 166 MMHG

## 2021-01-08 LAB
GLUCOSE BLD-MCNC: 147 MG/DL (ref 75–110)
GLUCOSE BLD-MCNC: 165 MG/DL (ref 75–110)
GLUCOSE BLD-MCNC: 175 MG/DL (ref 75–110)
INR BLD: 1
PROTHROMBIN TIME: 10.5 SEC (ref 9–12)

## 2021-01-08 PROCEDURE — 6370000000 HC RX 637 (ALT 250 FOR IP): Performed by: INTERNAL MEDICINE

## 2021-01-08 PROCEDURE — 82947 ASSAY GLUCOSE BLOOD QUANT: CPT

## 2021-01-08 PROCEDURE — G0378 HOSPITAL OBSERVATION PER HR: HCPCS

## 2021-01-08 PROCEDURE — 85610 PROTHROMBIN TIME: CPT

## 2021-01-08 PROCEDURE — 36415 COLL VENOUS BLD VENIPUNCTURE: CPT

## 2021-01-08 RX ORDER — WARFARIN SODIUM 10 MG/1
10 TABLET ORAL
Status: DISCONTINUED | OUTPATIENT
Start: 2021-01-08 | End: 2021-01-08 | Stop reason: HOSPADM

## 2021-01-08 RX ADMIN — EZETIMIBE 10 MG: 10 TABLET ORAL at 08:21

## 2021-01-08 RX ADMIN — Medication 81 MG: at 08:21

## 2021-01-08 RX ADMIN — TICAGRELOR 90 MG: 90 TABLET ORAL at 08:21

## 2021-01-08 RX ADMIN — LISINOPRIL 10 MG: 10 TABLET ORAL at 08:57

## 2021-01-08 RX ADMIN — CARVEDILOL 6.25 MG: 6.25 TABLET, FILM COATED ORAL at 08:21

## 2021-01-08 NOTE — PROGRESS NOTES
Pharmacy Note  Warfarin Consult follow-up      Recent Labs     01/08/21  1059   INR 1.0     Recent Labs     01/07/21  1154   PLT 74*       Current warfarin drug-drug interactions: acetaminophen, aspirin, Brilinta, ezetimibe,         Date INR Dose   1/7/2021 1.2 7.5 mg   1/8/2021 1.0 10 mg     Notes:  INR subtherapeutic today. Will give boosted home dose of warfarin 10mg today on 1/8/20. Daily PT/INR while inpatient.      Antonio Heller, PharmD  PGY-2 Ambulatory Care Resident Pharmacist  Medication Management Services - Coumadin Clinic  [phone]  1/8/2021  11:42 AM

## 2021-01-08 NOTE — DISCHARGE SUMMARY
[facility]  Discharge Note                 Name:  Alexei Nina  YOB: 1955  Social Security Number:  [id number]  Medical Record Number:  [id number]    Date of Admission:  1/7/2021  Date of Discharge:  1/8/2021    Admitting physician: Jessy Campos MD    Discharge Attending: Magaly Ibarra CNP  Primary Care Physician: GAIL Garcia CNP  Consultants: Cardiology  Discharge to home in stable condition    HOSPITAL ADMISSION PROBLEM LIST:  Patient Active Problem List   Diagnosis    Cerebellar stroke [facility])    Right sided weakness    Abnormal CT of the head    Hypertensive urgency    ASHD (arteriosclerotic heart disease)    S/P angioplasty with stent    History of stroke    History of myocardial infarction    Ischemic cardiomyopathy    Essential hypertension    Family history of heart disease    Mixed hyperlipidemia    Obesity    Left ventricular thrombus    S/P PTCA (percutaneous transluminal coronary angioplasty)         Procedures:cardiac catheterization    HOSPITAL COURSE :           The patient was admitted for: Known CAD   Hospital Procedures if any: Cath w/ stent placement  Medications changes recommendation: See list  Follow Up Plan: F/U Dr. Raúl Carter in 1-2 weeks      Discharge exam:   Vitals:    01/08/21 0807   BP: (!) 166/73   Pulse: 68   Resp: 14   Temp: 97.7 °F (36.5 °C)   SpO2: 98%     Neuro: normal  Chest: Clear to ausculation. No wheezing. Cardiac: Regular rate. s1 and s2 auscultated. No murmur noted. Abdomen/groin: soft, non-tender, without masses or organomegaly  Lower extremity edema: none  Right radial post cath site CDI. Dressing removed and left open to air     Follow up with primary care provider 1 week  Follow up with cardiology 4 weeks  Follow up with other consultant physicians at their directions.     Discharge Medications:   Lucas Shelby   Home Medication Instructions GMD:[id number]    Printed on:01/08/21 1122 Medication Information                      aspirin 81 MG EC tablet  Take 81 mg by mouth daily             blood glucose test strips (RELION GLUCOSE TEST STRIPS) strip  Test qid. Dx--E11.9 insulin dependent             carvedilol (COREG) 6.25 MG tablet  Take 1 tablet by mouth 2 times daily             Coenzyme Q10 (CO Q 10) 10 MG CAPS  Take by mouth daily             ezetimibe (ZETIA) 10 MG tablet  Take 1 tablet by mouth daily             glucose monitoring kit (FREESTYLE) monitoring kit  1 kit by Does not apply route daily TEST BLOOD SUGAR QID. WHATEVER METER IS COVERED BY GALVEZ. DIAGNOSIS E11.9             Insulin Aspart FlexPen 100 UNIT/ML SOPN  Inject 20 Units into the skin Pt to take as 20 units after each meal as sliding scale. Pt states \"The 20 Units are not usually enough. \"             insulin glargine (LANTUS SOLOSTAR) 100 UNIT/ML injection pen  Inject 56 Units into the skin nightly             insulin NPH (NOVOLIN N) 100 UNIT/ML injection vial  Inject into the skin nightly Pt mostly takes this at HS \"To help bring down my sugar. \" Pt states to take approx 30 Units nightly. Insulin Pen Needle (MEIJER PEN NEEDLES) 31G X 6 MM MISC  1 each by Does not apply route daily             Lancets MISC  TEST BLOOD SUGAR QID. WHATEVER METER IS COVERED BY GALVEZ.  DIAGNOSIS E11.9             LECITHIN CONCENTRATE PO  Take 2 tablets by mouth daily \"decrease cholesterol\"             lisinopril (PRINIVIL;ZESTRIL) 10 MG tablet  Take 1 tablet by mouth daily             magnesium (MAGNESIUM-OXIDE) 250 MG TABS tablet  Take 500 mg by mouth every morning              meclizine (ANTIVERT) 25 MG tablet  Take 25 mg by mouth 3 times daily as needed for Dizziness             ondansetron (ZOFRAN) 4 MG tablet  Take 1 tablet by mouth 3 times daily as needed for Nausea or Vomiting             polyethylene glycol (GLYCOLAX) 17 GM/SCOOP powder  Take 17 g by mouth daily             PSYLLIUM HUSK PO  Take 2 capsules by mouth Takes 2-3 capsules every morning             tamsulosin (FLOMAX) 0.4 MG capsule  Take 1 capsule by mouth every evening             ticagrelor (BRILINTA) 90 MG TABS tablet  Take 1 tablet by mouth 2 times daily             warfarin (COUMADIN) 5 MG tablet  Take 1 tablet by mouth daily                Dr Jaylene Jerome OP note:  Assessment:  1. CAD s/p prior LAD stenting - cath 10/29/2020 showed 75% proximal in stent LAD stenosis with severe diffuse mid and distal LAD, 80% mid LCX stenosis and occluded proximal RCA with left-to-right collaterals. 2. Patient considered not a candidate for CABG due to poor target vessel. 3. uclear stress test 10/2020 showed moderate perfusion defect of severe intensity in the apical, anteroapical and inferoapical regions during stress and rest imaging which is most consistent with an old myocardial infarction. EF of 28%  4. Multiple CVAs and TIAs thought to be related to his apical thrombus none since he has been on Coumadin. 5. H/O Atrial tachycardia   6. DM  7. FH of CAD    Cath initially done by Dr. Jose A Coello in [place] [address]    Cardiac Cath 1/7/21  Findings:  LAD: Lesion on Prox LAD: 80% stenosis reduced to 0% with PTCA/KENNETH Xience Hermelinda 3.5 x 18 KENNETH. 1       Lesion on Mid CX: 90% stenosis  reduced to 0% with PTCA/KENNETH Xience Hermelinda 3.5 x 33 KENNETH.     Coronary Tree      Dominance: Left     The LV gram was not performed     Estimated blood loss: 10 ml     Conclusions:   Successful PCI / Drug Eluting Stent of the mid LCX.   Successful PCI / Drug Eluting Stent of the proximal LAD.          Recommendations:  8. Medical Therapy. 9. Risk Factors Modification. 10. Post Cath Protocol  11.  Continue warfarin, aspirin and ticagrelor for one month, then stop aspirin and continue ticagrelor and warfarin afterwards    Coronary Discharge Core Measure: Please indicate the medication given by X, and if not the reasons not given:    Not Given Reason  Given      Beta Blockers x      ACE-I x      Statins x      ASA x OAP (Plavix/Effient/Brilinta) Brilinta    SL Nitro   x           Discussed with patient and nursing. Medications and discharge instructions reviewed with patient and nursing. Discussed in detail with patient post cath POC including but not limited to medications, diet, exercise, right radial artery site care, and follow-up. Questions and concerns addressed. OK for discharge home today. F/U in office in 1-3 weeks.       Electronically signed by GAIL Morrison CNP on 1/8/2021 at [address] [facility]      [phone]

## 2021-01-12 ENCOUNTER — OFFICE VISIT (OUTPATIENT)
Dept: SURGERY | Age: 66
End: 2021-01-12
Payer: COMMERCIAL

## 2021-01-12 VITALS
HEART RATE: 75 BPM | BODY MASS INDEX: 36.46 KG/M2 | TEMPERATURE: 98.4 F | WEIGHT: 275.1 LBS | SYSTOLIC BLOOD PRESSURE: 162 MMHG | HEIGHT: 73 IN | DIASTOLIC BLOOD PRESSURE: 93 MMHG

## 2021-01-12 DIAGNOSIS — K40.90 RIGHT INGUINAL HERNIA: ICD-10-CM

## 2021-01-12 DIAGNOSIS — Z86.73 HISTORY OF STROKE: ICD-10-CM

## 2021-01-12 DIAGNOSIS — R63.5 WEIGHT GAIN: ICD-10-CM

## 2021-01-12 DIAGNOSIS — R11.14 BILIOUS VOMITING WITH NAUSEA: ICD-10-CM

## 2021-01-12 DIAGNOSIS — R10.13 EPIGASTRIC PAIN: ICD-10-CM

## 2021-01-12 DIAGNOSIS — I51.3 LEFT VENTRICULAR THROMBUS: ICD-10-CM

## 2021-01-12 DIAGNOSIS — M62.08 DIASTASIS OF RECTUS ABDOMINIS: ICD-10-CM

## 2021-01-12 DIAGNOSIS — K80.20 SYMPTOMATIC CHOLELITHIASIS: Primary | ICD-10-CM

## 2021-01-12 DIAGNOSIS — I25.5 ISCHEMIC CARDIOMYOPATHY: ICD-10-CM

## 2021-01-12 DIAGNOSIS — Z86.010 HISTORY OF COLON POLYPS: ICD-10-CM

## 2021-01-12 DIAGNOSIS — Z98.61 S/P PTCA (PERCUTANEOUS TRANSLUMINAL CORONARY ANGIOPLASTY): ICD-10-CM

## 2021-01-12 DIAGNOSIS — Z87.19 HISTORY OF CHRONIC CONSTIPATION: ICD-10-CM

## 2021-01-12 PROCEDURE — G8427 DOCREV CUR MEDS BY ELIG CLIN: HCPCS | Performed by: SURGERY

## 2021-01-12 PROCEDURE — 1036F TOBACCO NON-USER: CPT | Performed by: SURGERY

## 2021-01-12 PROCEDURE — G8417 CALC BMI ABV UP PARAM F/U: HCPCS | Performed by: SURGERY

## 2021-01-12 PROCEDURE — G8484 FLU IMMUNIZE NO ADMIN: HCPCS | Performed by: SURGERY

## 2021-01-12 PROCEDURE — 99202 OFFICE O/P NEW SF 15 MIN: CPT | Performed by: SURGERY

## 2021-01-12 PROCEDURE — 3017F COLORECTAL CA SCREEN DOC REV: CPT | Performed by: SURGERY

## 2021-01-12 RX ORDER — PANTOPRAZOLE SODIUM 40 MG/1
40 TABLET, DELAYED RELEASE ORAL DAILY
Qty: 30 TABLET | Refills: 3 | Status: SHIPPED | OUTPATIENT
Start: 2021-01-12 | End: 2021-01-19

## 2021-01-12 NOTE — PROGRESS NOTES
Robbie Patton MD  General Surgery, Endoscopy  Chief Medical Officer    [facility] Shalini Alfonso  [address] [address]  Dept: [phone]  Fax: [address]  Chief Complaint   Patient presents with    New Patient     colonoscopy/EGD consult, referred by Tawanna Bone NP, c/o frequent constipation, dysphagia, nausea, vomitting, hx of polyps last colonoscopy 2009       HPI    Mr Hunter Ayala is a 28-year-old white male kindly referred to me by Tawanna Bone NP for evaluation of various GI complaints. He has a history of abdominal bloating with constipation which have worsened over the last several months. CT scan abdomen pelvis November 29, 2020 showed extensive stool burden with mild descending colitis, diverticular changes, gallstones, right inguinal hernia containing appendix. He has some symptoms of dysphagia, mostly solid foods. Occasional nausea with vomiting, often postprandial.  Morbidly obese, 278 pounds, BMI 37. Severe cardiovascular disease status post angioplasty with stenting. No cough, fever nor other respiratory symptoms. Colonoscopy with polypectomy 2009. Strong family history of cardiac disease, both parents, brother, sister, maternal grandfather all with myocardial infarctions. He has never smoked. Review of Systems   Constitutional: Positive for appetite change and unexpected weight change. Negative for activity change, chills and fever. HENT: Negative for nosebleeds, sneezing, sore throat and trouble swallowing. Eyes: Negative for visual disturbance. Respiratory: Positive for apnea. Negative for cough, choking and shortness of breath. Cardiovascular: Negative for chest pain, palpitations and leg swelling. Gastrointestinal: Positive for abdominal distention, abdominal pain, constipation, nausea and vomiting. Negative for blood in stool. Genitourinary: Negative for dysuria, flank pain and hematuria.    Musculoskeletal: evening 30 capsule 11    ondansetron (ZOFRAN) 4 MG tablet Take 1 tablet by mouth 3 times daily as needed for Nausea or Vomiting 30 tablet 0    insulin NPH (NOVOLIN N) 100 UNIT/ML injection vial Inject into the skin nightly Pt mostly takes this at HS \"To help bring down my sugar. \" Pt states to take approx 30 Units nightly.  insulin glargine (LANTUS SOLOSTAR) 100 UNIT/ML injection pen Inject 56 Units into the skin nightly (Patient taking differently: Inject 70 Units into the skin nightly ) 5 pen 5    Insulin Aspart FlexPen 100 UNIT/ML SOPN Inject 20 Units into the skin Pt to take as 20 units after each meal as sliding scale. Pt states \"The 20 Units are not usually enough. \"      warfarin (COUMADIN) 5 MG tablet Take 1 tablet by mouth daily (Patient taking differently: Take 7.5 mg by mouth daily Coumadin Clinic:  7.5 mg every Tuesday, Thursday, Saturday and 10 mg all other days) 90 tablet 3    ezetimibe (ZETIA) 10 MG tablet Take 1 tablet by mouth daily 90 tablet 1    magnesium (MAGNESIUM-OXIDE) 250 MG TABS tablet Take 500 mg by mouth every morning       Coenzyme Q10 (CO Q 10) 10 MG CAPS Take by mouth daily      LECITHIN CONCENTRATE PO Take 2 tablets by mouth daily \"decrease cholesterol\"      lisinopril (PRINIVIL;ZESTRIL) 10 MG tablet Take 1 tablet by mouth daily 90 tablet 0    meclizine (ANTIVERT) 25 MG tablet Take 25 mg by mouth 3 times daily as needed for Dizziness      blood glucose test strips (RELION GLUCOSE TEST STRIPS) strip Test qid. Dx--E11.9 insulin dependent 100 each 5    PSYLLIUM HUSK PO Take 2 capsules by mouth Takes 2-3 capsules every morning      glucose monitoring kit (FREESTYLE) monitoring kit 1 kit by Does not apply route daily TEST BLOOD SUGAR QID. WHATEVER METER IS COVERED BY GALVEZ. DIAGNOSIS E11.9 1 kit 0    Lancets MISC TEST BLOOD SUGAR QID. WHATEVER METER IS COVERED BY GALVEZ.  DIAGNOSIS E11.9 100 each 3    Insulin Pen Needle (MEIJER PEN NEEDLES) 31G X 6 MM MISC 1 each by Does not apply route daily 100 each 3    aspirin 81 MG EC tablet Take 81 mg by mouth daily       No current facility-administered medications for this visit. Social History     Socioeconomic History    Marital status: Single     Spouse name: Not on file    Number of children: Not on file    Years of education: Not on file    Highest education level: Not on file   Occupational History    Not on file   Social Needs    Financial resource strain: Not on file    Food insecurity     Worry: Not on file     Inability: Not on file    Transportation needs     Medical: Not on file     Non-medical: Not on file   Tobacco Use    Smoking status: Never Smoker    Smokeless tobacco: Never Used   Substance and Sexual Activity    Alcohol use: Not Currently    Drug use: Never    Sexual activity: Not Currently   Lifestyle    Physical activity     Days per week: Not on file     Minutes per session: Not on file    Stress: Not on file   Relationships    Social connections     Talks on phone: Not on file     Gets together: Not on file     Attends Congregation service: Not on file     Active member of club or organization: Not on file     Attends meetings of clubs or organizations: Not on file     Relationship status: Not on file    Intimate partner violence     Fear of current or ex partner: Not on file     Emotionally abused: Not on file     Physically abused: Not on file     Forced sexual activity: Not on file   Other Topics Concern    Not on file   Social History Narrative    Not on file       BP (!) 162/93 (Site: Right Upper Arm, Position: Sitting, Cuff Size: Large Adult)   Pulse 75   Temp 98.4 °F (36.9 °C) (Infrared)   Ht 6' 1\" (1.854 m)   Wt 275 lb 1.6 oz (124.8 kg)   BMI 36.30 kg/m²      Physical Exam  Vitals signs and nursing note reviewed. Constitutional:       Appearance: He is well-developed. HENT:      Head: Normocephalic and atraumatic. Eyes:      General: No scleral icterus.      Conjunctiva/sclera: Conjunctivae normal.      Pupils: Pupils are equal, round, and reactive to light. Neck:      Musculoskeletal: Normal range of motion and neck supple. Vascular: No JVD. Trachea: No tracheal deviation. Cardiovascular:      Rate and Rhythm: Normal rate and regular rhythm. Pulmonary:      Effort: Pulmonary effort is normal. No respiratory distress. Chest:      Chest wall: No tenderness. Abdominal:      General: There is no distension. Palpations: Abdomen is soft. There is no mass. Tenderness: There is no abdominal tenderness. There is no guarding or rebound. Hernia: A hernia (small umbilical) is present. Comments: Diastases of rectus abdominis present. Only small umbilical herniation present   Musculoskeletal: Normal range of motion. Lymphadenopathy:      Cervical: No cervical adenopathy. Skin:     General: Skin is warm and dry. Findings: No erythema or rash. Neurological:      Mental Status: He is alert and oriented to person, place, and time. Cranial Nerves: No cranial nerve deficit. Psychiatric:         Behavior: Behavior normal.         Thought Content: Thought content normal.         Judgment: Judgment normal.         IMAGING/LABS    CT ABDOMEN PELVIS W IV CONTRAST Additional Contrast? None  Status: Final result   Order Providers    Authorizing Billing   GAIL Ortega - CNP Rosana Collet, MD          Signed by    Signed Date/Time Phone Pager   2000 [facility], Angela Eldridge 61    Reading Providers    Read Date Phone Pager   2000 [facility], Angela Eldridge 61    Radiation Dose Estimates    No radiation information found for this patient   Narrative   EXAMINATION:   CT OF THE ABDOMEN AND PELVIS WITH CONTRAST 11/29/2020 12:58 pm       TECHNIQUE:   CT of the abdomen and pelvis was performed with the administration of   intravenous contrast. Multiplanar reformatted images are provided for review.    Dose modulation, iterative reconstruction, and/or weight based adjustment of the mA/kV was utilized to reduce the radiation dose to as low as reasonably   achievable.       COMPARISON:   None.       HISTORY:   ORDERING SYSTEM PROVIDED HISTORY: abd pain   TECHNOLOGIST PROVIDED HISTORY:   abd pain           FINDINGS:   Lower Chest: The visualized lung bases are clear.  Sequela of left   ventricular apical infarct with associated sequestered thrombus.  Coronary   calcified atheromatous plaque. .       Organs: Findings suggestive of mild hepatic steatosis.  Small calcified   gallstone.  No gallbladder wall thickening or biliary dilatation.  The   pancreas, spleen, adrenals and kidneys reveal no acute findings.       GI/Bowel: Large amount stool burden throughout the colon to the level the   rectum.  There is mild pericolonic inflammatory change in the descending   colon level.  Few scattered diverticula.  No significant wall thickening. The small bowel is decompressed.  Normal appendix extends into the right   inguinal hernia.       Pelvis: Prostatomegaly.  The Avalos catheter decompresses the bladder.       Peritoneum/Retroperitoneum: No free air or free fluid.  The aorta is normal   in caliber.  The visceral branches are patent. No lymphadenopathy.       Bones/Soft Tissues: Bilateral inguinal hernias.  The appendix extends into   the right inguinal hernia, as above.  Mild lower lumbar degenerative disc   disease and mild degenerative change in the hips.           Impression   1.  Large amount stool burden throughout the colon to the rectum.  Mild   inflammatory change around the descending colon suggestive of nonspecific   colitis.       2.  Few scattered colonic diverticula.       3.  Sequela of left ventricular apical infarct with associated sequestered   thrombus.       4.  Cholelithiasis.       5.  Prostatomegaly.  Avalos catheter decompresses the bladder.       6.  Right inguinal hernia containing the normal appendix.             ASSESSMENT     Diagnosis Orders   1.  Symptomatic cholelithiasis     2. Bilious vomiting with nausea     3. Epigastric pain  DISCONTINUED: pantoprazole (PROTONIX) 40 MG tablet   4. Diastasis of rectus abdominis     5. History of chronic constipation  Cologuard (For External Results Only)   6. History of colon polyps     7. Weight gain     8. Right inguinal hernia     9. Ischemic cardiomyopathy     10. Left ventricular thrombus     11. S/P PTCA (percutaneous transluminal coronary angioplasty)     12. History of stroke     15. BMI 36.0-36.9,adult         PLAN    Discussed with Mr Marilee Johnson his multiple GI complaints, and extensive medical history. He would benefit from diagnostic endoscopy as well as elective cholecystectomy and right inguinal hernia repair. However, given his morbid obesity and significant cardiovascular disease his perioperative risk is likely significantly elevated. We will request Cologuard for now. Prescription for Protonix provided. Discussed importance of a healthy, low-fat diet, increased water intake and appropriate use of milk of magnesia. We will request cardiac risk assessment for elective procedures and will likely begin with endoscopy. Mr Marilee Johnson is somewhat agitated and is convinced that he has a large ventral hernia when in fact this is diastases of the rectus abdominis. He has not conveyed clear understanding in spite of my explanations regarding the difference. Follow-up with me over the next couple of weeks for reevaluation.       Edmundo Thrasher MD

## 2021-01-13 ENCOUNTER — HOSPITAL ENCOUNTER (OUTPATIENT)
Dept: PHARMACY | Age: 66
Setting detail: THERAPIES SERIES
Discharge: HOME | End: 2021-01-13
Payer: COMMERCIAL

## 2021-01-13 DIAGNOSIS — I51.3 LEFT VENTRICULAR THROMBUS: ICD-10-CM

## 2021-01-13 DIAGNOSIS — Z86.73 HISTORY OF STROKE: ICD-10-CM

## 2021-01-13 LAB — INR BLD: 1.3

## 2021-01-13 PROCEDURE — 99212 OFFICE O/P EST SF 10 MIN: CPT

## 2021-01-13 PROCEDURE — 85610 PROTHROMBIN TIME: CPT

## 2021-01-13 NOTE — PROGRESS NOTES
KENNETH.     Conclusions:   Successful PCI / Drug Eluting Stent of the mid LCX.   Successful PCI / Drug Eluting Stent of the proximal LAD.      Recommendations:  8. Medical Therapy. 9. Risk Factors Modification. 10.Post Cath Protocol    Continue warfarin, aspirin and ticagrelor for one month, then stop aspirin and continue ticagrelor and warfarin afterwards           Patient is adamant that he was told to stop aspirin and Lovenox so patient did not take any Lovenox after cath. Claims he took warfarin as directed on calendar. CLINICAL PHARMACY CONSULT: MED RECONCILIATION/REVIEW ADDENDUM    For Pharmacy Admin Tracking Only    PHSO: Yes  Total # of Interventions Recommended: 5  - Increased Dose #: 2  - Discontinued Medication #: 2 Discontinue Reason(s): No Longer Used  - New Order #: 1 New Medication Order Reason(s): Needs Additional Medication Therapy  - Maintenance Safety Lab Monitoring #: 1  Recommended intervention potential cost savings:   Accepted intervention potential cost savings:    Total Interventions Accepted: 6  Time Spent (min): 30    Ishan PatelD

## 2021-01-13 NOTE — PATIENT INSTRUCTIONS
Continue to monitor urine and stool. Continue to monitor for signs of bleeding. Return to clinic in 1 week. Take warfarin 3 tablets for 15 mg today and tomorrow then continue warfarin 1.5 tablets for 7.5 mg every Monday and Friday and 2 tablets for 10 mg all other days.

## 2021-01-15 ENCOUNTER — OFFICE VISIT (OUTPATIENT)
Dept: CARDIOLOGY | Age: 66
End: 2021-01-15
Payer: COMMERCIAL

## 2021-01-15 VITALS
WEIGHT: 275 LBS | RESPIRATION RATE: 18 BRPM | DIASTOLIC BLOOD PRESSURE: 65 MMHG | BODY MASS INDEX: 36.45 KG/M2 | HEIGHT: 73 IN | OXYGEN SATURATION: 98 % | SYSTOLIC BLOOD PRESSURE: 138 MMHG | HEART RATE: 70 BPM

## 2021-01-15 DIAGNOSIS — I51.3 LEFT VENTRICULAR APICAL THROMBUS: ICD-10-CM

## 2021-01-15 DIAGNOSIS — Z79.4 TYPE 2 DIABETES MELLITUS WITH OTHER SPECIFIED COMPLICATION, WITH LONG-TERM CURRENT USE OF INSULIN (HCC): ICD-10-CM

## 2021-01-15 DIAGNOSIS — R42 LIGHTHEADED: ICD-10-CM

## 2021-01-15 DIAGNOSIS — I25.5 ISCHEMIC CARDIOMYOPATHY: ICD-10-CM

## 2021-01-15 DIAGNOSIS — Z95.820 S/P ANGIOPLASTY WITH STENT: ICD-10-CM

## 2021-01-15 DIAGNOSIS — E11.69 TYPE 2 DIABETES MELLITUS WITH OTHER SPECIFIED COMPLICATION, WITH LONG-TERM CURRENT USE OF INSULIN (HCC): ICD-10-CM

## 2021-01-15 DIAGNOSIS — E66.9 OBESITY, UNSPECIFIED CLASSIFICATION, UNSPECIFIED OBESITY TYPE, UNSPECIFIED WHETHER SERIOUS COMORBIDITY PRESENT: ICD-10-CM

## 2021-01-15 DIAGNOSIS — E78.2 MIXED HYPERLIPIDEMIA: ICD-10-CM

## 2021-01-15 DIAGNOSIS — I25.10 CORONARY ARTERY DISEASE INVOLVING NATIVE CORONARY ARTERY OF NATIVE HEART WITHOUT ANGINA PECTORIS: Primary | ICD-10-CM

## 2021-01-15 PROCEDURE — 2022F DILAT RTA XM EVC RTNOPTHY: CPT | Performed by: INTERNAL MEDICINE

## 2021-01-15 PROCEDURE — G8484 FLU IMMUNIZE NO ADMIN: HCPCS | Performed by: INTERNAL MEDICINE

## 2021-01-15 PROCEDURE — G8417 CALC BMI ABV UP PARAM F/U: HCPCS | Performed by: INTERNAL MEDICINE

## 2021-01-15 PROCEDURE — 3017F COLORECTAL CA SCREEN DOC REV: CPT | Performed by: INTERNAL MEDICINE

## 2021-01-15 PROCEDURE — G8427 DOCREV CUR MEDS BY ELIG CLIN: HCPCS | Performed by: INTERNAL MEDICINE

## 2021-01-15 PROCEDURE — 1036F TOBACCO NON-USER: CPT | Performed by: INTERNAL MEDICINE

## 2021-01-15 PROCEDURE — 3046F HEMOGLOBIN A1C LEVEL >9.0%: CPT | Performed by: INTERNAL MEDICINE

## 2021-01-15 PROCEDURE — 99214 OFFICE O/P EST MOD 30 MIN: CPT | Performed by: INTERNAL MEDICINE

## 2021-01-15 RX ORDER — EVOLOCUMAB 140 MG/ML
140 INJECTION, SOLUTION SUBCUTANEOUS
Qty: 2.1 ML | Refills: 3 | Status: SHIPPED | OUTPATIENT
Start: 2021-01-15 | End: 2021-02-04

## 2021-01-15 NOTE — PATIENT INSTRUCTIONS
SURVEY:    You may be receiving a survey from [employer] regarding your visit today. Please complete the survey to enable us to provide the highest quality of care to you and your family. If you cannot score us a very good on any question, please call the office to discuss how we could have made your experience a very good one. Thank you.     Your MA today was [employer]

## 2021-01-20 ENCOUNTER — TELEPHONE (OUTPATIENT)
Dept: PHARMACY | Age: 66
End: 2021-01-20

## 2021-01-20 NOTE — TELEPHONE ENCOUNTER
Recent Travel Screening and Travel History documentation:     Travel Screening     Question   Response    In the last month, have you been in contact with someone who was confirmed or suspected to have Coronavirus / COVID-19? Unable to assess    Have you had a COVID-19 viral test in the last 14 days? Unable to assess    Do you have any of the following new or worsening symptoms? Unable to assess    Have you traveled internationally in the last month?   Unable to assess      Travel History   Travel since 12/20/20     No documented travel since 12/20/20         Had to leave [facility]

## 2021-01-21 ENCOUNTER — HOSPITAL ENCOUNTER (OUTPATIENT)
Dept: PHARMACY | Age: 66
Setting detail: THERAPIES SERIES
Discharge: HOME | End: 2021-01-21
Payer: COMMERCIAL

## 2021-01-21 VITALS
BODY MASS INDEX: 36.39 KG/M2 | SYSTOLIC BLOOD PRESSURE: 164 MMHG | HEART RATE: 67 BPM | DIASTOLIC BLOOD PRESSURE: 83 MMHG | WEIGHT: 275.8 LBS

## 2021-01-21 DIAGNOSIS — Z86.73 HISTORY OF STROKE: ICD-10-CM

## 2021-01-21 DIAGNOSIS — I51.3 LEFT VENTRICULAR THROMBUS: ICD-10-CM

## 2021-01-21 LAB — INR BLD: 2.5

## 2021-01-21 PROCEDURE — 99211 OFF/OP EST MAY X REQ PHY/QHP: CPT

## 2021-01-21 PROCEDURE — 85610 PROTHROMBIN TIME: CPT

## 2021-01-21 RX ORDER — ASPIRIN 81 MG/1
81 TABLET ORAL DAILY
COMMUNITY
End: 2021-01-27 | Stop reason: ALTCHOICE

## 2021-01-21 NOTE — PROGRESS NOTES
Sar[facility] [address] [place] [address]/Simon  Medication Management  ANTICOAGULATION    Referring Doctor: Nasima    GOAL INR: 2-3    TODAY'S INR: 2.5    WARFARIN Dosage: Patient will continue warfarin 7.5 mg every Monday and Friday and 10 mg all other days. INR (no units)   Date Value   01/21/2021 2.5   01/13/2021 1.3   01/08/2021 1.0   12/28/2020 1.7   12/18/2020 1.7   12/04/2020 2.8   11/29/2020 1.9     POC INR (no units)   Date Value   01/07/2021 1.2       Medication changes:  - Patient started CBD oil topically to \"heal skin\" -  CBD with warfarin may increase INR. Uncertain of frequency of use or number of application sites    - Patient stopped taking ASA       Notes:    Fingerstick INR drawn per clinic protocol. Patient states no visible blood in urine and no black tarry stool. Denies any missed doses of warfarin. Patient states that he may be having gall bladder surgery, but wants to see another surgeon for another opinion. Patient counseled to notify clinic if he is having surgery and warfarin is held prior to procedure so that Lovenox bridging may be addressed. I also discussed potential for CBD oil to interact with warfarin - especially if frequency and amount of use is not consistent. Will recheck INR in 2 weeks. Patient acknowledges working in consult agreement with pharmacist as referred by his/her physician. CLINICAL PHARMACY CONSULT: MED RECONCILIATION/REVIEW ADDENDUM    For Pharmacy Admin Tracking Only    PHSO: Yes  Total # of Interventions Recommended: 1 , 2 updated list (patient not taking ASA and Repatha)  - Discontinued Medication #: 1 Discontinue Reason(s): No Longer Used  - Maintenance Safety Lab Monitoring #: 1  Total Interventions Accepted: 2  Time Spent (min): 4607 Alexandro Dupont Found

## 2021-01-27 ENCOUNTER — HOSPITAL ENCOUNTER (OUTPATIENT)
Dept: CARDIAC REHAB | Age: 66
Setting detail: THERAPIES SERIES
Discharge: HOME | End: 2021-01-27
Payer: COMMERCIAL

## 2021-01-27 VITALS
SYSTOLIC BLOOD PRESSURE: 132 MMHG | RESPIRATION RATE: 18 BRPM | HEART RATE: 66 BPM | BODY MASS INDEX: 35.78 KG/M2 | OXYGEN SATURATION: 95 % | WEIGHT: 270 LBS | DIASTOLIC BLOOD PRESSURE: 80 MMHG | HEIGHT: 73 IN

## 2021-01-27 ASSESSMENT — PATIENT HEALTH QUESTIONNAIRE - PHQ9
SUM OF ALL RESPONSES TO PHQ QUESTIONS 1-9: 2
SUM OF ALL RESPONSES TO PHQ QUESTIONS 1-9: 2

## 2021-01-27 NOTE — PROGRESS NOTES
Cardiac Rehab Initial History and Assessment    Bismark Ragland 1955  1/27/2021    Primary Diagnosis: PTCA with stent  Living Will: No   On File: N/A  Durable Power of :No    Medical History  Past Medical History:   Diagnosis Date    Abnormal cardiovascular stress test 10/2020    moderate perfusion defect of severe intensity in the apical anteroapical and inferoapical regions    CAD (coronary artery disease)     [facility]     Diabetic retinopathy [facility] [address] 11/05/2020    Hyperlipidemia     Hypertension     Meniere disease     MI [facility]myocardial infarction) ([facility] [address]     MULTIPLE    Obesity     Stroke [facility] [address]     Thrombocytopenia [facility] [address]     TIA (transient ischemic attack)      Past Surgical History:   Procedure Laterality Date    CARDIAC CATHETERIZATION  10/29/2020    Severe three vessel disease involving the LAD, circumflex and right coronary artery  /  DR Kranthi Medley  /  CT SURGERGY CONSULT    COLONOSCOPY      CORONARY ANGIOPLASTY WITH STENT PLACEMENT         Family History  Family History   Problem Relation Age of Onset    Heart Attack Mother     Diabetes Mother     Heart Disease Father     Prostate Cancer Father     Heart Attack Sister     Heart Attack Brother     Heart Attack Maternal Grandfather          Symptoms:  1. Angina   [x] None   [] Tightness   [] Shortness of Breath   [] Pressure    [] Nausea   [] Sharp, Stabbing  [] Pallor   [] Indigestion, Heartburn [] Sweaty    Where was discomfort located? Precipitating Factors? Relieved by:    2. Arrhythmia   [x] None   [] Irregular Beats (skips) [] Pacer    [] Atrial Fibrillation  [] AICD    On any Medications? 3. Congestive Heart Failure   [x] None   [] Pedal Edema  [] Unusual weight gain   [] SOB with mild exertion [] Fatigue    4. Vascular   [x] None   [] Carotid Narrowing  [] R [] L   [] Peripheral claudication [] R [] L    5. Musculoskeletal    [] None   [] Back Pain  Where? [x] Joint discomfort Where?  Right shoulder    Socio-Economic    Marital Status: single    Nutrition    Appetite:  [x]  Too Good    []  Good  []  Poor    Diet: heart healhty, diabetic  Eating out 1 times/wk  Alcohol Consumption: [] Yes [x] No   Type:  Frequency:    Caffeine: [] Yes [x] No  Type:  Amount:    Water intake per day: a lot  Vitamins/Natural herbal products: none    Psychological    [] Depression  [] Tearful  [] Fearful  [] Cheerful  [] Anxious  [x] Motivated  [] Overwhelmed    Treatment: none    Diabetes    [x] Yes  [] No  How lon years   Latest BS: 143  Frequency of Checks: continue monitor  Medication: insulin    Stress    Source: health issues  Relaxation techniques: watch TV  Hobbies: woodworking    Level of Education    [] 8th Grade  [] Associates  [] Masters  [x] [employer] [] Bachelor  []  Other:    Depression Screening:  [] Have you been feeling sad. ..down in the dumps? [] Have you lost interest in your job, sports, hobbies, friends? [x] Do you often feel tired? [x] Do you have trouble sleeping or do you sleep too much? [x] Have you been gaining or losing weight? [] Do you often feel down on yourself, that everything is your fault? [] Do you have troubled making decisions or concentrating on your work? [] Do you often feel agitated or like you can barely move? [] Do you ever feel that life isn't worth living?    *If greater than 5 symptoms listed,  notified. Cardiac Rehab Pre - Test  1. The heart is a muscle that acts like a pump to deliver oxygen and blood to the rest of the body. [x] True   [] False  2. Healing from the damage of a heart attach is complete in two weeks. [] True   [x] False  3. Smoking has no direct effect on the heart - it only effects your lungs. [] True   [x] False  4. Using all the salt you want is acceptable for all heart patients. [] True   [x] False  5. Chest pain that is relieved by rest or nitroglycerine is called Angina. [] True   [x] False  6.  Shortness of breath, indigestion, sweating, tightness or pain in your chest are symptoms of a heart attack. [x] True   [] False  7. Swelling of the feet and ankles only means you've been on your feet too much. [] True   [x] False  8. Saturated fats raised your blood cholesterol level more than anything else in your diet. [x] True   [] False  9. High Blood pressure can take care of itself by rest alone. [] True   [x] False  10. Walking is one of the best exercises for heart attack patients. [x] True   [] False    Physical Findings   BP: 132/80   Pulse: 66   Resp: 18   SpO2: 95 %  Lungs clear. Heart tones strong and regular. No edema note. Patient denies pain or dyspnea.    Goals:   -increased stamina/strength to 30-50 total exercise by increasing 1-2 level/wk and 1-2 min/wk  to achieve THR and RPE 12-16 on Nam RPE scale  -introduce weights/ therabands 2-4# for 5-10 reps  -manage BP better  -improved cholesterol and  Triglycerides  -develop regular exercise 30 min daily  -Improve Hgb A1C

## 2021-01-28 ENCOUNTER — OFFICE VISIT (OUTPATIENT)
Dept: UROLOGY | Age: 66
End: 2021-01-28
Payer: COMMERCIAL

## 2021-01-28 ENCOUNTER — HOSPITAL ENCOUNTER (OUTPATIENT)
Age: 66
Setting detail: SPECIMEN
Discharge: HOME | End: 2021-01-28
Payer: COMMERCIAL

## 2021-01-28 VITALS
WEIGHT: 279 LBS | SYSTOLIC BLOOD PRESSURE: 156 MMHG | RESPIRATION RATE: 20 BRPM | DIASTOLIC BLOOD PRESSURE: 89 MMHG | BODY MASS INDEX: 36.98 KG/M2 | HEIGHT: 73 IN | HEART RATE: 71 BPM

## 2021-01-28 DIAGNOSIS — R39.14 FEELING OF INCOMPLETE BLADDER EMPTYING: ICD-10-CM

## 2021-01-28 DIAGNOSIS — N40.1 BPH WITH OBSTRUCTION/LOWER URINARY TRACT SYMPTOMS: ICD-10-CM

## 2021-01-28 DIAGNOSIS — N39.41 URGE INCONTINENCE: ICD-10-CM

## 2021-01-28 DIAGNOSIS — R39.15 URINARY URGENCY: Primary | ICD-10-CM

## 2021-01-28 DIAGNOSIS — R39.15 URINARY URGENCY: ICD-10-CM

## 2021-01-28 DIAGNOSIS — N13.8 BPH WITH OBSTRUCTION/LOWER URINARY TRACT SYMPTOMS: ICD-10-CM

## 2021-01-28 LAB
-: ABNORMAL
AMORPHOUS: ABNORMAL
BACTERIA: ABNORMAL
BILIRUBIN URINE: NEGATIVE
CASTS UA: ABNORMAL /LPF
COLOR: YELLOW
COMMENT UA: ABNORMAL
CRYSTALS, UA: ABNORMAL /HPF
EPITHELIAL CELLS UA: ABNORMAL /HPF (ref 0–5)
GLUCOSE URINE: ABNORMAL
KETONES, URINE: NEGATIVE
LEUKOCYTE ESTERASE, URINE: NEGATIVE
MUCUS: ABNORMAL
NITRITE, URINE: NEGATIVE
OTHER OBSERVATIONS UA: ABNORMAL
PH UA: 6 (ref 5–9)
PROTEIN UA: NEGATIVE
RBC UA: ABNORMAL /HPF (ref 0–2)
RENAL EPITHELIAL, UA: ABNORMAL /HPF
SPECIFIC GRAVITY UA: 1.02 (ref 1.01–1.02)
TRICHOMONAS: ABNORMAL
TURBIDITY: CLEAR
URINE HGB: NEGATIVE
UROBILINOGEN, URINE: NORMAL
WBC UA: ABNORMAL /HPF (ref 0–5)
YEAST: ABNORMAL

## 2021-01-28 PROCEDURE — G8427 DOCREV CUR MEDS BY ELIG CLIN: HCPCS | Performed by: NURSE PRACTITIONER

## 2021-01-28 PROCEDURE — 1036F TOBACCO NON-USER: CPT | Performed by: NURSE PRACTITIONER

## 2021-01-28 PROCEDURE — G8417 CALC BMI ABV UP PARAM F/U: HCPCS | Performed by: NURSE PRACTITIONER

## 2021-01-28 PROCEDURE — G8484 FLU IMMUNIZE NO ADMIN: HCPCS | Performed by: NURSE PRACTITIONER

## 2021-01-28 PROCEDURE — 99214 OFFICE O/P EST MOD 30 MIN: CPT | Performed by: NURSE PRACTITIONER

## 2021-01-28 PROCEDURE — 51798 US URINE CAPACITY MEASURE: CPT | Performed by: NURSE PRACTITIONER

## 2021-01-28 PROCEDURE — 3017F COLORECTAL CA SCREEN DOC REV: CPT | Performed by: NURSE PRACTITIONER

## 2021-01-28 PROCEDURE — 87086 URINE CULTURE/COLONY COUNT: CPT

## 2021-01-28 PROCEDURE — 81001 URINALYSIS AUTO W/SCOPE: CPT

## 2021-01-28 RX ORDER — TAMSULOSIN HYDROCHLORIDE 0.4 MG/1
0.4 CAPSULE ORAL EVERY EVENING
Qty: 90 CAPSULE | Refills: 3 | Status: SHIPPED | OUTPATIENT
Start: 2021-01-28 | End: 2021-12-30 | Stop reason: SDUPTHER

## 2021-01-28 ASSESSMENT — ENCOUNTER SYMPTOMS
WHEEZING: 0
COUGH: 0
EYE REDNESS: 0
CONSTIPATION: 0
VOMITING: 0
NAUSEA: 0
COLOR CHANGE: 0
ABDOMINAL PAIN: 0
SHORTNESS OF BREATH: 0
BACK PAIN: 0

## 2021-01-28 NOTE — PROGRESS NOTES
HPI:          Patient is a 63 y.o. male in no acute distress. He is alert and oriented to person, place, and time. History  12/2020 Referral from YEN Mnedez for LUTS. Complaints of urgency and urge incontinence. He has nocturia 0-2 times per night. He has frequency every 30 minutes to an hour during the day. He does change his underwear once per night due to the incontinence. He does have a weak stream and a split stream at the start of urination. He is uncircumcised, but denies any issues retracting his foreskin. He does have significant constipation and was recently in the ER for constipation. He also reports a history of fecal smearing. He will often go 4 more days without a bowel movement. While in the ER he was told he was not emptying his bladder. He did have a CT completed while in the ER on 11/2020 that showed no evidence of  calcifications or hydronephrosis. He is an uncontrolled insulin-dependent diabetic. He does consume a large amount of bladder irritants. He denies history of stones. He has never seen urology in the past.   Started miralax daily     Started flomax daily     Referred for colonoscopy      Today  Here today to follow-up for lower urinary tract symptoms and constipation. He is taking Flomax daily. He is now urinating every 2-3 hours. This has improved from every 30 minutes to an hour, but there are times that he does feel that he has to urinate this often. He stopped MiraLAX on his own. He did increase his fiber intake. He states he is now having daily bowel movements. He was sent to general surgery for colonoscopy, but he did have a bad experience with the general surgeon. He is now seeing a gastroenterologist in [facility]. He does continue to complain of a weak stream, feelings of incomplete bladder emptying, and incontinence. He denies any dysuria or gross hematuria. PVR is low, 40 mL.     Past Medical History:   Diagnosis Date    Abnormal cardiovascular stress test 10/2020    moderate perfusion defect of severe intensity in the apical anteroapical and inferoapical regions    CAD (coronary artery disease)     [facility] mellitus [facility] [address] [facility]     Diabetic retinopathy ([facility] [address] 11/05/2020    Hyperlipidemia     Hypertension     Meniere disease     MI (myocardial infarction) ([facility] [address]     MULTIPLE    Obesity     Stroke ([facility] [address]     Thrombocytopenia ([facility] [address]     TIA (transient ischemic attack)      Past Surgical History:   Procedure Laterality Date    CARDIAC CATHETERIZATION  10/29/2020    Severe three vessel disease involving the LAD, circumflex and right coronary artery  /  DR Rodas Axtell  /  CT SURGERGY CONSULT    COLONOSCOPY      CORONARY ANGIOPLASTY WITH STENT PLACEMENT       Outpatient Encounter Medications as of 1/28/2021   Medication Sig Dispense Refill    tamsulosin (FLOMAX) 0.4 MG capsule Take 1 capsule by mouth every evening 90 capsule 3    VITAMIN D PO Take 1 tablet by mouth daily Patient uncertain of strength      Insulin Pen Needle 32G X 8 MM MISC by Does not apply route      pantoprazole (PROTONIX) 40 MG tablet Take 1 tablet by mouth daily 90 tablet 0    ticagrelor (BRILINTA) 90 MG TABS tablet Take 1 tablet by mouth 2 times daily 60 tablet 11    carvedilol (COREG) 6.25 MG tablet Take 1 tablet by mouth 2 times daily 180 tablet 3    ondansetron (ZOFRAN) 4 MG tablet Take 1 tablet by mouth 3 times daily as needed for Nausea or Vomiting 30 tablet 0    insulin NPH (NOVOLIN N) 100 UNIT/ML injection vial Inject into the skin nightly Pt mostly takes this at HS \"To help bring down my sugar. \" Pt states to take approx 30 Units nightly.  insulin glargine (LANTUS SOLOSTAR) 100 UNIT/ML injection pen Inject 56 Units into the skin nightly (Patient taking differently: Inject 60 Units into the skin nightly ) 5 pen 5    Insulin Aspart FlexPen 100 UNIT/ML SOPN Inject 20 Units into the skin Pt to take as 20 units after each meal as sliding scale. Pt states \"The 20 Units are not usually enough. \"      warfarin (COUMADIN) 5 MG tablet Take 1 tablet by mouth daily (Patient taking differently: Take 7.5 mg by mouth daily [facility]madin Clinic:  7.5 mg every Tuesday, Thursday, Saturday and 10 mg all other days) 90 tablet 3    ezetimibe (ZETIA) 10 MG tablet Take 1 tablet by mouth daily 90 tablet 1    blood glucose test strips (RELION GLUCOSE TEST STRIPS) strip Test qid. Dx--E11.9 insulin dependent 100 each 5    magnesium (MAGNESIUM-OXIDE) 250 MG TABS tablet Take 500 mg by mouth every morning       Coenzyme Q10 (CO Q 10) 10 MG CAPS Take by mouth daily      PSYLLIUM HUSK PO Take 2 capsules by mouth Takes 2-3 capsules every morning      LECITHIN CONCENTRATE PO Take 2 tablets by mouth daily \"decrease cholesterol\"      glucose monitoring kit (FREESTYLE) monitoring kit 1 kit by Does not apply route daily TEST BLOOD SUGAR QID. WHATEVER METER IS COVERED BY GALVEZ. DIAGNOSIS E11.9 1 kit 0    Lancets MISC TEST BLOOD SUGAR QID. WHATEVER METER IS COVERED BY GALVEZ. DIAGNOSIS E11.9 100 each 3    lisinopril (PRINIVIL;ZESTRIL) 10 MG tablet Take 1 tablet by mouth daily 90 tablet 0    Evolocumab (REPATHA) 140 MG/ML SOSY Inject 1 mL into the skin every 14 days (Patient not taking: Reported on 1/28/2021) 2.1 mL 3    meclizine (ANTIVERT) 25 MG tablet Take 25 mg by mouth 3 times daily as needed for Dizziness       No facility-administered encounter medications on file as of 1/28/2021.        Current Outpatient Medications on File Prior to Visit   Medication Sig Dispense Refill    VITAMIN D PO Take 1 tablet by mouth daily Patient uncertain of strength      Insulin Pen Needle 32G X 8 MM MISC by Does not apply route      pantoprazole (PROTONIX) 40 MG tablet Take 1 tablet by mouth daily 90 tablet 0    ticagrelor (BRILINTA) 90 MG TABS tablet Take 1 tablet by mouth 2 times daily 60 tablet 11    carvedilol (COREG) 6.25 MG tablet Take 1 tablet by mouth 2 times daily 180 tablet 3    ondansetron (ZOFRAN) 4 MG tablet Take 1 tablet by mouth 3 times daily as needed for Nausea or Vomiting 30 tablet 0    insulin NPH (NOVOLIN N) 100 UNIT/ML injection vial Inject into the skin nightly Pt mostly takes this at HS \"To help bring down my sugar. \" Pt states to take approx 30 Units nightly.  insulin glargine (LANTUS SOLOSTAR) 100 UNIT/ML injection pen Inject 56 Units into the skin nightly (Patient taking differently: Inject 60 Units into the skin nightly ) 5 pen 5    Insulin Aspart FlexPen 100 UNIT/ML SOPN Inject 20 Units into the skin Pt to take as 20 units after each meal as sliding scale. Pt states \"The 20 Units are not usually enough. \"      warfarin (COUMADIN) 5 MG tablet Take 1 tablet by mouth daily (Patient taking differently: Take 7.5 mg by mouth daily [facility]mad[facility] Clinic:  7.5 mg every Tuesday, Thursday, Saturday and 10 mg all other days) 90 tablet 3    ezetimibe (ZETIA) 10 MG tablet Take 1 tablet by mouth daily 90 tablet 1    blood glucose test strips (RELION GLUCOSE TEST STRIPS) strip Test qid. Dx--E11.9 insulin dependent 100 each 5    magnesium (MAGNESIUM-OXIDE) 250 MG TABS tablet Take 500 mg by mouth every morning       Coenzyme Q10 (CO Q 10) 10 MG CAPS Take by mouth daily      PSYLLIUM HUSK PO Take 2 capsules by mouth Takes 2-3 capsules every morning      LECITHIN CONCENTRATE PO Take 2 tablets by mouth daily \"decrease cholesterol\"      glucose monitoring kit (FREESTYLE) monitoring kit 1 kit by Does not apply route daily TEST BLOOD SUGAR QID. WHATEVER METER IS COVERED BY GALVEZ. DIAGNOSIS E11.9 1 kit 0    Lancets [facility] [place] BLOOD [place]GAR QID. WHATEVER METER IS COVERED BY GALVEZ.  DIAGNOSIS E11.9 100 each 3    lisinopril (PRINIVIL;ZESTRIL) 10 MG tablet Take 1 tablet by mouth daily 90 tablet 0    Evolocumab (REPATHA) 140 MG/ML SOSY Inject 1 mL into the skin every 14 days (Patient not taking: Reported on 1/28/2021) 2.1 mL 3    meclizine (ANTIVERT) 25 MG tablet Take 25 mg by mouth 3 times daily as needed for Dizziness       No current facility-administered medications on file prior to visit. Statins and Metformin and related  Family History   Problem Relation Age of Onset    Heart Attack Mother     Diabetes Mother     Heart Disease Father     Prostate Cancer Father     Heart Attack Sister     Heart Attack Brother     Heart Attack Maternal Grandfather      Social History     Tobacco Use   Smoking Status Never Smoker   Smokeless Tobacco Never Used       Social History     Substance and Sexual Activity   Alcohol Use Not Currently       Review of Systems   Constitutional: Negative for appetite change, chills and fever. Eyes: Negative for redness and visual disturbance. Respiratory: Negative for cough, shortness of breath and wheezing. Cardiovascular: Negative for chest pain and leg swelling. Gastrointestinal: Negative for abdominal pain, constipation, nausea and vomiting. Genitourinary: Positive for decreased urine volume, difficulty urinating, enuresis and urgency. Negative for discharge, dysuria, flank pain, frequency, hematuria, penile pain, scrotal swelling and testicular pain. Musculoskeletal: Negative for back pain, joint swelling and myalgias. Skin: Negative for color change, rash and wound. Neurological: Negative for dizziness, tremors and numbness. Hematological: Negative for adenopathy. Does not bruise/bleed easily. BP (!) 156/89 (Site: Right Upper Arm, Position: Sitting, Cuff Size: Large Adult)   Pulse 71   Resp 20   Ht 6' 1\" (1.854 m)   Wt 279 lb (126.6 kg)   BMI 36.81 kg/m²       PHYSICAL EXAM:  Constitutional: Patient in no acute distress; Neuro: alert and oriented to person place and time. Psych: Mood and affect normal.  Skin: Normal  Lungs: Respiratory effort normal  Cardiovascular:  Normal peripheral pulses  Abdomen: Soft, non-tender, non-distended with no CVA, flank pain  Bladder non-tender and not distended.       Lab Results   Component Value Date BUN 13 11/29/2020     Lab Results   Component Value Date    CREATININE 0.93 01/07/2021     Lab Results   Component Value Date    PSA 3.04 06/19/2020       ASSESSMENT:   Diagnosis Orders   1. Urinary urgency  Urinalysis with Microscopic    Culture, Urine    ME MEASUREMENT,POST-VOID RESIDUAL VOLUME BY US,NON-IMAGING   2. Urge incontinence  Urinalysis with Microscopic    Culture, Urine    ME MEASUREMENT,POST-VOID RESIDUAL VOLUME BY US,NON-IMAGING   3. Feeling of incomplete bladder emptying  Urinalysis with Microscopic    Culture, Urine    ME MEASUREMENT,POST-VOID RESIDUAL VOLUME BY US,NON-IMAGING   4. BPH with obstruction/lower urinary tract symptoms  Urinalysis with Microscopic    Culture, Urine    ME MEASUREMENT,POST-VOID RESIDUAL VOLUME BY US,NON-IMAGING         PLAN:  We will check a UA C&S    He will continue Flomax daily    He will continue follow-up with GI due to bowel issues    He will return for cystoscopy. We may need to consider treatment for overactive bladder secondary to diabetes versus therapy for BPH.

## 2021-01-29 LAB
CULTURE: NORMAL
Lab: NORMAL
SPECIMEN DESCRIPTION: NORMAL

## 2021-02-01 ENCOUNTER — TELEPHONE (OUTPATIENT)
Dept: UROLOGY | Age: 66
End: 2021-02-01

## 2021-02-01 NOTE — TELEPHONE ENCOUNTER
----- Message from GAIL Pedro - CNP sent at 2/1/2021  9:46 AM EST -----  Call pt - urine cx reviewed and negative for UTI & for significant microhematuria

## 2021-02-03 ENCOUNTER — TELEPHONE (OUTPATIENT)
Dept: PHARMACY | Age: 66
End: 2021-02-03

## 2021-02-03 ENCOUNTER — HOSPITAL ENCOUNTER (OUTPATIENT)
Dept: NON INVASIVE DIAGNOSTICS | Age: 66
Discharge: HOME | End: 2021-02-03
Payer: COMMERCIAL

## 2021-02-03 ENCOUNTER — HOSPITAL ENCOUNTER (OUTPATIENT)
Dept: CARDIAC REHAB | Age: 66
Setting detail: THERAPIES SERIES
Discharge: HOME | End: 2021-02-03
Payer: COMMERCIAL

## 2021-02-03 ENCOUNTER — TELEPHONE (OUTPATIENT)
Dept: CARDIOLOGY | Age: 66
End: 2021-02-03

## 2021-02-03 VITALS — WEIGHT: 279.5 LBS | BODY MASS INDEX: 37.04 KG/M2 | HEIGHT: 73 IN

## 2021-02-03 DIAGNOSIS — I25.10 ASHD (ARTERIOSCLEROTIC HEART DISEASE): Primary | ICD-10-CM

## 2021-02-03 DIAGNOSIS — I25.10 ASHD (ARTERIOSCLEROTIC HEART DISEASE): ICD-10-CM

## 2021-02-03 DIAGNOSIS — I25.5 ISCHEMIC CARDIOMYOPATHY: ICD-10-CM

## 2021-02-03 PROCEDURE — 93226 XTRNL ECG REC<48 HR SCAN A/R: CPT

## 2021-02-03 PROCEDURE — 93798 PHYS/QHP OP CAR RHAB W/ECG: CPT

## 2021-02-03 PROCEDURE — 93225 XTRNL ECG REC<48 HRS REC: CPT

## 2021-02-03 NOTE — TELEPHONE ENCOUNTER

## 2021-02-03 NOTE — PROGRESS NOTES
Phase II Cardiac Rehab Individualized Treatment Plan-Initial     Patient Name: Yefri Roman  Date of Initial Assessment: 2/3/2021  ACCOUNT #: [de-identified]  Diagnosis: PTCA with stent   Onset Date: 1/7/2021  Referring Physician: Dr Jaky Alvarez  Risk Stratification: High  Session Number: 1   EXERCISE    Stages of Change:   [] pre-contemplation  [x] Action   [] Contemplate    [] Maintainence   [x] Prep   [] Relapse          Exercise Prescription:  Mode: [x] TM [x] B [x] STP  [x] R  Frequency: 3X/week  Duration: 31-60 minutes  Intensity: METS 1.7  Progression: Increase 1-2 levels/week or 1-2 min/week to achieve target HR and Nam RPE scale 12-16. [] Angina with Exertion THR: 100   Maximum HR:88     [] Resistance Training Weight (lbs):   Reps:     Hypertension:  [x] Yes  [] No  Resting BP: 136/76  Peak Exercise BP: 134/74  BP Meds: Lisinopril    Intervention:  Home Exercise:  Type: Walking  Duration: 30 minutes  Frequency: Daily   [] Resistance Training    Education:   [x] Equipment [place]  [x] Self pulse   [] Proper use weights/therabands   [x] S/S to report  [x] Low Na Diet    [x] Warm up/ Cool down  [] BP Medication    [x] RPE Scale   [] Understand BP   [] Ex Safety   [] Exercise specialist class-Home Exercise       Target Goal:   -Individual Exercise Plan  -BP<140/90 or <130/80 if DM   -Aerobic active 30 + minutes 5-7 days per week    Nutrition    Stages of Change:   [] pre-contemplation  [x] Action   [] Contemplate    [] Maintainence   [x] Prep    [] Relapse    Lipids: Total Cholesterol: 167  Triglycerides: 102  HDL: 45  LDL: 102  Lipid Meds:  Zetia. . Takes Psyllium husk also.     Diabetes:  [x] Yes  [] No  FBS:   HbA1c:8.3  Diabetes Medication: Insulin Aspart FlexPen, Lantus insulin and NPH insulin  [x] Monitor BS at home   Frequency?: Continuous    Weight Management:  Last Weight: 279.5 lbs  Height: 6'1\"  BMI: 37  Wt Goal: 1-2 lbs/wk  Alcohol:   Social History     Substance and Sexual Activity   Alcohol Use Not Currently     Diet Assessment Tool: Food Diary  Special Diet: Low fat low sodium low chol low carb    Intervention:   [] Dietitian Consult       [x] Nurse/Patient Discussion     [] Diet Class           [] Referred to Diabetes Education     Education:  [] S&S hypo/hyperglycemia  [x] Low fat/low cholesterol diet  [] Weight loss methods      [] Relate Diabetes/CAD     [x] Eating heart healthy handout    Target Goal:  -LDL-C<100 if triglycerides are > 200  -LDL-C < 70 for high risk patients  -HbA1c < 7%  -BMI < 25   Education    Stages of Change:    [] pre-contemplation  [x] Action   [] Contemplate    [] Maintainence   [x] Prep    [] Relapse    Learning Barriers:   [] Speech   [] Cognitive   [] Literacy   [] Visions   [] Hearing    [x] Ready Learn    Knowledge test score: 90%      Family support: [x] Yes  [] No    Tobacco use:   Social History     Tobacco Use   Smoking Status Never Smoker   Smokeless Tobacco Never Used       Intervention:  [] Referred to smoking cessation counselor     [] Individual education and counseling  [] Tobacco adjunct  [] Informed of education class schedule     Education:   [] Risk Factors/Modifications  [x] Psychological aspects  [] Angina    [] Medications  [] CHF      [] Cardiac A&P    Target Goal:  -Complete cessation of tobacco use (if applicable)  -Continued risk factor modifications  -Recognizing signs/symptoms to report  -Proper use of meds    Psychosocial  Stages of Change:    [] pre-contemplation  [x] Action   [] Contemplate    [] Maintainence   [x] Prep    [] Relapse    Psychosocial Test:  Tool Used: Anatoliy Macias Quality of Life  Score: 26.21  Depression screening score: 3/9    Intervention:   [] Psych Consult/  [x] Uses stress management skills    [] Physician Referral    [] Stress management class  Medications:     Education:    [] Coping Techniques   [] Relaxation techniques   [] S/S of Depression    Target Goal:  -Assess presence or absence of depression using a

## 2021-02-03 NOTE — PROGRESS NOTES
Cardiac Rehabilitation   Physician Order Form    Alexei Living  1955    [x] Phase 2 ECG Monitored Cardiac Rehabilitation    [] MI   [x] PTCA with/without stents  [] CABG  [] Heart Valve Repair/Replaced   [] Stable Angina [] Other:     Onset Date: 1/7/2021                    Cardiac Education Goals: (see individualized treatment plan for specific goals, progression & compliance)    [x] Hypertension [x] Physical Inactivity  [x] A & P  [] Smoking  [x] Coping/Depression  [x] Medications  [x] Diabetes  [x] Obesity   [x] Angina  [x] Hyperlipidemia [] Stress   [x] Home Exercise                     Prescribed Exercise Plan:    Target HR:100       Duration:31-60 minutes  Frequency:3X/week  Initial Met Level:1.7  Limitations:    Modalities:  [x]Treadmill   [x] [place]umb. Stepper/bike  [] Elliptical  [x] [place] [employer]ne  [x] Weights/therabands    · Aerobic exercise to total 31-60 minutes. Progressing by 1-2 minutes per week and/or 1-2 levels per week per patient tolerance using various modalities; according to Nam Scale 11-13 and THR  · Strength training starting with weights 1-3 # / 5-8 reps progressing to 5-10 # / 15-20 reps; therabands red-gray 5-20 reps. Per patient symptoms use:  · Appropriate ACLS Algorhythm for Cardiac Events. · Nitroglycerine 0.4mg SLq 5mins X 3 for angina pain. · 12 lead EKG for c/o chest pain or change in rhythm. · Nasal O2 for SaO2 <90% or symptoms warranted. · Blood sugar monitoring for Hyper/Hypoglycemia symptoms.

## 2021-02-04 ENCOUNTER — HOSPITAL ENCOUNTER (OUTPATIENT)
Dept: PHARMACY | Age: 66
Setting detail: THERAPIES SERIES
Discharge: HOME | End: 2021-02-04
Payer: COMMERCIAL

## 2021-02-04 VITALS
DIASTOLIC BLOOD PRESSURE: 77 MMHG | SYSTOLIC BLOOD PRESSURE: 136 MMHG | HEART RATE: 68 BPM | WEIGHT: 278 LBS | BODY MASS INDEX: 36.68 KG/M2 | TEMPERATURE: 97.3 F

## 2021-02-04 DIAGNOSIS — I51.3 LEFT VENTRICULAR THROMBUS: ICD-10-CM

## 2021-02-04 DIAGNOSIS — Z86.73 HISTORY OF STROKE: ICD-10-CM

## 2021-02-04 LAB — INR BLD: 2.5

## 2021-02-04 PROCEDURE — 99212 OFFICE O/P EST SF 10 MIN: CPT

## 2021-02-04 PROCEDURE — 85610 PROTHROMBIN TIME: CPT

## 2021-02-04 RX ORDER — METFORMIN HYDROCHLORIDE 500 MG/1
500 TABLET, EXTENDED RELEASE ORAL
COMMUNITY
End: 2021-05-24

## 2021-02-04 NOTE — PATIENT INSTRUCTIONS
Continue to monitor urine and stool. Continue to monitor for signs of bleeding. Return to clinic in 4 weeks. Continue warfarin 1.5 tablets for7.5 mg every Monday and Friday and 2 tablets for 10 mg all other days.

## 2021-02-04 NOTE — PROGRESS NOTES
[address] [facility]  Medication Management  ANTICOAGULATION    Referring Doctor: Nasima    GOAL INR: 2-3    TODAY'S INR: 2.5    WARFARIN Dosage: Patient will continue warfarin 1.5 tablets for7.5 mg every Monday and Friday and 2 tablets for 10 mg all other days. INR (no units)   Date Value   01/21/2021 2.5   01/13/2021 1.3   01/08/2021 1.0   12/28/2020 1.7   12/18/2020 1.7   12/04/2020 2.8   11/29/2020 1.9     POC INR (no units)   Date Value   01/07/2021 1.2     Medication changes:  Started Metformin  mg BID. Stopped Lantus    Notes:    Fingerstick INR drawn per clinic protocol. Patient states no visible blood in urine and no black tarry stool. Denies any missed doses of warfarin. No change in other maintenance medications or in diet. Will recheck INR in 4 weeks. Patient acknowledges working in consult agreement with pharmacist as referred by his/her physician. PATIENT is a TALKER. CLINICAL PHARMACY CONSULT: MED RECONCILIATION/REVIEW ADDENDUM    For Pharmacy Admin Tracking Only    PHSO: No  Total # of Interventions Recommended: 2  - Discontinued Medication #: 1 Discontinue Reason(s): No Longer Used  - New Order #: 1 New Medication Order Reason(s): Needs Additional Medication Therapy  - Maintenance Safety Lab Monitoring #: 1  Recommended intervention potential cost savings:   Accepted intervention potential cost savings:    Total Interventions Accepted: 3  Time Spent (min): 30    Barron Mullins, PharmD

## 2021-02-08 ENCOUNTER — PROCEDURE VISIT (OUTPATIENT)
Dept: UROLOGY | Age: 66
End: 2021-02-08
Payer: COMMERCIAL

## 2021-02-08 VITALS
HEART RATE: 68 BPM | HEIGHT: 73 IN | SYSTOLIC BLOOD PRESSURE: 126 MMHG | DIASTOLIC BLOOD PRESSURE: 77 MMHG | BODY MASS INDEX: 36.84 KG/M2 | TEMPERATURE: 96 F | WEIGHT: 278 LBS

## 2021-02-08 DIAGNOSIS — N39.41 URGE INCONTINENCE: ICD-10-CM

## 2021-02-08 DIAGNOSIS — N40.1 BPH WITH OBSTRUCTION/LOWER URINARY TRACT SYMPTOMS: ICD-10-CM

## 2021-02-08 DIAGNOSIS — N13.8 BPH WITH OBSTRUCTION/LOWER URINARY TRACT SYMPTOMS: ICD-10-CM

## 2021-02-08 DIAGNOSIS — R39.15 URINARY URGENCY: Primary | ICD-10-CM

## 2021-02-08 LAB
ACQUISITION DURATION: NORMAL S
AVERAGE HEART RATE: 67 BPM
EKG DIAGNOSIS: NORMAL
FASTEST SUPRAVENTRICULAR RATE: 112 BPM
FASTEST VENTRICULAR RATE: 145 BPM
HOLTER MAX HEART RATE: 104 BPM
HOOKUP DATE: NORMAL
HOOKUP TIME: NORMAL
LONGEST SUPRAVENTRICULAR RATE: 112 BPM
LONGEST VE RUN RATE: 145 BPM
MAX HEART RATE TIME/DATE: NORMAL
MIN HEART RATE TIME/DATE: NORMAL
MIN HEART RATE: 51 BPM
NUMBER OF FASTEST SUPRAVENTRICULAR BEATS: 8
NUMBER OF FASTEST VENTRICULAR BEATS: 4
NUMBER OF LONGEST SUPRAVENTRICULAR BEATS: 8
NUMBER OF LONGEST VENTRICULAR BEATS: 4
NUMBER OF QRS COMPLEXES: NORMAL
NUMBER OF SUPRAVENTRICULAR BEATS IN RUNS: 8
NUMBER OF SUPRAVENTRICULAR COUPLETS: 2
NUMBER OF SUPRAVENTRICULAR ECTOPICS: 216
NUMBER OF SUPRAVENTRICULAR ISOLATED BEATS: 204
NUMBER OF SUPRAVENTRICULAR RUNS: 1
NUMBER OF VENTRICULAR BEATS IN RUNS: 7
NUMBER OF VENTRICULAR BIGEMINAL CYCLES: 3
NUMBER OF VENTRICULAR COUPLETS: 10
NUMBER OF VENTRICULAR ECTOPICS: 898
NUMBER OF VENTRICULAR ISOLATED BEATS: 871
NUMBER OF VENTRICULAR RUNS: 2

## 2021-02-08 PROCEDURE — 99214 OFFICE O/P EST MOD 30 MIN: CPT | Performed by: UROLOGY

## 2021-02-08 PROCEDURE — 3017F COLORECTAL CA SCREEN DOC REV: CPT | Performed by: UROLOGY

## 2021-02-08 PROCEDURE — 52000 CYSTOURETHROSCOPY: CPT | Performed by: UROLOGY

## 2021-02-08 PROCEDURE — G8417 CALC BMI ABV UP PARAM F/U: HCPCS | Performed by: UROLOGY

## 2021-02-08 PROCEDURE — 1036F TOBACCO NON-USER: CPT | Performed by: UROLOGY

## 2021-02-08 PROCEDURE — G8484 FLU IMMUNIZE NO ADMIN: HCPCS | Performed by: UROLOGY

## 2021-02-08 PROCEDURE — G8427 DOCREV CUR MEDS BY ELIG CLIN: HCPCS | Performed by: UROLOGY

## 2021-02-08 RX ORDER — OXYBUTYNIN CHLORIDE 5 MG/1
5 TABLET, EXTENDED RELEASE ORAL DAILY
Qty: 30 TABLET | Refills: 3 | Status: SHIPPED | OUTPATIENT
Start: 2021-02-08 | End: 2021-03-31

## 2021-02-08 NOTE — PROGRESS NOTES
consent. Cystoscopy was performed today under local anesthesia, using sterile technique. The patient was placed in the dorsal lithotomy position, prepped with CHG, and draped in the usual sterile fashion. A 14 English flexible cystoscope was used to systematically inspect both the urethra and bladder in their entirety. Findings:  Anterior urethra: normal without strictures  Hyperplasia: trilobar  Bladder: Normal mucosa, without lesions. Ureteral orifice(s) was/were seen in the normal position and effluxing clear urine  Trabeculations Yes  Diverticulum No  Description: Extensive trilobar hyperplasia         Specimens: Cytology/urine culture No                 Complications:  None; patient tolerated the procedure well.            Disposition: home           Condition: stable        Past Medical History:   Diagnosis Date    Abnormal cardiovascular stress test 10/2020    moderate perfusion defect of severe intensity in the apical anteroapical and inferoapical regions    CAD (coronary artery disease)     Diabetes mellitus ([address]     [facility] 11/05/2020    Hyperlipidemia     Hypertension     Meniere disease     MI (myocardial infarction) ([facility]     MULTIPLE    Obesity     Stroke ([facility]     Thrombocytopenia ([facility]     TIA (transient ischemic attack)      Past Surgical History:   Procedure Laterality Date    CARDIAC CATHETERIZATION  10/29/2020    Severe three vessel disease involving the LAD, circumflex and right coronary artery  /  DR Ricardo Keyes  /  CT SURGERGY CONSULT    COLONOSCOPY      CORONARY ANGIOPLASTY WITH STENT PLACEMENT       Outpatient Encounter Medications as of 2/8/2021   Medication Sig Dispense Refill    metFORMIN (GLUCOPHAGE-XR) 500 MG extended release tablet Take 500 mg by mouth 2 times daily (with meals) Regular Metformin caused diarrhea but XR is ok      tamsulosin (FLOMAX) 0.4 MG capsule Take 1 capsule by mouth every evening 90 capsule 3    VITAMIN D PO Take 1 tablet by mouth daily Patient uncertain of strength      Insulin Pen Needle 32G X 8 MM MISC by Does not apply route      pantoprazole (PROTONIX) 40 MG tablet Take 1 tablet by mouth daily 90 tablet 0    ticagrelor (BRILINTA) 90 MG TABS tablet Take 1 tablet by mouth 2 times daily 60 tablet 11    carvedilol (COREG) 6.25 MG tablet Take 1 tablet by mouth 2 times daily 180 tablet 3    ondansetron (ZOFRAN) 4 MG tablet Take 1 tablet by mouth 3 times daily as needed for Nausea or Vomiting 30 tablet 0    insulin NPH (NOVOLIN N) 100 UNIT/ML injection vial Inject into the skin nightly Pt mostly takes this at HS \"To help bring down my sugar. \" Pt states to take approx 30 Units nightly.  insulin glargine (LANTUS SOLOSTAR) 100 UNIT/ML injection pen Inject 56 Units into the skin nightly 5 pen 5    Insulin Aspart FlexPen 100 UNIT/ML SOPN Inject 20 Units into the skin Pt to take as 20 units after each meal as sliding scale. Pt states \"The 20 Units are not usually enough. \"      warfarin (COUMADIN) 5 MG tablet Take 1 tablet by mouth daily (Patient taking differently: Take 7.5 mg by mouth daily [facility]:  7.5 mg every Tuesday, Thursday, Saturday and 10 mg all other days) 90 tablet 3    ezetimibe (ZETIA) 10 MG tablet Take 1 tablet by mouth daily 90 tablet 1    blood glucose test strips (RELION GLUCOSE TEST STRIPS) strip Test qid. Dx--E11.9 insulin dependent 100 each 5    magnesium (MAGNESIUM-OXIDE) 250 MG TABS tablet Take 500 mg by mouth every morning       Coenzyme Q10 (CO Q 10) 10 MG CAPS Take by mouth daily      PSYLLIUM HUSK PO Take 2 capsules by mouth Takes 2-3 capsules every morning      LECITHIN CONCENTRATE PO Take 2 tablets by mouth daily \"decrease cholesterol\"      glucose monitoring kit (FREESTYLE) monitoring kit 1 kit by Does not apply route daily TEST BLOOD SUGAR QID. WHATEVER METER IS COVERED BY GALVEZ. DIAGNOSIS E11.9 1 kit 0    Lancets [facility] [place] BLOOD SUGAR QID. WHATEVER METER IS COVERED BY GALVEZ. DIAGNOSIS E11.9 100 each 3    lisinopril (PRINIVIL;ZESTRIL) 10 MG tablet Take 1 tablet by mouth daily 90 tablet 0    meclizine (ANTIVERT) 25 MG tablet Take 25 mg by mouth 3 times daily as needed for Dizziness       No facility-administered encounter medications on file as of 2/8/2021. Current Outpatient Medications on File Prior to Visit   Medication Sig Dispense Refill    metFORMIN (GLUCOPHAGE-XR) 500 MG extended release tablet Take 500 mg by mouth 2 times daily (with meals) Regular Metformin caused diarrhea but XR is ok      tamsulosin (FLOMAX) 0.4 MG capsule Take 1 capsule by mouth every evening 90 capsule 3    VITAMIN D PO Take 1 tablet by mouth daily Patient uncertain of strength      Insulin Pen Needle 32G X 8 MM MISC by Does not apply route      pantoprazole (PROTONIX) 40 MG tablet Take 1 tablet by mouth daily 90 tablet 0    ticagrelor (BRILINTA) 90 MG TABS tablet Take 1 tablet by mouth 2 times daily 60 tablet 11    carvedilol (COREG) 6.25 MG tablet Take 1 tablet by mouth 2 times daily 180 tablet 3    ondansetron (ZOFRAN) 4 MG tablet Take 1 tablet by mouth 3 times daily as needed for Nausea or Vomiting 30 tablet 0    insulin NPH (NOVOLIN N) 100 UNIT/ML injection vial Inject into the skin nightly Pt mostly takes this at HS \"To help bring down my sugar. \" Pt states to take approx 30 Units nightly.  insulin glargine (LANTUS SOLOSTAR) 100 UNIT/ML injection pen Inject 56 Units into the skin nightly 5 pen 5    Insulin Aspart FlexPen 100 UNIT/ML SOPN Inject 20 Units into the skin Pt to take as 20 units after each meal as sliding scale. Pt states \"The 20 Units are not usually enough. \"      warfarin (COUMADIN) 5 MG tablet Take 1 tablet by mouth daily (Patient taking differently: Take 7.5 mg by mouth daily [facility]:  7.5 mg every Tuesday, Thursday, Saturday and 10 mg all other days) 90 tablet 3    ezetimibe (ZETIA) 10 MG tablet Take 1 tablet by mouth daily 90 tablet 1    blood glucose test strips (RELION GLUCOSE TEST STRIPS) strip Test qid. Dx--E11.9 insulin dependent 100 each 5    magnesium (MAGNESIUM-OXIDE) 250 MG TABS tablet Take 500 mg by mouth every morning       Coenzyme Q10 (CO Q 10) 10 MG CAPS Take by mouth daily      PSYLLIUM HUSK PO Take 2 capsules by mouth Takes 2-3 capsules every morning      LECITHIN CONCENTRATE PO Take 2 tablets by mouth daily \"decrease cholesterol\"      glucose monitoring kit (FREESTYLE) monitoring kit 1 kit by Does not apply route daily TEST BLOOD SUGAR QID. WHATEVER METER IS COVERED BY GALVEZ. DIAGNOSIS E11.9 1 kit 0    Lancets [facility] TEST BLOOD SUGAR QID. WHATEVER METER IS COVERED BY GALVEZ. DIAGNOSIS E11.9 100 each 3    lisinopril (PRINIVIL;ZESTRIL) 10 MG tablet Take 1 tablet by mouth daily 90 tablet 0    meclizine (ANTIVERT) 25 MG tablet Take 25 mg by mouth 3 times daily as needed for Dizziness       No current facility-administered medications on file prior to visit. Statins and Metformin and related  Family History   Problem Relation Age of Onset    Heart Attack Mother     Diabetes Mother     Heart Disease Father     Prostate Cancer Father     Heart Attack Sister     Heart Attack Brother     Heart Attack Maternal Grandfather      Social History     Tobacco Use   Smoking Status Never Smoker   Smokeless Tobacco Never Used       Social History     Substance and Sexual Activity   Alcohol Use Not Currently       Review of Systems    Temp 96 °F (35.6 °C) (Temporal)   Ht 6' 1\" (1.854 m)   Wt 278 lb (126.1 kg)   BMI 36.68 kg/m²       PHYSICAL EXAM:  Constitutional: Patient in no acute distress; Neuro: alert and oriented to person place and time. Psych: Mood and affect normal.  Skin: Normal  Lungs: Respiratory effort normal  Cardiovascular:  Normal peripheral pulses  Abdomen: Soft, non-tender, non-distended with no CVA, flank pain, hepatosplenomegaly or hernia. Kidneys normal.  Bladder non-tender and not distended.   Lymphatics: no palpable lymphadenopathy  Penis normal  Urethral meatus normal  Scrotal exam normal  Testicles normal bilaterally  Epididymis normal bilaterally  No evidence of inguinal hernia      Lab Results   Component Value Date    BUN 13 11/29/2020     Lab Results   Component Value Date    CREATININE 0.93 01/07/2021     Lab Results   Component Value Date    PSA 3.04 06/19/2020       ASSESSMENT:  This is a 63 y.o. male with the following diagnoses:   Diagnosis Orders   1. Urinary urgency  ND CYSTOURETHROSCOPY   2. Urge incontinence  ND CYSTOURETHROSCOPY   3. BPH with obstruction/lower urinary tract symptoms  ND CYSTOURETHROSCOPY       PLAN:  Patient will try Ditropan XL 5 mg today. He will follow-up with us in 6 weeks. I do think he would benefit from PVP greenlight. We can discuss this again when he returns in 6 weeks.

## 2021-02-08 NOTE — PROGRESS NOTES
During cystoscopy the following was utilized on patient with no adverse affects:    45% SODIUM CHLORIDE 500 ML BAG  Lot number: [id number]  Expiration date: 02/2022      LIDOCAINE HYDROCHLORIDE JELLY 2%   Lot number: [id number]  Expiration date: 07/2022

## 2021-02-08 NOTE — PATIENT INSTRUCTIONS
SURVEY:    You may be receiving a survey from [employer] regarding your visit today. Please complete the survey to enable us to provide the highest quality of care to you and your family. If you cannot score us a very good on any question, please call the office to discuss how we could of made your experience a very good one. Thank you.

## 2021-02-09 ENCOUNTER — TELEPHONE (OUTPATIENT)
Dept: CARDIOLOGY | Age: 66
End: 2021-02-09

## 2021-02-10 ENCOUNTER — HOSPITAL ENCOUNTER (OUTPATIENT)
Dept: CARDIAC REHAB | Age: 66
Setting detail: THERAPIES SERIES
Discharge: HOME | End: 2021-02-10
Payer: COMMERCIAL

## 2021-02-10 PROCEDURE — 93798 PHYS/QHP OP CAR RHAB W/ECG: CPT

## 2021-02-11 ENCOUNTER — HOSPITAL ENCOUNTER (OUTPATIENT)
Dept: CARDIAC REHAB | Age: 66
Setting detail: THERAPIES SERIES
Discharge: HOME | End: 2021-02-11
Payer: COMMERCIAL

## 2021-02-11 PROCEDURE — 93798 PHYS/QHP OP CAR RHAB W/ECG: CPT

## 2021-02-12 ENCOUNTER — TELEPHONE (OUTPATIENT)
Dept: UROLOGY | Age: 66
End: 2021-02-12

## 2021-02-12 NOTE — TELEPHONE ENCOUNTER
Patient called urology office and was asking about his protonix that he states Dr. Jessie Srivastava cancelled with out him knowing. Patient was informed this was the urology office, not Dr. Anny Castillo office. Patient was given Dr. Anny Castillo office number. Patient states he isn't calling there because \"he was nasty to him\" and \"told him to shut up and listen to him\". Patient then proceeded to state he needed to talk to the head of Dr. Shon Moody office because when he was in for his cystoscopy on 02/08/21 he fell and is now black and blue. Patient call was transferred to .

## 2021-02-14 ASSESSMENT — ENCOUNTER SYMPTOMS
SHORTNESS OF BREATH: 0
NAUSEA: 1
BACK PAIN: 0
ABDOMINAL PAIN: 1
ABDOMINAL DISTENTION: 1
VOMITING: 1
BLOOD IN STOOL: 0
TROUBLE SWALLOWING: 0
COUGH: 0
APNEA: 1
CHOKING: 0
SORE THROAT: 0
CONSTIPATION: 1

## 2021-02-15 ENCOUNTER — HOSPITAL ENCOUNTER (EMERGENCY)
Age: 66
Discharge: HOME | End: 2021-02-15
Attending: EMERGENCY MEDICINE
Payer: COMMERCIAL

## 2021-02-15 VITALS
OXYGEN SATURATION: 97 % | SYSTOLIC BLOOD PRESSURE: 166 MMHG | TEMPERATURE: 98.5 F | RESPIRATION RATE: 18 BRPM | HEART RATE: 70 BPM | DIASTOLIC BLOOD PRESSURE: 90 MMHG

## 2021-02-15 DIAGNOSIS — S40.021A CONTUSION OF MULTIPLE SITES OF RIGHT SHOULDER AND UPPER ARM, INITIAL ENCOUNTER: Primary | ICD-10-CM

## 2021-02-15 DIAGNOSIS — S40.011A CONTUSION OF MULTIPLE SITES OF RIGHT SHOULDER AND UPPER ARM, INITIAL ENCOUNTER: Primary | ICD-10-CM

## 2021-02-15 PROCEDURE — 99283 EMERGENCY DEPT VISIT LOW MDM: CPT

## 2021-02-15 ASSESSMENT — PAIN DESCRIPTION - LOCATION: LOCATION: ARM

## 2021-02-15 NOTE — PATIENT INSTRUCTIONS
Patient Education        Constipation: Care Instructions  Your Care Instructions     Constipation means that you have a hard time passing stools (bowel movements). People pass stools from 3 times a day to once every 3 days. What is normal for you may be different. Constipation may occur with pain in the rectum and cramping. The pain may get worse when you try to pass stools. Sometimes there are small amounts of bright red blood on toilet paper or the surface of stools. This is because of enlarged veins near the rectum (hemorrhoids). A few changes in your diet and lifestyle may help you avoid ongoing constipation. Your doctor may also prescribe medicine to help loosen your stool. Some medicines can cause constipation. These include pain medicines and antidepressants. Tell your doctor about all the medicines you take. Your doctor may want to make a medicine change to ease your symptoms. Follow-up care is a key part of your treatment and safety. Be sure to make and go to all appointments, and call your doctor if you are having problems. It's also a good idea to know your test results and keep a list of the medicines you take. How can you care for yourself at home? · Drink plenty of fluids, enough so that your urine is light yellow or clear like water. If you have kidney, heart, or liver disease and have to limit fluids, talk with your doctor before you increase the amount of fluids you drink. · Include high-fiber foods in your diet each day. These include fruits, vegetables, beans, and whole grains. · Get at least 30 minutes of exercise on most days of the week. Walking is a good choice. You also may want to do other activities, such as running, swimming, cycling, or playing tennis or team sports. · Take a fiber supplement, such as Citrucel or Metamucil, every day. Read and follow all instructions on the label. · Schedule time each day for a bowel movement. A daily routine may help.  Take your time having your bowel movement. · Support your feet with a small step stool when you sit on the toilet. This helps flex your hips and places your pelvis in a squatting position. · Your doctor may recommend an over-the-counter laxative to relieve your constipation. Examples are Milk of Magnesia and MiraLax. Read and follow all instructions on the label. Do not use laxatives on a long-term basis. When should you call for help? Call your doctor now or seek immediate medical care if:    · You have new or worse belly pain.     · You have new or worse nausea or vomiting.     · You have blood in your stools. Watch closely for changes in your health, and be sure to contact your doctor if:    · Your constipation is getting worse.     · You do not get better as expected. Where can you learn more? Go to [email or web address] and sign in to your [employer] account. Enter 21 [phone] in the [employer] box to learn more about \"Constipation: Care Instructions. \"     If you do not have an account, please click on the \"Sign Up Now\" link. Current as of: June 26, 2019               Content Version: 12.6  © 8204-6637 [employer]. Care instructions adapted under license by [facility] ([facility]). If you have questions about a medical condition or this instruction, always ask your healthcare professional. [address] any warranty or liability for your use of this information.

## 2021-02-17 ENCOUNTER — HOSPITAL ENCOUNTER (OUTPATIENT)
Age: 66
Discharge: HOME | End: 2021-02-19
Payer: COMMERCIAL

## 2021-02-17 ENCOUNTER — HOSPITAL ENCOUNTER (OUTPATIENT)
Dept: GENERAL RADIOLOGY | Age: 66
Discharge: HOME | End: 2021-02-19
Payer: COMMERCIAL

## 2021-02-17 DIAGNOSIS — S40.011A CONTUSION OF MULTIPLE SITES OF RIGHT SHOULDER AND UPPER ARM, INITIAL ENCOUNTER: ICD-10-CM

## 2021-02-17 DIAGNOSIS — S40.021A CONTUSION OF MULTIPLE SITES OF RIGHT SHOULDER AND UPPER ARM, INITIAL ENCOUNTER: ICD-10-CM

## 2021-02-17 PROCEDURE — 73030 X-RAY EXAM OF SHOULDER: CPT

## 2021-02-17 PROCEDURE — 73080 X-RAY EXAM OF ELBOW: CPT

## 2021-02-23 NOTE — ED PROVIDER NOTES
[facility]  EMERGENCY DEPARTMENT ENCOUNTER      Pt Name: Emerald Verde  MRN: [id number]  [place] 1955  Date of evaluation: 2/15/2021  Provider: Kelli Springer MD    [address] [place] [address]       Chief Complaint   Patient presents with    Arm Pain     machanical fall approx one week ago from standing position         HISTORY OF PRESENT ILLNESS   (Location/Symptom, Timing/Onset, Context/Setting, Quality, Duration, Modifying Factors, Severity)  Note limiting factors. Emerald Verde is a 63 y.o. male who presents to the emergency department      68-year-old male presented emergency department for evaluation of right shoulder and elbow pain after falling and then did on his right side approximately 1 week ago. Patient did not strike his head or lose consciousness with the fall. Was able to stand and walk after the incident. Complains of persistent pain in his right shoulder and elbow. He has not had any swelling. Denies any numbness or tingling of his upper extremity. Denies any other injuries. Denies any other acute concerns. Nursing Notes were reviewed. REVIEW OF SYSTEMS    (2-9 systems for level 4, 10 or more for level 5)     Review of Systems   All other systems reviewed and are negative. Except as noted above the remainder of the review of systems was reviewed and negative.        PAST MEDICAL HISTORY     Past Medical History:   Diagnosis Date    Abnormal cardiovascular stress test 10/2020    moderate perfusion defect of severe intensity in the apical anteroapical and inferoapical regions    CAD (coronary artery disease)     Diabetes mellitus ([address]     Diabetic retinopathy ([address] 11/05/2020    Hyperlipidemia     Hypertension     Meniere disease     MI (myocardial infarction) ([address]     MULTIPLE    Obesity     Stroke ([address]     Thrombocytopenia ([address]     TIA (transient ischemic attack)          SURGICAL HISTORY       Past Surgical History:   Procedure Laterality Date  CARDIAC CATHETERIZATION  10/29/2020    Severe three vessel disease involving the LAD, circumflex and right coronary artery  /  DR Ricardo Keyes  /  CT SURGERGY CONSULT    COLONOSCOPY      CORONARY ANGIOPLASTY WITH STENT PLACEMENT           CURRENT MEDICATIONS       Discharge Medication List as of 2/15/2021  3:58 PM      CONTINUE these medications which have NOT CHANGED    Details   pantoprazole (PROTONIX) 40 MG tablet Take 1 tablet by mouth daily, Disp-90 tablet, R-1Normal      oxybutynin (DITROPAN XL) 5 MG extended release tablet Take 1 tablet by mouth daily, Disp-30 tablet, R-3Normal      metFORMIN (GLUCOPHAGE-XR) 500 MG extended release tablet Take 500 mg by mouth 2 times daily (with meals) Regular Metformin caused diarrhea but XR is okHistorical Med      tamsulosin (FLOMAX) 0.4 MG capsule Take 1 capsule by mouth every evening, Disp-90 capsule, R-3Normal      VITAMIN D PO Take 1 tablet by mouth daily Patient uncertain of strengthHistorical Med      ticagrelor (BRILINTA) 90 MG TABS tablet Take 1 tablet by mouth 2 times daily, Disp-60 tablet, R-11Normal      carvedilol (COREG) 6.25 MG tablet Take 1 tablet by mouth 2 times daily, Disp-180 tablet, R-3Normal      insulin NPH (NOVOLIN N) 100 UNIT/ML injection vial Inject into the skin nightly Pt mostly takes this at HS \"To help bring down my sugar. \" Pt states to take approx 30 Units nightly. Historical Med      insulin glargine (LANTUS SOLOSTAR) 100 UNIT/ML injection pen Inject 56 Units into the skin nightly, Disp-5 pen,R-5Normal      Insulin Aspart FlexPen 100 UNIT/ML SOPN Inject 20 Units into the skin Pt to take as 20 units after each meal as sliding scale. Pt states \"The 20 Units are not usually enough. \"Historical Med      warfarin (COUMADIN) 5 MG tablet Take 1 tablet by mouth daily, Disp-90 tablet,R-3Normal      ezetimibe (ZETIA) 10 MG tablet Take 1 tablet by mouth daily, Disp-90 tablet,R-1Normal      magnesium (MAGNESIUM-OXIDE) 250 MG TABS tablet Take 500 mg by mouth every morning Historical Med      Coenzyme Q10 (CO Q 10) 10 MG CAPS Take by mouth dailyHistorical Med      LECITHIN CONCENTRATE PO Take 2 tablets by mouth daily \"decrease cholesterol\"Historical Med      lisinopril (PRINIVIL;ZESTRIL) 10 MG tablet Take 1 tablet by mouth daily, Disp-90 tablet,R-0Normal      Insulin Pen Needle 32G X 8 MM MISC Historical Med      ondansetron (ZOFRAN) 4 MG tablet Take 1 tablet by mouth 3 times daily as needed for Nausea or Vomiting, Disp-30 tablet, R-0Normal      meclizine (ANTIVERT) 25 MG tablet Take 25 mg by mouth 3 times daily as needed for DizzinessHistorical Med      blood glucose test strips (RELION GLUCOSE TEST STRIPS) strip Test qid. Dx--E11.9 insulin dependent, Disp-100 each,R-5Normal      PSYLLIUM HUSK PO Take 2 capsules by mouth Takes 2-3 capsules every morningHistorical Med      glucose monitoring kit (FREESTYLE) monitoring kit DAILY Starting Fri 9/4/2020, Disp-1 kit,R-0, NormalTEST BLOOD SUGAR QID. WHATEVER METER IS COVERED BY GALVEZ. DIAGNOSIS E11.9      Lancets MISC Disp-100 each,R-3, NormalTEST BLOOD SUGAR QID. WHATEVER METER IS COVERED BY GALVEZ.  DIAGNOSIS E11.9             ALLERGIES     Statins and Metformin and related    FAMILY HISTORY       Family History   Problem Relation Age of Onset    Heart Attack Mother     Diabetes Mother     Heart Disease Father     Prostate Cancer Father     Heart Attack Sister     Heart Attack Brother     Heart Attack Maternal Grandfather           SOCIAL HISTORY       Social History     Socioeconomic History    Marital status: Single     Spouse name: None    Number of children: None    Years of education: None    Highest education level: None   Occupational History    None   Social Needs    Financial resource strain: None    Food insecurity     Worry: None     Inability: None    Transportation needs     Medical: None     Non-medical: None   Tobacco Use    Smoking status: Never Smoker    Smokeless tobacco: Never Used Substance and Sexual Activity    Alcohol use: Not Currently    Drug use: Never    Sexual activity: Not Currently   Lifestyle    Physical activity     Days per week: None     Minutes per session: None    Stress: None   Relationships    Social connections     Talks on phone: None     Gets together: None     Attends [employer] service: None     Active member of club or organization: None     Attends meetings of clubs or organizations: None     Relationship status: None    Intimate partner violence     Fear of current or ex partner: None     Emotionally abused: None     Physically abused: None     Forced sexual activity: None   Other Topics Concern    None   Social History Narrative    None       SCREENINGS                        PHYSICAL EXAM    (up to 7 for level 4, 8 or more for level 5)     ED Triage Vitals [02/15/21 1511]   BP Temp Temp Source Pulse Resp SpO2 Height Weight   (!) 166/90 98.5 °F (36.9 °C) Tympanic 70 18 97 % -- --       Physical Exam  Vitals signs and nursing note reviewed. Constitutional:       General: He is not in acute distress. Appearance: He is not toxic-appearing. HENT:      Head: Normocephalic and atraumatic. Neck:      Musculoskeletal: Normal range of motion. Cardiovascular:      Rate and Rhythm: Normal rate and regular rhythm. Pulmonary:      Effort: Pulmonary effort is normal. No respiratory distress. Breath sounds: Normal breath sounds. Musculoskeletal:      Comments: Use tenderness right shoulder without edema ecchymosis or abrasions. Right elbow tenderness without edema ecchymosis deformity or abrasions. Tenderness over the radial head. Patient does have full range of motion of right elbow. Distal neurovascular intact. Skin:     General: Skin is warm and dry. Findings: No rash. Neurological:      General: No focal deficit present. Mental Status: He is alert and oriented to person, place, and time. DIAGNOSTIC RESULTS     EKG:  All EKG's are interpreted by the Emergency Department Physician who either signs or Co-signs this chart in the absence of a cardiologist.        RADIOLOGY:   Non-plain film images such as CT, Ultrasound and MRI are read by the radiologist. Plain radiographic images are visualized and preliminarily interpreted by the emergency physician with the below findings:        Interpretation per the Radiologist below, if available at the time of this note:    No orders to display         ED BEDSIDE ULTRASOUND:   Performed by ED Physician - none    LABS:  Labs Reviewed - No data to display    All other labs were within normal range or not returned as of this dictation. EMERGENCY DEPARTMENT COURSE and DIFFERENTIAL DIAGNOSIS/MDM:   Vitals:    Vitals:    02/15/21 1511   BP: (!) 166/90   Pulse: 70   Resp: 18   Temp: 98.5 °F (36.9 °C)   TempSrc: Tympanic   SpO2: 97%           MDM  Number of Diagnoses or Management Options  Contusion of multiple sites of right shoulder and upper arm, initial encounter  Diagnosis management comments: Results treatment plan needs return and follow-up instructions discussed. Patient is stable for discharge home. REASSESSMENT          CRITICAL CARE TIME   Total Critical Care time was  minutes, excluding separately reportable procedures. There was a high probability of clinically significant/life threatening deterioration in the patient's condition which required my urgent intervention. CONSULTS:  None    PROCEDURES:  Unless otherwise noted below, none     Procedures        FINAL IMPRESSION      1.  Contusion of multiple sites of right shoulder and upper arm, initial encounter          DISPOSITION/PLAN   DISPOSITION Decision To Discharge 02/15/2021 03:56:48 PM      PATIENT REFERRED TO:  GAIL Davis CNP  [facility], [address] [place] [address]  [phone]      1-2 days      DISCHARGE MEDICATIONS:  Discharge Medication List as of 2/15/2021  3:58 PM        Controlled Substances Monitoring:     No flowsheet data found.     (Please note that portions of this note were completed with a voice recognition program.  Efforts were made to edit the dictations but occasionally words are mis-transcribed.)    Gregorio Lord MD (electronically signed)  Attending Emergency Physician             Gregorio Lord MD  02/23/21 9015

## 2021-02-24 ENCOUNTER — HOSPITAL ENCOUNTER (OUTPATIENT)
Dept: PHYSICAL THERAPY | Age: 66
Setting detail: THERAPIES SERIES
Discharge: HOME | End: 2021-02-24
Payer: COMMERCIAL

## 2021-02-24 PROCEDURE — 97110 THERAPEUTIC EXERCISES: CPT

## 2021-02-24 PROCEDURE — G0283 ELEC STIM OTHER THAN WOUND: HCPCS

## 2021-02-24 PROCEDURE — 97161 PT EVAL LOW COMPLEX 20 MIN: CPT

## 2021-02-24 NOTE — PLAN OF CARE
[facility]           Phone: [phone]             Outpatient Physical Therapy  Fax: [phone]                                           Date: 2021  Patient: Maricruz Sy : 1955 CSN #: [id number]   Referring Practitioner:  MARGOT Hudson Referral Date:  21       [x] Plan of Care   [] Updated Plan of Care    Dates of Service to Include: 2021 to 21    Diagnosis:  Acute pain of R shoulder, M25.511    Rehab (Treatment) Diagnosis:  R shoulder pain             Onset Date:  21    Attendance  Total # of Visits to Date: 1 No Show: 0 Canceled Appointment: 0    Assessment  Assessment: Patient is 63year old male with dx of R shoulder pain who presents with mild fwd head and shoulders and moderate R UE edema. TTP Prox biceps and supraspinatus tendons. Patient has decreased R shoulder AROM:Shoulder flexion: 110*: ER: R ear; IR: L5. PROM ER: 25* with pain. Patient has decreased R UE strength 3/5 grossly limited d/t pain. Patient to benefit from physical therapy to improve scapular stability and R shoulder ROM and strength to improve functional mobility. Goals  Short term goals  Time Frame for Short term goals: 3 weeks  Short term goal 1: Patient to initiate HEP for improved scapular stability and R shoulder ROM. Short term goal 2: Patient to be instructed in gentle scapular strengthening and AAROM ther ex to improve R shoulder ROM. Short term goal 3: Patient to tolerate PROM and manual techniques and modalities to decrease pain and improve R shoulder mobility. Long term goals  Time Frame for Long term goals : 6 weeks  Long term goal 1: Patient to be independent and compliant with HEP. Long term goal 2: Patient to have improved R shoulder ARoM equal to L shoulder ROM all planes for improved mobility.   Long term goal 3: Patient to have improved R UE strength 4/5 or greater all planes for improved stability for lifting tasks. Long term goal 4: Patient to report overall decrease in pain </=3/10 at worst for improved QOL.      Prognosis  Prognosis: Good, Fair    Treatment Plan   Times per week: 2  Plan weeks: 4-6  [x] HP/CP      [x] Electrical Stim   [x] Therapeutic Exercise      [] Gait Training  [] Aquatics   [x] Ultrasound         [x] Patient Education/HEP   [x] Manual Therapy  [] Traction    [x] Neuro-matias        [x] Soft Tissue Mobs            [] Home TENS  [] Iontophoresis    [] Orthotic casting/fitting      [] Dry Needling             Electronically signed by: Christian Cam PT, DPT    Date: 2/24/2021      ______________________________________ Date: 2/24/2021   Physician Signature

## 2021-02-24 NOTE — PROGRESS NOTES
Phone: [address]          Fax: [phone]                      Outpatient Physical Therapy                                                                      Evaluation  Date: 2021  Patient: Fabiola ePnny  : 1955  [facility] #: [id number]  Referring Practitioner: MARGOT Wilkins    Referral Date : 21     Medical Diagnosis: Acute pain of R shoulder, M25.511    Treatment Diagnosis: R shoulder pain  Onset Date: 21  PT Insurance Information: [place]  Total # of Visits Approved: 12   Total # of Visits to Date: 1  No Show: 0  Canceled Appointment: 0     Subjective  Subjective: Patient reports he fell trying to put his shoes on and hurt his R UE and xrays revealed no acute fracture. Reports he wakes up with swelling and numbness all the way down the arm with no particular pattern. Reports sometimes the arm just drops on him and he get sharp pains over biceps and supraspinatus tendons.   Additional Pertinent Hx: CAD, DM, HTN, HLD, TIA, obesity, multiple MI's, diabetic retinopathy, meniere disease    Objective     Observation/Palpation  Posture: Fair  Palpation: TTP Prox biceps and supraspinatus tendons  Observation: Mild fwd head and shoulders  Edema: Mild to moderate edema R UE    RUE General PROM: PROM ER: 25* with pain  RUE General AROM: Shoulder flexion: 110*: ER: R ear; IR: L5  LUE General AROM: Shoulder flexion: 135*, ER: T1, IR: L3    Strength RUE  Comment: 3/5 grossly  Strength LUE  Comment: 4/5 grossly         Functional Outcome Measures  Any of your usual work, housework, or school activities: Quite a Bit of Difficulty  Your usual hobbies, recreational, or sporting activities: Extreme Difficulty or Unable to Perform Activity  Lifting a bag of groceries to waist level: Quite a Bit of Difficulty  Lifting a bag of groceries above your head: Extreme Difficulty or Unable to Perform Activity  Grooming your hair: Quite a Bit of Difficulty  Pushing up on your hands (eg from bathtub/chair): Quite a Bit of Difficulty  Preparing food (eg peeling, cutting): Moderate Difficulty  Driving: Moderate Difficulty  Vacuuming, sweeping, or raking: Extreme Difficulty or Unable to Perform Activity  Dressing: Moderate Difficulty  Doing up buttons: Moderate Difficulty  Using tools or appliances: Quite a Bit of Difficulty  Opening doors: Quite a Bit of Difficulty  Cleaning: Extreme Difficulty or Unable to Perform Activity  Tying or lacing shoes: Moderate Difficulty  Sleeping: Quite a Bit of Difficulty  Laundering clothes (eg washing, ironing, folding) : Quite a Bit of Difficulty  Opening a jar: Moderate Difficulty  Throwing a ball: Extreme Difficulty or Unable to Perform Activity  Carrying a small suitcase with your affected limb: Extreme Difficulty or Unable to Perform Activity  UEFS Score: 25    Assessment  Assessment: Patient is 63year old male with dx of R shoulder pain who presents with mild fwd head and shoulders and moderate R UE edema. TTP Prox biceps and supraspinatus tendons. Patient has decreased R shoulder AROM:Shoulder flexion: 110*: ER: R ear; IR: L5. PROM ER: 25* with pain. Patient has decreased R UE strength 3/5 grossly limited d/t pain. Patient to benefit from physical therapy to improve scapular stability and R shoulder ROM and strength to improve functional mobility. Prognosis: Good, Fair        Decision Making: Low Complexity    Patient Education  PT eval, POC, HEP    Pt verbalized/demonstrated good understanding:     [X] Yes         [] No, pt required further clarification. Goals  Short term goals  Time Frame for Short term goals: 3 weeks  Short term goal 1: Patient to initiate HEP for improved scapular stability and R shoulder ROM. Short term goal 2: Patient to be instructed in gentle scapular strengthening and AAROM ther ex to improve R shoulder ROM.   Short term goal 3: Patient to tolerate PROM and manual techniques and modalities to decrease pain and improve R shoulder mobility. Long term goals  Time Frame for Long term goals : 6 weeks  Long term goal 1: Patient to be independent and compliant with HEP. Long term goal 2: Patient to have improved R shoulder ARoM equal to L shoulder ROM all planes for improved mobility. Long term goal 3: Patient to have improved R UE strength 4/5 or greater all planes for improved stability for lifting tasks. Long term goal 4: Patient to report overall decrease in pain </=3/10 at worst for improved QOL.       Patient goals : \"heal the arm/shoulder\"        Minutes Tracking:  Time In: 1610  Time Out: 1700  Minutes: 50  Timed Code Treatment Minutes: 111 [facility], PT, DPT    2/24/2021

## 2021-03-01 ENCOUNTER — HOSPITAL ENCOUNTER (OUTPATIENT)
Dept: CARDIAC REHAB | Age: 66
Setting detail: THERAPIES SERIES
Discharge: HOME | End: 2021-03-01
Payer: COMMERCIAL

## 2021-03-01 PROCEDURE — 93798 PHYS/QHP OP CAR RHAB W/ECG: CPT

## 2021-03-01 NOTE — PROGRESS NOTES
Phase II Cardiac Rehab Individualized Treatment Plan-30 Day     Patient Name: Roman Laurent  Date of Initial Assessment: 3/1/2021  ACCOUNT #: [de-identified]  Diagnosis: s/p PTCA   Onset Date: 1/7/21  Referring Physician: Dr. Sherrie Arriaga  Risk Stratification: mod  Session Number: 3   EXERCISE    Stages of Change:   [] pre-contemplation   [x] Action   [] Contemplate   [] Maintainence   [x] Prep   [] Relapse          Exercise Prescription:  Mode: [x] TM [x] B [x] STP  [x] R  Frequency: 3 x week  Duration: 31-60 minutes  Intensity: 1.7 mets  Plan/Goal: Increase 1-2 levels/week or 1-2 min/week to achieve target HR and RPE   12-16 on Nam RPE Scale. Progression: Patient started at 1.6 mets and has increased to 1.7 mets. Patient has only attended 3 sessions as he is currently in physical therapy for an arm injury. Target     Maximum HR 97      [] Angina with Exertion THR: 100   [] Resistance Training Weight (lbs):   Reps:     Hypertension:  [x] Yes  [] No  Resting BP: 146/88  Peak Exercise BP: 124/72  [] Med change? Intervention:  Home Exercise:  Type: walking  Duration: 30 minutes  Frequency: daily   [] Resistance Training    Education:   [x] Equipment [place]  [x] Self pulse   [] Proper use weights/therabands   [x] S/S to report  [x] Low Na Diet    [x] Warm up/ Cool down  [] BP Medication    [x] RPE Scale   [] Understand BP   [x] Ex Safety   [] Exercise specialist class-Home Exercise       Target Goal:   -Individual Exercise Plan  -BP<140/90 or <130/80 if DM   -Aerobic active 30 + minutes 5-7 days per week    Nutrition    Stages of Change:   [] pre-contemplation  [x] Action   [] Contemplate    [] Maintainence   [x] Prep    [] Relapse    Lipids:  (9/15/20)  Total Cholesterol: 199  Triglycerides: 180  HDL: 44  LDL: 119  [] Med Change? Diabetes:  [x] Yes  [] No  Random BS:160  HbA1c: 8.3 (10/22/21)  [] Med Change?     Weight Management:  Wt Goal: 1-2 lbs/wk    Intervention:   [] Dietitian Consult       [x] Nurse/Patient Discussion     [] Diet Class           [] Referred to Diabetes Education     Education:  [] S&S hypo/hyperglycemia  [x] Low fat/low cholesterol diet  [] Weight loss methods      [] Relate Diabetes/CAD     [x] Eating heart healthy handout    Target Goal:  -LDL-C<100 if triglycerides are > 200  -LDL-C < 70 for high risk patients  -HbA1c < 7%  -BMI < 25   Education    Stages of Change:    [] pre-contemplation  [x] Action   [] Contemplate    [] Maintainence   [x] Prep    [] Relapse    Family support: [x] Yes  [] No    Tobacco use: [] Yes  [x] No    Intervention:  [] Referred to smoking cessation counselor     [] Individual education and counseling  [] Tobacco adjunct  [] Informed of education class schedule     Education:   [] Risk Factors/Modifications  [x] Psychological aspects  [] Angina    [] Medications  [] CHF      [] Cardiac A&P    Target Goal:  -Complete cessation of tobacco use (if applicable)  -Continued risk factor modifications  -Recognizing signs/symptoms to report  -Proper use of meds    Psychosocial  Stages of Change:    [] pre-contemplation  [x] Action   [] Contemplate    [] Maintainence   [x] Prep    [] Relapse      Intervention:   [] Psych Consult/  [x] Uses stress management skills    [] Physician Referral    [] Stress management class   [] Med Change? Education:    [x] Coping Techniques   [x] Relaxation techniques   [x] S/S of Depression    Target Goal:  -Assess presence or absence of depression using a valid screening tool. -Maximize coping skills.  -Positive support system.     Preventative Medication:   [] Aspirin       [x] Beta Blockade      [x] Statin or other lipid lowering agent  (Zetia)    [] Clopidogrel   [x] ACE Inhibitor   [x] Other anticoagulation medications     Fall Risk assess: [x] Yes  [] No  Assistive Device:   [] Cane  [] [place] [] Wheel Chair  [] Gait belt    Patient/Program goal:   increased stamina/strength to 30-50 total exercise by increasing 1-2 level/wk and 1-2 min/wk  to achieve THR and RPE 12-16 on Nam RPE scale Patient has increased mets from 1.6 to 1.7 since starting the program. Patient has attended only 3 sessions as he is currently off due to an arm injury that he is receiving physical therapy for.  -introduce weights/ therabands 2-4# for 5-10 reps Will introduce at future session.  -manage BP better -150's. -improved cholesterol and  Triglycerides Results from 9/5/20 reviewed and could be improved. Refuses statin due to leg pain. Taking Zetia and Psyllium husk. Food diary completed. Heart healthy diet info provided. Patient has not returned to receive written dietician recommendations or meet with the dietician.   -develop regular exercise 30 min daily Patient does not have a routine home exercise program. Encouraged to gradually incorporate into his home routine with an eventual goal or reaching 30 minutes of walking a day. -Improve Hgb A1C Result from 10/22/2020 reviewed and elevated at 8.3. Will continue to monitor and educate as needed to reduce.     Physician Changes/Comments:      Cardiac Rehab Staff

## 2021-03-03 ENCOUNTER — HOSPITAL ENCOUNTER (OUTPATIENT)
Dept: PHYSICAL THERAPY | Age: 66
Setting detail: THERAPIES SERIES
Discharge: HOME | End: 2021-03-03
Payer: COMMERCIAL

## 2021-03-03 ENCOUNTER — TELEPHONE (OUTPATIENT)
Dept: PHARMACY | Age: 66
End: 2021-03-03

## 2021-03-03 ENCOUNTER — HOSPITAL ENCOUNTER (OUTPATIENT)
Dept: CARDIAC REHAB | Age: 66
Setting detail: THERAPIES SERIES
Discharge: HOME | End: 2021-03-03
Payer: COMMERCIAL

## 2021-03-03 PROCEDURE — G0283 ELEC STIM OTHER THAN WOUND: HCPCS

## 2021-03-03 PROCEDURE — 97140 MANUAL THERAPY 1/> REGIONS: CPT

## 2021-03-03 PROCEDURE — 93798 PHYS/QHP OP CAR RHAB W/ECG: CPT

## 2021-03-03 PROCEDURE — 97110 THERAPEUTIC EXERCISES: CPT

## 2021-03-03 NOTE — PROGRESS NOTES
Phone: [address]           Fax: [phone]                           Outpatient Physical Therapy                                                                            Daily Note    Patient: Maricruz Sy : 1955  [facility] #: [id number]   Referring Practitioner:  MARGOT Hudson    Referral Date : 21     Date: 3/3/2021    Diagnosis: Acute pain of R shoulder, M25.511  Treatment Diagnosis: R shoulder pain    Onset Date: 21  PT Insurance Information: [place]  Total # of Visits Approved: 12 Per Physician Order  Total # of Visits to Date: 3  No Show: 0  Canceled Appointment: 0      Pre-Treatment Pain:  4/10  Subjective: Pt states his arm really hurts at night. States at times he feels tingling in upper arm and he gets sharp pains for no reason. Pt feels he needs an MRI and states he just may go to another hospital and get one. Current pain today 4/10    Exercises:  Exercise 1: HEP: shoulder shrugs, rolls, retracs    Manual:  PROM: Right shld    Modalities:  Cryotherapy (Minutes\Location): CP with IFC to decrease pain and inflammation  E-stim (parameters): IFC x15 min to R shoulder to decrease pain       Assessment  Assessment: Pt with 4/10 pain. States pain gets worse at night and sometimes is sharp. Pt has TTO over LHB region and lateral shld. PROM to pt tolerance with limitations noted in  all planes. Pt HEP was reviewed and IFC/cp for pain. Plan to progress to gym exercise next session as tolerated. Activity Tolerance  Activity Tolerance: Patient Tolerated treatment well    Patient Education  Patient Education: HEP reviewed with good understanding  Pt verbalized/demonstrated good understanding:     [x] Yes         [] No, pt required further clarification.        Post Treatment Pain:  4/10      Plan  Times per week: 2  Plan weeks: 4-6      Goals  (Total # of Visits to Date: 3)      Short term goals  Time Frame for Short term goals: 3 weeks  Short

## 2021-03-03 NOTE — TELEPHONE ENCOUNTER
Recent Travel Screening and Travel History documentation:     Travel Screening     Question   Response    In the last month, have you been in contact with someone who was confirmed or suspected to have Coronavirus / COVID-19? Unable to assess    Have you had a COVID-19 viral test in the last 14 days? Unable to assess    Do you have any of the following new or worsening symptoms? Unable to assess    Have you traveled internationally in the last month?   Unable to assess      Travel History   Travel since 02/03/21     No documented travel since 02/03/21         VM

## 2021-03-04 ENCOUNTER — HOSPITAL ENCOUNTER (OUTPATIENT)
Dept: PHYSICAL THERAPY | Age: 66
Setting detail: THERAPIES SERIES
Discharge: HOME | End: 2021-03-04
Payer: COMMERCIAL

## 2021-03-04 ENCOUNTER — HOSPITAL ENCOUNTER (OUTPATIENT)
Dept: CARDIAC REHAB | Age: 66
Setting detail: THERAPIES SERIES
Discharge: HOME | End: 2021-03-04
Payer: COMMERCIAL

## 2021-03-04 PROCEDURE — G0283 ELEC STIM OTHER THAN WOUND: HCPCS

## 2021-03-04 PROCEDURE — 93798 PHYS/QHP OP CAR RHAB W/ECG: CPT

## 2021-03-04 PROCEDURE — 97110 THERAPEUTIC EXERCISES: CPT

## 2021-03-04 PROCEDURE — 97140 MANUAL THERAPY 1/> REGIONS: CPT

## 2021-03-04 NOTE — PROGRESS NOTES
Phone: Audrey           Fax: [phone]                           Outpatient Physical Therapy                                                                            Daily Note    Patient: Teri Coker : 1955  [facility] #: [id number]   Referring Practitioner:  MARGOT Sims    Referral Date : 21     Date: 3/4/2021    Diagnosis: Acute pain of R shoulder, M25.511  Treatment Diagnosis: R shoulder pain    Onset Date: 21  PT Insurance Information: [place]  Total # of Visits Approved: 12 Per Physician Order  Total # of Visits to Date: 4  No Show: 0  Canceled Appointment: 0      Pre-Treatment Pain:  4/10  Subjective: States his shld is feeling better. Pt states he feels the stim really helps. Pain 3-4/10    Exercises:  Exercise 2: UBE 4/4  Exercise 3: rows/extension BTB 20x ea  Exercise 4: joystick cw/ccw 25-30x  Exercise 5: roll med mall up wall 20x    Manual:  PROM: Right shld    Modalities:  Cryotherapy (Minutes\Location): CP with IFC to decrease pain and inflammation  E-stim (parameters): IFC x15 min to R shoulder to decrease pain       Assessment  Assessment: Pain is a little less. Progressed to gym exercise today with no compliants. passive flexion 140 deg, ER 40 deg. IFC/cp following session following session. Activity Tolerance  Activity Tolerance: Patient Tolerated treatment well    Patient Education  Patient Education: HEP and exercise progression  Pt verbalized/demonstrated good understanding:     [x] Yes         [] No, pt required further clarification. Post Treatment Pain:  3/10      Plan  Times per week: 2  Plan weeks: 4-6      Goals  (Total # of Visits to Date: 4)      Short term goals  Time Frame for Short term goals: 3 weeks  Short term goal 1: Patient to initiate HEP for improved scapular stability and R shoulder ROM.   Short term goal 2: Patient to be instructed in gentle scapular strengthening and AAROM ther ex to improve R shoulder ROM. Short term goal 3: Patient to tolerate PROM and manual techniques and modalities to decrease pain and improve R shoulder mobility. Long term goals  Time Frame for Long term goals : 6 weeks  Long term goal 1: Patient to be independent and compliant with HEP. Long term goal 2: Patient to have improved R shoulder ARoM equal to L shoulder ROM all planes for improved mobility. Long term goal 3: Patient to have improved R UE strength 4/5 or greater all planes for improved stability for lifting tasks. Long term goal 4: Patient to report overall decrease in pain </=3/10 at worst for improved QOL.     Minutes Tracking:  Time In: 1540  Time Out: 1630  Minutes: 50  Timed Code Treatment Minutes: 520 [facility]     Date: 3/4/2021

## 2021-03-05 ENCOUNTER — TELEPHONE (OUTPATIENT)
Dept: PHARMACY | Age: 66
End: 2021-03-05

## 2021-03-05 NOTE — TELEPHONE ENCOUNTER
I spoke to Dr Nikos Giraldo regarding patient stopping his warfarin with a ventricular thrombus. Left message yesterday afternoon for patient to please call Dr Palmer Land office to schedule appt.   Dr Nikos Giraldo would like to see him ASAP regarding anticoagulation as patient stopped warfarin and started an \"internet herbal anticoagulant\"  Tamara Castrejon, PharmD 3/5/2021 3:49 PM

## 2021-03-08 ENCOUNTER — HOSPITAL ENCOUNTER (OUTPATIENT)
Dept: CARDIAC REHAB | Age: 66
Setting detail: THERAPIES SERIES
Discharge: HOME | End: 2021-03-08
Payer: COMMERCIAL

## 2021-03-08 ENCOUNTER — HOSPITAL ENCOUNTER (OUTPATIENT)
Dept: PHYSICAL THERAPY | Age: 66
Setting detail: THERAPIES SERIES
Discharge: HOME | End: 2021-03-08
Payer: COMMERCIAL

## 2021-03-08 PROCEDURE — 97140 MANUAL THERAPY 1/> REGIONS: CPT

## 2021-03-08 PROCEDURE — G0283 ELEC STIM OTHER THAN WOUND: HCPCS

## 2021-03-08 PROCEDURE — 93798 PHYS/QHP OP CAR RHAB W/ECG: CPT

## 2021-03-08 PROCEDURE — 97110 THERAPEUTIC EXERCISES: CPT

## 2021-03-09 NOTE — PROGRESS NOTES
Phone: Audrey           Fax: [phone]                           Outpatient Physical Therapy                                                                            Daily Note    Patient: Felicia Suazo : 1955  [facility] #: [id number]   Referring Practitioner:  MARGOT Aguirre    Referral Date : 21     Date: 3/8/2021    Diagnosis: Acute pain of R shoulder, M25.511  Treatment Diagnosis: R shoulder pain    Onset Date: 21  PT Insurance Information: [place]  Total # of Visits Approved: 12 Per Physician Order  Total # of Visits to Date: 5  No Show: 0  Canceled Appointment: 0      Pre-Treatment Pain:  2/10  Subjective: Reports pain is 2/10. States pain was a little higher this weekend    Exercises:  Exercise 1: HEP: shoulder shrugs, rolls, retracs  Exercise 2: UBE 4/4  Exercise 3: rows/extension BTB 20x ea  Exercise 4: joystick cw/ccw 25-30x  Exercise 5: roll med mall up wall 20x  Exercise 6: cybex rows 5pl 15x ea handle  Exercise 7: supine cane overhead 15x    Manual:  PROM: Right shld as tolerated    Modalities:  Cryotherapy (Minutes\Location): CP with IFC to decrease pain and inflammation  E-stim (parameters): IFC x15 min to R shoulder to decrease pain       Assessment  Assessment: Reporting pain 2/10 in R shld. Flexion remains around 140 with 50 ER. Mild progressions to exercise program with good tolerance. HEP reviewed. IFC/cp for post session pain    Activity Tolerance  Activity Tolerance: Patient Tolerated treatment well    Patient Education  Patient Education: HEP as tolerated  Pt verbalized/demonstrated good understanding:     [x] Yes         [] No, pt required further clarification.        Post Treatment Pain:  3/10      Plan  Times per week: 2  Plan weeks: 4-6      Goals  (Total # of Visits to Date: 5)      Short term goals  Time Frame for Short term goals: 3 weeks  Short term goal 1: Patient to initiate HEP for improved scapular stability and R shoulder ROM. --met  Short term goal 2: Patient to be instructed in gentle scapular strengthening and AAROM ther ex to improve R shoulder ROM. --met  Short term goal 3: Patient to tolerate PROM and manual techniques and modalities to decrease pain and improve R shoulder mobility. --met    Long term goals  Time Frame for Long term goals : 6 weeks  Long term goal 1: Patient to be independent and compliant with HEP. Long term goal 2: Patient to have improved R shoulder ARoM equal to L shoulder ROM all planes for improved mobility. Long term goal 3: Patient to have improved R UE strength 4/5 or greater all planes for improved stability for lifting tasks. Long term goal 4: Patient to report overall decrease in pain </=3/10 at worst for improved QOL.     Minutes Tracking:  Time In: 6033  Time Out: 2800  Minutes: 58  Timed Code Treatment Minutes: 0[address] [facility]     Date: 3/8/2021

## 2021-03-10 ENCOUNTER — HOSPITAL ENCOUNTER (OUTPATIENT)
Dept: PHYSICAL THERAPY | Age: 66
Setting detail: THERAPIES SERIES
Discharge: HOME | End: 2021-03-10
Payer: COMMERCIAL

## 2021-03-10 ENCOUNTER — HOSPITAL ENCOUNTER (OUTPATIENT)
Dept: CARDIAC REHAB | Age: 66
Setting detail: THERAPIES SERIES
Discharge: HOME | End: 2021-03-10
Payer: COMMERCIAL

## 2021-03-10 PROCEDURE — G0283 ELEC STIM OTHER THAN WOUND: HCPCS

## 2021-03-10 PROCEDURE — 97110 THERAPEUTIC EXERCISES: CPT

## 2021-03-10 PROCEDURE — 93798 PHYS/QHP OP CAR RHAB W/ECG: CPT

## 2021-03-10 PROCEDURE — 97140 MANUAL THERAPY 1/> REGIONS: CPT

## 2021-03-11 ENCOUNTER — HOSPITAL ENCOUNTER (OUTPATIENT)
Dept: PHYSICAL THERAPY | Age: 66
Setting detail: THERAPIES SERIES
Discharge: HOME | End: 2021-03-11
Payer: COMMERCIAL

## 2021-03-11 ENCOUNTER — HOSPITAL ENCOUNTER (OUTPATIENT)
Dept: CARDIAC REHAB | Age: 66
Setting detail: THERAPIES SERIES
Discharge: HOME | End: 2021-03-11
Payer: COMMERCIAL

## 2021-03-11 ENCOUNTER — OFFICE VISIT (OUTPATIENT)
Dept: CARDIOLOGY | Age: 66
End: 2021-03-11
Payer: COMMERCIAL

## 2021-03-11 DIAGNOSIS — Z79.899 MEDICATION COURSE CHANGED: Primary | ICD-10-CM

## 2021-03-11 PROCEDURE — 97110 THERAPEUTIC EXERCISES: CPT

## 2021-03-11 PROCEDURE — G0283 ELEC STIM OTHER THAN WOUND: HCPCS

## 2021-03-11 PROCEDURE — 97140 MANUAL THERAPY 1/> REGIONS: CPT

## 2021-03-11 PROCEDURE — 93798 PHYS/QHP OP CAR RHAB W/ECG: CPT

## 2021-03-11 PROCEDURE — 99999 PR OFFICE/OUTPT VISIT,PROCEDURE ONLY: CPT | Performed by: INTERNAL MEDICINE

## 2021-03-11 NOTE — PROGRESS NOTES
Short term goals: 3 weeks  Short term goal 1: Patient to initiate HEP for improved scapular stability and R shoulder ROM. --met  Short term goal 2: Patient to be instructed in gentle scapular strengthening and AAROM ther ex to improve R shoulder ROM. --met  Short term goal 3: Patient to tolerate PROM and manual techniques and modalities to decrease pain and improve R shoulder mobility. --met    Long term goals  Time Frame for Long term goals : 6 weeks  Long term goal 1: Patient to be independent and compliant with HEP. Long term goal 2: Patient to have improved R shoulder ARoM equal to L shoulder ROM all planes for improved mobility. Long term goal 3: Patient to have improved R UE strength 4/5 or greater all planes for improved stability for lifting tasks. Long term goal 4: Patient to report overall decrease in pain </=3/10 at worst for improved QOL.     Minutes Tracking:  Time In: 1546  Time Out: 9716  Minutes: 64  Timed Code Treatment Minutes: 60 Minutes

## 2021-03-11 NOTE — PROGRESS NOTES
Phone: Audrey           Fax: [phone]                           Outpatient Physical Therapy                                                                            Daily Note    Patient: Pedro Manriquez : 1955  [facility] #: [id number]   Referring Practitioner:  MARGOT Salazar    Referral Date : 21     Date: 3/11/2021    Diagnosis: Acute pain of R shoulder, M25.511  Treatment Diagnosis: R shoulder pain    Onset Date: 21  PT Insurance Information: [place]  Total # of Visits Approved: 12 Per Physician Order  Total # of Visits to Date: 7  No Show: 0  Canceled Appointment: 0      Pre-Treatment Pain:  2/10  Subjective: States pain remains 2/10. Feels his shld is feeling better. No c/o pain in elbow    Exercises:  Exercise 2: UBE 5/5  Exercise 3: rows/extension BTB 20x ea  Exercise 4: joystick cw/ccw 25-30x  Exercise 5: roll med mall up wall 20x  Exercise 6: cybex rows 5pl 15x ea handle  Exercise 8: shrugs/retractions 4# 15x ea  Exercise 9: 90/90  3# 10-15x    Manual:  PROM: Right shld as tolerated    Modalities:  Cryotherapy (Minutes\Location): CP with IFC to decrease pain and inflammation       Assessment  Assessment: Pain 2/10, states in the morning it's a little higher. Active flexion standing 155deg. Pt is able to lift a 3# wt to the front and side to 90 deg level with no increase in pain. Pull strength ins 4+/5    Activity Tolerance  Activity Tolerance: Patient Tolerated treatment well    Patient Education  Patient Education: HEP  Pt verbalized/demonstrated good understanding:     [x] Yes         [] No, pt required further clarification. Post Treatment Pain:  2/10      Plan  Times per week: 2  Plan weeks: 4-6      Goals  (Total # of Visits to Date: 7)      Short term goals  Time Frame for Short term goals: 3 weeks  Short term goal 1: Patient to initiate HEP for improved scapular stability and R shoulder ROM. --met  Short term goal 2: Patient to be instructed in gentle scapular strengthening and AAROM ther ex to improve R shoulder ROM. --met  Short term goal 3: Patient to tolerate PROM and manual techniques and modalities to decrease pain and improve R shoulder mobility. --met    Long term goals  Time Frame for Long term goals : 6 weeks  Long term goal 1: Patient to be independent and compliant with HEP. Long term goal 2: Patient to have improved R shoulder ARoM equal to L shoulder ROM all planes for improved mobility. Long term goal 3: Patient to have improved R UE strength 4/5 or greater all planes for improved stability for lifting tasks. Long term goal 4: Patient to report overall decrease in pain </=3/10 at worst for improved QOL.     Minutes Tracking:  Time In: 3020  Time Out: 3766  Minutes: 65  Timed Code Treatment Minutes: 61 Minutes       Daniel Hernandez     Date: 3/11/2021

## 2021-03-11 NOTE — PROGRESS NOTES
Dr. Eddie Santos spoke with pt for about 10 minutes on pt stopping his Warfarin and the risks and benefits were reviewed in great detail and he was ok with just taking his herbal supplements and not his Warfarin. Dr. Eddie Santos highly advised against this however this is the pt choice at this time. Pt is aware of risks and verbalized understating.

## 2021-03-12 ENCOUNTER — ANTI-COAG VISIT (OUTPATIENT)
Dept: PHARMACY | Age: 66
End: 2021-03-12

## 2021-03-12 DIAGNOSIS — I51.3 LEFT VENTRICULAR THROMBUS: ICD-10-CM

## 2021-03-12 DIAGNOSIS — Z86.73 HISTORY OF STROKE: ICD-10-CM

## 2021-03-12 NOTE — LETTER
?             Date:  3-12-21      Doctor: Nasima    Re: Anticoagulation Clinic Consult Agreement    Patient Name: Dante Rodriguez    : [birth year]    Thank you for allowing us to participate in the care of your patient. Due to the reason indicated above we, ([facility]), will no longer be sharing with you the responsibility of managing your patients anticoagulation therapy. This notice will serve as a termination of the consult agreement for this patient. If applicable to compliance, your patient has been notified prior to this termination through three letters requesting follow-up. These letters may be accessed through the medical record if needed. We appreciate your referrals and thank you for allowing us to participate in the care of your patient. Should you have any questions or concerns please feel free to contact our office at [phone]. Sincerely,   _________________________________  ALISTAIR Talley Ph.  Director, Medication Management  [address] [place], [place] [address]Gurdeep, PharmD  Clinic Pharmacist    [place], [place]. Ph.  Clinic Pharmacist    [place]. Ph  Clinic Pharmacist    Meena Draper, PharmD  Clinic Pharmacist    Karina [place], [place]. Ph.  Clinic Pharmacist

## 2021-03-12 NOTE — PROGRESS NOTES
Discharged patient due to patient stopping warfarin and taking an herbal supplement instead of warfarin . Dr Flori Blum aware and also warned patient of high risk of clots. Per encounter on 3-11-21  \"Dr. Flori Blum spoke with pt for about 10 minutes on pt stopping his Warfarin and the risks and benefits were reviewed in great detail and he was ok with just taking his herbal supplements and not his Warfarin. Dr. Flori Blum highly advised against this however this is the pt choice at this time. Pt is aware of risks and verbalized understating. \"    Faxed discharge letter to Dr Flori Blum.     Shaw De La Torre, PharmD 3/12/2021 8:32 AM

## 2021-03-15 ENCOUNTER — HOSPITAL ENCOUNTER (OUTPATIENT)
Dept: PHYSICAL THERAPY | Age: 66
Setting detail: THERAPIES SERIES
Discharge: HOME | End: 2021-03-15
Payer: COMMERCIAL

## 2021-03-15 ENCOUNTER — HOSPITAL ENCOUNTER (OUTPATIENT)
Dept: CARDIAC REHAB | Age: 66
Setting detail: THERAPIES SERIES
Discharge: HOME | End: 2021-03-15
Payer: COMMERCIAL

## 2021-03-15 PROCEDURE — 97110 THERAPEUTIC EXERCISES: CPT

## 2021-03-15 PROCEDURE — G0283 ELEC STIM OTHER THAN WOUND: HCPCS

## 2021-03-15 PROCEDURE — 97140 MANUAL THERAPY 1/> REGIONS: CPT

## 2021-03-15 PROCEDURE — 93798 PHYS/QHP OP CAR RHAB W/ECG: CPT

## 2021-03-15 NOTE — PROGRESS NOTES
Phone: Audrey           Fax: [phone]                           Outpatient Physical Therapy                                                                            Daily Note    Patient: Jyotsna Roy : 1955  [facility] #: [id number]   Referring Practitioner:  MARGOT Scott    Referral Date : 21     Date: 3/15/2021    Diagnosis: Acute pain of R shoulder, M25.511  Treatment Diagnosis: R shoulder pain    Onset Date: 21  PT Insurance Information: [place]  Total # of Visits Approved: 12 Per Physician Order  Total # of Visits to Date: 8  No Show: 0  Canceled Appointment: 0      Pre-Treatment Pain:  3/10  Subjective: Pt states pain today rates about 3/10. Continues with pain only in right shoulder. States notices pain in the evenings when trying to move around in bed. States he is also noticing \"cracking\" in right shoulder with certain movements. Exercises:  Exercise 2: UBE 5/5  Exercise 3: rows/extension BTB 30x ea  Exercise 4: joystick cw/ccw 25-30x  Exercise 9: 90/90  3# 10-15x  Exercise 10: tband IR 20x blue  Exercise 11: left SL - 0# abd, 3# ER - 15x  Exercise 12: supine bench press - 3#    Manual:  PROM: Right shld as tolerated    Modalities:  Cryotherapy (Minutes\Location): CP with IFC to decrease pain and inflammation  E-stim (parameters): IFC x15 min to R shoulder to decrease pain       Assessment  Assessment: Pt with good laura ex; pain mostly with resisted abd. Achieve 35 degrees of right shoulder ER passively; 42 degrees left shoulder. Will continue to progress as pt tolerates. Activity Tolerance  Activity Tolerance: Patient Tolerated treatment well    Patient Education  Patient Education: continued HEP  Pt verbalized/demonstrated good understanding:     [x] Yes         [] No, pt required further clarification.        Post Treatment Pain:  3/10      Plan  Times per week: 2  Plan weeks: 4-6      Goals  (Total # of Visits to Date: 8)

## 2021-03-29 ENCOUNTER — HOSPITAL ENCOUNTER (OUTPATIENT)
Dept: CARDIAC REHAB | Age: 66
Setting detail: THERAPIES SERIES
Discharge: HOME | End: 2021-03-29
Payer: COMMERCIAL

## 2021-03-29 PROCEDURE — 93798 PHYS/QHP OP CAR RHAB W/ECG: CPT

## 2021-03-29 NOTE — PROGRESS NOTES
[]? Maintainence   [x]? Prep                                   []? Relapse     Lipids:  (9/15/20)  Total Cholesterol: 199  Triglycerides: 180  HDL: 44  LDL: 119  []? Med Change?      Diabetes:  [x]? Yes  []? No  Random BS:160  HbA1c: 8.3 (10/22/21)  []? Med Change?     Weight Management:  Wt Goal: 1-2 lbs/wk     Intervention:   []? Dietitian Consult                                [x]? Nurse/Patient Discussion                 []? Diet Class                                                                             []? Referred to Diabetes Education        Education:  []? S&S hypo/hyperglycemia  [x]? Low fat/low cholesterol diet  []? Weight loss methods                                               []? Relate Diabetes/CAD                        [x]? Eating heart healthy handout     Target Goal:  -LDL-C<100 if triglycerides are > 200  -LDL-C < 70 for high risk patients  -HbA1c < 7%  -BMI < 25   Education     Stages of Change:    []? pre-contemplation             [x]? Action   []? Contemplate                      []? Maintainence   [x]? Prep                                   []? Relapse     Family support: [x]? Yes  []? No     Tobacco use: []? Yes  [x]? No     Intervention:  []? Referred to smoking cessation counselor                           []? Individual education and counseling  []? Tobacco adjunct  []? Informed of education class schedule      Education:   []? Risk Factors/Modifications             [x]? Psychological aspects  []? Angina                                            []? Medications  []? CHF                                                  []? Cardiac A&P     Target Goal:  -Complete cessation of tobacco use (if applicable)  -Continued risk factor modifications  -Recognizing signs/symptoms to report  -Proper use of meds     Psychosocial  Stages of Change:    []? pre-contemplation             [x]? Action   []? Contemplate                      []? Maintainence   [x]?  Prep []? Relapse        Intervention:   []? Psych Consult/          [x]? Uses stress management skills       []? Physician Referral                          []? Stress management class   []? Med Change?      Education:    [x]? Coping Techniques   [x]? Relaxation techniques   [x]? S/S of Depression     Target Goal:  -Assess presence or absence of depression using a valid screening tool. -Maximize coping skills.  -Positive support system.     Preventative Medication:   []? Aspirin                                   [x]? Beta Blockade                       [x]? Statin or other lipid lowering agent            (Zetia)    []? Clopidogrel   [x]? ACE Inhibitor   [x]? Other anticoagulation medications Was on coumadin; stopped; Dr. Darell Michel aware; now taking OTC herbal supplement     Fall Risk assess: [x]? Yes  []? No  Assistive Device:   []? Cane  []? Britt Kirkpatrick []? Wheel Chair  []? Gait belt     Patient/Program goal:   increased stamina/strength to 30-50 total exercise by increasing 1-2 level/wk and 1-2 min/wk  to achieve THR and RPE 12-16 on Nam RPE scale Patient has increased mets from 1.7 to 1.9 in the past 60 days. Patient has attended only 10 sessions in 60 days.   -introduce weights/ therabands 2-4# for 5-10 reps Will introduce at future session.  -manage BP better -150's. -improved cholesterol and  Triglycerides Results from 9/5/20 reviewed and could be improved. Refuses statin due to leg pain. Taking Zetia and Psyllium husk. Food diary completed. Heart healthy diet info provided. Patient has received written diet info from dietician.  -develop regular exercise 30 min daily Patient does not have a routine home exercise program. Encouraged to gradually incorporate into his home routine with an eventual goal or reaching 30 minutes of walking a day. -Improve Hgb A1C Result from 10/22/2020 reviewed and elevated at 8.3.  Will continue to monitor and educate as needed to reduce.     Physician

## 2021-03-31 ENCOUNTER — OFFICE VISIT (OUTPATIENT)
Dept: UROLOGY | Age: 66
End: 2021-03-31
Payer: COMMERCIAL

## 2021-03-31 ENCOUNTER — HOSPITAL ENCOUNTER (OUTPATIENT)
Dept: CARDIAC REHAB | Age: 66
Setting detail: THERAPIES SERIES
Discharge: HOME | End: 2021-03-31
Payer: COMMERCIAL

## 2021-03-31 VITALS
HEIGHT: 73 IN | BODY MASS INDEX: 36.31 KG/M2 | SYSTOLIC BLOOD PRESSURE: 132 MMHG | WEIGHT: 274 LBS | TEMPERATURE: 97.3 F | DIASTOLIC BLOOD PRESSURE: 78 MMHG

## 2021-03-31 DIAGNOSIS — R39.15 URINARY URGENCY: Primary | ICD-10-CM

## 2021-03-31 DIAGNOSIS — N40.1 BPH WITH OBSTRUCTION/LOWER URINARY TRACT SYMPTOMS: ICD-10-CM

## 2021-03-31 DIAGNOSIS — R39.14 FEELING OF INCOMPLETE BLADDER EMPTYING: ICD-10-CM

## 2021-03-31 DIAGNOSIS — N13.8 BPH WITH OBSTRUCTION/LOWER URINARY TRACT SYMPTOMS: ICD-10-CM

## 2021-03-31 DIAGNOSIS — K59.00 CONSTIPATION, UNSPECIFIED CONSTIPATION TYPE: ICD-10-CM

## 2021-03-31 DIAGNOSIS — N39.41 URGE INCONTINENCE: ICD-10-CM

## 2021-03-31 PROCEDURE — 93798 PHYS/QHP OP CAR RHAB W/ECG: CPT

## 2021-03-31 PROCEDURE — 99214 OFFICE O/P EST MOD 30 MIN: CPT | Performed by: PHYSICIAN ASSISTANT

## 2021-03-31 PROCEDURE — 1036F TOBACCO NON-USER: CPT | Performed by: PHYSICIAN ASSISTANT

## 2021-03-31 PROCEDURE — 3017F COLORECTAL CA SCREEN DOC REV: CPT | Performed by: PHYSICIAN ASSISTANT

## 2021-03-31 PROCEDURE — G8427 DOCREV CUR MEDS BY ELIG CLIN: HCPCS | Performed by: PHYSICIAN ASSISTANT

## 2021-03-31 PROCEDURE — G8417 CALC BMI ABV UP PARAM F/U: HCPCS | Performed by: PHYSICIAN ASSISTANT

## 2021-03-31 PROCEDURE — 51798 US URINE CAPACITY MEASURE: CPT | Performed by: PHYSICIAN ASSISTANT

## 2021-03-31 PROCEDURE — G8484 FLU IMMUNIZE NO ADMIN: HCPCS | Performed by: PHYSICIAN ASSISTANT

## 2021-03-31 RX ORDER — OXYBUTYNIN CHLORIDE 10 MG/1
10 TABLET, EXTENDED RELEASE ORAL EVERY EVENING
Qty: 30 TABLET | Refills: 5 | Status: SHIPPED | OUTPATIENT
Start: 2021-03-31 | End: 2021-11-29 | Stop reason: SDUPTHER

## 2021-03-31 RX ORDER — POLYETHYLENE GLYCOL 3350 17 G/17G
POWDER, FOR SOLUTION ORAL
COMMUNITY
Start: 2021-03-03 | End: 2021-11-29 | Stop reason: SDUPTHER

## 2021-03-31 RX ORDER — FLASH GLUCOSE SENSOR
KIT MISCELLANEOUS
COMMUNITY
Start: 2021-03-10 | End: 2022-08-29

## 2021-03-31 ASSESSMENT — ENCOUNTER SYMPTOMS
SHORTNESS OF BREATH: 0
COUGH: 0
NAUSEA: 0
WHEEZING: 0
EYE REDNESS: 0
BACK PAIN: 0
CONSTIPATION: 0
ABDOMINAL PAIN: 0
COLOR CHANGE: 0
VOMITING: 0

## 2021-03-31 NOTE — PROGRESS NOTES
While rooming the patient for his visit today, he did make mention of the fall he had in our office on 2/8/2021. He mentioned how he was bruised from his right shoulder down to his elbow and to his right wrist.   Writer made mention of this in the note since a safecare was entered.

## 2021-03-31 NOTE — PATIENT INSTRUCTIONS
BLADDER IRRITANTS     There are several changes you can make to your diet to help improve your urinary symptoms. The following have been shown to cause irritation to the bladder and should be AVOIDED if possible:  ~ Coffee (including decaffeinated)   ~ ALL Tea (including green teas and decaffeinated)  ~ Soda/Pop/carbonated beverages/Energy drinks (especially dark dyed lit, root beers, mountain dew, etc)  ~These are MUCH worse if they have caffeine, but can also be irritative to the bladder even without caffeine  ~ Alcoholic beverages  ~ Spicy foods (peppers contain capsaicin, which is very irritating to the bladder)  ~ Acidic foods (for example: tomato based foods, orange juice, etc)    We encourage increased water intake, unless you have been placed on a fluid restriction by another provider.

## 2021-03-31 NOTE — PROGRESS NOTES
HPI:      Patient is a 63 y.o. male in no acute distress. He is alert and oriented to person, place, and time. History  12/2020 Referral from [facility] for LUTS. Complaints of urgency and urge incontinence. He has nocturia 0-2 times per night. He has frequency every 30 minutes to an hour during the day. He does change his underwear once per night due to the incontinence. He does have a weak stream and a split stream at the start of urination. He is uncircumcised, but denies any issues retracting his foreskin. He does have significant constipation and was recently in the ER for constipation. He also reports a history of fecal smearing. He will often go 4 more days without a bowel movement. While in the ER he was told he was not emptying his bladder. He did have a CT completed while in the ER on 11/2020 that showed no evidence of  calcifications or hydronephrosis. He is an uncontrolled insulin-dependent diabetic. He does consume a large amount of bladder irritants. He denies history of stones. He has never seen urology in the past.              Started miralax daily                 Started flomax daily                 Referred for colonoscopy    2/2021 - cysto - Extensive trilobar hyperplasia    Today:   Patient was started on oxybutynin 5 mg XL at the last visit. Patient continues to take Flomax. He has stated when he walks his urgency void is not as strong as it was prior to starting Ditropan. He does have minimal leakage. He does not have to wake up all the time at night to go to the bathroom. His urgency is improved but is not where it needs to be. He does take a half a cap of MiraLAX almost every day and he does have an improvement in his constipation. He does have a daily soft BM. If he takes a full cap, he does have diarrhea. Voided 80ml. PVR=25ml.        Past Medical History:   Diagnosis Date    Abnormal cardiovascular stress test 10/2020    moderate perfusion defect of severe (LANTUS SOLOSTAR) 100 UNIT/ML injection pen Inject 56 Units into the skin nightly 5 pen 5    Insulin Aspart FlexPen 100 UNIT/ML SOPN Inject 20 Units into the skin Pt to take as 20 units after each meal as sliding scale. Pt states \"The 20 Units are not usually enough. \"      ezetimibe (ZETIA) 10 MG tablet Take 1 tablet by mouth daily 90 tablet 1    blood glucose test strips (RELION GLUCOSE TEST STRIPS) strip Test qid. Dx--E11.9 insulin dependent 100 each 5    magnesium (MAGNESIUM-OXIDE) 250 MG TABS tablet Take 500 mg by mouth every morning       Coenzyme Q10 (CO Q 10) 10 MG CAPS Take by mouth daily      PSYLLIUM HUSK PO Take 2 capsules by mouth Takes 2-3 capsules every morning      LECITHIN CONCENTRATE PO Take 2 tablets by mouth daily \"decrease cholesterol\"      glucose monitoring kit (FREESTYLE) monitoring kit 1 kit by Does not apply route daily TEST BLOOD SUGAR QID. WHATEVER METER IS COVERED BY GALVEZ. DIAGNOSIS E11.9 1 kit 0    Lancets MIS TEST BLOOD SUGAR QID. WHATEVER METER IS COVERED BY GALVEZ. DIAGNOSIS E11.9 100 each 3    lisinopril (PRINIVIL;ZESTRIL) 10 MG tablet Take 1 tablet by mouth daily 90 tablet 0    meclizine (ANTIVERT) 25 MG tablet Take 25 mg by mouth 3 times daily as needed for Dizziness      warfarin (COUMADIN) 5 MG tablet Take 1 tablet by mouth daily (Patient not taking: Reported on 3/31/2021) 90 tablet 3     No facility-administered encounter medications on file as of 3/31/2021.        Current Outpatient Medications on File Prior to Visit   Medication Sig Dispense Refill    polyethylene glycol (GLYCOLAX) 17 GM/SCOOP powder       Continuous Blood Gluc Sensor (FREESTYLE [place]TE 2 SENSOR) MISC       pantoprazole (PROTONIX) 40 MG tablet Take 1 tablet by mouth daily 90 tablet 1    oxybutynin (DITROPAN XL) 5 MG extended release tablet Take 1 tablet by mouth daily 30 tablet 3    metFORMIN (GLUCOPHAGE-XR) 500 MG extended release tablet Take 500 mg by mouth 2 times daily (with meals) Regular Metformin caused diarrhea but XR is ok      tamsulosin (FLOMAX) 0.4 MG capsule Take 1 capsule by mouth every evening 90 capsule 3    VITAMIN D PO Take 1 tablet by mouth daily Patient uncertain of strength      Insulin Pen Needle 32G X 8 MM MISC by Does not apply route      ticagrelor (BRILINTA) 90 MG TABS tablet Take 1 tablet by mouth 2 times daily 60 tablet 11    carvedilol (COREG) 6.25 MG tablet Take 1 tablet by mouth 2 times daily 180 tablet 3    ondansetron (ZOFRAN) 4 MG tablet Take 1 tablet by mouth 3 times daily as needed for Nausea or Vomiting 30 tablet 0    insulin NPH (NOVOLIN N) 100 UNIT/ML injection vial Inject into the skin nightly Pt mostly takes this at HS \"To help bring down my sugar. \" Pt states to take approx 30 Units nightly.  insulin glargine (LANTUS SOLOSTAR) 100 UNIT/ML injection pen Inject 56 Units into the skin nightly 5 pen 5    Insulin Aspart FlexPen 100 UNIT/ML SOPN Inject 20 Units into the skin Pt to take as 20 units after each meal as sliding scale. Pt states \"The 20 Units are not usually enough. \"      ezetimibe (ZETIA) 10 MG tablet Take 1 tablet by mouth daily 90 tablet 1    blood glucose test strips (RELION GLUCOSE TEST STRIPS) strip Test qid. Dx--E11.9 insulin dependent 100 each 5    magnesium (MAGNESIUM-OXIDE) 250 MG TABS tablet Take 500 mg by mouth every morning       Coenzyme Q10 (CO Q 10) 10 MG CAPS Take by mouth daily      PSYLLIUM HUSK PO Take 2 capsules by mouth Takes 2-3 capsules every morning      LECITHIN CONCENTRATE PO Take 2 tablets by mouth daily \"decrease cholesterol\"      glucose monitoring kit (FREESTYLE) monitoring kit 1 kit by Does not apply route daily TEST BLOOD SUGAR QID. WHATEVER METER IS COVERED BY GALVEZ. DIAGNOSIS E11.9 1 kit 0    Lancets [facility] TEST BLOOD SUGAR QID. WHATEVER METER IS COVERED BY GALVEZ.  DIAGNOSIS E11.9 100 each 3    lisinopril (PRINIVIL;ZESTRIL) 10 MG tablet Take 1 tablet by mouth daily 90 tablet 0    meclizine (ANTIVERT) 25 MG tablet Take 25 mg by mouth 3 times daily as needed for Dizziness      warfarin (COUMADIN) 5 MG tablet Take 1 tablet by mouth daily (Patient not taking: Reported on 3/31/2021) 90 tablet 3     No current facility-administered medications on file prior to visit. Statins and Metformin and related  Family History   Problem Relation Age of Onset    Heart Attack Mother     Diabetes Mother     Heart Disease Father     Prostate Cancer Father     Heart Attack Sister     Heart Attack Brother     Heart Attack Maternal Grandfather      Social History     Tobacco Use   Smoking Status Never Smoker   Smokeless Tobacco Never Used       Social History     Substance and Sexual Activity   Alcohol Use Not Currently       Review of Systems   Constitutional: Negative for appetite change, chills and fever. Eyes: Negative for redness and visual disturbance. Respiratory: Negative for cough, shortness of breath and wheezing. Cardiovascular: Negative for chest pain and leg swelling. Gastrointestinal: Negative for abdominal pain, constipation, nausea and vomiting. Genitourinary: Positive for urgency. Negative for decreased urine volume, difficulty urinating, discharge, dysuria, enuresis, flank pain, frequency, hematuria, penile pain, scrotal swelling and testicular pain. Post-void dribbling    Musculoskeletal: Negative for back pain, joint swelling and myalgias. Skin: Negative for color change, rash and wound. Neurological: Negative for dizziness, tremors and numbness. Hematological: Negative for adenopathy. Does not bruise/bleed easily. /78 (Site: Right Upper Arm, Position: Sitting, Cuff Size: Large Adult)   Temp 97.3 °F (36.3 °C) (Temporal)   Ht 6' 1\" (1.854 m)   Wt 274 lb (124.3 kg)   BMI 36.15 kg/m²       PHYSICAL EXAM:  Constitutional: Patient in no acute distress; Neuro: alert and oriented to person place and time.     Psych: Mood and affect normal.  Lungs: Respiratory effort normal  Abdomen: Soft, non-tender, non-distended  Rectal: deferred       Lab Results   Component Value Date    BUN 13 11/29/2020     Lab Results   Component Value Date    CREATININE 0.93 01/07/2021     Lab Results   Component Value Date    PSA 3.04 06/19/2020       ASSESSMENT:   Diagnosis Orders   1. Urinary urgency     2. 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 Feeling of incomplete bladder emptying     5. Constipation, unspecified constipation type       PLAN:  Continue flomax    Increase ditropan to 10mg XL nightly     Discussed bladder irritants thoroughly. Patient instructed to avoid/minimize intake of food/drinks such as: coffee, tea, caffeine, alcohol, carbonated beverages, dark soda/pop, spicy/acidic foods. Was sent home with a extensive list, including non-irritating alternatives. We did discuss PVR greenlight with him, including risks and benefits of this procedure. He prefers to hold off on procedure and try increasing Ditropan.      Follow up in 6 weeks with PVR

## 2021-04-12 NOTE — PROGRESS NOTES
Ashley is calling from [facility] and talked with Jenny who was going to fax order for blood work & stop date for Dapto. She never received them.    Julien Neff am scribing for and in the presence of Jessa Phan MD, F.A.C.C. Patient: Ana Lopez  : 1955  Date of Visit: January 15, 2021    REASON FOR VISIT / CONSULTATION: Follow-Up from Hospital (HX:LV apical thrombus, ASHD, s/p stent, stroke, CP, ischemic cardiomyopathy, HTN, HLD Pt is here for hospital follow up he states he is doing well after stents placed 21 SOB at night. He does have some lightheadedness thinks due to strokes. Denies:CP,palpitaitons)      History of Present Illness:        Dear Ambrocio Becerril, APRN - CNP    I had the pleasure of seeing Ana Lopez in my office today. Mr. Adela Fish is a 63 y.o. male presented for follow up. Patient has extensive medical history. He reported having multiple heart attacks with the first one at age 31 and the second one in , he had a cardiac cath and stent placed at that time. He followed up with his cardiologist up until 2020. Family history includes his siblings who have extensive heart disease including heart attacks and bypass surgery    History of ischemic cardiomyopathy secondary to multiple heart attacks, his most recent ejection fraction was 35% by echo back in 2020. He told me that he was offered defibrillator at some point but he was told that his heart function is good enough and no defibrillator needed. He has history of longstanding diabetes, hypertension and dyslipidemia he reported that he is not taking statin therapy because it does cause muscle pain. He said none of his family members were able to tolerate statins as well. He is lifelong non-smoker. He also reported having a history of Ménière's disease and chronic dizziness. He was admitted to [facility] from 2020 to 2020 because of acute stroke. As per discharge summary from [facility], NIH score was 2 for right upper and lower extremity dysmetria.   He got up MRI of the brain, MRA of the head and neck and MRI of the cervical spine done. Patient found to have multiple acute infarcts in the right cerebellar hemisphere. Additional punctate acute infarct of the right lateral kateryna with old infarction of the left frontal lobe in addition to small vessel disease. No focal significant arterial narrowing in the neck. During that same hospital admission he was evaluated by hematology because of thrombocytopenia, currently tolerating dual antiplatelet therapy with no bleeding complications. He was also evaluated by cardiology for ischemic cardiomyopathy and lisinopril started in addition to adding carvedilol twice daily. He was later seen by Dr. Gt Bhatt and recommended loop recorder but patient refused, he wanted to discuss with GAIL Hidalgo CNP first.      Echocardiogram done on 4/24/2020 showed mild left ventricular hypertrophy, global left ventricular systolic function is moderately reduced, estimated EF is 35%. Appling appears hypokinetic. Evidence of mild (grade I) diastolic dysfunction. Negative bubble study, no shunt noted. EKG done in office on 9/30/2020 showed sinus rhythm with evidence of old anterior myocardial infarction. No acute ischemic changes. Echo done on 10/13/2020: Because of poor image quality, definity echo contrast was used to better assess left ventricular wall motion and thickness. EF was 35%. Mild left ventricular hypertrophy. Akinetic apex with filling defect seen on contrast echo consistent with left ventricular apical thrombus. The left atrium is mildly dilated (29-33) with a left atrial volume index of 29 ml/m2. Evidence of moderate (grade II) diastolic dysfunction is seen. The aortic root is considered to be at the upper limits of normal in size when corrected for body surface area. Stress Test done on 10/13/2020: Abnormal myocardial perfusion study.   There is a moderate perfusion defect of severe intensity in the apical, anteroapical and inferoapical regions during stress and rest imaging which is most consistent with an old myocardial infarction. EF was 28%. Heart cath done on 10/29/2020: Severe three vessel coronary artery disease involving a 70% proximal and multiple 50-80% mid and distal LAD stenosis, 70-80% large mid OM1 stenosis and proximal 100% stenosis in the right coronary artery which did show left to right filling of a pretty good size PDA and PL branch of the RCA. Normal left ventricular end diastolic pressure. Patient is a strong believer of homeopathic medicine/chelation therapy. I clearly stated to him that he has left ventricular apical thrombus and that has caused his stroke but Dr. Ofelia Guerrero told him that this is not true and this is secondary to \"free radicals??\". He is also adamant about not taking any statins because of severe muscle pain. He said none of his family member will be able to tolerate statin therapy. He takes lecithin to dissolve the atherosclerotic plaque and he is a strong believer of this. His blood sugar is uncontrolled. His INR still not therapeutic but he comes to our anticoagulation clinic regularly for recheck and dose adjustment. Heart cath done on 1/7/2021 with Dr Anitra Siemens at [facility] - Successful PCI / Drug Eluting Stent of the mid LCX. Successful PCI / Drug Eluting Stent of the proximal LAD. Mr. Sj Lutz is here today for a follow up after recent hospitalization and having stents placed. He is concerned with his weight gain since last visit and he reports he has not changed his eating habits. He is up 8 pounds. He continues to have dizziness but not any worse than before. He was seen by neurology and he was told that his headache is secondary to Ménière's disease in addition to his cerebellar stroke. He said he has Stalevo less. He just takes more time moving from one position to the other and be careful doing his daily activities and driving.   He is able to clean his own house and do most his own household chores and walks. He denies history of sleep apnea. He sleeps well at night, does not wake up short of breath or gasping for air. He denies any chest pain, pressure or tightness. He denies any increased shortness of breath or palpitations. He denies any abdominal pain, bleeding problems, bowel issues, problems with his medications or any other concerns at this time. No pains in his legs or thighs when he walks around. PAST MEDICAL HISTORY:        Past Medical History:   Diagnosis Date    Abnormal cardiovascular stress test 10/2020    moderate perfusion defect of severe intensity in the apical anteroapical and inferoapical regions    CAD (coronary artery disease)     Diabetes mellitus ([facility]     [facility] 11/[facility]/2020    Hyperlipidemia     Hypertension     Meniere disease     MI [facility]ocardial infarction[facility] ([facility]     MULTIPLE    Obesity     Stroke [facility]     Thrombocytopenia ([facility]     TIA (transient ischemic attack)        CURRENT ALLERGIES: Statins and Metformin and related REVIEW OF SYSTEMS: 14 systems were reviewed. Pertinent positives and negatives as above, all else negative.      Past Surgical History:   Procedure Laterality Date    CARDIAC CATHETERIZATION  10/29/2020    Severe three vessel disease involving the LAD, circumflex and right coronary artery  /  DR Sanz Stagers  /  CT SURGERGY CONSULT    COLONOSCOPY      CORONARY ANGIOPLASTY WITH STENT PLACEMENT      Social History:  Social History     Tobacco Use    Smoking status: Never Smoker    Smokeless tobacco: Never Used   Substance Use Topics    Alcohol use: Not Currently    Drug use: Never        CURRENT MEDICATIONS:        Outpatient Medications Marked as Taking for the 1/15/21 encounter (Office Visit) with Spike Stone MD   Medication Sig Dispense Refill    pantoprazole (PROTONIX) 40 MG tablet Take 1 tablet by mouth daily 30 tablet 3    ticagrelor (BRILINTA) 90 MG TABS tablet Take 1 tablet by mouth 2 times daily 60 tablet 11    carvedilol (COREG) 6.25 MG tablet Take 1 tablet by mouth 2 times daily 180 tablet 3    polyethylene glycol (GLYCOLAX) 17 GM/SCOOP powder Take 17 g by mouth daily 1530 g 1    tamsulosin (FLOMAX) 0.4 MG capsule Take 1 capsule by mouth every evening 30 capsule 11    ondansetron (ZOFRAN) 4 MG tablet Take 1 tablet by mouth 3 times daily as needed for Nausea or Vomiting 30 tablet 0    insulin NPH (NOVOLIN N) 100 UNIT/ML injection vial Inject into the skin nightly Pt mostly takes this at HS \"To help bring down my sugar. \" Pt states to take approx 30 Units nightly.  insulin glargine (LANTUS SOLOSTAR) 100 UNIT/ML injection pen Inject 56 Units into the skin nightly (Patient taking differently: Inject 70 Units into the skin nightly ) 5 pen 5    Insulin Aspart FlexPen 100 UNIT/ML SOPN Inject 20 Units into the skin Pt to take as 20 units after each meal as sliding scale. Pt states \"The 20 Units are not usually enough. \"      warfarin (COUMADIN) 5 MG tablet Take 1 tablet by mouth daily (Patient taking differently: Take 7.5 mg by mouth daily Coumadin Clinic:  7.5 mg every Tuesday, Thursday, Saturday and 10 mg all other days) 90 tablet 3    ezetimibe (ZETIA) 10 MG tablet Take 1 tablet by mouth daily 90 tablet 1    blood glucose test strips (RELION GLUCOSE TEST STRIPS) strip Test qid. Dx--E11.9 insulin dependent 100 each 5    magnesium (MAGNESIUM-OXIDE) 250 MG TABS tablet Take 500 mg by mouth every morning       Coenzyme Q10 (CO Q 10) 10 MG CAPS Take by mouth daily      PSYLLIUM HUSK PO Take 2 capsules by mouth Takes 2-3 capsules every morning      LECITHIN CONCENTRATE PO Take 2 tablets by mouth daily \"decrease cholesterol\"      glucose monitoring kit (FREESTYLE) monitoring kit 1 kit by Does not apply route daily TEST BLOOD SUGAR QID. WHATEVER METER IS COVERED BY GALVEZ. DIAGNOSIS E11.9 1 kit 0    Lancets MIS TEST BLOOD SUGAR QID. WHATEVER METER IS COVERED BY GALVEZ.  DIAGNOSIS E11.9 100 each 3    Insulin Pen Needle (MEIJER PEN NEEDLES) 31G X 6 MM MISC 1 each by Does not apply route daily 100 each 3    lisinopril (PRINIVIL;ZESTRIL) 10 MG tablet Take 1 tablet by mouth daily 90 tablet 0       FAMILY HISTORY: family history includes Diabetes in his mother; Heart Attack in his brother, maternal grandfather, mother, and sister; Heart Disease in his father; Prostate Cancer in his father. Physical Examination:      /65 (Site: Right Upper Arm, Position: Sitting, Cuff Size: Medium Adult)   Pulse 70   Resp 18   Ht 6' 1\" (1.854 m)   Wt 275 lb (124.7 kg)   SpO2 98%   BMI 36.28 kg/m²  Body mass index is 36.28 kg/m². Constitutional: He is oriented to person. He appears well-developed and well-nourished. In no acute distress. HEENT: Normocephalic and atraumatic. No JVD present. Carotid bruit is not present. No mass and no thyromegaly present. No lymphadenopathy present. Cardiovascular: Normal rate, regular rhythm, normal heart sounds. Exam reveals no gallop and no friction rubs. No murmur was heard. Pulmonary/Chest: Effort normal and breath sounds normal. No respiratory distress. He has no wheezes, rhonchi or rales. Abdominal: Soft, non-tender. Bowel sounds and aorta are normal. He exhibits no organomegaly, mass or bruit. Extremities: No edema. No cyanosis or clubbing. 2+ radial and carotid pulses. Distal extremity pulses: 2+ bilaterally. .  Neurological: He is alert and oriented to person. No evidence of gross cranial nerve deficit. Coordination appeared normal.   Skin: Skin is warm and dry. There is no rash or diaphoresis. Psychiatric: He has a normal mood and affect.  His speech is normal and behavior is normal.      MOST RECENT LABS ON RECORD:   Lab Results   Component Value Date    WBC 6.5 11/29/2020    HGB 17.1 (H) 11/29/2020    HCT 45.9 11/29/2020    PLT 74 (L) 01/07/2021    CHOL 199 09/14/2020    TRIG 180 (H) 09/14/2020    HDL 44 09/14/2020    ALT 60 (H) 11/29/2020    AST 40 (H) 11/29/2020  11/29/2020    K 3.7 11/29/2020     11/29/2020    CREATININE 0.93 01/07/2021    BUN 13 11/29/2020    CO2 23 11/29/2020    TSH 1.58 04/25/2020    PSA 3.04 06/19/2020    INR 1.3 01/13/2021    GLUF 202 06/19/2020    LABA1C 9.8 (H) 09/14/2020       ASSESSMENT:     1. Coronary artery disease involving native coronary artery of native heart without angina pectoris    2. S/P angioplasty with stent    3. Left ventricular apical thrombus    4. Ischemic cardiomyopathy    5. Lightheaded    6. Mixed hyperlipidemia    7. Type 2 diabetes mellitus with other specified complication, with long-term current use of insulin ([facility]    8. Obesity, unspecified classification, unspecified obesity type, unspecified whether serious comorbidity present       PLAN:        Patient with extensive medical history as outlined in HPI. History of multiple heart attacks, stent placement 2004, ischemic cardiomyopathy. History of uncontrolled diabetes, hypertension and dyslipidemia. History of intolerance to statin therapy. Multiple strokes, the pattern of his multiple ischemic strokes is suggestive of embolic etiology. No prior history of atrial fibrillation. Recent echo showed left ventricular apical thrombus which is clearly the source of multiple embolic strokes. S/P PCI to mid circumflex and proximal LAD using KENNETH by Dr. Leona Monge on 1/7/2021. Apical akinesis with left ventricular apical thrombus seen by Echo from 10/13/2020  Antiplatelet Agent: Continue Ticagrelor (Brilinta) 90 mg twice daily. Anticoagulation: Continue warfarin (Coumadin) take as directed (goal INR 2-3) his INR is still subtherapeutic. Counseled patient extensively that he must take his medications as prescribed and he is to follow strict instructions from the anticoagulation clinic to get his INR therapeutic otherwise he will end up with recurrent strokes.     Atherosclerotic Heart Disease: S/P Stenting in 2004 and History of Stroke and MI, heart cath on 10/29/2020 70% proximal and multiple 50-80% mid and distal LAD stenosis, 70-80% large mid OM1 stenosis and proximal 100% stenosis in the right coronary artery which did show left to right filling of a pretty good size PDA and PL branch of the RCA. Successful PCI / Drug Eluting Stent of the mid LCX. Successful PCI / Drug Eluting Stent of the proximal LAD. Antiplatelet Agent: Continue Ticagrelor (Brilinta) 90 mg twice daily. Continue warfarin as above. Beta Blocker: Continue Carvedilol (Coreg) 6.25 mg twice daily. Anti-anginal medications: Continue to nitroglycerin 0.4 mg tablets as needed for chest pain. Cholesterol Reduction Therapy: Continue ezetimide (Zetia) 10 mg daily. He is adamant about not taking statin therapy. He claims that all of his family member have problems with statin therapy. I told him I will start him on PCSK9 inhibitors we will try to get him approved for Praluent. We will check into seeing if we can get him approved again. Patient is a strong believer of chelation/homeopathic medicine. He has seen Dr. Macey Roblero for long time and finally Dr. Macey Roblero dropped him as a patient. I told him that he has serious cardiovascular disease and his multiple strokes are clearly secondary to the left ventricular apical thrombus. I counseled the patient extensively to come to the emergency room immediately if he has any chest pain or worsening shortness of breath that this can be life-threatening. Patient verbalized understanding. Referral Placed for cardiac rehab: We discussed the potential benefits of cardiac rehab to improve both his cardiac condition as well as improve his exercise tolerance and overall quality of life. He was very agreeable with this and therefore I made the referral for Phase II cardiac rehab. Ischemic Cardiomyopathy:   Beta Blocker: Continue Carvedilol (Coreg) 6.25 mg twice daily. ACE Inibitor/ARB: Continue lisinopril 10 mg daily.   Heart failure counseling: I advised them to try and keep their legs up whenever possible and to limit salt in their diet. Lightheadedness/dizziness: Could be secondary to his Meniere Disease versus cerebellar stroke. Nonpharmacologic counseling: Because of his condition, I reminded him to try and keep himself well-hydrated and to take extra time when moving from laying to sitting, sitting to standing and standing to walking. I also explained to him to help improve his symptoms he should include 3 g sodium diet, 1 or 2 L of sports drinks daily, knee-high compressions stockings. Essential Hypertension: Borderline controlled, patient said his blood pressures controlled at home. Beta Blocker: Continue Carvedilol (Coreg) 6.25 mg twice daily. ACE Inibitor/ARB: Continue lisinopril 10 mg daily. Hyperlipidemia: Mixed - Last LDL on 9/14/2020 was 119 mg/dL  Cholesterol Reduction Therapy: Absolutely refusing to take statin therapy because of severe muscle pain  Continue Zetia 10 mg daily for optimal LDL control. Cholesterol Reduction Therapy: Start Repatha. Pending insurance approval.    Diabetes:   Continue current medications as directed. Follow with GAIL Hidalgo CNP and also follow up with his Endocrinologist at [facility]. Obesity: Body mass index is 36.28 kg/m². I also briefly discussed both diet and exercise strategies for him to continue to loses weight and he was very receptive to this. Patient does not believe that he has sleep apnea syndrome. In the meantime, I encouraged Mr. Ilene Lawson to continue to take his other medications. FOLLOW UP:   I told Mr. Ilene Lawson to call my office if he had any problems, but otherwise I asked him to Return in about 3 months (around 4/15/2021). However, I would be happy to see him sooner should the need arise. Sincerely,  Aury Reed MD, F.A.C.C.   [facility] Specialist    [address]  [facility], [address] [place] [address]  Phone: [phone], Fax: [phone]     I believe that the risk of significant morbidity and mortality related to the patient's current medical conditions are: intermediate-high. 25 minutes were spent with the patient and all of their questions were answered. The documentation recorded by the scribe, accurately and completely reflects the services I personally performed and the decisions made by me. Miranda Patel MD, F.A.C.C.  January 15, 2021

## 2021-04-17 ENCOUNTER — HOSPITAL ENCOUNTER (EMERGENCY)
Age: 66
Discharge: HOME | End: 2021-04-17
Attending: EMERGENCY MEDICINE
Payer: COMMERCIAL

## 2021-04-17 VITALS
DIASTOLIC BLOOD PRESSURE: 73 MMHG | OXYGEN SATURATION: 96 % | RESPIRATION RATE: 17 BRPM | TEMPERATURE: 97.6 F | HEART RATE: 70 BPM | SYSTOLIC BLOOD PRESSURE: 177 MMHG

## 2021-04-17 DIAGNOSIS — L03.114 CELLULITIS OF LEFT UPPER EXTREMITY: Primary | ICD-10-CM

## 2021-04-17 PROCEDURE — 99283 EMERGENCY DEPT VISIT LOW MDM: CPT

## 2021-04-17 PROCEDURE — 6370000000 HC RX 637 (ALT 250 FOR IP): Performed by: EMERGENCY MEDICINE

## 2021-04-17 RX ORDER — CEPHALEXIN 500 MG/1
500 CAPSULE ORAL ONCE
Status: COMPLETED | OUTPATIENT
Start: 2021-04-17 | End: 2021-04-17

## 2021-04-17 RX ORDER — SULFAMETHOXAZOLE AND TRIMETHOPRIM 800; 160 MG/1; MG/1
1 TABLET ORAL ONCE
Status: COMPLETED | OUTPATIENT
Start: 2021-04-17 | End: 2021-04-17

## 2021-04-17 RX ORDER — ACETAMINOPHEN 500 MG
1000 TABLET ORAL ONCE
Status: COMPLETED | OUTPATIENT
Start: 2021-04-17 | End: 2021-04-17

## 2021-04-17 RX ORDER — CEPHALEXIN 500 MG/1
500 CAPSULE ORAL 4 TIMES DAILY
Qty: 28 CAPSULE | Refills: 0 | Status: SHIPPED | OUTPATIENT
Start: 2021-04-17 | End: 2021-04-26 | Stop reason: SDUPTHER

## 2021-04-17 RX ORDER — SULFAMETHOXAZOLE AND TRIMETHOPRIM 800; 160 MG/1; MG/1
1 TABLET ORAL 2 TIMES DAILY
Qty: 14 TABLET | Refills: 0 | Status: SHIPPED | OUTPATIENT
Start: 2021-04-17 | End: 2021-04-26 | Stop reason: SDUPTHER

## 2021-04-17 RX ADMIN — SULFAMETHOXAZOLE AND TRIMETHOPRIM 1 TABLET: 800; 160 TABLET ORAL at 05:37

## 2021-04-17 RX ADMIN — ACETAMINOPHEN 1000 MG: 500 TABLET, FILM COATED ORAL at 05:37

## 2021-04-17 RX ADMIN — CEPHALEXIN 500 MG: 500 CAPSULE ORAL at 05:37

## 2021-04-17 ASSESSMENT — ENCOUNTER SYMPTOMS
CHEST TIGHTNESS: 0
COLOR CHANGE: 1
NAUSEA: 0
ABDOMINAL PAIN: 0
VOMITING: 0
SHORTNESS OF BREATH: 0

## 2021-04-17 ASSESSMENT — PAIN DESCRIPTION - PAIN TYPE: TYPE: ACUTE PAIN

## 2021-04-17 NOTE — ED PROVIDER NOTES
[facility] ED  Emergency Department Encounter  EmergencyMedicine Attending     Pt Name:Chester Hernández  MRN: [id number]  Birthdate 1955  Date of evaluation: 4/17/21  PCP:  GAIL Hernandez CNP    CHIEF COMPLAINT       Chief Complaint   Patient presents with    Wound Infection     left upper arm, redness noted at site of blood glucose monitoring system       HISTORY OF PRESENT ILLNESS  (Location/Symptom, Timing/Onset, Context/Setting, Quality, Duration, Modifying Factors, Severity.)      Sarika Born is a 63 y.o. male who presents with erythema and warmth over his left upper arm. Patient has a glucose monitoring system that he has now taken out, erythema and warmth started at the site of the monitoring system that he has. No other complaints, localized 3 out of 10 pain at the site of erythema and warmth. No fevers or chills, no cough congestion runny nose, no body aches, no fatigue, no other complaints. Well-controlled diabetic otherwise as well. No allergies to any antibiotics. PAST MEDICAL / SURGICAL / SOCIAL / FAMILY HISTORY      has a past medical history of Abnormal cardiovascular stress test, CAD (coronary artery disease), Diabetes mellitus ([address] Diabetic retinopathy ([address] Hyperlipidemia, Hypertension, Meniere disease, MI (myocardial infarction) ([address] Obesity, Stroke ([address] Thrombocytopenia ([address] and TIA (transient ischemic attack). has a past surgical history that includes Coronary angioplasty with stent; Colonoscopy; and Cardiac catheterization (10/29/2020).     Social History     Socioeconomic History    Marital status: Single     Spouse name: Not on file    Number of children: Not on file    Years of education: Not on file    Highest education level: Not on file   Occupational History    Not on file   Social Needs    Financial resource strain: Not on file    Food insecurity     Worry: Not on file     Inability: Not on file   Sedimap needs Medical: Not on file     Non-medical: Not on file   Tobacco Use    Smoking status: Never Smoker    Smokeless tobacco: Never Used   Substance and Sexual Activity    Alcohol use: Not Currently    Drug use: Never    Sexual activity: Not Currently   Lifestyle    Physical activity     Days per week: Not on file     Minutes per session: Not on file    Stress: Not on file   Relationships    Social connections     Talks on phone: Not on file     Gets together: Not on file     Attends Sikhism service: Not on file     Active member of club or organization: Not on file     Attends meetings of clubs or organizations: Not on file     Relationship status: Not on file    Intimate partner violence     Fear of current or ex partner: Not on file     Emotionally abused: Not on file     Physically abused: Not on file     Forced sexual activity: Not on file   Other Topics Concern    Not on file   Social History Narrative    Not on file       Family History   Problem Relation Age of Onset    Heart Attack Mother     Diabetes Mother     Heart Disease Father     Prostate Cancer Father     Heart Attack Sister     Heart Attack Brother     Heart Attack Maternal Grandfather        Allergies:  Statins and Metformin and related    Home Medications:  Prior to Admission medications    Medication Sig Start Date End Date Taking?  Authorizing Provider   cephALEXin (KEFLEX) 500 MG capsule Take 1 capsule by mouth 4 times daily for 7 days 4/17/21 4/24/21 Yes Isai Dunne MD   sulfamethoxazole-trimethoprim (BACTRIM DS) 800-160 MG per tablet Take 1 tablet by mouth 2 times daily for 7 days 4/17/21 4/24/21 Yes Isai Dunne MD   LANTUS SOLOSTAR 100 UNIT/ML injection pen INJECT 56 UNITS SUBCUTANEOUSLY NIGHTLY 4/9/21   GAIL Diaz - CNP   polyethylene glycol [facility]) 17 GM/SCOOP powder  3/3/21   Historical Provider, MD   Continuous Blood Gluc Sensor (FREESTYLE [place]LOTTE 2 SENSOR) [facility]  3/10/21   Historical Provider, MD oxybutynin (DITROPAN XL) 10 MG extended release tablet Take 1 tablet by mouth every evening 3/31/21   Felix Velez PA-C   pantoprazole (PROTONIX) 40 MG tablet Take 1 tablet by mouth daily 2/12/21   GAIL Bales CNP   metFORMIN (GLUCOPHAGE-XR) 500 MG extended release tablet Take 500 mg by mouth 2 times daily (with meals) Regular Metformin caused diarrhea but XR is ok    Historical Provider, MD   tamsulosin (FLOMAX) 0.4 MG capsule Take 1 capsule by mouth every evening 1/28/21   GAIL Price CNP   VITAMIN D PO Take 1 tablet by mouth daily Patient uncertain of strength    Historical Provider, MD   Insulin Pen Needle 32G X 8 MM MISC by Does not apply route    Historical Provider, MD   ticagrelor (BRILINTA) 90 MG TABS tablet Take 1 tablet by mouth 2 times daily 1/8/21   GAIL Jack CNP   carvedilol (COREG) 6.25 MG tablet Take 1 tablet by mouth 2 times daily 1/4/21   Afia Angel MD   ondansetron (ZOFRAN) 4 MG tablet Take 1 tablet by mouth 3 times daily as needed for Nausea or Vomiting 11/24/20   GAIL Bales CNP   insulin NPH (NOVOLIN N) 100 UNIT/ML injection vial Inject into the skin nightly Pt mostly takes this at HS \"To help bring down my sugar. \" Pt states to take approx 30 Units nightly. Historical Provider, MD   Insulin Aspart FlexPen 100 UNIT/ML SOPN Inject 20 Units into the skin Pt to take as 20 units after each meal as sliding scale. Pt states \"The 20 Units are not usually enough. \"    Historical Provider, MD   meclizine (ANTIVERT) 25 MG tablet Take 25 mg by mouth 3 times daily as needed for Dizziness    Historical Provider, MD   warfarin (COUMADIN) 5 MG tablet Take 1 tablet by mouth daily  Patient not taking: Reported on 3/31/2021 10/16/20   Afia Angel MD   ezetimibe (ZETIA) 10 MG tablet Take 1 tablet by mouth daily 9/30/20   Afia Angel MD   blood glucose test strips (RELION GLUCOSE TEST STRIPS) strip Test qid.   Dx--E11.9 insulin dependent 9/29/20   Yamini Ards, APRN - CNP   magnesium (MAGNESIUM-OXIDE) 250 MG TABS tablet Take 500 mg by mouth every morning     Historical Provider, MD   Coenzyme Q10 (CO Q 10) 10 MG CAPS Take by mouth daily    Historical Provider, MD   PSYLLIUM HUSK PO Take 2 capsules by mouth Takes 2-3 capsules every morning    Historical Provider, MD   LECITHIN CONCENTRATE PO Take 2 tablets by mouth daily \"decrease cholesterol\"    Historical Provider, MD   glucose monitoring kit (FREESTYLE) monitoring kit 1 kit by Does not apply route daily TEST BLOOD SUGAR QID. WHATEVER METER IS COVERED BY GALVEZ. DIAGNOSIS E11.9 9/4/20   Lonn Ards, APRN - CNP   Lancets MISC TEST BLOOD SUGAR QID. WHATEVER METER IS COVERED BY GALVEZ. DIAGNOSIS E11.9 9/4/20   Lonn Ards, APRN - CNP   lisinopril (PRINIVIL;ZESTRIL) 10 MG tablet Take 1 tablet by mouth daily 9/3/20   Yamini Barakat APRN - CNP       REVIEW OF SYSTEMS    (2-9 systems for level 4, 10 or more for level 5)      Review of Systems   Constitutional: Negative for chills, fatigue and fever. Respiratory: Negative for chest tightness and shortness of breath. Cardiovascular: Negative for chest pain. Gastrointestinal: Negative for abdominal pain, nausea and vomiting. Genitourinary: Negative for dysuria. Skin: Positive for color change. Neurological: Negative for weakness and numbness. Psychiatric/Behavioral: Negative for confusion. PHYSICAL EXAM   (up to 7 for level 4, 8 or more for level 5)      INITIAL VITALS:   BP (!) 177/73   Pulse 70   Temp 97.6 °F (36.4 °C) (Temporal)   Resp 17   SpO2 96%     Physical Exam  Constitutional:       General: He is not in acute distress. Appearance: He is well-developed. HENT:      Head: Normocephalic and atraumatic. Cardiovascular:      Rate and Rhythm: Normal rate and regular rhythm. Heart sounds: Normal heart sounds. No murmur. No friction rub. No gallop.     Pulmonary:      Effort: Pulmonary effort is normal. No respiratory distress. Breath sounds: Normal breath sounds. No wheezing or rales. Abdominal:      General: There is no distension. Palpations: Abdomen is soft. Tenderness: There is no abdominal tenderness. There is no guarding or rebound. Musculoskeletal:         General: No tenderness. Skin:     General: Skin is warm and dry. Findings: Erythema present. Comments: 4 inch diameter area over the left tricep with erythema and warmth. No fluctuance on examination, small area of induration, bedside ultrasound however did not show any drainable abscess. DIFFERENTIAL  DIAGNOSIS     PLAN (LABS / IMAGING / EKG):  No orders of the defined types were placed in this encounter. MEDICATIONS ORDERED:  Orders Placed This Encounter   Medications    sulfamethoxazole-trimethoprim (BACTRIM DS;SEPTRA DS) 800-160 MG per tablet 1 tablet     Order Specific Question:   Antimicrobial Indications     Answer:   Skin and Soft Tissue Infection    cephALEXin (KEFLEX) capsule 500 mg     Order Specific Question:   Antimicrobial Indications     Answer:   Skin and Soft Tissue Infection    acetaminophen (TYLENOL) tablet 1,000 mg    cephALEXin (KEFLEX) 500 MG capsule     Sig: Take 1 capsule by mouth 4 times daily for 7 days     Dispense:  28 capsule     Refill:  0    sulfamethoxazole-trimethoprim (BACTRIM DS) 800-160 MG per tablet     Sig: Take 1 tablet by mouth 2 times daily for 7 days     Dispense:  14 tablet     Refill:  0       DDX: Cellulitis versus abscess    DIAGNOSTIC RESULTS / [address] [place] [address] / [facility]   :  No results found for this visit on 04/17/21. IMPRESSION: 62-year-old male who presents to the emergency department secondary to area of erythema and warmth that is consistent with cellulitis.   Very small area of induration as well, I did a point-of-care bedside ultrasound, no drainable pocket, only some cobblestoning seen on the ultrasound as you would expect with cellulitis. Started on antibiotics, otherwise normal vitals, afebrile, no tachycardia. Will have patient follow-up with PCP on an outpatient basis, return to ER with worsening symptoms otherwise. RADIOLOGY:  None    EKG  None    All EKG's are interpreted by the Emergency Department Physician who either signs or Co-signs this chart in the absence of a cardiologist.      PROCEDURES:  None    CONSULTS:  None    CRITICAL CARE:  None    FINAL IMPRESSION      1.  Cellulitis of left upper extremity          DISPOSITION / PLAN     DISPOSITION Decision To Discharge 04/17/2021 05:31:56 AM      PATIENT REFERRED TO:  Sera Proctor, [address] [place] [address] [address], [address]  [phone]    Call in 2 days        DISCHARGE MEDICATIONS:  New Prescriptions    CEPHALEXIN (KEFLEX) 500 MG CAPSULE    Take 1 capsule by mouth 4 times daily for 7 days    SULFAMETHOXAZOLE-TRIMETHOPRIM (BACTRIM DS) 800-160 MG PER TABLET    Take 1 tablet by mouth 2 times daily for 7 days       Jie Gilliam MD  Emergency Medicine Attending    (Please note that portions of thisnote were completed with a voice recognition program.  Efforts were made to edit the dictations but occasionally words are mis-transcribed.)        Jie Gilliam MD  04/18/21 [id number]

## 2021-04-21 ENCOUNTER — HOSPITAL ENCOUNTER (OUTPATIENT)
Dept: MRI IMAGING | Age: 66
Discharge: HOME | End: 2021-04-23
Payer: COMMERCIAL

## 2021-04-21 DIAGNOSIS — M25.511 ACUTE PAIN OF RIGHT SHOULDER: ICD-10-CM
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
## 2021-04-21 PROCEDURE — 73221 MRI JOINT UPR EXTREM W/O DYE: CPT

## 2021-04-29 ENCOUNTER — OFFICE VISIT (OUTPATIENT)
Dept: CARDIOLOGY | Age: 66
End: 2021-04-29
Payer: COMMERCIAL

## 2021-04-29 VITALS
OXYGEN SATURATION: 99 % | SYSTOLIC BLOOD PRESSURE: 126 MMHG | WEIGHT: 270 LBS | HEIGHT: 73 IN | BODY MASS INDEX: 35.78 KG/M2 | DIASTOLIC BLOOD PRESSURE: 74 MMHG | HEART RATE: 60 BPM | RESPIRATION RATE: 18 BRPM

## 2021-04-29 DIAGNOSIS — I25.5 ISCHEMIC CARDIOMYOPATHY: ICD-10-CM

## 2021-04-29 DIAGNOSIS — I25.10 ASHD (ARTERIOSCLEROTIC HEART DISEASE): Primary | ICD-10-CM

## 2021-04-29 DIAGNOSIS — E78.2 MIXED HYPERLIPIDEMIA: ICD-10-CM

## 2021-04-29 DIAGNOSIS — I10 ESSENTIAL HYPERTENSION: ICD-10-CM

## 2021-04-29 DIAGNOSIS — R42 LIGHTHEADED: ICD-10-CM

## 2021-04-29 PROCEDURE — G8427 DOCREV CUR MEDS BY ELIG CLIN: HCPCS | Performed by: INTERNAL MEDICINE

## 2021-04-29 PROCEDURE — G8417 CALC BMI ABV UP PARAM F/U: HCPCS | Performed by: INTERNAL MEDICINE

## 2021-04-29 PROCEDURE — 99214 OFFICE O/P EST MOD 30 MIN: CPT | Performed by: INTERNAL MEDICINE

## 2021-04-29 PROCEDURE — 3017F COLORECTAL CA SCREEN DOC REV: CPT | Performed by: INTERNAL MEDICINE

## 2021-04-29 PROCEDURE — 1036F TOBACCO NON-USER: CPT | Performed by: INTERNAL MEDICINE

## 2021-04-29 NOTE — PROGRESS NOTES
Bonnie Alvarez am scribing for and in the presence of Loretta Gillespie MD, F.A.C.C. Patient: Baljit Mccoy  : 1955  Date of Visit: 2021    REASON FOR VISIT / CONSULTATION: 3 Month Follow-Up (HX: CAD S/p Stent, Isc Cardiomyopathy, HLD. Pt states he is doing ok. Was in the ER on  for cellulitis. C/o; Lighteaded and dizziness that has not chagned. Denies: CP, Palpitaiotns, SOB.)      History of Present Illness:        Dear Maribel Hawkins, APRN - CNP    I had the pleasure of seeing Baljit Mccoy in my office today. Mr. Tor Naranjo is a 63 y.o. male presented for follow up. Patient has extensive medical history. He reported having multiple heart attacks with the first one at age 31 and the second one in , he had a cardiac cath and stent placed at that time. He followed up with his cardiologist up until 2020. Family history includes his siblings who have extensive heart disease including heart attacks and bypass surgery    History of ischemic cardiomyopathy secondary to multiple heart attacks, his most recent ejection fraction was 35% by echo back in 2020. He told me that he was offered defibrillator at some point but he was told that his heart function is good enough and no defibrillator needed. He has history of longstanding diabetes, hypertension and dyslipidemia he reported that he is not taking statin therapy because it does cause muscle pain. He said none of his family members were able to tolerate statins as well. He is lifelong non-smoker. He also reported having a history of Ménière's disease and chronic dizziness. He was admitted to [facility] from 2020 to 2020 because of acute stroke. As per discharge summary from [facility], NIH score was 2 for right upper and lower extremity dysmetria. He got up MRI of the brain, MRA of the head and neck and MRI of the cervical spine done.   Patient found to have multiple acute myocardial infarction. EF was 28%. Heart cath done on 10/29/2020: Severe three vessel coronary artery disease involving a 70% proximal and multiple 50-80% mid and distal LAD stenosis, 70-80% large mid OM1 stenosis and proximal 100% stenosis in the right coronary artery which did show left to right filling of a pretty good size PDA and PL branch of the RCA. Normal left ventricular end diastolic pressure. Patient is a strong believer of homeopathic medicine/chelation therapy. I clearly stated to him that he has left ventricular apical thrombus and that has caused his stroke but Dr. Booker Holly told him that this is not true and this is secondary to \"free radicals??\". He is also adamant about not taking any statins because of severe muscle pain. He said none of his family member will be able to tolerate statin therapy. He takes lecithin to dissolve the atherosclerotic plaque and he is a strong believer of this. Heart cath done on 1/7/2021 with Dr Negar Rich at [facility] - Successful PCI / Drug Eluting Stent of the mid LCX. Successful PCI / Drug Eluting Stent of the proximal LAD. Mr. Bryan Landeros is here today for a follow up he reports doing fairly well. He is still on his natural medications and not his Warfarin or Brillinta. He was recently in the ER on 4/17/2021 for cellulitis. He was wearing a dexatron for his sugar and he did [place] [facility] He may change his diabetic doctor due to just no agreeing with him at this time. He denied any shortness of breath. No chest pain, pressure or tightness. He can get some lightheaded/ dizzienss however this has been since his stoke and has not changed from last visit. He was seen by neurology and he was told that his headache is secondary to Ménière's disease in addition to his cerebellar stroke. No heart palpitations.  He denies any abdominal pain, bleeding problems, bowel issues, problems with his medications or any other concerns at this time. No pains in his legs or thighs when he walks around. He is able to do all his household chores with no exertional symptoms. PAST MEDICAL HISTORY:        Past Medical History:   Diagnosis Date    Abnormal cardiovascular stress test 10/2020    moderate perfusion defect of severe intensity in the apical anteroapical and inferoapical regions    CAD (coronary artery disease)     Diabetes mellitus ([facility]     Diabetic retinopathy ([facility] 11/05/2020    Hyperlipidemia     Hypertension     Meniere disease     MI ([facility] infarction) [facility]     MULTIPLE    Obesity     Stroke ([facility]     Thrombocytopenia ([facility]     TIA (transient ischemic attack)        CURRENT ALLERGIES: Statins and Metformin and related REVIEW OF SYSTEMS: 14 systems were reviewed. Pertinent positives and negatives as above, all else negative.      Past Surgical History:   Procedure Laterality Date    CARDIAC CATHETERIZATION  10/29/2020    Severe three vessel disease involving the LAD, circumflex and right coronary artery  /  DR Yaritza Kent  /  CT SURGERGY CONSULT    COLONOSCOPY      CORONARY ANGIOPLASTY WITH STENT PLACEMENT      Social History:  Social History     Tobacco Use    Smoking status: Never Smoker    Smokeless tobacco: Never Used   Substance Use Topics    Alcohol use: Not Currently    Drug use: Never        CURRENT MEDICATIONS:        Outpatient Medications Marked as Taking for the 4/29/21 encounter (Office Visit) with Moustapha Braun MD   Medication Sig Dispense Refill    cephALEXin (KEFLEX) 500 MG capsule Take 1 capsule by mouth 4 times daily for 7 days 28 capsule 0    sulfamethoxazole-trimethoprim (BACTRIM DS) 800-160 MG per tablet Take 1 tablet by mouth 2 times daily for 7 days 14 tablet 0    LANTUS SOLOSTAR 100 UNIT/ML injection pen INJECT 56 UNITS SUBCUTANEOUSLY NIGHTLY 15 mL 0    polyethylene glycol (GLYCOLAX) 17 GM/SCOOP powder       Continuous Blood Gluc Sensor (FREESTYLE CHARLOTTE 2 SENSOR) MISC       oxybutynin (DITROPAN XL) 10 MG extended release tablet Take 1 tablet by mouth every evening 30 tablet 5    pantoprazole (PROTONIX) 40 MG tablet Take 1 tablet by mouth daily 90 tablet 1    metFORMIN (GLUCOPHAGE-XR) 500 MG extended release tablet Take 500 mg by mouth 2 times daily (with meals) Regular Metformin caused diarrhea but XR is ok      tamsulosin (FLOMAX) 0.4 MG capsule Take 1 capsule by mouth every evening 90 capsule 3    VITAMIN D PO Take 1 tablet by mouth daily Patient uncertain of strength      Insulin Pen Needle 32G X 8 MM MISC by Does not apply route      carvedilol (COREG) 6.25 MG tablet Take 1 tablet by mouth 2 times daily 180 tablet 3    ondansetron (ZOFRAN) 4 MG tablet Take 1 tablet by mouth 3 times daily as needed for Nausea or Vomiting 30 tablet 0    insulin NPH (NOVOLIN N) 100 UNIT/ML injection vial Inject into the skin nightly Pt mostly takes this at HS \"To help bring down my sugar. \" Pt states to take approx 30 Units nightly.  Insulin Aspart FlexPen 100 UNIT/ML SOPN Inject 20 Units into the skin Pt to take as 20 units after each meal as sliding scale. Pt states \"The 20 Units are not usually enough. \"      meclizine (ANTIVERT) 25 MG tablet Take 25 mg by mouth 3 times daily as needed for Dizziness      blood glucose test strips (RELION GLUCOSE TEST STRIPS) strip Test qid. Dx--E11.9 insulin dependent 100 each 5    magnesium (MAGNESIUM-OXIDE) 250 MG TABS tablet Take 500 mg by mouth every morning       Coenzyme Q10 (CO Q 10) 10 MG CAPS Take by mouth daily      PSYLLIUM HUSK PO Take 2 capsules by mouth Takes 2-3 capsules every morning      LECITHIN CONCENTRATE PO Take 2 tablets by mouth daily \"decrease cholesterol\"      glucose monitoring kit (FREESTYLE) monitoring kit 1 kit by Does not apply route daily TEST BLOOD SUGAR QID. WHATEVER METER IS COVERED BY GALVEZ. DIAGNOSIS E11.9 1 kit 0    Lancets [facility] TEST BLOOD SUGAR QID. WHATEVER METER IS COVERED BY GALVEZ.  DIAGNOSIS E11.9 100 each 3    problems with statin therapy. Patient is a strong believer of chelation/homeopathic medicine. I told him that he has serious cardiovascular disease and his multiple strokes are clearly secondary to the left ventricular apical thrombus. I counseled the patient extensively to come to the emergency room immediately if he has any chest pain or worsening shortness of breath that this can be life-threatening. Patient verbalized understanding. Ischemic Cardiomyopathy:   Beta Blocker: Continue Carvedilol (Coreg) 6.25 mg twice daily. ACE Inibitor/ARB: Continue lisinopril 10 mg daily. Heart failure counseling: I advised them to try and keep their legs up whenever possible and to limit salt in their diet. Lightheadedness/dizziness: Could be secondary to his Meniere Disease versus cerebellar stroke. Nonpharmacologic counseling: Because of his condition, I reminded him to try and keep himself well-hydrated and to take extra time when moving from laying to sitting, sitting to standing and standing to walking. I also explained to him to help improve his symptoms he should include 3 g sodium diet, 1 or 2 L of sports drinks daily, knee-high compressions stockings. Essential Hypertension: Borderline controlled, patient said his blood pressures controlled at home. Beta Blocker: Continue Carvedilol (Coreg) 6.25 mg twice daily. ACE Inibitor/ARB: Continue lisinopril 10 mg daily. Hyperlipidemia: Mixed - Last LDL on 9/14/2020 was 119 mg/dL  As outlined above. In the meantime, I encouraged Mr. Claudia Carbajal to continue to take his other medications. FOLLOW UP:   I told Mr. Claudia Carbajal to call my office if he had any problems, but otherwise I asked him to Return in about 6 months (around 10/29/2021). However, I would be happy to see him sooner should the need arise. Sincerely,  Moustapha Corral MD, F.A.C.C.   [facility] Cardiology Specialist    [address] [facility] [facility], [address] [place] [address]  Phone: [phone], Fax: [phone]     I believe that the risk of significant morbidity and mortality related to the patient's current medical conditions are: intermediate-high. >30 minutes were spent during prep work, discussion and exam of the patient, and follow up documentation and all of their questions were answered. The documentation recorded by the scribe, accurately and completely reflects the services I personally performed and the decisions made by me. Kristine Barber MD, F.A.C.C.  April 29, 2021

## 2021-04-29 NOTE — PATIENT INSTRUCTIONS
SURVEY:    You may be receiving a survey from [employer] regarding your visit today. Please complete the survey to enable us to provide the highest quality of care to you and your family. If you cannot score us a very good on any question, please call the office to discuss how we could have made your experience a very good one. Thank you. Adrienne Castro was your MA today!

## 2021-05-11 ENCOUNTER — OFFICE VISIT (OUTPATIENT)
Dept: UROLOGY | Age: 66
End: 2021-05-11
Payer: COMMERCIAL

## 2021-05-11 ENCOUNTER — TELEPHONE (OUTPATIENT)
Dept: UROLOGY | Age: 66
End: 2021-05-11

## 2021-05-11 VITALS
HEART RATE: 66 BPM | BODY MASS INDEX: 36.28 KG/M2 | TEMPERATURE: 97.1 F | SYSTOLIC BLOOD PRESSURE: 144 MMHG | WEIGHT: 275 LBS | DIASTOLIC BLOOD PRESSURE: 77 MMHG

## 2021-05-11 DIAGNOSIS — N40.1 BPH WITH OBSTRUCTION/LOWER URINARY TRACT SYMPTOMS: Primary | ICD-10-CM

## 2021-05-11 DIAGNOSIS — K59.00 CONSTIPATION, UNSPECIFIED CONSTIPATION TYPE: ICD-10-CM

## 2021-05-11 DIAGNOSIS — R39.15 URINARY URGENCY: ICD-10-CM

## 2021-05-11 DIAGNOSIS — N39.41 URGE INCONTINENCE: ICD-10-CM

## 2021-05-11 DIAGNOSIS — N13.8 BPH WITH OBSTRUCTION/LOWER URINARY TRACT SYMPTOMS: Primary | ICD-10-CM

## 2021-05-11 DIAGNOSIS — R39.14 FEELING OF INCOMPLETE BLADDER EMPTYING: ICD-10-CM

## 2021-05-11 PROCEDURE — 1036F TOBACCO NON-USER: CPT | Performed by: PHYSICIAN ASSISTANT

## 2021-05-11 PROCEDURE — G8417 CALC BMI ABV UP PARAM F/U: HCPCS | Performed by: PHYSICIAN ASSISTANT

## 2021-05-11 PROCEDURE — 99215 OFFICE O/P EST HI 40 MIN: CPT | Performed by: PHYSICIAN ASSISTANT

## 2021-05-11 PROCEDURE — G8427 DOCREV CUR MEDS BY ELIG CLIN: HCPCS | Performed by: PHYSICIAN ASSISTANT

## 2021-05-11 PROCEDURE — 3017F COLORECTAL CA SCREEN DOC REV: CPT | Performed by: PHYSICIAN ASSISTANT

## 2021-05-11 PROCEDURE — 51798 US URINE CAPACITY MEASURE: CPT | Performed by: PHYSICIAN ASSISTANT

## 2021-05-11 RX ORDER — FINASTERIDE 5 MG/1
5 TABLET, FILM COATED ORAL DAILY
Qty: 30 TABLET | Refills: 11 | Status: SHIPPED | OUTPATIENT
Start: 2021-05-11 | End: 2022-06-13

## 2021-05-11 ASSESSMENT — ENCOUNTER SYMPTOMS
COLOR CHANGE: 0
NAUSEA: 0
COUGH: 0
EYE REDNESS: 0
SHORTNESS OF BREATH: 0
ABDOMINAL PAIN: 0
CONSTIPATION: 0
WHEEZING: 0
BACK PAIN: 0
VOMITING: 0

## 2021-05-11 NOTE — PATIENT INSTRUCTIONS
SURVEY:    You may be receiving a survey from [employer] regarding your visit today. Please complete the survey to enable us to provide the highest quality of care to you and your family. If you cannot score us a very good on any question, please call the office to discuss how we could have made your experience a very good one. Thank you.   Mariya

## 2021-05-11 NOTE — PROGRESS NOTES
HPI:      Patient is a 63 y.o. male in no acute distress. He is alert and oriented to person, place, and time. History  12/2020 Referral from YEN Mckinnon for LUTS. Complaints of urgency and urge incontinence. He has nocturia 0-2 times per night. He has frequency every 30 minutes to an hour during the day. He does change his underwear once per night due to the incontinence. He does have a weak stream and a split stream at the start of urination. He is uncircumcised, but denies any issues retracting his foreskin. He does have significant constipation and was recently in the ER for constipation. He also reports a history of fecal smearing. He will often go 4 more days without a bowel movement. While in the ER he was told he was not emptying his bladder. He did have a CT completed while in the ER on 11/2020 that showed no evidence of  calcifications or hydronephrosis. He is an uncontrolled insulin-dependent diabetic. He does consume a large amount of bladder irritants. He denies history of stones. He has never seen urology in the past.              Started miralax daily                 Started flomax daily                 Referred for colonoscopy    2/2021 - cysto - Extensive trilobar hyperplasia    Ditropan increased to XL 10mg    Today:   Patient presents today for medication follow-up, urgency, urge incontinence, constipation and BPH. At last visit patient's Ditropan XL was increased to 10 mg. He has been taking 5mg XL and 10mg XL for a total of 15mg. This is not what we discussed at the last visit. Patient continues to take Flomax. He does admit that his symptoms are better than what it was prior to him coming in to see us. He states he is now eating dark chocolate and has had some improvement in his urgency, he read that this would be helpful on the Internet. Ginger Qrueshi He takes a cap MiraLAX every day and achieved a bowel movement almost every day soft and easy to pass.  Patient voided - 50  mL, PVR - 153 mL. He wants to avoid surgery and would like to start another medication to \"shrink\" his prostate.        Past Medical History:   Diagnosis Date    Abnormal cardiovascular stress test 10/2020    moderate perfusion defect of severe intensity in the apical anteroapical and inferoapical regions    CAD (coronary artery disease)     [facility]     Diabetic retinopathy ([facility] 11/05/2020    Hyperlipidemia     Hypertension     Meniere disease     MI (myocardial infarction) ([facility]     MULTIPLE    Obesity     Stroke ([facility]     Thrombocytopenia ([facility]     TIA (transient ischemic attack)      Past Surgical History:   Procedure Laterality Date    CARDIAC CATHETERIZATION  10/29/2020    Severe three vessel disease involving the LAD, circumflex and right coronary artery  /  DR Vizcarra Ahr  /  CT SURGERGY CONSULT    COLONOSCOPY      CORONARY ANGIOPLASTY WITH STENT PLACEMENT       Outpatient Encounter Medications as of 5/11/2021   Medication Sig Dispense Refill    finasteride (PROSCAR) 5 MG tablet Take 1 tablet by mouth daily 30 tablet 11    LANTUS SOLOSTAR 100 UNIT/ML injection pen INJECT 56 UNITS SUBCUTANEOUSLY NIGHTLY (Patient taking differently: nightly 56-60 units) 15 mL 0    polyethylene glycol (GLYCOLAX) 17 GM/SCOOP powder       Continuous Blood Gluc Sensor (FREESTYLE CHARLOTTE 2 SENSOR) MISC       oxybutynin (DITROPAN XL) 10 MG extended release tablet Take 1 tablet by mouth every evening 30 tablet 5    pantoprazole (PROTONIX) 40 MG tablet Take 1 tablet by mouth daily 90 tablet 1    metFORMIN (GLUCOPHAGE-XR) 500 MG extended release tablet Take 500 mg by mouth daily (with breakfast) Regular Metformin caused diarrhea but XR is ok       tamsulosin (FLOMAX) 0.4 MG capsule Take 1 capsule by mouth every evening 90 capsule 3    VITAMIN D PO Take 1 tablet by mouth daily Patient uncertain of strength      Insulin Pen Needle 32G X 8 MM MISC by Does not apply route      insulin NPH (NOVOLIN N) 100 UNIT/ML injection vial Inject into the skin nightly Pt mostly takes this at HS \"To help bring down my sugar. \" Pt states to take approx 30 Units nightly. Takes prn only      Insulin Aspart FlexPen 100 UNIT/ML SOPN Inject 20 Units into the skin Pt to take as 20 units after each meal as sliding scale. Pt states \"The 20 Units are not usually enough. \"      blood glucose test strips (RELION GLUCOSE TEST STRIPS) strip Test qid. Dx--E11.9 insulin dependent 100 each 5    magnesium (MAGNESIUM-OXIDE) 250 MG TABS tablet Take 500 mg by mouth every morning       Coenzyme Q10 (CO Q 10) 10 MG CAPS Take by mouth daily      PSYLLIUM HUSK PO Take 2 capsules by mouth Takes 2-3 capsules every morning      LECITHIN CONCENTRATE PO Take 2 tablets by mouth daily \"decrease cholesterol\"      glucose monitoring kit (FREESTYLE) monitoring kit 1 kit by Does not apply route daily TEST BLOOD SUGAR QID. WHATEVER METER IS COVERED BY GALVEZ. DIAGNOSIS E11.9 1 kit 0    Lancets MISC TEST BLOOD SUGAR QID. WHATEVER METER IS COVERED BY GALVEZ. DIAGNOSIS E11.9 100 each 3    lisinopril (PRINIVIL;ZESTRIL) 10 MG tablet Take 1 tablet by mouth daily 90 tablet 0    carvedilol (COREG) 6.25 MG tablet Take 1 tablet by mouth 2 times daily (Patient not taking: Reported on 5/11/2021) 180 tablet 3    [DISCONTINUED] ondansetron (ZOFRAN) 4 MG tablet Take 1 tablet by mouth 3 times daily as needed for Nausea or Vomiting (Patient not taking: Reported on 5/3/2021) 30 tablet 0    meclizine (ANTIVERT) 25 MG tablet Take 25 mg by mouth 3 times daily as needed for Dizziness        No facility-administered encounter medications on file as of 5/11/2021.        Current Outpatient Medications on File Prior to Visit   Medication Sig Dispense Refill    LANTUS SOLOSTAR 100 UNIT/ML injection pen INJECT 56 UNITS SUBCUTANEOUSLY NIGHTLY (Patient taking differently: nightly 56-60 units) 15 mL 0    polyethylene glycol (GLYCOLAX) 17 GM/SCOOP powder       Continuous Blood Gluc Sensor (FREESTYLE CHARLOTTE 2 SENSOR) MISC       oxybutynin (DITROPAN XL) 10 MG extended release tablet Take 1 tablet by mouth every evening 30 tablet 5    pantoprazole (PROTONIX) 40 MG tablet Take 1 tablet by mouth daily 90 tablet 1    metFORMIN (GLUCOPHAGE-XR) 500 MG extended release tablet Take 500 mg by mouth daily (with breakfast) Regular Metformin caused diarrhea but XR is ok       tamsulosin (FLOMAX) 0.4 MG capsule Take 1 capsule by mouth every evening 90 capsule 3    VITAMIN D PO Take 1 tablet by mouth daily Patient uncertain of strength      Insulin Pen Needle 32G X 8 MM MISC by Does not apply route      insulin NPH (NOVOLIN N) 100 UNIT/ML injection vial Inject into the skin nightly Pt mostly takes this at HS \"To help bring down my sugar. \" Pt states to take approx 30 Units nightly. Takes prn only      Insulin Aspart FlexPen 100 UNIT/ML SOPN Inject 20 Units into the skin Pt to take as 20 units after each meal as sliding scale. Pt states \"The 20 Units are not usually enough. \"      blood glucose test strips (RELION GLUCOSE TEST STRIPS) strip Test qid. Dx--E11.9 insulin dependent 100 each 5    magnesium (MAGNESIUM-OXIDE) 250 MG TABS tablet Take 500 mg by mouth every morning       Coenzyme Q10 (CO Q 10) 10 MG CAPS Take by mouth daily      PSYLLIUM HUSK PO Take 2 capsules by mouth Takes 2-3 capsules every morning      LECITHIN CONCENTRATE PO Take 2 tablets by mouth daily \"decrease cholesterol\"      glucose monitoring kit (FREESTYLE) monitoring kit 1 kit by Does not apply route daily TEST BLOOD SUGAR QID. WHATEVER METER IS COVERED BY GALVEZ. DIAGNOSIS E11.9 1 kit 0    Lancets [facility] TEST BLOOD SUGAR QID. WHATEVER METER IS COVERED BY GALVEZ.  DIAGNOSIS E11.9 100 each 3    lisinopril (PRINIVIL;ZESTRIL) 10 MG tablet Take 1 tablet by mouth daily 90 tablet 0    carvedilol (COREG) 6.25 MG tablet Take 1 tablet by mouth 2 times daily (Patient not taking: Reported on 5/11/2021) 180 tablet 3    meclizine (ANTIVERT) 25 MG tablet Take 25 mg by mouth 3 times daily as needed for Dizziness        No current facility-administered medications on file prior to visit. Statins and Metformin and related  Family History   Problem Relation Age of Onset    Heart Attack Mother     Diabetes Mother     Heart Disease Father     Prostate Cancer Father     Heart Attack Sister     Heart Attack Brother     Heart Attack Maternal Grandfather      Social History     Tobacco Use   Smoking Status Never Smoker   Smokeless Tobacco Never Used       Social History     Substance and Sexual Activity   Alcohol Use Not Currently       Review of Systems   Constitutional: Negative for appetite change, chills and fever. Eyes: Negative for redness and visual disturbance. Respiratory: Negative for cough, shortness of breath and wheezing. Cardiovascular: Negative for chest pain and leg swelling. Gastrointestinal: Negative for abdominal pain, constipation, nausea and vomiting. Genitourinary: Positive for urgency. Negative for decreased urine volume, difficulty urinating, discharge, dysuria, enuresis, flank pain, frequency, hematuria, penile pain, scrotal swelling and testicular pain. Positive for post void dribbling   Musculoskeletal: Negative for back pain, joint swelling and myalgias. Skin: Negative for color change, rash and wound. Neurological: Negative for dizziness, tremors and numbness. Hematological: Negative for adenopathy. Does not bruise/bleed easily. BP (!) 144/77 (Site: Right Upper Arm, Position: Sitting, Cuff Size: Large Adult)   Pulse 66   Temp 97.1 °F (36.2 °C)   Wt 275 lb (124.7 kg)   BMI 36.28 kg/m²       PHYSICAL EXAM:  Constitutional: Patient in no acute distress; Neuro: alert and oriented to person place and time.     Psych: Mood and affect normal.  Skin: Normal  Lungs: Respiratory effort normal  Abdomen: Soft, non-tender, non-distended  Rectal: Deferred      Lab Results   Component Value Date    BUN 13 11/29/2020     Lab Results   Component Value Date    CREATININE 0.93 01/07/2021     Lab Results   Component Value Date    PSA 3.04 06/19/2020       ASSESSMENT:   Diagnosis Orders   1. BPH with obstruction/lower urinary tract symptoms  LA MEASUREMENT,POST-VOID RESIDUAL VOLUME BY US,NON-IMAGING    Basic Metabolic Panel   2. Urge incontinence     3. Urinary urgency     4. Feeling of incomplete bladder emptying     5. Constipation, unspecified constipation type         PLAN:  Patient did have an extensive amount of questions in regards to his urinary habits we did answer them for him. Did discuss with him PVP greenlight risks and benefits at this point he wishes to try another medication instead of proceeding with surgery. Continue Flomax    Continue MiraLAX daily    Continue Ditropan XL 10 mg - STOP TAKING 5mg XL dose -we will call pharmacy to tell them to stop filling his 5 mg dose    Start proscar - he aware that it can take upwards of 6 months to be fully effective, he is also aware that this can lower the PSA artificially      Follow-up in 2-3 months with BMP and a PVR    We will hold off on PSA until he has been on proscar     Spent 40 mins, >50% time face-to-face, discussing diagnoses, any applicable test results, treatment plan/options, and prognosis.

## 2021-05-24 ENCOUNTER — HOSPITAL ENCOUNTER (OUTPATIENT)
Age: 66
Discharge: HOME | End: 2021-05-24
Payer: COMMERCIAL

## 2021-05-24 DIAGNOSIS — E11.69 TYPE 2 DIABETES MELLITUS WITH OTHER SPECIFIED COMPLICATION, WITH LONG-TERM CURRENT USE OF INSULIN (HCC): ICD-10-CM

## 2021-05-24 DIAGNOSIS — Z79.4 TYPE 2 DIABETES MELLITUS WITH OTHER SPECIFIED COMPLICATION, WITH LONG-TERM CURRENT USE OF INSULIN (HCC): ICD-10-CM

## 2021-05-24 LAB
ALBUMIN SERPL-MCNC: 4.3 G/DL (ref 3.5–5.2)
ALBUMIN/GLOBULIN RATIO: 2 (ref 1–2.5)
ALP BLD-CCNC: 61 U/L (ref 40–129)
ALT SERPL-CCNC: 42 U/L (ref 5–41)
ANION GAP SERPL CALCULATED.3IONS-SCNC: 11 MMOL/L (ref 9–17)
AST SERPL-CCNC: 35 U/L
BILIRUB SERPL-MCNC: 0.75 MG/DL (ref 0.3–1.2)
BUN BLDV-MCNC: 15 MG/DL (ref 8–23)
BUN/CREAT BLD: 18 (ref 9–20)
CALCIUM SERPL-MCNC: 9.4 MG/DL (ref 8.6–10.4)
CHLORIDE BLD-SCNC: 102 MMOL/L (ref 98–107)
CO2: 27 MMOL/L (ref 20–31)
CREAT SERPL-MCNC: 0.85 MG/DL (ref 0.7–1.2)
GFR AFRICAN AMERICAN: >60 ML/MIN
GFR NON-AFRICAN AMERICAN: >60 ML/MIN
GFR SERPL CREATININE-BSD FRML MDRD: ABNORMAL ML/MIN/{1.73_M2}
GFR SERPL CREATININE-BSD FRML MDRD: ABNORMAL ML/MIN/{1.73_M2}
GLUCOSE BLD-MCNC: 182 MG/DL (ref 70–99)
POTASSIUM SERPL-SCNC: 3.8 MMOL/L (ref 3.7–5.3)
SODIUM BLD-SCNC: 140 MMOL/L (ref 135–144)
TOTAL PROTEIN: 6.5 G/DL (ref 6.4–8.3)

## 2021-05-24 PROCEDURE — 80053 COMPREHEN METABOLIC PANEL: CPT

## 2021-05-24 PROCEDURE — 36415 COLL VENOUS BLD VENIPUNCTURE: CPT

## 2021-06-02 NOTE — DISCHARGE SUMMARY
goals : 6 weeks  Long term goal 1: Patient to be independent and compliant with HEP. Long term goal 2: Patient to have improved R shoulder ARoM equal to L shoulder ROM all planes for improved mobility. Long term goal 3: Patient to have improved R UE strength 4/5 or greater all planes for improved stability for lifting tasks. Long term goal 4: Patient to report overall decrease in pain </=3/10 at worst for improved QOL.       Reason for Discharge  [] Goals Achieved                        []  Poor Follow Through/Attendance                  []  Optimal Function Achieved     [x]  Patient Discharged Self    []  Hospitalization                         []  Physician discharge      Thank you for this referral      Lelia Menjivar PT, DPT               Date: 6/2/2021

## 2021-06-23 ENCOUNTER — OFFICE VISIT (OUTPATIENT)
Dept: SURGERY | Age: 66
End: 2021-06-23
Payer: COMMERCIAL

## 2021-06-23 VITALS
HEIGHT: 73 IN | TEMPERATURE: 98 F | DIASTOLIC BLOOD PRESSURE: 88 MMHG | SYSTOLIC BLOOD PRESSURE: 147 MMHG | BODY MASS INDEX: 36.18 KG/M2 | HEART RATE: 85 BPM | WEIGHT: 273 LBS

## 2021-06-23 DIAGNOSIS — R10.9 LEFT SIDED ABDOMINAL PAIN: ICD-10-CM

## 2021-06-23 DIAGNOSIS — Z12.11 SCREEN FOR COLON CANCER: Primary | ICD-10-CM

## 2021-06-23 DIAGNOSIS — K59.04 CHRONIC IDIOPATHIC CONSTIPATION: ICD-10-CM

## 2021-06-23 PROCEDURE — 1036F TOBACCO NON-USER: CPT | Performed by: SURGERY

## 2021-06-23 PROCEDURE — 99214 OFFICE O/P EST MOD 30 MIN: CPT | Performed by: SURGERY

## 2021-06-23 PROCEDURE — 3017F COLORECTAL CA SCREEN DOC REV: CPT | Performed by: SURGERY

## 2021-06-23 PROCEDURE — G8427 DOCREV CUR MEDS BY ELIG CLIN: HCPCS | Performed by: SURGERY

## 2021-06-23 PROCEDURE — G8417 CALC BMI ABV UP PARAM F/U: HCPCS | Performed by: SURGERY

## 2021-06-23 NOTE — PROGRESS NOTES
GENERAL SURGERY CONSULTATION      Patient's Name/ Date of Birth/ Gender: Dotty Baca / 1955 (77 y.o.) / male     PCP: GAIL Goode CNP  Referring:     History of present Illness:  Patient is a 63 y.o. male. He had seen Dr. Abebe Berrios in the past. Notes reviewed. He wanted to see a different surgeon for today's issue of concern which is constipation. Labs, CT images, reports, notes all reviewed. He has history of stroke, MI, 2 heart stents, refuses blood thinners, takes natural/homeopathic medications that he orders online. He sees cardiology Dr. Maame Sanchez. Past Medical History:  has a past medical history of Abnormal cardiovascular stress test, CAD (coronary artery disease), Diabetes mellitus ([address] Diabetic retinopathy ([address] Hyperlipidemia, Hypertension, Meniere disease, MI (myocardial infarction) ([address] Obesity, Stroke ([address] Thrombocytopenia ([address] and TIA (transient ischemic attack). Past Surgical History:   Past Surgical History:   Procedure Laterality Date    CARDIAC CATHETERIZATION  10/29/2020    Severe three vessel disease involving the LAD, circumflex and right coronary artery  /  DR Garth King  /  CT SURGERGY CONSULT    COLONOSCOPY      CORONARY ANGIOPLASTY WITH STENT PLACEMENT         Social History:  reports that he has never smoked. He has never used smokeless tobacco. He reports previous alcohol use. He reports that he does not use drugs. Family History: family history includes Diabetes in his mother; Heart Attack in his brother, maternal grandfather, mother, and sister; Heart Disease in his father; Prostate Cancer in his father.     Review of Systems:   General: Completed and, except as mentioned above, was negative or noncontributory  Psychological:  Completed and, except as mentioned above, was negative or noncontributory  Ophthalmic:  Completed and, except as mentioned above, was negative or noncontributory  ENT:  Completed and, except as mentioned above, was negative or noncontributory  Allergy and Immunology:  Completed and, except as mentioned above, was negative or noncontributory  Hematological and Lymphatic:  Completed and, except as mentioned above, was negative or noncontributory  Endocrine: Completed and, except as mentioned above, was negative or noncontributory  Breast:  Completed and, except as mentioned above, was negative or noncontributory  Respiratory:  Completed and, except as mentioned above, was negative or noncontributory  Cardiovascular:  Completed and, except as mentioned above, was negative or noncontributory  Gastrointestinal: Completed and, except as mentioned above, was negative or noncontributory  Genito-Urinary:  Completed and, except as mentioned above, was negative or noncontributory  Musculoskeletal:  Completed and, except as mentioned above, was negative or noncontributory  Neurological:  Completed and, except as mentioned above, was negative or noncontributory  Dermatological:  Completed and, except as mentioned above, was negative or noncontributory    Allergies: Statins and Metformin and related    Current Meds:  Current Outpatient Medications:     LANTUS SOLOSTAR 100 UNIT/ML injection pen, INJECT 56 UNITS SUBCUTANEOUSLY NIGHTLY (Patient taking differently: nightly 56-60 units), Disp: 15 mL, Rfl: 0    polyethylene glycol (GLYCOLAX) 17 GM/SCOOP powder, , Disp: , Rfl:     pantoprazole (PROTONIX) 40 MG tablet, Take 1 tablet by mouth daily, Disp: 90 tablet, Rfl: 1    VITAMIN D PO, Take 1 tablet by mouth daily Patient uncertain of strength, Disp: , Rfl:     insulin NPH (NOVOLIN N) 100 UNIT/ML injection vial, Inject into the skin nightly Pt mostly takes this at HS \"To help bring down my sugar. \" Pt states to take approx 30 Units nightly. Takes prn only, Disp: , Rfl:     Insulin Aspart FlexPen 100 UNIT/ML SOPN, Inject 20 Units into the skin Pt to take as 20 units after each meal as sliding scale. Pt states \"The 20 Units are not usually enough. \", Disp: , Rfl:     magnesium (MAGNESIUM-OXIDE) 250 MG TABS tablet, Take 500 mg by mouth every morning , Disp: , Rfl:     Coenzyme Q10 (CO Q 10) 10 MG CAPS, Take by mouth daily, Disp: , Rfl:     PSYLLIUM HUSK PO, Take 2 capsules by mouth Takes 2-3 capsules every morning, Disp: , Rfl:     LECITHIN CONCENTRATE PO, Take 2 tablets by mouth daily \"decrease cholesterol\", Disp: , Rfl:     lisinopril (PRINIVIL;ZESTRIL) 10 MG tablet, Take 1 tablet by mouth daily, Disp: 90 tablet, Rfl: 0    finasteride (PROSCAR) 5 MG tablet, Take 1 tablet by mouth daily (Patient not taking: Reported on 5/24/2021), Disp: 30 tablet, Rfl: 11    Continuous Blood Gluc Sensor (FREESTYLE CHARLOTTE 2 SENSOR) MISC, , Disp: , Rfl:     oxybutynin (DITROPAN XL) 10 MG extended release tablet, Take 1 tablet by mouth every evening, Disp: 30 tablet, Rfl: 5    tamsulosin (FLOMAX) 0.4 MG capsule, Take 1 capsule by mouth every evening (Patient not taking: Reported on 6/23/2021), Disp: 90 capsule, Rfl: 3    Insulin Pen Needle 32G X 8 MM MISC, by Does not apply route, Disp: , Rfl:     carvedilol (COREG) 6.25 MG tablet, Take 1 tablet by mouth 2 times daily (Patient not taking: Reported on 5/11/2021), Disp: 180 tablet, Rfl: 3    meclizine (ANTIVERT) 25 MG tablet, Take 25 mg by mouth 3 times daily as needed for Dizziness  (Patient not taking: Reported on 5/24/2021), Disp: , Rfl:     blood glucose test strips (RELION GLUCOSE TEST STRIPS) strip, Test qid. Dx--E11.9 insulin dependent, Disp: 100 each, Rfl: 5    glucose monitoring kit (FREESTYLE) monitoring kit, 1 kit by Does not apply route daily TEST BLOOD SUGAR QID. WHATEVER METER IS COVERED BY GALVEZ. DIAGNOSIS E11.9, Disp: 1 kit, Rfl: 0    Lancets MISC, TEST BLOOD SUGAR QID. WHATEVER METER IS COVERED BY GALVEZ. DIAGNOSIS E11.9, Disp: 100 each, Rfl: 3    Physical Exam:  Vital signs and Nurse's note reviewed.  BP (!) 147/88   Pulse 85   Temp 98 °F (36.7 °C)   Ht 6' 1\" (1.854 m)   Wt 273 lb (123.8 kg)   BMI 36.02 kg/m²    height is 6' 1\" (1.854 m) and weight is 273 lb (123.8 kg). His temperature is 98 °F (36.7 °C). His blood pressure is 147/88 (abnormal) and his pulse is 85. Gen:  A&Ox3, NAD. Pleasant and cooperative. HEENT: PERRLA, EOMI, no scleral icterus  Neck:  no goiter  CVS: Regular rate and rhythm  Resp: Good bilateral air entry, no active wheezing, no labored breathing  Abd: soft, non-tender, non-distended, bowel sounds present, rectal exam to be done at scope, [facility], diastasis recti, no acute issues, no peritonitis  Ext: Moves all extremities, no gross focal motor deficits  Skin: No erythema or ulcerations     Labs:   Lab Results   Component Value Date    WBC 6.5 11/29/2020    HGB 17.1 11/29/2020    HCT 45.9 11/29/2020    MCV 91.3 11/29/2020    PLT 74 01/07/2021     Lab Results   Component Value Date     05/24/2021    K 3.8 05/24/2021     05/24/2021    CO2 27 05/24/2021    BUN 15 05/24/2021    CREATININE 0.85 05/24/2021    GLUCOSE 182 05/24/2021    GLUCOSE 128 09/14/2020    CALCIUM 9.4 05/24/2021     Lab Results   Component Value Date    ALKPHOS 61 05/24/2021    ALT 42 05/24/2021    AST 35 05/24/2021    PROT 6.5 05/24/2021    BILITOT 0.75 05/24/2021    LABALBU 4.3 05/24/2021     No results found for: AMYLASE  Lab Results   Component Value Date    LIPASE 28 11/29/2020     Lab Results   Component Value Date    INR 2.5 02/04/2021       Radiologic Studies:  EXAMINATION:   CT OF THE ABDOMEN AND PELVIS WITH CONTRAST 11/29/2020 12:58 pm       TECHNIQUE:   CT of the abdomen and pelvis was performed with the administration of   intravenous contrast. Multiplanar reformatted images are provided for review.    Dose modulation, iterative reconstruction, and/or weight based adjustment of   the mA/kV was utilized to reduce the radiation dose to as low as reasonably   achievable.       COMPARISON:   None.       HISTORY:   ORDERING SYSTEM PROVIDED HISTORY: abd pain   TECHNOLOGIST PROVIDED HISTORY: abd pain           FINDINGS:   Lower Chest: The visualized lung bases are clear.  Sequela of left   ventricular apical infarct with associated sequestered thrombus.  Coronary   calcified atheromatous plaque. .       Organs: Findings suggestive of mild hepatic steatosis.  Small calcified   gallstone.  No gallbladder wall thickening or biliary dilatation.  The   pancreas, spleen, adrenals and kidneys reveal no acute findings.       GI/Bowel: Large amount stool burden throughout the colon to the level the   rectum.  There is mild pericolonic inflammatory change in the descending   colon level.  Few scattered diverticula.  No significant wall thickening. The small bowel is decompressed.  Normal appendix extends into the right   inguinal hernia.       Pelvis: Prostatomegaly.  The Avalos catheter decompresses the bladder.       Peritoneum/Retroperitoneum: No free air or free fluid.  The aorta is normal   in caliber.  The visceral branches are patent. No lymphadenopathy.       Bones/Soft Tissues: Bilateral inguinal hernias.  The appendix extends into   the right inguinal hernia, as above.  Mild lower lumbar degenerative disc   disease and mild degenerative change in the hips.           Impression   1.  Large amount stool burden throughout the colon to the rectum.  Mild   inflammatory change around the descending colon suggestive of nonspecific   colitis.       2.  Few scattered colonic diverticula.       3.  Sequela of left ventricular apical infarct with associated sequestered   thrombus.       4.  Cholelithiasis.       5.  Prostatomegaly.  Avalos catheter decompresses the bladder.       6.  Right inguinal hernia containing the normal appendix.             Impressions/Recommendations:     Constipation. Due for screening colonoscopy. CT above reviewed, this was from 2020- no acute physical findings of urgency. Risks and benefits of colonoscopy have been discussed in detail with the patient.   Risks of the procedure include bleeding, bowel perforation, possibly missing smaller lesions if the bowel prep is not adequate. Alternative studies include barium enema and virtual colonoscopy; however, if a lesion is seen, then one would still need to proceed with the gold standard colonoscopy to retrieve or biopsy the lesion. All the patient's questions are answered. Will proceed with colonoscopy under MAC. 45 minutes spent with the patient and more than 50% of that time was face to face and spent on counseling and/or coordination/planning of testing and/or procedures as outlined above (excluding exam, excluding imaging reviewed).       Alvin Dennis DO, MPH, [address] [place] [address], [address] [place] [address] office [phone]  [place] office [phone]

## 2021-06-30 ENCOUNTER — TELEPHONE (OUTPATIENT)
Dept: NEUROSURGERY | Age: 66
End: 2021-06-30

## 2021-06-30 NOTE — TELEPHONE ENCOUNTER
Patient called wondering the status of his disability paperwork. Patient stated the deadline for the paperwork is near.

## 2021-07-06 NOTE — TELEPHONE ENCOUNTER
Patient was last seen 1 year ago.  Will discuss with my attending sneha we should do the FMLA or defer to his PCP

## 2021-07-06 NOTE — TELEPHONE ENCOUNTER
Patient called again inquiring about the status of his [place]  Paperwork. Patient states he will not be returning to work and will be on disability for the remainder of his life.

## 2021-07-07 ENCOUNTER — TELEPHONE (OUTPATIENT)
Dept: UROLOGY | Age: 66
End: 2021-07-07

## 2021-07-08 DIAGNOSIS — N13.8 BPH WITH OBSTRUCTION/LOWER URINARY TRACT SYMPTOMS: ICD-10-CM

## 2021-07-08 DIAGNOSIS — N40.1 BPH WITH OBSTRUCTION/LOWER URINARY TRACT SYMPTOMS: ICD-10-CM

## 2021-07-12 ENCOUNTER — OFFICE VISIT (OUTPATIENT)
Dept: UROLOGY | Age: 66
End: 2021-07-12
Payer: COMMERCIAL

## 2021-07-12 VITALS
DIASTOLIC BLOOD PRESSURE: 77 MMHG | HEART RATE: 89 BPM | SYSTOLIC BLOOD PRESSURE: 136 MMHG | BODY MASS INDEX: 35.89 KG/M2 | WEIGHT: 272 LBS

## 2021-07-12 DIAGNOSIS — R39.15 URINARY URGENCY: ICD-10-CM

## 2021-07-12 DIAGNOSIS — N13.8 BPH WITH OBSTRUCTION/LOWER URINARY TRACT SYMPTOMS: Primary | ICD-10-CM

## 2021-07-12 DIAGNOSIS — R39.14 FEELING OF INCOMPLETE BLADDER EMPTYING: ICD-10-CM

## 2021-07-12 DIAGNOSIS — N40.1 BPH WITH OBSTRUCTION/LOWER URINARY TRACT SYMPTOMS: Primary | ICD-10-CM

## 2021-07-12 DIAGNOSIS — K59.00 CONSTIPATION, UNSPECIFIED CONSTIPATION TYPE: ICD-10-CM

## 2021-07-12 PROCEDURE — G8427 DOCREV CUR MEDS BY ELIG CLIN: HCPCS | Performed by: UROLOGY

## 2021-07-12 PROCEDURE — 99213 OFFICE O/P EST LOW 20 MIN: CPT | Performed by: UROLOGY

## 2021-07-12 PROCEDURE — 1036F TOBACCO NON-USER: CPT | Performed by: UROLOGY

## 2021-07-12 PROCEDURE — G8417 CALC BMI ABV UP PARAM F/U: HCPCS | Performed by: UROLOGY

## 2021-07-12 PROCEDURE — 3017F COLORECTAL CA SCREEN DOC REV: CPT | Performed by: UROLOGY

## 2021-07-12 PROCEDURE — 51798 US URINE CAPACITY MEASURE: CPT | Performed by: UROLOGY

## 2021-07-12 RX ORDER — VITAMIN E 268 MG
400 CAPSULE ORAL DAILY
COMMUNITY
End: 2022-01-12

## 2021-07-12 NOTE — PROGRESS NOTES
HPI:          Patient is a 63 y.o. male in no acute distress. He is alert and oriented to person, place, and time. History  12/2020 Referral from [facility] for LUTS.  Complaints of urgency and urge incontinence.  He has nocturia 0-2 times per night.  He has frequency every 30 minutes to an hour during the day. Larisa Laurent does change his underwear once per night due to the incontinence. Larisa Laurent does have a weak stream and a split stream at the start of urination. Larisa Laurent is uncircumcised, but denies any issues retracting his foreskin. Larisa Laurent does have significant constipation and was recently in the ER for constipation. He also reports a history of fecal smearing. He will often go 4 more days without a bowel movement.  While in the ER he was told he was not emptying his bladder.  He did have a CT completed while in the ER on 11/2020 that showed no evidence of  calcifications or hydronephrosis.  He is an uncontrolled insulin-dependent diabetic. Larisa Laurent does consume a large amount of bladder irritants.  He denies history of stones. Larisa Laurent has never seen urology in the past.              Started miralax daily                 Started flomax daily                 Referred for colonoscopy     2/2021 - cysto - Extensive trilobar hyperplasia     Ditropan increased to XL 10mg    Currently  Patient is here today for 3-month follow-up. We had discussed surgery with the patient in the past.  We did increase his Ditropan to 10 mg XL. We also started him on finasteride. We also had him use MiraLAX for bowel movements. Patient did get a recent BMP. This did not show any abnormalities. Patient does continue to take MiraLAX as well as both medications prescribed. He does feel that he is doing better. He also made mention that he is taking a supplement for prostate health. He does feel that the supplement is what is making him better.   I do think that may be the daily bowel movements in the combination of medications and avoiding bladder irritants probably has something to do with it.     Past Medical History:   Diagnosis Date    Abnormal cardiovascular stress test 10/2020    moderate perfusion defect of severe intensity in the apical anteroapical and inferoapical regions    CAD (coronary artery disease)     [facility]     Diabetic retinopathy [facility] 11/05/2020    Hyperlipidemia     Hypertension     Meniere disease     [facility] infarction) [facility]     MULTIPLE    Obesity     Stroke [facility]     Thrombocytopenia ([facility]     TIA (transient ischemic attack)      Past Surgical History:   Procedure Laterality Date    CARDIAC CATHETERIZATION  10/29/2020    Severe three vessel disease involving the LAD, circumflex and right coronary artery  /  DR Romulo Ponce  /  CT SURGERGY CONSULT    COLONOSCOPY      CORONARY ANGIOPLASTY WITH STENT PLACEMENT       Outpatient Encounter Medications as of 7/12/2021   Medication Sig Dispense Refill    finasteride (PROSCAR) 5 MG tablet Take 1 tablet by mouth daily (Patient not taking: Reported on 5/24/2021) 30 tablet 11    LANTUS SOLOSTAR 100 UNIT/ML injection pen INJECT 56 UNITS SUBCUTANEOUSLY NIGHTLY (Patient taking differently: nightly 56-60 units) 15 mL 0    polyethylene glycol (GLYCOLAX) 17 GM/SCOOP powder       Continuous Blood Gluc Sensor (FREESTYLE CHARLOTTE 2 SENSOR) MISC       oxybutynin (DITROPAN XL) 10 MG extended release tablet Take 1 tablet by mouth every evening 30 tablet 5    pantoprazole (PROTONIX) 40 MG tablet Take 1 tablet by mouth daily 90 tablet 1    tamsulosin (FLOMAX) 0.4 MG capsule Take 1 capsule by mouth every evening (Patient not taking: Reported on 6/23/2021) 90 capsule 3    VITAMIN D PO Take 1 tablet by mouth daily Patient uncertain of strength      Insulin Pen Needle 32G X 8 MM MISC by Does not apply route      carvedilol (COREG) 6.25 MG tablet Take 1 tablet by mouth 2 times daily (Patient not taking: Reported on 5/11/2021) 180 tablet 3    insulin NPH (NOVOLIN N) 100 UNIT/ML injection vial Inject into the skin nightly Pt mostly takes this at HS \"To help bring down my sugar. \" Pt states to take approx 30 Units nightly. Takes prn only      Insulin Aspart FlexPen 100 UNIT/ML SOPN Inject 20 Units into the skin Pt to take as 20 units after each meal as sliding scale. Pt states \"The 20 Units are not usually enough. \"      meclizine (ANTIVERT) 25 MG tablet Take 25 mg by mouth 3 times daily as needed for Dizziness  (Patient not taking: Reported on 5/24/2021)      blood glucose test strips (RELION GLUCOSE TEST STRIPS) strip Test qid. Dx--E11.9 insulin dependent 100 each 5    magnesium (MAGNESIUM-OXIDE) 250 MG TABS tablet Take 500 mg by mouth every morning       Coenzyme Q10 (CO Q 10) 10 MG CAPS Take by mouth daily      PSYLLIUM HUSK PO Take 2 capsules by mouth Takes 2-3 capsules every morning      LECITHIN CONCENTRATE PO Take 2 tablets by mouth daily \"decrease cholesterol\"      glucose monitoring kit (FREESTYLE) monitoring kit 1 kit by Does not apply route daily TEST BLOOD SUGAR QID. WHATEVER METER IS COVERED BY GALVEZ. DIAGNOSIS E11.9 1 kit 0    Lancets MISC TEST BLOOD SUGAR QID. WHATEVER METER IS COVERED BY GALVEZ. DIAGNOSIS E11.9 100 each 3    lisinopril (PRINIVIL;ZESTRIL) 10 MG tablet Take 1 tablet by mouth daily 90 tablet 0     No facility-administered encounter medications on file as of 7/12/2021.       Current Outpatient Medications on File Prior to Visit   Medication Sig Dispense Refill    finasteride (PROSCAR) 5 MG tablet Take 1 tablet by mouth daily (Patient not taking: Reported on 5/24/2021) 30 tablet 11    LANTUS SOLOSTAR 100 UNIT/ML injection pen INJECT 56 UNITS SUBCUTANEOUSLY NIGHTLY (Patient taking differently: nightly 56-60 units) 15 mL 0    polyethylene glycol (GLYCOLAX) 17 GM/SCOOP powder       Continuous Blood Gluc Sensor (FREESTYLE CHARLOTTE 2 SENSOR) MISC       oxybutynin (DITROPAN XL) 10 MG extended release tablet Take 1 tablet by mouth every evening 30 tablet 5    pantoprazole (PROTONIX) 40 MG tablet Take 1 tablet by mouth daily 90 tablet 1    tamsulosin (FLOMAX) 0.4 MG capsule Take 1 capsule by mouth every evening (Patient not taking: Reported on 6/23/2021) 90 capsule 3    VITAMIN D PO Take 1 tablet by mouth daily Patient uncertain of strength      Insulin Pen Needle 32G X 8 MM MISC by Does not apply route      carvedilol (COREG) 6.25 MG tablet Take 1 tablet by mouth 2 times daily (Patient not taking: Reported on 5/11/2021) 180 tablet 3    insulin NPH (NOVOLIN N) 100 UNIT/ML injection vial Inject into the skin nightly Pt mostly takes this at HS \"To help bring down my sugar. \" Pt states to take approx 30 Units nightly. Takes prn only      Insulin Aspart FlexPen 100 UNIT/ML SOPN Inject 20 Units into the skin Pt to take as 20 units after each meal as sliding scale. Pt states \"The 20 Units are not usually enough. \"      meclizine (ANTIVERT) 25 MG tablet Take 25 mg by mouth 3 times daily as needed for Dizziness  (Patient not taking: Reported on 5/24/2021)      blood glucose test strips (RELION GLUCOSE TEST STRIPS) strip Test qid. Dx--E11.9 insulin dependent 100 each 5    magnesium (MAGNESIUM-OXIDE) 250 MG TABS tablet Take 500 mg by mouth every morning       Coenzyme Q10 (CO Q 10) 10 MG CAPS Take by mouth daily      PSYLLIUM HUSK PO Take 2 capsules by mouth Takes 2-3 capsules every morning      LECITHIN CONCENTRATE PO Take 2 tablets by mouth daily \"decrease cholesterol\"      glucose monitoring kit (FREESTYLE) monitoring kit 1 kit by Does not apply route daily TEST BLOOD SUGAR QID. WHATEVER METER IS COVERED BY GALVEZ. DIAGNOSIS E11.9 1 kit 0    Lancets [facility] TEST BLOOD SUGAR QID. WHATEVER METER IS COVERED BY GALVEZ. DIAGNOSIS E11.9 100 each 3    lisinopril (PRINIVIL;ZESTRIL) 10 MG tablet Take 1 tablet by mouth daily 90 tablet 0     No current facility-administered medications on file prior to visit.      Statins and Metformin and related  Family History Problem Relation Age of Onset    Heart Attack Mother     Diabetes Mother     Heart Disease Father     Prostate Cancer Father     Heart Attack Sister     Heart Attack Brother     Heart Attack Maternal Grandfather      Social History     Tobacco Use   Smoking Status Never Smoker   Smokeless Tobacco Never Used       Social History     Substance and Sexual Activity   Alcohol Use Not Currently       Review of Systems    There were no vitals taken for this visit. PHYSICAL EXAM:  Constitutional: Patient in no acute distress; Neuro: alert and oriented to person place and time. Psych: Mood and affect normal.  Skin: Normal  Lungs: Respiratory effort normal  Cardiovascular:  Normal peripheral pulses  Abdomen: Soft, non-tender, non-distended with no CVA, flank pain  Bladder non-tender and not distended. Lymphatics: no palpable lymphadenopathy  Penis normal  Urethral meatus normal  Scrotal exam normal  Testicles normal bilaterally  Epididymis normal bilaterally  No evidence of inguinal hernia  Anus and perineum normal  Normal rectal tone with no masses  Prostate soft, non-tender to palpation. No palpable nodules. Estimated 40 grams. Seminal vesicles not palpable. Lab Results   Component Value Date    BUN 15 05/24/2021     Lab Results   Component Value Date    CREATININE 0.85 05/24/2021     Lab Results   Component Value Date    PSA 3.04 06/19/2020       ASSESSMENT:  This is a 63 y.o. male with the following diagnoses:   Diagnosis Orders   1. BPH with obstruction/lower urinary tract symptoms  [place] MEASUREMENT,POST-VOID RESIDUAL VOLUME BY US,NON-IMAGING   2. Urinary urgency  [place] MEASUREMENT,POST-VOID RESIDUAL VOLUME BY US,NON-IMAGING   3. Feeling of incomplete bladder emptying  [place] MEASUREMENT,POST-VOID RESIDUAL VOLUME BY US,NON-IMAGING   4. Constipation, unspecified constipation type  [place] MEASUREMENT,POST-VOID RESIDUAL VOLUME BY US,NON-IMAGING       PLAN:  Maintain current medications.   He will follow-up with us in 6 months. He will have a repeat PSA done at this point time that we will review.

## 2021-07-19 ENCOUNTER — OFFICE VISIT (OUTPATIENT)
Dept: NEUROLOGY | Age: 66
End: 2021-07-19
Payer: COMMERCIAL

## 2021-07-19 VITALS
WEIGHT: 269 LBS | RESPIRATION RATE: 18 BRPM | TEMPERATURE: 98 F | HEART RATE: 93 BPM | BODY MASS INDEX: 36.44 KG/M2 | SYSTOLIC BLOOD PRESSURE: 184 MMHG | DIASTOLIC BLOOD PRESSURE: 97 MMHG | HEIGHT: 72 IN

## 2021-07-19 DIAGNOSIS — G89.29 CHRONIC NECK PAIN: ICD-10-CM

## 2021-07-19 DIAGNOSIS — M54.2 CHRONIC NECK PAIN: ICD-10-CM

## 2021-07-19 DIAGNOSIS — I69.393 ATAXIA DUE TO RECENT STROKE: Primary | ICD-10-CM

## 2021-07-19 PROCEDURE — G8427 DOCREV CUR MEDS BY ELIG CLIN: HCPCS | Performed by: NEUROMUSCULOSKELETAL MEDICINE, SPORTS MEDICINE

## 2021-07-19 PROCEDURE — 1036F TOBACCO NON-USER: CPT | Performed by: NEUROMUSCULOSKELETAL MEDICINE, SPORTS MEDICINE

## 2021-07-19 PROCEDURE — 99212 OFFICE O/P EST SF 10 MIN: CPT | Performed by: NEUROMUSCULOSKELETAL MEDICINE, SPORTS MEDICINE

## 2021-07-19 PROCEDURE — G8417 CALC BMI ABV UP PARAM F/U: HCPCS | Performed by: NEUROMUSCULOSKELETAL MEDICINE, SPORTS MEDICINE

## 2021-07-19 PROCEDURE — 3017F COLORECTAL CA SCREEN DOC REV: CPT | Performed by: NEUROMUSCULOSKELETAL MEDICINE, SPORTS MEDICINE

## 2021-07-19 RX ORDER — ATORVASTATIN CALCIUM 80 MG/1
80 TABLET, FILM COATED ORAL DAILY
COMMUNITY
End: 2022-01-12

## 2021-07-19 RX ORDER — CLOPIDOGREL BISULFATE 75 MG/1
75 TABLET ORAL DAILY
COMMUNITY
End: 2022-01-12

## 2021-07-19 NOTE — PROGRESS NOTES
involuntary movement: present, Speech difficulty: present, Headache: present, Light sensitivity: absent   Psychiatric Anxiety: absent, Depression  absent, drug abuse: absent, Hallucination: absent, mood disorder: absent, Suicidal ideations absent   Hematologic Abnormal bleeding: absent, Anemia: absent, Lymph gland changes: absent Clotting disorder: absent     Past Medical History:   Diagnosis Date    Abnormal cardiovascular stress test 10/2020    moderate perfusion defect of severe intensity in the apical anteroapical and inferoapical regions    CAD (coronary artery disease)     [facility]     Diabetic retinopathy ([facility] 11/05/2020    Hyperlipidemia     Hypertension     Meniere disease     [facility]     MULTIPLE    Obesity     Stroke [facility]     Thrombocytopenia ([facility]     TIA (transient ischemic attack)        Past Surgical History:   Procedure Laterality Date    CARDIAC CATHETERIZATION  10/29/2020    Severe three vessel disease involving the LAD, circumflex and right coronary artery  /  DR Urena Areas  /  CT SURGERGY CONSULT    COLONOSCOPY      CORONARY ANGIOPLASTY WITH STENT PLACEMENT         Social History     Socioeconomic History    Marital status: Single     Spouse name: Not on file    Number of children: Not on file    Years of education: Not on file    Highest education level: Not on file   Occupational History    Not on file   Tobacco Use    Smoking status: Never Smoker    Smokeless tobacco: Never Used   Vaping Use    Vaping Use: Never used   Substance and Sexual Activity    Alcohol use: Not Currently    Drug use: Never    Sexual activity: Not Currently   Other Topics Concern    Not on file   Social History Narrative    Not on file     Social Determinants of Health     Financial Resource Strain: Low Risk     Difficulty of Paying Living Expenses: Not hard at all   Food Insecurity: No Food Insecurity    Worried About Running Out of Food in the Last Year: Never true    Ran Out of Food in the Last Year: Never true   Transportation Needs: No Transportation Needs    Lack of Transportation (Medical): No    Lack of Transportation (Non-Medical):  No   Physical Activity: Inactive    Days of Exercise per Week: 0 days    Minutes of Exercise per Session: 0 min   Stress:     Feeling of Stress :    Social Connections:     Frequency of Communication with Friends and Family:     Frequency of Social Gatherings with Friends and Family:     Attends Jain Services:     Active Member of Clubs or Organizations:     Attends Club or Organization Meetings:     Marital Status:    Intimate Partner Violence:     Fear of Current or Ex-Partner:     Emotionally Abused:     Physically Abused:     Sexually Abused:        Family History   Problem Relation Age of Onset    Heart Attack Mother     Diabetes Mother     Heart Disease Father     Prostate Cancer Father     Heart Attack Sister     Heart Attack Brother     Heart Attack Maternal Grandfather        Current Outpatient Medications   Medication Sig Dispense Refill    clopidogrel (PLAVIX) 75 MG tablet Take 75 mg by mouth daily      metFORMIN (GLUCOPHAGE) 1000 MG tablet Take 1,000 mg by mouth 2 times daily (with meals)      atorvastatin (LIPITOR) 80 MG tablet Take 80 mg by mouth daily      NONFORMULARY [facility] HRT, suppliment for heart      VITAMIN A PO Take by mouth      vitamin E 400 UNIT capsule Take 400 Units by mouth daily      NONFORMULARY Prostate suppliment BID      finasteride (PROSCAR) 5 MG tablet Take 1 tablet by mouth daily 30 tablet 11    LANTUS SOLOSTAR 100 UNIT/ML injection pen INJECT 56 UNITS SUBCUTANEOUSLY NIGHTLY (Patient taking differently: nightly 56-60 units) 15 mL 0    polyethylene glycol (GLYCOLAX) 17 GM/SCOOP powder       Continuous Blood Gluc Sensor (FREESTYLE CHARLOTTE 2 SENSOR) MISC       oxybutynin (DITROPAN XL) 10 MG extended release tablet Take 1 tablet by mouth every evening 30 PLT 74 (L) 01/07/2021    CHOL 199 09/14/2020    TRIG 180 (H) 09/14/2020    HDL 44 09/14/2020    ALT 42 (H) 05/24/2021    AST 35 05/24/2021     05/24/2021    K 3.8 05/24/2021     05/24/2021    CREATININE 0.85 05/24/2021    BUN 15 05/24/2021    CO2 27 05/24/2021    TSH 1.58 04/25/2020    INR 2.5 02/04/2021    GLUF 202 06/19/2020    LABA1C 8.3 10/22/2020       BP (!) 184/97 (Site: Left Upper Arm, Position: Sitting, Cuff Size: Medium Adult)   Pulse 93   Temp 98 °F (36.7 °C) (Temporal)   Resp 18   Ht 6' (1.829 m)   Wt 269 lb (122 kg)   BMI 36.48 kg/m²     NEUROLOGICAL EXAMINATION:     MENTAL STATUS: Patient is alert and oriented x3. CRANIAL NERVES: Pupils are equal and reactive. EOMS are equal in all directions. There is no nystagmus or any other abnormal eye movements. Facial sensation is normal.  There is no facial weakness. Cherl Spinner Hearing is normal.  Palate and tongue movements are normal.     MOTOR EXAMINATION: Muscle tone is normal in all the limbs. There is no focal weakness. Muscle strength is 5/5 in both upper and lower limbs. There are no abnormal limb movements. SENSORY EXAMINATION: Normal.     STRETCH REFLEXES: Symmetrical in both the upper and lower limbs. GAIT:.  Poor balance because of dizziness    IMPRESSION:  Chronic intermittent dizzy spells and vertigo resulting in poor balance. Could be related to either chronic vestibular dysfunction or possibly resulting from residual symptoms from the right cerebellar stroke in 2020, and is presently undergoing physical therapy for his right shoulder neck pain which he should continue    PLAN:    1. Continue physical therapy. 2.  Follow-up in 3 months  3. Follow-up with his PCP regarding elevated blood pressures    Completed disability papers based on the above-mentioned symptomatology.   Because of his persistent chronic symptoms, and other comorbid conditions as noted above he would not be able to function well and effectively in his occupation as an . NOTE: This neurology evaluation is part of outpatient coverage at [place]  1-2 days per week. Patients requiring frequent evaluations or uncomfortable with potential 3-4 day turnaround on questions or calls  may be better served by a neurologist in the area full time. [facility]. Adonis/Vinicio is available for outpatient visits and procedures including EMG/NCS. Non-[facility] neurologists also practice in [place] (Dr. Janeth Chiang) and [place] (Lisa Ruelas).        Marilyn [place] MD   7/19/2021  5:25 PM

## 2021-07-20 NOTE — TELEPHONE ENCOUNTER
Patient had an appointment on 7/20. Patient cancelled it on 7/14 paperwork still in dr. Tanisha Thompson folder.

## 2021-08-05 ENCOUNTER — TELEPHONE (OUTPATIENT)
Dept: CARDIOLOGY | Age: 66
End: 2021-08-05

## 2021-08-05 NOTE — TELEPHONE ENCOUNTER
Dr. Viv Aguayo is having a colonoscopy on 9/3/2021. Dr. Andrea Gonsalves is requesting cardiac clearance. Please advise.

## 2021-08-06 NOTE — TELEPHONE ENCOUNTER
Roger Bland, can I please see him quickly for cardiac evaluation prior to the upcoming procedure. I can see him anytime even late at 4 PM any day. He has complicated cardiac history.   Thank you

## 2021-08-12 NOTE — TELEPHONE ENCOUNTER
Reviewed patients chart to make sure CC was obtained. Patient cancelled appointment with Dr. Lindsay Aragon. Phoned patient, patient stated \"I don't remember anyone ever setting that appointment up\" informed patient that he had appointment on 8/10 with Dr. Lindsay Aragon that patient had canceled. Patient then stated \"I didn't realize I needed cardiac clearance before my colonoscopy\" informed patient that if clearance was not obtained prior to scheduled scope procedure would need to be rescheduled. Patient requested to be transferred to cardiology office so new appointment could be made. Patient transferred.

## 2021-08-27 ENCOUNTER — ANESTHESIA EVENT (OUTPATIENT)
Dept: OPERATING ROOM | Age: 66
End: 2021-08-27
Payer: COMMERCIAL

## 2021-08-27 NOTE — PROGRESS NOTES
anes requests cardiac clearance prior to surgery, message left with Cameron Loges for Dr. Mellissa Lester.

## 2021-08-30 ENCOUNTER — OFFICE VISIT (OUTPATIENT)
Dept: CARDIOLOGY | Age: 66
End: 2021-08-30
Payer: COMMERCIAL

## 2021-08-30 VITALS
BODY MASS INDEX: 37.22 KG/M2 | SYSTOLIC BLOOD PRESSURE: 113 MMHG | HEIGHT: 73 IN | WEIGHT: 280.8 LBS | OXYGEN SATURATION: 97 % | RESPIRATION RATE: 18 BRPM | HEART RATE: 65 BPM | DIASTOLIC BLOOD PRESSURE: 70 MMHG

## 2021-08-30 DIAGNOSIS — R42 LIGHTHEADED: ICD-10-CM

## 2021-08-30 DIAGNOSIS — I25.5 ISCHEMIC CARDIOMYOPATHY: ICD-10-CM

## 2021-08-30 DIAGNOSIS — Z78.9 STATIN INTOLERANCE: ICD-10-CM

## 2021-08-30 DIAGNOSIS — I25.2 HISTORY OF MYOCARDIAL INFARCTION: ICD-10-CM

## 2021-08-30 DIAGNOSIS — Z86.73 HISTORY OF STROKE: ICD-10-CM

## 2021-08-30 DIAGNOSIS — I25.10 ASHD (ARTERIOSCLEROTIC HEART DISEASE): ICD-10-CM

## 2021-08-30 DIAGNOSIS — Z01.810 PREOP CARDIOVASCULAR EXAM: Primary | ICD-10-CM

## 2021-08-30 DIAGNOSIS — I10 ESSENTIAL HYPERTENSION: ICD-10-CM

## 2021-08-30 DIAGNOSIS — Z95.820 S/P ANGIOPLASTY WITH STENT: ICD-10-CM

## 2021-08-30 DIAGNOSIS — R94.31 ABNORMAL ECG: ICD-10-CM

## 2021-08-30 DIAGNOSIS — E78.2 MIXED HYPERLIPIDEMIA: ICD-10-CM

## 2021-08-30 PROCEDURE — G8427 DOCREV CUR MEDS BY ELIG CLIN: HCPCS | Performed by: INTERNAL MEDICINE

## 2021-08-30 PROCEDURE — 99214 OFFICE O/P EST MOD 30 MIN: CPT | Performed by: INTERNAL MEDICINE

## 2021-08-30 PROCEDURE — G8417 CALC BMI ABV UP PARAM F/U: HCPCS | Performed by: INTERNAL MEDICINE

## 2021-08-30 PROCEDURE — 3017F COLORECTAL CA SCREEN DOC REV: CPT | Performed by: INTERNAL MEDICINE

## 2021-08-30 PROCEDURE — 1036F TOBACCO NON-USER: CPT | Performed by: INTERNAL MEDICINE

## 2021-08-30 PROCEDURE — 93000 ELECTROCARDIOGRAM COMPLETE: CPT | Performed by: INTERNAL MEDICINE

## 2021-08-30 NOTE — PROGRESS NOTES
Nathan Le am scribing for and in the presence of Renaldo Becerra MD, F.A.C.C. Patient: Nazia Lester  : 1955  Date of Visit: 2021    REASON FOR VISIT / CONSULTATION: Cardiac Clearance (EKG done today. HX:ASHD, Ischemic cardiomyopathy, lightheaded,HTN, HLD PT is here for cardiac clearance for colonoscopy on 9/3 with Dr Janeth Pinzon. He states he has dizziness denies falls he does stiumble. Denies:Cp,SOB,palp)      History of Present Illness:        Dear Chirag Kebede, GAIL - IVONNE and Dr Janeth Pinzon,     I had the pleasure of seeing Nazia Lester in my office today. Mr. Chelsea Jorge is a 63 y.o. male presented for follow up. Patient has extensive medical history. He reported having multiple heart attacks with the first one at age 60 [facility] and the second one in , he had a cardiac cath and stent placed at that time. He followed up with his cardiologist up until 2020. Family history includes his siblings who have extensive heart disease including heart attacks and bypass surgery    History of ischemic cardiomyopathy secondary to multiple heart attacks, his most recent ejection fraction was 35% by echo back in 2020. He told me that he was offered defibrillator at some point but he was told that his heart function is good enough and no defibrillator needed. He has history of longstanding diabetes, hypertension and dyslipidemia he reported that he is not taking statin therapy because it does cause muscle pain. He said none of his family members were able to tolerate statins as well. He is lifelong non-smoker. He also reported having a history of Ménière's disease and chronic dizziness. He was admitted to [facility] from 2020 to 2020 because of acute stroke. As per discharge summary from [facility], NIH score was 2 for right upper and lower extremity dysmetria. He got up MRI of the brain, MRA of the head and neck and MRI of the cervical spine done. Patient found to have multiple acute infarcts in the right cerebellar hemisphere. Additional punctate acute infarct of the right lateral kateryna with old infarction of the left frontal lobe in addition to small vessel disease. No focal significant arterial narrowing in the neck. During that same hospital admission he was evaluated by hematology because of thrombocytopenia, currently tolerating dual antiplatelet therapy with no bleeding complications. He was also evaluated by cardiology for ischemic cardiomyopathy and lisinopril started in addition to adding carvedilol twice daily. He was later seen by Dr. Vinita Sanabria and recommended loop recorder but patient refused, he wanted to discuss with GAIL Costa CNP first.      Echocardiogram done on 4/24/2020 showed mild left ventricular hypertrophy, global left ventricular systolic function is moderately reduced, estimated EF is 35%. [place] appears hypokinetic. Evidence of mild (grade I) diastolic dysfunction. Negative bubble study, no shunt noted. EKG done in office on 9/30/2020 showed sinus rhythm with evidence of old anterior myocardial infarction. No acute ischemic changes. Echo done on 10/13/2020: Because of poor image quality, definity echo contrast was used to better assess left ventricular wall motion and thickness. EF was 35%. Mild left ventricular hypertrophy. Akinetic apex with filling defect seen on contrast echo consistent with left ventricular apical thrombus. The left atrium is mildly dilated (29-33) with a left atrial volume index of 29 ml/m2. Evidence of moderate (grade II) diastolic dysfunction is seen. The aortic root is considered to be at the upper limits of normal in size when corrected for body surface area. Stress Test done on 10/13/2020: Abnormal myocardial perfusion study.   There is a moderate perfusion defect of severe intensity in the apical, anteroapical and inferoapical regions during stress and rest imaging which is most consistent with an old myocardial infarction. EF was 28%. Heart cath done on 10/29/2020: Severe three vessel coronary artery disease involving a 70% proximal and multiple 50-80% mid and distal LAD stenosis, 70-80% large mid OM1 stenosis and proximal 100% stenosis in the right coronary artery which did show left to right filling of a pretty good size PDA and PL branch of the RCA. Normal left ventricular end diastolic pressure. Patient is a strong believer of homeopathic medicine/chelation therapy. I clearly stated to him that he has left ventricular apical thrombus and that has caused his stroke but Dr. Chaparro Donato told him that this is not true and this is secondary to \"free radicals??\". He is also adamant about not taking any statins because of severe muscle pain. He said none of his family member will be able to tolerate statin therapy. He takes lecithin to dissolve the atherosclerotic plaque and he is a strong believer of this. Heart cath done on 1/7/2021 with Dr Lubna Early at [address] - Successful PCI / Drug Eluting Stent of the mid LCX. Successful PCI / Drug Eluting Stent of the proximal LAD. Holter done on 2/3/2021- Baseline rhythm is sinus with average HR of 67 bpm, ranging between 51 and 104 bpm with rare PACs (Less than 1%), mainly  isolated with one run ofectopic atrial tachycardia (8 beats at HR of 112 bpm). Occasional PVCs (less than 1%) with 2 ventricular runs, the longest was 4 beats at a HR of 145 bpm.    Mr. Jevon Mtz is here today for a cardiac clearance to have colonoscopy done on 9/3/2021 with Dr Sánchez Rojas. He reports doing fairly well. He denied any shortness of breath. No chest pain, pressure or tightness. He can get some lightheaded/ dizzienss however this has been since his stoke and has not changed from last visit. He goes out and does about an hour a day. He does feel bloated and constipation a lot denies stomach pain, nausea or vomiting. No heart palpitations.  He denies any abdominal pain, bleeding problems, bowel issues, problems with his medications or any other concerns at this time. No pains in his legs or thighs when he walks around. He is able to do all his household chores with no exertional symptoms. EKG done today in office 8/30/2021- No acute ischemic changes from previous. PAST MEDICAL HISTORY:        Past Medical History:   Diagnosis Date    Abnormal cardiovascular stress test 10/2020    moderate perfusion defect of severe intensity in the apical anteroapical and inferoapical regions    CAD (coronary artery disease)     Diabetes mellitus [facility]     Diabetic retinopathy ([facility] 11/05/2020    Hyperlipidemia     Hypertension     Meniere disease     MI (myocardial infarction) ([facility]     MULTIPLE    Obesity     Stroke ([facility]     [facility]ocyt[facility] ([email or web address]     TIA (transient ischemic attack)        CURRENT ALLERGIES: Statins and Metformin and related REVIEW OF SYSTEMS: 14 systems were reviewed. Pertinent positives and negatives as above, all else negative.      Past Surgical History:   Procedure Laterality Date    CARDIAC CATHETERIZATION  10/29/2020    Severe three vessel disease involving the LAD, circumflex and right coronary artery  /  DR Boo Blount  /  CT SURGERGY CONSULT    COLONOSCOPY      CORONARY ANGIOPLASTY WITH STENT PLACEMENT      Social History:  Social History     Tobacco Use    Smoking status: Never Smoker    Smokeless tobacco: Never Used   Vaping Use    Vaping Use: Never used   Substance Use Topics    Alcohol use: Not Currently    Drug use: Never        CURRENT MEDICATIONS:        Outpatient Medications Marked as Taking for the 8/30/21 encounter (Office Visit) with Magdalene Ahumada MD   Medication Sig Dispense Refill    clopidogrel (PLAVIX) 75 MG tablet Take 75 mg by mouth daily      atorvastatin (LIPITOR) 80 MG tablet Take 80 mg by mouth daily      NONFORMULARY Lion HRT, suppliment for heart      VITAMIN A PO Take by mouth      vitamin E 400 UNIT 3. Ischemic cardiomyopathy    4. Lightheaded    5. Essential hypertension    6. Mixed hyperlipidemia    7. History of myocardial infarction    8. Statin intolerance    9. S/P angioplasty with stent    10. Abnormal ECG    11. History of stroke       PLAN:        Patient with extensive medical history as outlined in HPI. History of multiple heart attacks, stent placement 2004, ischemic cardiomyopathy. History of uncontrolled diabetes, hypertension and dyslipidemia. History of intolerance to statin therapy. Multiple strokes, the pattern of his multiple ischemic strokes is suggestive of embolic etiology. No prior history of atrial fibrillation. Recent echo showed left ventricular apical thrombus which is clearly the source of multiple embolic strokes. S/P PCI to mid circumflex and proximal LAD using KENNETH by Dr. Davide Ann on 1/7/2021. Apical akinesis with left ventricular apical thrombus seen by Echo from 10/13/2020  As outlined in HPI he refuses to take his Warfarin or Brilinta. He is totally convinced that he should only use natural substance to dissolve the clot. Atherosclerotic Heart Disease: S/P Stenting in 2004 and History of Stroke and MI, heart cath on 10/29/2020 70% proximal and multiple 50-80% mid and distal LAD stenosis, 70-80% large mid OM1 stenosis and proximal 100% stenosis in the right coronary artery which did show left to right filling of a pretty good size PDA and PL branch of the RCA. Successful PCI / Drug Eluting Stent of the mid LCX. Successful PCI / Drug Eluting Stent of the proximal LAD. S/P PCI to mid circumflex and proximal LAD using KENNETH by Dr. Davide Ann on 1/7/2021. Beta Blocker: Continue Carvedilol (Coreg) 6.25 mg twice daily. Anti-anginal medications: Continue to nitroglycerin 0.4 mg tablets as needed for chest pain. Continue Plavix 75 mg daily. Continue Lipitor 80 mg every night. Patient is a strong believer of chelation/homeopathic medicine.  I told him that he has serious cardiovascular disease and his multiple strokes are clearly secondary to the left ventricular apical thrombus. I counseled the patient extensively to come to the emergency room immediately if he has any chest pain or worsening shortness of breath that this can be life-threatening. Patient verbalized understanding. Ischemic Cardiomyopathy:   Beta Blocker: Continue Carvedilol (Coreg) 6.25 mg twice daily. ACE Inibitor/ARB: Continue lisinopril 10 mg daily. Heart failure counseling: I advised them to try and keep their legs up whenever possible and to limit salt in their diet. Lightheadedness/dizziness: Could be secondary to his Meniere Disease versus cerebellar stroke. Nonpharmacologic counseling: Because of his condition, I reminded him to try and keep himself well-hydrated and to take extra time when moving from laying to sitting, sitting to standing and standing to walking. I also explained to him to help improve his symptoms he should include   1 or 2 L of sports drinks daily, knee-high compressions stockings. Essential Hypertension: Controlled, patient said his blood pressures controlled at home. Beta Blocker: Continue Carvedilol (Coreg) 6.25 mg twice daily. ACE Inibitor/ARB: Continue lisinopril 10 mg daily. Hyperlipidemia: Mixed - Last LDL on 9/14/2020 was 119 mg/dL  He claims that he stopped taking Lipitor. Will check lipid panel on follow-up. Pre-Op Clearance: Colonoscopy on 9/3/2021 with Dr Raimundo Deleon. Currently stable from cardiovascular standpoint. No chest pain or worsening shortness of breath. ECG reviewed, no acute ischemic changes. Patient is going for low risk procedure. No further cardiac work-up needed he is as good as he came before the upcoming procedure.     Pre-Operative Risk assessment using 2014 ACC/AHA guidelines   Emergent procedure No  Active Cardiac Condition No (decompensated HF, Arrhythmia, MI <3 weeks, severe valve disease)  Risk Level of Procedure Low Risk (endoscopy, superficial skin, breast, ambulatory, or cataract, etc.)  Revised Cardiac Risk Index Risk factors: History of ischemic heart disease  History of cerebrovascular disease  Measurement of Exercise Tolerance before Surgery >4 Yes  According to the 2014 ACC/AHA pre-operative risk assessment guidelines Zander Salazar is at intermediate risk for major cardiac complications during a low risk procedure and may continue as planned. Specific medication recommendations are listed below. Medications recommended to continue should be taken with a sip of water even when NPO. Medical management to reduce perioperative risk:  Antiplatelet Agent:     Antiplatelet Agent: STOP clopidogrel (Plavix) 7 days prior to procedure. Additional Recommendations: I would also suggest that he continue his beta blocker and statin throughout the perioperative period. Patient is as good as he can be for the upcoming procedure. In the meantime, I encouraged Mr. Olga Lidia Buchanan to continue to take his other medications. FOLLOW UP:   I told Mr. Olga Lidia Buchanan to call my office if he had any problems, but otherwise I asked him to Return in about 6 months (around 2/28/2022). However, I would be happy to see him sooner should the need arise. Sincerely,  Brunilda Ford MD, F.A.C.C. [facility] Specialist    [address]  Phone: [phone], Fax: [phone]     I believe that the risk of significant morbidity and mortality related to the patient's current medical conditions are: intermediate-high. >30 minutes were spent during prep work, discussion and exam of the patient, and follow up documentation and all of their questions were answered. The documentation recorded by the scribe, accurately and completely reflects the services I personally performed and the decisions made by me. Brunilda Ford MD, F.A.C.C.  August 30, 2021

## 2021-08-30 NOTE — PATIENT INSTRUCTIONS
SURVEY:    You may be receiving a survey from [employer] regarding your visit today. Please complete the survey to enable us to provide the highest quality of care to you and your family. If you cannot score us a very good on any question, please call the office to discuss how we could have made your experience a very good one. Thank you.

## 2021-08-31 ENCOUNTER — HOSPITAL ENCOUNTER (OUTPATIENT)
Dept: PREADMISSION TESTING | Age: 66
Setting detail: SPECIMEN
Discharge: HOME | End: 2021-09-04
Payer: COMMERCIAL

## 2021-08-31 DIAGNOSIS — Z01.818 PREOP TESTING: Primary | ICD-10-CM

## 2021-08-31 PROCEDURE — U0003 INFECTIOUS AGENT DETECTION BY NUCLEIC ACID (DNA OR RNA); SEVERE ACUTE RESPIRATORY SYNDROME CORONAVIRUS 2 (SARS-COV-2) (CORONAVIRUS DISEASE [COVID-19]), AMPLIFIED PROBE TECHNIQUE, MAKING USE OF HIGH THROUGHPUT TECHNOLOGIES AS DESCRIBED BY CMS-2020-01-R: HCPCS

## 2021-08-31 PROCEDURE — U0005 INFEC AGEN DETEC AMPLI PROBE: HCPCS

## 2021-08-31 PROCEDURE — C9803 HOPD COVID-19 SPEC COLLECT: HCPCS

## 2021-09-01 LAB
SARS-COV-2: NORMAL
SARS-COV-2: NOT DETECTED
SOURCE: NORMAL

## 2021-09-01 NOTE — TELEPHONE ENCOUNTER
Spoke with patient, cardiac clearance obtained. Patient has already been instructed on medication hold instructions. Patient voiced no further questions at this time.

## 2021-09-03 ENCOUNTER — HOSPITAL ENCOUNTER (OUTPATIENT)
Age: 66
Setting detail: OUTPATIENT SURGERY
Discharge: HOME | End: 2021-09-03
Attending: SURGERY | Admitting: SURGERY
Payer: COMMERCIAL

## 2021-09-03 ENCOUNTER — ANESTHESIA (OUTPATIENT)
Dept: OPERATING ROOM | Age: 66
End: 2021-09-03
Payer: COMMERCIAL

## 2021-09-03 VITALS
WEIGHT: 271.8 LBS | HEIGHT: 73 IN | SYSTOLIC BLOOD PRESSURE: 131 MMHG | DIASTOLIC BLOOD PRESSURE: 66 MMHG | OXYGEN SATURATION: 96 % | TEMPERATURE: 97.2 F | RESPIRATION RATE: 18 BRPM | HEART RATE: 54 BPM | BODY MASS INDEX: 36.02 KG/M2

## 2021-09-03 VITALS
OXYGEN SATURATION: 97 % | RESPIRATION RATE: 12 BRPM | TEMPERATURE: 96.8 F | SYSTOLIC BLOOD PRESSURE: 115 MMHG | DIASTOLIC BLOOD PRESSURE: 47 MMHG

## 2021-09-03 PROBLEM — Z12.11 SCREEN FOR COLON CANCER: Status: ACTIVE | Noted: 2021-09-03

## 2021-09-03 PROBLEM — R13.10 DIFFICULTY SWALLOWING: Status: ACTIVE | Noted: 2021-09-03

## 2021-09-03 PROBLEM — K21.9 GASTROESOPHAGEAL REFLUX DISEASE WITHOUT ESOPHAGITIS: Status: ACTIVE | Noted: 2021-09-03

## 2021-09-03 LAB
GLUCOSE BLD-MCNC: 230 MG/DL (ref 74–100)
HCT VFR BLD CALC: 43.5 % (ref 40.7–50.3)
HEMOGLOBIN: 15.5 G/DL (ref 13–17)
MCH RBC QN AUTO: 33.3 PG (ref 25.2–33.5)
MCHC RBC AUTO-ENTMCNC: 35.6 G/DL (ref 28.4–34.8)
MCV RBC AUTO: 93.3 FL (ref 82.6–102.9)
NRBC AUTOMATED: 0 PER 100 WBC
PDW BLD-RTO: 12.2 % (ref 11.8–14.4)
PLATELET # BLD: ABNORMAL K/UL (ref 138–453)
PLATELET, FLUORESCENCE: 98 K/UL (ref 138–453)
PLATELET, IMMATURE FRACTION: 1.9 % (ref 1.1–10.3)
PMV BLD AUTO: ABNORMAL FL (ref 8.1–13.5)
RBC # BLD: 4.66 M/UL (ref 4.21–5.77)
WBC # BLD: 5.5 K/UL (ref 3.5–11.3)

## 2021-09-03 PROCEDURE — 87077 CULTURE AEROBIC IDENTIFY: CPT

## 2021-09-03 PROCEDURE — 82947 ASSAY GLUCOSE BLOOD QUANT: CPT

## 2021-09-03 PROCEDURE — 45380 COLONOSCOPY AND BIOPSY: CPT | Performed by: SURGERY

## 2021-09-03 PROCEDURE — 6360000002 HC RX W HCPCS: Performed by: NURSE ANESTHETIST, CERTIFIED REGISTERED

## 2021-09-03 PROCEDURE — 43239 EGD BIOPSY SINGLE/MULTIPLE: CPT | Performed by: SURGERY

## 2021-09-03 PROCEDURE — 2580000003 HC RX 258: Performed by: SURGERY

## 2021-09-03 PROCEDURE — 2709999900 HC NON-CHARGEABLE SUPPLY: Performed by: SURGERY

## 2021-09-03 PROCEDURE — 3700000000 HC ANESTHESIA ATTENDED CARE: Performed by: SURGERY

## 2021-09-03 PROCEDURE — 7100000010 HC PHASE II RECOVERY - FIRST 15 MIN: Performed by: SURGERY

## 2021-09-03 PROCEDURE — 2500000003 HC RX 250 WO HCPCS: Performed by: NURSE ANESTHETIST, CERTIFIED REGISTERED

## 2021-09-03 PROCEDURE — 85055 RETICULATED PLATELET ASSAY: CPT

## 2021-09-03 PROCEDURE — 85027 COMPLETE CBC AUTOMATED: CPT

## 2021-09-03 PROCEDURE — 36415 COLL VENOUS BLD VENIPUNCTURE: CPT

## 2021-09-03 PROCEDURE — 7100000011 HC PHASE II RECOVERY - ADDTL 15 MIN: Performed by: SURGERY

## 2021-09-03 PROCEDURE — 3609010600 HC COLONOSCOPY POLYPECTOMY SNARE/COLD BIOPSY: Performed by: SURGERY

## 2021-09-03 PROCEDURE — 88305 TISSUE EXAM BY PATHOLOGIST: CPT

## 2021-09-03 PROCEDURE — 3609012400 HC EGD TRANSORAL BIOPSY SINGLE/MULTIPLE: Performed by: SURGERY

## 2021-09-03 PROCEDURE — 3700000001 HC ADD 15 MINUTES (ANESTHESIA): Performed by: SURGERY

## 2021-09-03 RX ORDER — SODIUM CHLORIDE, SODIUM LACTATE, POTASSIUM CHLORIDE, CALCIUM CHLORIDE 600; 310; 30; 20 MG/100ML; MG/100ML; MG/100ML; MG/100ML
INJECTION, SOLUTION INTRAVENOUS CONTINUOUS
Status: DISCONTINUED | OUTPATIENT
Start: 2021-09-03 | End: 2021-09-03 | Stop reason: HOSPADM

## 2021-09-03 RX ORDER — LIDOCAINE HYDROCHLORIDE 20 MG/ML
INJECTION, SOLUTION EPIDURAL; INFILTRATION; INTRACAUDAL; PERINEURAL PRN
Status: DISCONTINUED | OUTPATIENT
Start: 2021-09-03 | End: 2021-09-03 | Stop reason: SDUPTHER

## 2021-09-03 RX ORDER — PHENYLEPHRINE HYDROCHLORIDE 10 MG/ML
INJECTION INTRAVENOUS PRN
Status: DISCONTINUED | OUTPATIENT
Start: 2021-09-03 | End: 2021-09-03 | Stop reason: SDUPTHER

## 2021-09-03 RX ORDER — PROPOFOL 10 MG/ML
INJECTION, EMULSION INTRAVENOUS CONTINUOUS PRN
Status: DISCONTINUED | OUTPATIENT
Start: 2021-09-03 | End: 2021-09-03 | Stop reason: SDUPTHER

## 2021-09-03 RX ADMIN — PHENYLEPHRINE HYDROCHLORIDE 100 MCG: 10 INJECTION INTRAVENOUS at 13:29

## 2021-09-03 RX ADMIN — LIDOCAINE HYDROCHLORIDE 150 MG: 20 INJECTION, SOLUTION EPIDURAL; INFILTRATION; INTRACAUDAL; PERINEURAL at 13:09

## 2021-09-03 RX ADMIN — PHENYLEPHRINE HYDROCHLORIDE 100 MCG: 10 INJECTION INTRAVENOUS at 13:40

## 2021-09-03 RX ADMIN — PHENYLEPHRINE HYDROCHLORIDE 100 MCG: 10 INJECTION INTRAVENOUS at 13:31

## 2021-09-03 RX ADMIN — SODIUM CHLORIDE, POTASSIUM CHLORIDE, SODIUM LACTATE AND CALCIUM CHLORIDE: 600; 310; 30; 20 INJECTION, SOLUTION INTRAVENOUS at 11:57

## 2021-09-03 RX ADMIN — PROPOFOL 200 MCG/KG/MIN: 10 INJECTION, EMULSION INTRAVENOUS at 13:09

## 2021-09-03 RX ADMIN — PHENYLEPHRINE HYDROCHLORIDE 100 MCG: 10 INJECTION INTRAVENOUS at 13:33

## 2021-09-03 ASSESSMENT — PAIN - FUNCTIONAL ASSESSMENT: PAIN_FUNCTIONAL_ASSESSMENT: 0-10

## 2021-09-03 NOTE — ANESTHESIA POSTPROCEDURE EVALUATION
Department of Anesthesiology  Postprocedure Note    Patient: Toan Nagel  MRN: [id number]  YOB: 1955  Date of evaluation: 9/3/2021  Time:  2:45 PM     Procedure Summary     Date: 09/03/21 Room / Location: [address] [place] [address]    Anesthesia Start: 7803 Anesthesia Stop: 1345    Procedures:       [address] with polypectomy  (N/A )      EGD BIOPSY Diagnosis: (SCREENING)    Surgeons: Julissa Huang DO Responsible Provider: GAIL Shaw CRNA    Anesthesia Type: general ASA Status: 4          Anesthesia Type: general    Azucena Phase I:      Azucena Phase II:      Last vitals: Reviewed and per EMR flowsheets.        Anesthesia Post Evaluation    Patient location during evaluation: bedside  Patient participation: complete - patient participated  Level of consciousness: awake and alert  Pain score: 0  Airway patency: patent  Nausea & Vomiting: no nausea and no vomiting  Complications: no  Cardiovascular status: hemodynamically stable  Respiratory status: acceptable  Hydration status: stable

## 2021-09-03 NOTE — H&P
GENERAL SURGERY CONSULTATION        Patient's Name/ Date of Birth/ Gender: Mejia Najera / 1955 (37 y.o.) / male      PCP: GAIL Whaley CNP  Referring:      History of present Illness:  Patient is a 63 y.o. male. He had seen Dr. Merlin Erm in the past. Notes reviewed. He wanted to see a different surgeon for today's issue of concern which is constipation. Labs, CT images, reports, notes all reviewed. He has history of stroke, MI, 2 heart stents, refuses blood thinners, takes natural/homeopathic medications that he orders online. He sees cardiology Dr. Rohan Mosley.      Past Medical History:  has a past medical history of Abnormal cardiovascular stress test, CAD (coronary artery disease), Diabetes mellitus ([address] Diabetic retinopathy ([address] Hyperlipidemia, Hypertension, Meniere disease, MI (myocardial infarction) ([address] Obesity, Stroke ([address] Thrombocytopenia ([address] and TIA (transient ischemic attack).     Past Surgical History:   Past Surgical History         Past Surgical History:   Procedure Laterality Date    CARDIAC CATHETERIZATION   10/29/2020     Severe three vessel disease involving the LAD, circumflex and right coronary artery  /  DR Maggie Nye  /  CT SURGERGY CONSULT    COLONOSCOPY        CORONARY ANGIOPLASTY WITH STENT PLACEMENT                Social History:  reports that he has never smoked. He has never used smokeless tobacco. He reports previous alcohol use. He reports that he does not use drugs.     Family History: family history includes Diabetes in his mother; Heart Attack in his brother, maternal grandfather, mother, and sister;  Heart Disease in his father; Prostate Cancer in his father.     Review of Systems:   General: Completed and, except as mentioned above, was negative or noncontributory  Psychological:  Completed and, except as mentioned above, was negative or noncontributory  Ophthalmic:  Completed and, except as mentioned above, was negative or noncontributory  ENT: each meal as sliding scale. Pt states \"The 20 Units are not usually enough. \", Disp: , Rfl:     magnesium (MAGNESIUM-OXIDE) 250 MG TABS tablet, Take 500 mg by mouth every morning , Disp: , Rfl:     Coenzyme Q10 (CO Q 10) 10 MG CAPS, Take by mouth daily, Disp: , Rfl:     PSYLLIUM HUSK PO, Take 2 capsules by mouth Takes 2-3 capsules every morning, Disp: , Rfl:     LECITHIN CONCENTRATE PO, Take 2 tablets by mouth daily \"decrease cholesterol\", Disp: , Rfl:     lisinopril (PRINIVIL;ZESTRIL) 10 MG tablet, Take 1 tablet by mouth daily, Disp: 90 tablet, Rfl: 0    finasteride (PROSCAR) 5 MG tablet, Take 1 tablet by mouth daily (Patient not taking: Reported on 5/24/2021), Disp: 30 tablet, Rfl: 11    Continuous Blood Gluc Sensor (FREESTYLE CHARLOTTE 2 SENSOR) MISC, , Disp: , Rfl:     oxybutynin (DITROPAN XL) 10 MG extended release tablet, Take 1 tablet by mouth every evening, Disp: 30 tablet, Rfl: 5    tamsulosin (FLOMAX) 0.4 MG capsule, Take 1 capsule by mouth every evening (Patient not taking: Reported on 6/23/2021), Disp: 90 capsule, Rfl: 3    Insulin Pen Needle 32G X 8 MM MISC, by Does not apply route, Disp: , Rfl:     carvedilol (COREG) 6.25 MG tablet, Take 1 tablet by mouth 2 times daily (Patient not taking: Reported on 5/11/2021), Disp: 180 tablet, Rfl: 3    meclizine (ANTIVERT) 25 MG tablet, Take 25 mg by mouth 3 times daily as needed for Dizziness  (Patient not taking: Reported on 5/24/2021), Disp: , Rfl:     blood glucose test strips (RELION GLUCOSE TEST STRIPS) strip, Test qid. Dx--E11.9 insulin dependent, Disp: 100 each, Rfl: 5    glucose monitoring kit (FREESTYLE) monitoring kit, 1 kit by Does not apply route daily TEST BLOOD SUGAR QID. WHATEVER METER IS COVERED BY GALVEZ. DIAGNOSIS E11.9, Disp: 1 kit, Rfl: 0    Lancets MISC, TEST BLOOD SUGAR QID. WHATEVER METER IS COVERED BY GALVEZ. DIAGNOSIS E11.9, Disp: 100 each, Rfl: 3        Physical Exam:  Vital signs and Nurse's note reviewed.  BP (!) 147/88 Pulse 85   Temp 98 °F (36.7 °C)   Ht 6' 1\" (1.854 m)   Wt 273 lb (123.8 kg)   BMI 36.02 kg/m²    height is 6' 1\" (1.854 m) and weight is 273 lb (123.8 kg). His temperature is 98 °F (36.7 °C). His blood pressure is 147/88 (abnormal) and his pulse is 85. Gen:  A&Ox3, NAD. Pleasant and cooperative. HEENT: PERRLA, EOMI, no scleral icterus  Neck:  no goiter  CVS: Regular rate and rhythm  Resp: Good bilateral air entry, no active wheezing, no labored breathing  Abd: soft, non-tender, non-distended, bowel sounds present, rectal exam to be done at scope, [facility], diastasis recti, no acute issues, no peritonitis  Ext: Moves all extremities, no gross focal motor deficits  Skin: No erythema or ulcerations      Labs:         Lab Results   Component Value Date     WBC 6.5 11/29/2020     HGB 17.1 11/29/2020     HCT 45.9 11/29/2020     MCV 91.3 11/29/2020     PLT 74 01/07/2021            Lab Results   Component Value Date      05/24/2021     K 3.8 05/24/2021      05/24/2021     CO2 27 05/24/2021     BUN 15 05/24/2021     CREATININE 0.85 05/24/2021     GLUCOSE 182 05/24/2021     GLUCOSE 128 09/14/2020     CALCIUM 9.4 05/24/2021            Lab Results   Component Value Date     ALKPHOS 61 05/24/2021     ALT 42 05/24/2021     AST 35 05/24/2021     PROT 6.5 05/24/2021     BILITOT 0.75 05/24/2021     LABALBU 4.3 05/24/2021      No results found for: AMYLASE        Lab Results   Component Value Date     LIPASE 28 11/29/2020            Lab Results   Component Value Date     INR 2.5 02/04/2021         Radiologic Studies:  EXAMINATION:   CT OF THE ABDOMEN AND PELVIS WITH CONTRAST 11/29/2020 12:58 pm       TECHNIQUE:   CT of the abdomen and pelvis was performed with the administration of   intravenous contrast. Multiplanar reformatted images are provided for review.    Dose modulation, iterative reconstruction, and/or weight based adjustment of   the mA/kV was utilized to reduce the radiation dose to as low as reasonably achievable.       COMPARISON:   None.       HISTORY:   ORDERING SYSTEM PROVIDED HISTORY: abd pain   TECHNOLOGIST PROVIDED HISTORY:   abd pain           FINDINGS:   Lower Chest: The visualized lung bases are clear.  Sequela of left   ventricular apical infarct with associated sequestered thrombus.  Coronary   calcified atheromatous plaque. .       Organs: Findings suggestive of mild hepatic steatosis.  Small calcified   gallstone.  No gallbladder wall thickening or biliary dilatation.  The   pancreas, spleen, adrenals and kidneys reveal no acute findings.       GI/Bowel: Large amount stool burden throughout the colon to the level the   rectum.  There is mild pericolonic inflammatory change in the descending   colon level.  Few scattered diverticula.  No significant wall thickening. The small bowel is decompressed.  Normal appendix extends into the right   inguinal hernia.       Pelvis: Prostatomegaly.  The Avalos catheter decompresses the bladder.       Peritoneum/Retroperitoneum: No free air or free fluid.  The aorta is normal   in caliber.  The visceral branches are patent. No lymphadenopathy.       Bones/Soft Tissues: Bilateral inguinal hernias.  The appendix extends into   the right inguinal hernia, as above.  Mild lower lumbar degenerative disc   disease and mild degenerative change in the hips.           Impression   1.  Large amount stool burden throughout the colon to the rectum.  Mild   inflammatory change around the descending colon suggestive of nonspecific   colitis.       2.  Few scattered colonic diverticula.       3.  Sequela of left ventricular apical infarct with associated sequestered   thrombus.       4.  Cholelithiasis.       5.  Prostatomegaly.  Avalos catheter decompresses the bladder.       6.  Right inguinal hernia containing the normal appendix.                Impressions/Recommendations:      Constipation. Due for screening colonoscopy.   CT above reviewed, this was from 2020- no acute physical findings of urgency. Risks and benefits of colonoscopy have been discussed in detail with the patient. Risks of the procedure include bleeding, bowel perforation, possibly missing smaller lesions if the bowel prep is not adequate. Alternative studies include barium enema and virtual colonoscopy; however, if a lesion is seen, then one would still need to proceed with the gold standard colonoscopy to retrieve or biopsy the lesion. All the patient's questions are answered. Will proceed with colonoscopy under MAC.        Addendum: wants EGD. Reflux symptoms. He is not sure if he has been taking the PPI on his med list. He says he is having issues behind his [place] apple when drinking water. He says it has been going on for 10 years. I will add on EGD. Low platelets in January- check CBC now before procedures. H&P  General Surgery        Pt Name: Eddi García  MRN: [id number]  YOB: 1955  Date of evaluation: 9/3/2021      [x] I have examined the patient and reviewed the H&P/Consult completed, and there are no changes to the patient or plans. [] I have examined the patient and reviewed the H&P/Consult and have noted the following changes: The patient was counseled at length about the risks of eneida Covid-19 during their perioperative period and any recovery window from their procedure. The patient was made aware that eneida Covid-19  may worsen their prognosis for recovering from their procedure  and lend to a higher morbidity and/or mortality risk. All material risks, benefits, and reasonable alternatives including postponing the procedure were discussed. The patient does wish to proceed with the procedure at this time.         Electronically signed by Sania Ramirez DO  on 9/3/2021 at 12:13 PM

## 2021-09-03 NOTE — PROGRESS NOTES
Notified Dr. Reji Dailey of pt's platelet level, states it is satisfactory and we may proceed with colonoscopy. Notified Alon Jackson CRNA and Justin Dickerson RN who is circulating the case.

## 2021-09-03 NOTE — OP NOTE
Operative Note        Patient: Chuck Gordon  YOB: 1955  MRN: [id number] [facility] Vicente Shepherd, APRN - CNP      Date of Procedure: 9/3/2021    Pre-Op Diagnosis: screening colonoscopy. Constipation. Epigastric pain. Difficulty swallowing. GERD. Post-Op Diagnosis: Same. Hemorrhoids. Mild sigmoid diverticulosis. Transverse colon sessile polyp. Mild appearing gastritis, no ulcers. Procedure:    1) EGD with karen test/antral biopsies cold    2) Colonoscopy to cecum with cold forceps polypectomy      Surgeon(s):  Isaias Calhoun DO      Anesthesia: Monitor Anesthesia Care    Estimated Blood Loss (mL): n/a    Complications: None    Specimens:   ID Type Source Tests Collected by Time Destination   1 : Karen test Tissue Stomach H. PYLORI DETECTION Isaias Calhoun,  9/3/2021 1315    A : Antral Bx Tissue Stomach SURGICAL PATHOLOGY Isaiasrebekah Calhoun,  9/3/2021 1317    B : Transverse colon polyp Tissue Colon-Transverse SURGICAL PATHOLOGY Isaias Redden,  9/3/2021 1331        Details:    1) EGD:    Please see H&P for indications. The patient was positioned appropriately and placed under monitored anesthesia. Protective bite block was placed. Time out called procedure confirmed. The lubricated gastroscope was carefully inserted into the oropharynx and advanced under direct vision into the esophagus, the stomach, through the pylorus, and into the 1st and second portions of the duodenum. The scope was withdrawn into the stomach. the scope was retroflexed in the stomach. Findings:    Esophagus: normal    GE junction: normal    Stomach: retroflexion: normal    Cardia, body, antrum: mild appearing antritis. No ulcers. Cold antral biopsies taken, Karen test performed    Pylorus: normal    Duodenum: bulb and sweep: normal    I did not see any abnormalities at the oropharyx or upon intubating the esophagus    2) colonoscopy:    Patient was positioned in left lateral position.  Time out called and procedure confirmed. The lubricated variable stiffness pediatric colonoscope was carefully passed under direct vision into the rectum, advanced through the sigmoid colon, transverse colon, ascending colon and the cecum. The ileocecal valve was well visualized. Findings:     Cecum: normal cecal pouch. IC valve and appendiceal orifice well confirmed  Ascending: normal  Transverse: small sessile polyp removed via cold forceps polypectomy and submitted. Hemostasis well- confirmed  Descending: normal  Sigmoid: mild diverticulosis  Rectum: normal  Rectal exam: external hemorrhoids without bleeding or thrombosis or inflammation  Bowel prep quality: excellent    At the distal 1/3 of the rectum, the scope was retroflexed and was negative. The patient tolerated the procedure well. The abdomen was soft after the procedure. I spoke with pt/family afterwards. Next colonoscopy 5 years or if polyp is hyperplastic then 10 years. Await report.        Electronically signed by Harish Cortés DO, FACOS, FACS on 9/3/2021 at 1:41 PM

## 2021-09-03 NOTE — PROGRESS NOTES

## 2021-09-03 NOTE — ANESTHESIA PRE PROCEDURE
Department of Anesthesiology  Preprocedure Note       Name:  Wm Ansari   Age:  63 y.o.  :  1955                                          MRN:  [id number]         Date:  9/3/2021      Surgeon: Keith Lord):  Elke Alcaraz DO    Procedure: Procedure(s):  COLORECTAL CANCER SCREENING, NOT HIGH RISK    Medications prior to admission:   Prior to Admission medications    Medication Sig Start Date End Date Taking? Authorizing Provider   atorvastatin (LIPITOR) 80 MG tablet Take 80 mg by mouth daily   Yes Historical Provider, MD   NONFORMULARY Lion HRT, suppliment for heart   Yes Historical Provider, MD   VITAMIN A PO Take by mouth   Yes Historical Provider, MD   vitamin E 400 UNIT capsule Take 400 Units by mouth daily   Yes Historical Provider, MD   NONFORMULARY Prostate suppliment BID   Yes Historical Provider, MD   finasteride (PROSCAR) 5 MG tablet Take 1 tablet by mouth daily 21  Yes Sudarshan Velez PA-C   polyethylene glycol (GLYCOLAX) 17 GM/SCOOP powder  3/3/21  Yes Historical Provider, MD   tamsulosin (FLOMAX) 0.4 MG capsule Take 1 capsule by mouth every evening 21  Yes Curtis Dutta, APRN - CNP   VITAMIN D PO Take 1 tablet by mouth daily Patient uncertain of strength   Yes Historical Provider, MD   carvedilol (COREG) 6.25 MG tablet Take 1 tablet by mouth 2 times daily 21  Yes Victoria Gomes MD   insulin NPH (NOVOLIN N) 100 UNIT/ML injection vial Inject into the skin nightly Pt mostly takes this at HS \"To help bring down my sugar. \" Pt states to take approx 30 Units nightly.    Takes prn only   Yes Historical Provider, MD   lisinopril (PRINIVIL;ZESTRIL) 10 MG tablet Take 1 tablet by mouth daily 9/3/20  Yes Lang Agee APRN - CNP   clopidogrel (PLAVIX) 75 MG tablet Take 75 mg by mouth daily    Historical Provider, MD   LANTUS SOLOSTAR 100 UNIT/ML injection pen INJECT 56 UNITS SUBCUTANEOUSLY NIGHTLY  Patient taking differently: nightly 56-60 units 21   Lang Agee APRN - CNP   Continuous Blood Gluc Sensor (FREESTYLE CHARLOTTE 2 SENSOR) MISC  3/10/21   Historical Provider, MD   oxybutynin (DITROPAN XL) 10 MG extended release tablet Take 1 tablet by mouth every evening 3/31/21   Dolores Velez PA-C   pantoprazole (PROTONIX) 40 MG tablet Take 1 tablet by mouth daily 2/12/21   GAIL Paulino CNP   Insulin Pen Needle 32G X 8 MM MISC by Does not apply route    Historical Provider, MD   Insulin Aspart FlexPen 100 UNIT/ML SOPN Inject 30 Units into the skin 2 times daily Pt to take as 20 units after each meal as sliding scale. Pt states \"The 20 Units are not usually enough. \"    Historical Provider, MD   blood glucose test strips (RELION GLUCOSE TEST STRIPS) strip Test qid. Dx--E11.9 insulin dependent 9/29/20   GAIL Paulino CNP   Coenzyme Q10 (CO Q 10) 10 MG CAPS Take by mouth daily    Historical Provider, MD   LECITHIN CONCENTRATE PO Take 2 tablets by mouth daily \"decrease cholesterol\"    Historical Provider, MD   glucose monitoring kit (FREESTYLE) monitoring kit 1 kit by Does not apply route daily TEST BLOOD SUGAR QID. WHATEVER METER IS COVERED BY GALVEZ. DIAGNOSIS E11.9  Patient not taking: Reported on 7/19/2021 9/4/20   GAIL Paulino CNP   Lancets [facility] TEST BLOOD SUGAR QID. WHATEVER METER IS COVERED BY GALVEZ. DIAGNOSIS E11.9 9/4/20   GAIL Paulino CNP       Current medications:    Current Facility-Administered Medications   Medication Dose Route Frequency Provider Last Rate Last Admin    lactated ringers infusion   IntraVENous Continuous Freida Bashir,  mL/hr at 09/03/21 1157 New Bag at 09/03/21 1157       Allergies:     Allergies   Allergen Reactions    Statins      Cramping  Muscle aches    Metformin And Related Nausea And Vomiting     Diarrhea with regular Metformin does ok with XR form       Problem List:    Patient Active Problem List   Diagnosis Code    Cerebellar stroke [address] [address] I63.9    Right sided weakness R53.1    Abnormal CT of the head R93.0    Hypertensive urgency I16.0    ASHD (arteriosclerotic heart disease) I25.10    S/P angioplasty with stent Z95.820    History of stroke Z86.73    History of myocardial infarction I25.2    Ischemic cardiomyopathy I25.5    Essential hypertension I10    Family history of heart disease Z82.49    Mixed hyperlipidemia E78.2    Obesity E66.9    Left ventricular thrombus I51.3    S/P PTCA (percutaneous transluminal coronary angioplasty) Z98.61       Past Medical History:        Diagnosis Date    Abnormal cardiovascular stress test 10/2020    moderate perfusion defect of severe intensity in the apical anteroapical and inferoapical regions    CAD (coronary artery disease)     Diabetes mellitus ([facility] [address]     Diabetic retinopathy ([facility] [facility] 11/05/2020    Hyperlipidemia     Hypertension     Meniere disease     MI (myocardial infarction) ([facility] [address]     MULTIPLE    Obesity     Stroke ([facility] [facility]     Thrombocytopenia (HCC)     TIA (transient ischemic attack)        Past Surgical History:        Procedure Laterality Date    CARDIAC CATHETERIZATION  10/29/2020    Severe three vessel disease involving the LAD, circumflex and right coronary artery  /  DR Denver Pu  /  CT SURGERGY CONSULT    COLONOSCOPY      CORONARY ANGIOPLASTY WITH STENT PLACEMENT         Social History:    Social History     Tobacco Use    Smoking status: Never Smoker    Smokeless tobacco: Never Used   Substance Use Topics    Alcohol use: Not Currently                                Counseling given: Not Answered      Vital Signs (Current):   Vitals:    08/17/21 1142 09/03/21 1144 09/03/21 1158   BP:  (!) 172/103 (!) 159/85   Pulse:  78    Resp:  12    Temp:  36.1 °C (96.9 °F)    TempSrc:  Temporal    SpO2:  96%    Weight: 265 lb (120.2 kg) 271 lb 12.8 oz (123.3 kg)    Height: 6' 1\" (1.854 m) 6' 1\" (1.854 m)                                               BP Readings from Last 3 Encounters:   09/03/21 (!) 159/85 08/30/21 113/70   07/19/21 (!) 184/97       NPO Status: Time of last liquid consumption: 0610                        Time of last solid consumption: 2200                        Date of last liquid consumption: 09/03/21                        Date of last solid food consumption: 09/01/21    BMI:   Wt Readings from Last 3 Encounters:   09/03/21 271 lb 12.8 oz (123.3 kg)   08/30/21 280 lb 12.8 oz (127.4 kg)   07/19/21 269 lb (122 kg)     Body mass index is 35.86 kg/m².     CBC:   Lab Results   Component Value Date    WBC 6.5 11/29/2020    RBC 5.03 11/29/2020    HGB 17.1 11/29/2020    HCT 45.9 11/29/2020    MCV 91.3 11/29/2020    RDW 12.3 11/29/2020    PLT 74 01/07/2021       CMP:   Lab Results   Component Value Date     05/24/2021    K 3.8 05/24/2021     05/24/2021    CO2 27 05/24/2021    BUN 15 05/24/2021    CREATININE 0.85 05/24/2021    GFRAA >60 05/24/2021    LABGLOM >60 05/24/2021    GLUCOSE 182 05/24/2021    GLUCOSE 128 09/14/2020    PROT 6.5 05/24/2021    CALCIUM 9.4 05/24/2021    BILITOT 0.75 05/24/2021    ALKPHOS 61 05/24/2021    AST 35 05/24/2021    ALT 42 05/24/2021       POC Tests:   Recent Labs     09/03/21  1152   POCGLU 230*       Coags:   Lab Results   Component Value Date    PROTIME 10.5 01/08/2021    PROTIME 13.7 01/07/2021    INR 2.5 02/04/2021    APTT 29.8 11/29/2020       HCG (If Applicable): No results found for: PREGTESTUR, PREGSERUM, HCG, HCGQUANT     ABGs: No results found for: PHART, PO2ART, KTX9EOW, WSJ2VLW, BEART, B1GTXKMC     Type & Screen (If Applicable):  No results found for: LABABO, LABRH    Drug/Infectious Status (If Applicable):  No results found for: HIV, HEPCAB    COVID-19 Screening (If Applicable):   Lab Results   Component Value Date    COVID19 Not Detected 08/31/2021           Anesthesia Evaluation  Patient summary reviewed and Nursing notes reviewed no history of anesthetic complications:   Airway: Mallampati: II  TM distance: >3 FB   Neck ROM: full  Mouth opening: > = 3 FB Dental: normal exam         Pulmonary:normal exam  breath sounds clear to auscultation                            ROS comment: SOB   Cardiovascular:  Exercise tolerance: good (>4 METS),   (+) hypertension:, past MI (>5 years ago since HA/symptoms):, CAD:, CABG/stent ( 1/7/21:Successful PCI / Drug Eluting Stent of the mid LCX. Successful PCI / Drug Eluting Stent of the proximal LAD.):,       ECG reviewed  Rhythm: regular  Rate: normal  Echocardiogram reviewed  Stress test reviewed  Cleared by cardiology     Beta Blocker:  Dose within 24 Hrs      ROS comment: See cardiac clearance     Neuro/Psych:   (+) CVA (admitted to [facility] from 4/24/2020 to 4/27/2020 because of acute stroke. no residual symptoms): residual symptoms, TIA,             GI/Hepatic/Renal:   (+) bowel prep,           Endo/Other:    (+) DiabetesType II DM, using insulin, . ROS comment: Notified MD Sánchez Rojas of PLT 79 from 01/21 Abdominal:   (+) obese,           Vascular: negative vascular ROS. Other Findings:             Anesthesia Plan      general     ASA 4       Induction: intravenous. MIPS: Prophylactic antiemetics administered. Anesthetic plan and risks discussed with patient. Plan discussed with CRNA.                   GAIL Carrillo - JENNIFER   9/3/2021

## 2021-09-05 LAB
DIRECT EXAM: NEGATIVE
Lab: NORMAL
SPECIMEN DESCRIPTION: NORMAL

## 2021-09-08 LAB — SURGICAL PATHOLOGY REPORT: NORMAL

## 2021-10-03 PROBLEM — Z12.11 SCREEN FOR COLON CANCER: Status: RESOLVED | Noted: 2021-09-03 | Resolved: 2021-10-03

## 2021-10-21 ENCOUNTER — OFFICE VISIT (OUTPATIENT)
Dept: NEUROLOGY | Age: 66
End: 2021-10-21
Payer: COMMERCIAL

## 2021-10-21 VITALS
HEIGHT: 73 IN | BODY MASS INDEX: 37.23 KG/M2 | RESPIRATION RATE: 18 BRPM | DIASTOLIC BLOOD PRESSURE: 76 MMHG | HEART RATE: 77 BPM | SYSTOLIC BLOOD PRESSURE: 129 MMHG | TEMPERATURE: 97.8 F | WEIGHT: 280.9 LBS

## 2021-10-21 DIAGNOSIS — R42 SPELL OF DIZZINESS: Primary | ICD-10-CM

## 2021-10-21 PROCEDURE — G8427 DOCREV CUR MEDS BY ELIG CLIN: HCPCS | Performed by: NEUROMUSCULOSKELETAL MEDICINE, SPORTS MEDICINE

## 2021-10-21 PROCEDURE — 99213 OFFICE O/P EST LOW 20 MIN: CPT | Performed by: NEUROMUSCULOSKELETAL MEDICINE, SPORTS MEDICINE

## 2021-10-21 PROCEDURE — G8484 FLU IMMUNIZE NO ADMIN: HCPCS | Performed by: NEUROMUSCULOSKELETAL MEDICINE, SPORTS MEDICINE

## 2021-10-21 PROCEDURE — 1036F TOBACCO NON-USER: CPT | Performed by: NEUROMUSCULOSKELETAL MEDICINE, SPORTS MEDICINE

## 2021-10-21 PROCEDURE — G8417 CALC BMI ABV UP PARAM F/U: HCPCS | Performed by: NEUROMUSCULOSKELETAL MEDICINE, SPORTS MEDICINE

## 2021-10-21 PROCEDURE — 3017F COLORECTAL CA SCREEN DOC REV: CPT | Performed by: NEUROMUSCULOSKELETAL MEDICINE, SPORTS MEDICINE

## 2021-10-21 NOTE — PROGRESS NOTES
NEUROLOGY Follow up      Patient Name:  Huma Coleman  :   1955  Clinic Visit Date: 10/21/2021    I saw Mr. Finn Son  in the neurology clinic today for symptoms of persistent lightheadedness and dizziness spells. He continues to have persistent intermittent lightheadedness with head movements especially looking down and turning his head quickly. No vomiting ,nausea ,double vision blurred vision, facial numbness slurred speech, difficulty swallowing or weakness or numbness in the extremities. History of cerebellar stroke in the past, diabetes, hypertension    REVIEW OF SYSTEMS    Constitutional Weight changes: present, change in appetite: absent Fatigue: present; Fevers : absent, Any recent hospitalizations:  absent   HEENT Ears: normal,  Visual disturbance: absent   Respiratory Shortness of breath: absent, choking:  absent, Cough: present, Snoring : absent   Cardiovascular Chest pain: absent, Leg swelling :absent, palpitations : absent, fainting : absent   GI Constipation: absent, Diarrhea: absent, Swallowing change: absent    Urinary frequency: absent, Urinary urgency: absent, Urinary incontinence: absent   Musculoskeletal Neck pain: absent, Back pain: present, Stiffness: present, Muscle pain: absent, Joint pain: absent, restless leg : absent   Dermatological Hair loss: absent, Skin changes: absent   Neurological Confusion: absent, Trouble concentrating: absent, Seizures: absent;  Memory loss: present, balance problem: present, Dizziness: present, vertigo: present, Weakness: present, Numbness absent, Tremor: absent, Spasm: absent, involuntary movement: absent, Speech difficulty: absent, Headache: present, Light sensitivity: absent   Psychiatric Anxiety: absent, Depression  absent, drug abuse: absent, Hallucination: absent, mood disorder: absent, Suicidal ideations absent   Hematologic Abnormal bleeding: absent, Anemia: absent, Lymph gland changes: absent Clotting disorder: absent     Past Medical History:   Diagnosis Date    Abnormal cardiovascular stress test 10/2020    moderate perfusion defect of severe intensity in the apical anteroapical and inferoapical regions    CAD (coronary artery disease)     Diabetes mellitus ([facility]     Diabetic retinopathy ([facility] 11/05/2020    Hyperlipidemia     Hypertension     Meniere disease     [facility]     MULTIPLE    Obesity     Stroke [facility]     Thrombocytopenia ([facility]     TIA (transient ischemic attack)        Past Surgical History:   Procedure Laterality Date    CARDIAC CATHETERIZATION  10/29/2020    Severe three vessel disease involving the LAD, circumflex and right coronary artery  /  DR Botello Bussamantha  /  CT SURGERGY CONSULT    COLONOSCOPY      COLONOSCOPY N/A 9/3/2021    COLORECTAL CANCER SCREENING Colonscope with polypectomy  performed by Ino Avelar DO at [facility] ENDOSCOPY  9/3/2021    EGD BIOPSY performed by Ino Avelar DO at [address] [place] [address]     Socioeconomic History    Marital status: Single     Spouse name: Not on file    Number of children: Not on file    Years of education: Not on file    Highest education level: Not on file   Occupational History    Not on file   Tobacco Use    Smoking status: Never Smoker    Smokeless tobacco: Never Used   Vaping Use    Vaping Use: Never used   Substance and Sexual Activity    Alcohol use: Not Currently    Drug use: Never    Sexual activity: Not Currently   Other Topics Concern    Not on file   Social History Narrative    Not on file     Social Determinants of Health     Financial Resource Strain: Low Risk     Difficulty of Paying Living Expenses: Not hard at all   Food Insecurity: No Food Insecurity    Worried About Running Out of Food in the Last Year: Never true    Jody of Food in the Last Year: Never true   Transportation Needs: No Transportation Needs    Lack of Transportation (Medical): No    Lack of Transportation (Non-Medical):  No   Physical Activity: Inactive    Days of Exercise per Week: 0 days    Minutes of Exercise per Session: 0 min   Stress:     Feeling of Stress :    Social Connections:     Frequency of Communication with Friends and Family:     Frequency of Social Gatherings with Friends and Family:     Attends Latter day Services:     Active Member of Clubs or Organizations:     Attends Club or Organization Meetings:     Marital Status:    Intimate Partner Violence:     Fear of Current or Ex-Partner:     Emotionally Abused:     Physically Abused:     Sexually Abused:        Family History   Problem Relation Age of Onset    Heart Attack Mother     Diabetes Mother     Heart Disease Father     Prostate Cancer Father     Heart Attack Sister     Heart Attack Brother     Heart Attack Maternal Grandfather        Current Outpatient Medications   Medication Sig Dispense Refill    NONFORMULARY Muscle relaxer      clopidogrel (PLAVIX) 75 MG tablet Take 75 mg by mouth daily      atorvastatin (LIPITOR) 80 MG tablet Take 80 mg by mouth daily      NONFORMULARY Lion HRT, suppliment for heart      VITAMIN A PO Take by mouth      vitamin E 400 UNIT capsule Take 400 Units by mouth daily      NONFORMULARY Prostate suppliment BID      finasteride (PROSCAR) 5 MG tablet Take 1 tablet by mouth daily 30 tablet 11    LANTUS SOLOSTAR 100 UNIT/ML injection pen INJECT 56 UNITS SUBCUTANEOUSLY NIGHTLY (Patient taking differently: nightly 56-60 units) 15 mL 0    polyethylene glycol (GLYCOLAX) 17 GM/SCOOP powder       Continuous Blood Gluc Sensor (FREESTYLE [place]TE 2 SENSOR) MISC       oxybutynin (DITROPAN XL) 10 MG extended release tablet Take 1 tablet by mouth every evening 30 tablet 5    pantoprazole (PROTONIX) 40 MG tablet Take 1 tablet by mouth daily 90 tablet 1    tamsulosin (FLOMAX) 0.4 MG capsule Take 1 capsule by mouth every evening 90 capsule 3    VITAMIN D PO Take 1 tablet by mouth daily Patient uncertain of strength      Insulin Pen Needle 32G X 8 MM MISC by Does not apply route      carvedilol (COREG) 6.25 MG tablet Take 1 tablet by mouth 2 times daily 180 tablet 3    insulin NPH (NOVOLIN N) 100 UNIT/ML injection vial Inject into the skin nightly Pt mostly takes this at HS \"To help bring down my sugar. \" Pt states to take approx 30 Units nightly. Takes prn only      Insulin Aspart FlexPen 100 UNIT/ML SOPN Inject 30 Units into the skin 2 times daily Pt to take as 20 units after each meal as sliding scale. Pt states \"The 20 Units are not usually enough. \"      blood glucose test strips (RELION GLUCOSE TEST STRIPS) strip Test qid. Dx--E11.9 insulin dependent 100 each 5    Coenzyme Q10 (CO Q 10) 10 MG CAPS Take by mouth daily      LECITHIN CONCENTRATE PO Take 2 tablets by mouth daily \"decrease cholesterol\"      glucose monitoring kit (FREESTYLE) monitoring kit 1 kit by Does not apply route daily TEST BLOOD SUGAR QID. WHATEVER METER IS COVERED BY GALVEZ. DIAGNOSIS E11.9 1 kit 0    Lancets MISC TEST BLOOD SUGAR QID. WHATEVER METER IS COVERED BY GALVEZ. DIAGNOSIS E11.9 100 each 3    lisinopril (PRINIVIL;ZESTRIL) 10 MG tablet Take 1 tablet by mouth daily 90 tablet 0     No current facility-administered medications for this visit.              DATA:  Lab Results   Component Value Date    WBC 5.5 09/03/2021    HGB 15.5 09/03/2021    PLT See Reflexed IPF Result 09/03/2021    CHOL 199 09/14/2020    TRIG 180 (H) 09/14/2020    HDL 44 09/14/2020    ALT 42 (H) 05/24/2021    AST 35 05/24/2021     05/24/2021    K 3.8 05/24/2021     05/24/2021    CREATININE 0.85 05/24/2021    BUN 15 05/24/2021    CO2 27 05/24/2021    TSH 1.58 04/25/2020    INR 2.5 02/04/2021    GLUF 202 06/19/2020    LABA1C 8.3 10/22/2020       /76 (Site: Left Upper Arm, Position: Sitting, Cuff Size: Medium Adult)   Pulse 77   Temp 97.8 °F (36.6 °C) (Temporal)   Resp 18   Ht 6' 1\" (1.854 m)   Wt 280 lb 14.4 oz (127.4 kg)   BMI 37.06 kg/m²     NEUROLOGICAL EXAMINATION:     MENTAL STATUS: Normal     CRANIAL NERVES: Pupils are equal and reactive. EOMS are equal in all directions. No nystagmus or any other abnormal eye movements. Facial sensation is normal.  There is no facial weakness. There is no loss of hearing. Palate and tongue movements are normal.     MOTOR EXAMINATION: Muscle tone is normal in all the limbs. There is no focal extremity weakness. Muscle strength is 5/5 in both upper and lower limbs. no abnormal limb movements. SENSORY EXAMINATION: Normal.     STRETCH REFLEXES: Symmetrical in both the upper and lower limbs. GAIT:.  No ataxia. IMPRESSION:. Chronic persistent dizzy spells and vertigo. Etiology is most likely a vestibular cause, with possible residual contribution from his cerebellar stroke in the past    PLAN:    1. Recommended vestibular evaluation/ physical therapy at [facility].  2.  Follow-up in office as needed, if symptoms do not improve      NOTE: This neurology evaluation is part of outpatient coverage at [facility]  1-2 days per week. Patients requiring frequent evaluations or uncomfortable with potential 3-4 day turnaround on questions or calls  may be better served by a neurologist in the area full time. [facility] visits and procedures including EMG/NCS. Non-[facility] neurologists also practice in [facility] (Dr. Guerita Morrow) and [facility] (Lisa Rush).        Genoveva Peace MD   10/21/2021  3:44 PM

## 2021-10-21 NOTE — PATIENT INSTRUCTIONS
SURVEY:    You may be receiving a survey from [employer] regarding your visit today. Please complete the survey to enable us to provide the highest quality of care to you and your family. If you cannot score us a very good on any question, please call the office to discuss how we could have made your experience a very good one. Thank you.

## 2021-10-28 ENCOUNTER — HOSPITAL ENCOUNTER (OUTPATIENT)
Dept: PHYSICAL THERAPY | Age: 66
Setting detail: THERAPIES SERIES
Discharge: HOME | End: 2021-10-28
Payer: COMMERCIAL

## 2021-10-28 PROCEDURE — 97162 PT EVAL MOD COMPLEX 30 MIN: CPT

## 2021-10-28 PROCEDURE — 97112 NEUROMUSCULAR REEDUCATION: CPT

## 2021-11-01 NOTE — PLAN OF CARE
Merged with [facility]           Phone: [phone]             Outpatient Physical Therapy  Fax: [phone]                                           Date: 10/28/2021  Patient: Jeana Ling : 1955 CSN #: [id number]   Referring Practitioner:  Faith Gorman MD Referral Date:  10/21/21       [x] Plan of Care   [] Updated Plan of Care    Dates of Service to Include: 10/28/2021 to 21    Diagnosis:  Spell of dizziness, R42    Rehab (Treatment) Diagnosis:  Hx of CVA, dizziness, impaired balance             Onset Date:       Attendance  Total # of Visits to Date: 1 No Show: 0 Canceled Appointment: 0    Assessment  Body structures, Functions, Activity limitations: Decreased functional mobility , Decreased ADL status, Decreased ROM, Decreased strength, Decreased endurance, Decreased high-level IADLs, Decreased balance, Decreased posture  Assessment: The patient is a 61 y.o. male who is familar to this clinic. He reports dizziness since a cerebellar stroke a year and a half ago. He also reports a previous diagnosis of Meneire's disease. He reports symptoms of vertigo, \"pulsating sensation behind eyes,\" nausea, and impaired balance that is exacerbated by rolling to his R side, and looking up and reports symptoms can last from hours to days. Upon testing he had abnormal gaze holding nystagmus, vergence, and neck ROM. He had negative BPPV testing without symptoms and nystagmus. He would benefit from skilled PT to progress vestibular habituation exercises and HEP. Goals  Short term goals  Time Frame for Short term goals: 2 weeks  Short term goal 1: Patient will be initiated with a HEP  Short term goal 2: Patient will be reassessed for balance deficits at next visit.   Long term goals  Time Frame for Long term goals : 4 weeks  Long term goal 1: Patient will be independent and compliant with a HEP  Long term goal 2: Patient will tolerate 30 minutes of vestibular habituation exercises to decrease dizziness  Long term goal 3: Patient will report 50% improvement in symptoms and overall function.      Prognosis  Prognosis: Fair    Treatment Plan   Times per week: 1  Plan weeks: 4  [x] HP/CP      [] Electrical Stim   [x] Therapeutic Exercise      [x] Gait Training  [] Aquatics   [] Ultrasound         [x] Patient Education/HEP   [x] Manual Therapy  [] Traction    [x] Neuro-matias        [x] Soft Tissue Mobs            [] Home TENS  [] Iontophoresis    [] Orthotic casting/fitting      [] Dry Needling             Electronically signed by: Sigrid Cabrera PT, DPT    Date: 10/28/2021      ______________________________________ Date: 10/28/2021   Physician Signature

## 2021-11-01 NOTE — PROGRESS NOTES
your problem placed stress on your relationship with members of your family or friends?: No  Because of your problem, are you depressed?: SomeTimes  Does your problem interfere with your job or household responsibilities?: Yes  Does bending over increase your problem?: Yes  Total DHI Score: 58    Assessment  Body structures, Functions, Activity limitations: Decreased functional mobility , Decreased ADL status, Decreased ROM, Decreased strength, Decreased endurance, Decreased high-level IADLs, Decreased balance, Decreased posture  Assessment: The patient is a 61 y.o. male who is familar to this clinic. He reports dizziness since a cerebellar stroke a year and a half ago. He also reports a previous diagnosis of Meneire's disease. He reports symptoms of vertigo, \"pulsating sensation behind eyes,\" nausea, and impaired balance that is exacerbated by rolling to his R side, and looking up and reports symptoms can last from hours to days. Upon testing he had abnormal gaze holding nystagmus, vergence, and neck ROM. He had negative BPPV testing without symptoms and nystagmus. He would benefit from skilled PT to progress vestibular habituation exercises and HEP. Prognosis: Fair        Decision Making: Medium Complexity    Patient Education  Patient Education: PT POC, exercise rationale, HEP  Pt verbalized/demonstrated good understanding:     [x] Yes         [] No, pt required further clarification. Goals  Short term goals  Time Frame for Short term goals: 2 weeks  Short term goal 1: Patient will be initiated with a HEP  Short term goal 2: Patient will be reassessed for balance deficits at next visit.     Long term goals  Time Frame for Long term goals : 4 weeks  Long term goal 1: Patient will be independent and compliant with a HEP  Long term goal 2: Patient will tolerate 30 minutes of vestibular habituation exercises to decrease dizziness  Long term goal 3: Patient will report 50% improvement in symptoms and overall function.     Patient goals : Find out what is causing the dizziness, and find out what it is from    Minutes Tracking:  Time In: 1350  Time Out: [address]  Minutes: 53  Timed Code Treatment Minutes: [address] [facility], [address] [facility], DPT   Date: 10/28/2021

## 2021-11-09 ENCOUNTER — HOSPITAL ENCOUNTER (OUTPATIENT)
Dept: PHYSICAL THERAPY | Age: 66
Setting detail: THERAPIES SERIES
Discharge: HOME | End: 2021-11-09
Payer: COMMERCIAL

## 2021-11-09 PROCEDURE — 97116 GAIT TRAINING THERAPY: CPT

## 2021-11-09 PROCEDURE — 97112 NEUROMUSCULAR REEDUCATION: CPT

## 2021-11-09 NOTE — PROGRESS NOTES
verbalized/demonstrated good understanding:     [x] Yes         [] No, pt required further clarification. Post Treatment Pain:  0/10      Plan  Times per week: 1  Plan weeks: 4      Goals  (Total # of Visits to Date: 2)      Short term goals  Time Frame for Short term goals: 2 weeks  Short term goal 1: Patient will be initiated with a HEP -MET  Short term goal 2: Patient will be reassessed for balance deficits at next visit -MET    Long term goals  Time Frame for Long term goals : 4 weeks  Long term goal 1: Patient will be independent and compliant with a HEP  Long term goal 2: Patient will tolerate 30 minutes of vestibular habituation exercises to decrease dizziness  Long term goal 3: Patient will report 50% improvement in symptoms and overall function.     Minutes Tracking:  Time In: 1400  Time Out: 1440  Minutes: 40  Timed Code Treatment Minutes: Anca Loja, DPT     Date: 11/9/2021

## 2021-11-18 ENCOUNTER — HOSPITAL ENCOUNTER (OUTPATIENT)
Dept: PHYSICAL THERAPY | Age: 66
Setting detail: THERAPIES SERIES
End: 2021-11-18
Payer: COMMERCIAL

## 2021-11-23 ENCOUNTER — APPOINTMENT (OUTPATIENT)
Dept: PHYSICAL THERAPY | Age: 66
End: 2021-11-23
Payer: COMMERCIAL

## 2021-11-26 DIAGNOSIS — N13.8 BPH WITH OBSTRUCTION/LOWER URINARY TRACT SYMPTOMS: ICD-10-CM

## 2021-11-26 DIAGNOSIS — N40.1 BPH WITH OBSTRUCTION/LOWER URINARY TRACT SYMPTOMS: ICD-10-CM

## 2021-11-26 DIAGNOSIS — R39.15 URINARY URGENCY: ICD-10-CM

## 2021-11-26 DIAGNOSIS — N39.41 URGE INCONTINENCE: ICD-10-CM

## 2021-11-26 RX ORDER — POLYETHYLENE GLYCOL 3350 17 G/17G
17 POWDER, FOR SOLUTION ORAL DAILY
Qty: 1530 G | Refills: 0 | Status: CANCELLED | OUTPATIENT
Start: 2021-11-26

## 2021-11-29 RX ORDER — OXYBUTYNIN CHLORIDE 10 MG/1
TABLET, EXTENDED RELEASE ORAL
Qty: 30 TABLET | Refills: 11 | Status: SHIPPED | OUTPATIENT
Start: 2021-11-29 | End: 2022-06-30

## 2021-11-29 NOTE — TELEPHONE ENCOUNTER
patient was seen within the past year. Patient tolerates Ditropan XL 10 mg well. We will refill this medication.
[place]   1/12/2022  1:30 PM Maurisio Velez PA-C [facility]   3/2/2022  1:40 PM Jazlyn Mendoza MD TIFF CARD [facility]            Patient Active Problem List:     Cerebellar stroke [facility])     Abnormal CT of the head     Hypertensive urgency     ASHD (arteriosclerotic heart disease)     S/P angioplasty with stent     History of stroke     History of myocardial infarction     Ischemic cardiomyopathy     Essential hypertension     Family history of heart disease     Mixed hyperlipidemia     Obesity     Left ventricular thrombus     S/P PTCA (percutaneous transluminal coronary angioplasty)     Gastroesophageal reflux disease without esophagitis     Difficulty swallowing
Closure 2 Information: This tab is for additional flaps and grafts, including complex repair and grafts and complex repair and flaps. You can also specify a different location for the additional defect, if the location is the same you do not need to select a new one. We will insert the automated text for the repair you select below just as we do for solitary flaps and grafts. Please note that at this time if you select a location with a different insurance zone you will need to override the ICD10 and CPT if appropriate.

## 2021-11-30 ENCOUNTER — HOSPITAL ENCOUNTER (OUTPATIENT)
Dept: PHYSICAL THERAPY | Age: 66
Setting detail: THERAPIES SERIES
Discharge: HOME | End: 2021-11-30
Payer: COMMERCIAL

## 2021-12-30 RX ORDER — TAMSULOSIN HYDROCHLORIDE 0.4 MG/1
0.4 CAPSULE ORAL EVERY EVENING
Qty: 90 CAPSULE | Refills: 0 | Status: SHIPPED | OUTPATIENT
Start: 2021-12-30 | End: 2022-02-01 | Stop reason: SDUPTHER

## 2022-01-12 ENCOUNTER — OFFICE VISIT (OUTPATIENT)
Dept: UROLOGY | Age: 67
End: 2022-01-12
Payer: COMMERCIAL

## 2022-01-12 VITALS
BODY MASS INDEX: 36.94 KG/M2 | HEART RATE: 71 BPM | SYSTOLIC BLOOD PRESSURE: 146 MMHG | WEIGHT: 280 LBS | DIASTOLIC BLOOD PRESSURE: 88 MMHG

## 2022-01-12 DIAGNOSIS — N13.8 BPH WITH OBSTRUCTION/LOWER URINARY TRACT SYMPTOMS: Primary | ICD-10-CM

## 2022-01-12 DIAGNOSIS — N40.1 BPH WITH OBSTRUCTION/LOWER URINARY TRACT SYMPTOMS: Primary | ICD-10-CM

## 2022-01-12 DIAGNOSIS — Z12.5 SCREENING PSA (PROSTATE SPECIFIC ANTIGEN): ICD-10-CM

## 2022-01-12 PROCEDURE — G8484 FLU IMMUNIZE NO ADMIN: HCPCS | Performed by: PHYSICIAN ASSISTANT

## 2022-01-12 PROCEDURE — 3017F COLORECTAL CA SCREEN DOC REV: CPT | Performed by: PHYSICIAN ASSISTANT

## 2022-01-12 PROCEDURE — 51798 US URINE CAPACITY MEASURE: CPT | Performed by: PHYSICIAN ASSISTANT

## 2022-01-12 PROCEDURE — 99214 OFFICE O/P EST MOD 30 MIN: CPT | Performed by: PHYSICIAN ASSISTANT

## 2022-01-12 PROCEDURE — G8417 CALC BMI ABV UP PARAM F/U: HCPCS | Performed by: PHYSICIAN ASSISTANT

## 2022-01-12 PROCEDURE — G8427 DOCREV CUR MEDS BY ELIG CLIN: HCPCS | Performed by: PHYSICIAN ASSISTANT

## 2022-01-12 PROCEDURE — 1036F TOBACCO NON-USER: CPT | Performed by: PHYSICIAN ASSISTANT

## 2022-01-12 RX ORDER — MAGNESIUM 30 MG
30 TABLET ORAL 2 TIMES DAILY
COMMUNITY
End: 2022-04-12

## 2022-01-12 RX ORDER — ASPIRIN 81 MG/1
81 TABLET ORAL DAILY
COMMUNITY

## 2022-01-12 ASSESSMENT — ENCOUNTER SYMPTOMS
CONSTIPATION: 1
VOMITING: 0
WHEEZING: 0
ABDOMINAL PAIN: 0
SHORTNESS OF BREATH: 0
NAUSEA: 0
COLOR CHANGE: 0
COUGH: 0
BACK PAIN: 0
EYE REDNESS: 0

## 2022-01-12 NOTE — PROGRESS NOTES
HPI:      Patient is a 61 y.o. male in no acute distress. He is alert and oriented to person, place, and time. History  12/2020 Referral from YEN Evans for LUTS.  Complaints of urgency and urge incontinence.  He has nocturia 0-2 times per night.  He has frequency every 30 minutes to an hour during the day. Allyson Del Real does change his underwear once per night due to the incontinence. Allyson Del Real does have a weak stream and a split stream at the start of urination. Allyson Del Real is uncircumcised, but denies any issues retracting his foreskin. Allyson Del Real does have significant constipation and was recently in the ER for constipation. He also reports a history of fecal smearing. He will often go 4 more days without a bowel movement.  While in the ER he was told he was not emptying his bladder.  He did have a CT completed while in the ER on 11/2020 that showed no evidence of  calcifications or hydronephrosis.  He is an uncontrolled insulin-dependent diabetic. Allyson Del Real does consume a large amount of bladder irritants.  He denies history of stones. Allyson Del Real has never seen urology in the past.              Started miralax daily                 Started flomax daily                 Referred for colonoscopy     2/2021 - cysto - Extensive trilobar hyperplasia     Ditropan increased to XL 10mg    PSA  11/2021 - 0.6 (1.2)  6/2020 - 3.04    Today:  Patient is here today for follow up BPH, OAB, constipation. Takes ditropan to 10 mg XL, finasteride and flomax. He is taking a supplement for prostate health - ProstaGenix. He does feel that the supplement is what is making him better. We did remind him that he is on Flomax twice a day and Ditropan. We also reminded him that he has not been on Proscar for over 6 months. He states that when his glucose is under good control he voids very well. When he is glucose is high he does state that he has increased in urgency and he does have some mild amount of leakage.   He wishes he would not have any urgency or leakage at all. Patient does relate that he is not having a bowel movement every day. He has stopped taking MiraLAX. He states that he has been extremely frustrated with his glucose management. His PCP has sent him to endocrinology and patient feels that his glucose control could be significantly better. Patient did have a PSA 11/2021 which was 0.6 (1.2). This is low.  Patient voided - 30 mL, PVR - 17 mL    Past Medical History:   Diagnosis Date    Abnormal cardiovascular stress test 10/2020    moderate perfusion defect of severe intensity in the apical anteroapical and inferoapical regions    CAD (coronary artery disease)     Diabetes mellitus [facility]     Diabetic retinopathy ([facility] 11/05/2020    Hyperlipidemia     Hypertension     Meniere disease     MI (myocardial infarction) ([facility]     MULTIPLE    Obesity     Stroke ([facility]     Thrombocytopenia ([facility]     TIA (transient ischemic attack)      Past Surgical History:   Procedure Laterality Date    CARDIAC CATHETERIZATION  10/29/2020    Severe three vessel disease involving the LAD, circumflex and right coronary artery  /  DR Russ Villalobos  /  CT SURGERGY CONSULT    COLONOSCOPY      COLONOSCOPY N/A 9/3/2021    COLORECTAL CANCER SCREENING Colonscope with polypectomy  performed by Maura Chavez DO at [facility] ENDOSCOPY  9/3/2021    EGD BIOPSY performed by Maura Chavez DO at [address] [place] [address] Encounter Medications as of 1/12/2022   Medication Sig Dispense Refill    aspirin 81 MG EC tablet Take 81 mg by mouth daily      magnesium 30 MG tablet Take 30 mg by mouth 2 times daily      tamsulosin (FLOMAX) 0.4 MG capsule Take 1 capsule by mouth every evening 90 capsule 0    oxybutynin (DITROPAN-XL) 10 MG extended release tablet TAKE 1 TABLET BY MOUTH ONCE DAILY IN THE EVENING 30 tablet 11    TRULICITY 1.5 XI/9.0WH SOPN       famotidine (PEPCID) 40 MG tablet       polyethylene glycol (GLYCOLAX) 17 GM/SCOOP powder Take 17 g by mouth 2 times daily 3 each 1    NONFORMULARY Lion HRT, suppliment for heart      NONFORMULARY prosta-genix Prostate suppliment BID      finasteride (PROSCAR) 5 MG tablet Take 1 tablet by mouth daily 30 tablet 11    LANTUS SOLOSTAR 100 UNIT/ML injection pen INJECT 56 UNITS SUBCUTANEOUSLY NIGHTLY (Patient taking differently: nightly 56-60 units) 15 mL 0    Continuous Blood Gluc Sensor (FREESTYLE [place] 2 SENSOR) MISC       pantoprazole (PROTONIX) 40 MG tablet Take 1 tablet by mouth daily 90 tablet 1    Insulin Pen Needle 32G X 8 MM MISC by Does not apply route      carvedilol (COREG) 6.25 MG tablet Take 1 tablet by mouth 2 times daily 180 tablet 3    insulin NPH (NOVOLIN N) 100 UNIT/ML injection vial Inject into the skin nightly Pt mostly takes this at HS \"To help bring down my sugar. \" Pt states to take approx 30 Units nightly. TAKES PRN ONLY      Insulin Aspart FlexPen 100 UNIT/ML SOPN Inject 30 Units into the skin 2 times daily Pt to take as 20 units after each meal as sliding scale. Pt states \"The 20 Units are not usually enough. \"      blood glucose test strips (RELION GLUCOSE TEST STRIPS) strip Test qid. Dx--E11.9 insulin dependent 100 each 5    Coenzyme Q10 (CO Q 10) 10 MG CAPS Take by mouth daily      LECITHIN CONCENTRATE PO Take 2 tablets by mouth daily \"decrease cholesterol\"      glucose monitoring kit (FREESTYLE) monitoring kit 1 kit by Does not apply route daily TEST BLOOD SUGAR QID. WHATEVER METER IS COVERED BY GALVEZ. DIAGNOSIS E11.9 1 kit 0    Lancets MISC TEST BLOOD SUGAR QID. WHATEVER METER IS COVERED BY GALVEZ.  DIAGNOSIS E11.9 100 each 3    lisinopril (PRINIVIL;ZESTRIL) 10 MG tablet Take 1 tablet by mouth daily 90 tablet 0    [DISCONTINUED] NONFORMULARY Muscle relaxer (Patient not taking: Reported on 1/12/2022)      [DISCONTINUED] clopidogrel (PLAVIX) 75 MG tablet Take 75 mg by mouth daily (Patient not taking: Reported on 1/12/2022) Units nightly. TAKES PRN ONLY      Insulin Aspart FlexPen 100 UNIT/ML SOPN Inject 30 Units into the skin 2 times daily Pt to take as 20 units after each meal as sliding scale. Pt states \"The 20 Units are not usually enough. \"      blood glucose test strips (RELION GLUCOSE TEST STRIPS) strip Test qid. Dx--E11.9 insulin dependent 100 each 5    Coenzyme Q10 (CO Q 10) 10 MG CAPS Take by mouth daily      LECITHIN CONCENTRATE PO Take 2 tablets by mouth daily \"decrease cholesterol\"      glucose monitoring kit (FREESTYLE) monitoring kit 1 kit by Does not apply route daily TEST BLOOD SUGAR QID. WHATEVER METER IS COVERED BY GALVEZ. DIAGNOSIS E11.9 1 kit 0    Lancets MIS TEST BLOOD SUGAR QID. WHATEVER METER IS COVERED BY GALVEZ. DIAGNOSIS E11.9 100 each 3    lisinopril (PRINIVIL;ZESTRIL) 10 MG tablet Take 1 tablet by mouth daily 90 tablet 0     No current facility-administered medications on file prior to visit. Statins and Metformin and related  Family History   Problem Relation Age of Onset    Heart Attack Mother     Diabetes Mother     Heart Disease Father     Prostate Cancer Father     Heart Attack Sister     Heart Attack Brother     Heart Attack Maternal Grandfather      Social History     Tobacco Use   Smoking Status Never Smoker   Smokeless Tobacco Never Used       Social History     Substance and Sexual Activity   Alcohol Use Not Currently       Review of Systems   Constitutional: Negative for appetite change, chills and fever. Eyes: Negative for redness and visual disturbance. Respiratory: Negative for cough, shortness of breath and wheezing. Cardiovascular: Negative for chest pain and leg swelling. Gastrointestinal: Positive for constipation. Negative for abdominal pain, nausea and vomiting. Genitourinary: Positive for urgency.  Negative for decreased urine volume, difficulty urinating, dysuria, enuresis, flank pain, frequency, hematuria, penile discharge, penile pain, scrotal swelling

## 2022-01-12 NOTE — PATIENT INSTRUCTIONS
Colace 100mg twice a day    FOR CONSTIPATION    It is very important to have regular, soft, daily bowel movements, because it WILL improve your urinary symptoms. Take Miralax (or generic equivalent) 17g once daily (one capful). Take every day, DO NOT skip days, as it must be taken daily in order to be effective. DO NOT take just \"as needed\". It is safe to take long term and is recommended for your symptoms. If one dose daily causes loose stools/diarrhea, decrease to 1/2 capful or 1/4 capful. If you cannot tolerate this medication, please notify our office. If one dose daily of Miralax is not sufficient to produce a soft, easy to pass daily stool, you may also add an over-the-counter stool softener capsule. For example: colace (docusate). For Miralax to have maximal effectiveness, be sure to increase your water intake - aim for 80 oz daily unless you are on a fluid-restriction from another provider. BLADDER IRRITANTS     There are several changes you can make to your diet to help improve your urinary symptoms. The following have been shown to cause irritation to the bladder and should be AVOIDED if possible:  ~ Coffee (including decaffeinated)   ~ ALL Tea (including green teas and decaffeinated)  ~ Soda/Pop/carbonated beverages/Energy drinks (especially dark dyed lit, root beers, mountain dew, etc)  ~These are MUCH worse if they have caffeine, but can also be irritative to the bladder even without caffeine  ~ Alcoholic beverages  ~ Spicy foods (peppers contain capsaicin, which is very irritating to the bladder)  ~ Acidic foods (for example: tomato based foods, orange juice, etc)    We encourage increased water intake, unless you have been placed on a fluid restriction by another provider.

## 2022-01-14 ENCOUNTER — TELEPHONE (OUTPATIENT)
Dept: UROLOGY | Age: 67
End: 2022-01-14

## 2022-01-14 NOTE — TELEPHONE ENCOUNTER
In the office we did remind him that he needs to take his Flomax twice a day as well as, Resume Miralax, and start over-the-counter Colace 100mg twice a day.

## 2022-01-14 NOTE — TELEPHONE ENCOUNTER
When Caren Erwin was in on 1/12/2022 (Wednesday) he said that he was advised to double up on one of his medications although he does not remember which medication it was.

## 2022-01-25 ENCOUNTER — TELEPHONE (OUTPATIENT)
Dept: CARDIOLOGY | Age: 67
End: 2022-01-25

## 2022-01-25 NOTE — TELEPHONE ENCOUNTER
Pt was at his chiropractic ap pt today and was telling his chriopractor about his dizziness and the chiropractor suggest he get his carotid arteries checked. Pt wants to know how you fell about this?

## 2022-01-26 ENCOUNTER — TELEPHONE (OUTPATIENT)
Dept: CARDIOLOGY | Age: 67
End: 2022-01-26

## 2022-01-26 DIAGNOSIS — I16.0 HYPERTENSIVE URGENCY: ICD-10-CM

## 2022-01-26 RX ORDER — CARVEDILOL 6.25 MG/1
6.25 TABLET ORAL 2 TIMES DAILY
Qty: 180 TABLET | Refills: 3 | Status: SHIPPED | OUTPATIENT
Start: 2022-01-26 | End: 2022-08-29

## 2022-01-26 NOTE — TELEPHONE ENCOUNTER
Spoke with Nicolette Duarte and relayed the message and he verbalized understanding. He is okay on holding off til his next appointment due to his symptoms not worsening.

## 2022-01-26 NOTE — TELEPHONE ENCOUNTER
His carotid arteries were checked by MRI back in 2020 and there was no blockages. His dizziness is chronic and most likely secondary to recurrent stroke. If he has worsening symptoms I can see him and check his carotid arteries clinically and see if we need to do the ultrasound of the carotid arteries or not. Thank you.

## 2022-02-01 ENCOUNTER — TELEPHONE (OUTPATIENT)
Dept: UROLOGY | Age: 67
End: 2022-02-01

## 2022-02-01 RX ORDER — TAMSULOSIN HYDROCHLORIDE 0.4 MG/1
0.4 CAPSULE ORAL 2 TIMES DAILY
Qty: 60 CAPSULE | Refills: 11 | Status: SHIPPED | OUTPATIENT
Start: 2022-02-01 | End: 2022-03-02

## 2022-02-01 NOTE — TELEPHONE ENCOUNTER
Patient called stating he has been taking Finasteride BID and needs refill.  RX only says take one daily

## 2022-02-01 NOTE — TELEPHONE ENCOUNTER
Refill sent for Flomax/tamsulosin twice a day    We do not want him to take his finasteride/Proscar twice a day we only want him to take it once a day.     To clarify Flomax/tamsulosin twice a day    Finasteride/Proscar once a day

## 2022-02-04 NOTE — DISCHARGE SUMMARY
Phone: Audrey          Fax: [phone]                            Outpatient Physical Therapy                                                                    Discharge Summary    Patient: Sanjana Kenny  : 1955  [facility] #: [id number]   Referring physician: No admitting provider for patient encounter. Referring Practitioner: Saturnino Ceballos MD      Diagnosis: Spell of dizziness, R42      Date Treatment Initiated: 10/28/21  Date of Last Treatment: 21      PT Visit Information  PT Insurance Information: [employer]  Total # of Visits Approved: 4  Total # of Visits to Date: 2  Plan of Care/Certification Expiration Date: 21  No Show: 0  Canceled Appointment: 0  Progress Note Counter: RTD: Nothing scheduled at this time      Frequency/Duration  1 times per week  4 weeks      Treatment Received  [x] HP/CP      [] Electrical Stim   [x] Therapeutic Exercise      [x] Gait Training  [] Aquatics   [] Ultrasound         [x] Patient Education/HEP   [] Manual Therapy  [] Traction    [x] Neuro-matias        [x] Soft Tissue Mobs            [] Home TENS  [] Iontophoresis    [] Orthotic casting/fitting      [] Dry Needling    Assessment  Assessment: Patient reports he's feeling better with improved glucose levels and since starting potassium supplements. Patient reports he has not been doing his HEP because he does similar things in his daily activties, so he was educated on rationale behind performing specific exercises that exacerbate his symptoms to habituate to them. He was given a new HEP. Pt cancelled appointments for a different tx. He will be discharged at this time.        Goals  Short term goals  Time Frame for Short term goals: 2 weeks  Short term goal 1: Patient will be initiated with a HEP -MET  Short term goal 2: Patient will be reassessed for balance deficits at next visit -MET    Long term goals  Time Frame for Long term goals : 4 weeks  Long term goal 1: Patient will be independent and compliant with a HEP  Long term goal 2: Patient will tolerate 30 minutes of vestibular habituation exercises to decrease dizziness  Long term goal 3: Patient will report 50% improvement in symptoms and overall function.       Reason for Discharge  [] Goals Achieved                        []  Poor Follow Through/Attendance                  []  Optimal Function Achieved     [x]  Patient Discharged Self    []  Hospitalization                         []  Physician discharge      Thank you for this referral      Karina Gonzalez PT, DPT               Date: 2/4/2022

## 2022-03-02 ENCOUNTER — OFFICE VISIT (OUTPATIENT)
Dept: CARDIOLOGY | Age: 67
End: 2022-03-02
Payer: COMMERCIAL

## 2022-03-02 VITALS
OXYGEN SATURATION: 96 % | HEIGHT: 73 IN | SYSTOLIC BLOOD PRESSURE: 145 MMHG | BODY MASS INDEX: 37.53 KG/M2 | DIASTOLIC BLOOD PRESSURE: 85 MMHG | HEART RATE: 67 BPM | RESPIRATION RATE: 18 BRPM | WEIGHT: 283.2 LBS

## 2022-03-02 DIAGNOSIS — R42 LIGHTHEADEDNESS: ICD-10-CM

## 2022-03-02 DIAGNOSIS — E78.2 MIXED HYPERLIPIDEMIA: ICD-10-CM

## 2022-03-02 DIAGNOSIS — K59.00 CONSTIPATION, UNSPECIFIED CONSTIPATION TYPE: ICD-10-CM

## 2022-03-02 DIAGNOSIS — I51.3 APICAL MURAL THROMBUS: Primary | ICD-10-CM

## 2022-03-02 DIAGNOSIS — I25.10 ASHD (ARTERIOSCLEROTIC HEART DISEASE): ICD-10-CM

## 2022-03-02 DIAGNOSIS — I25.5 ISCHEMIC CARDIOMYOPATHY: ICD-10-CM

## 2022-03-02 DIAGNOSIS — I10 ESSENTIAL HYPERTENSION: ICD-10-CM

## 2022-03-02 PROCEDURE — 1036F TOBACCO NON-USER: CPT | Performed by: INTERNAL MEDICINE

## 2022-03-02 PROCEDURE — 99214 OFFICE O/P EST MOD 30 MIN: CPT | Performed by: INTERNAL MEDICINE

## 2022-03-02 PROCEDURE — G8484 FLU IMMUNIZE NO ADMIN: HCPCS | Performed by: INTERNAL MEDICINE

## 2022-03-02 PROCEDURE — 3017F COLORECTAL CA SCREEN DOC REV: CPT | Performed by: INTERNAL MEDICINE

## 2022-03-02 PROCEDURE — G8427 DOCREV CUR MEDS BY ELIG CLIN: HCPCS | Performed by: INTERNAL MEDICINE

## 2022-03-02 PROCEDURE — G8417 CALC BMI ABV UP PARAM F/U: HCPCS | Performed by: INTERNAL MEDICINE

## 2022-03-02 NOTE — PATIENT INSTRUCTIONS
SURVEY:    You may be receiving a survey from [employer] regarding your visit today. Please complete the survey to enable us to provide the highest quality of care to you and your family. If you cannot score us a very good on any question, please call the office to discuss how we could have made your experience a very good one. Thank you.

## 2022-03-02 NOTE — PROGRESS NOTES
Lisa Borden RN am scribing for and in the presence of Clara Urbina MD, F.A.C.C. Patient: Tara Winters  : 1955  Date of Visit: 2022    REASON FOR VISIT / CONSULTATION: Follow-up (HX:s/p stent, ischemic cardiomyopathy, HTN,HLD, MI Pt is here for 6 month follow up he states he is doing well he has constipation and wt gain thinks due to Trulicety. still unsteady on feet. Denies:CP,SOB,palp)      History of Present Illness:        Dear Linn Tolbert, APRN - CNP and Dr Radha Trevino,     I had the pleasure of seeing Tara Winters in my office today. Mr. Allen Pisano is a 61 y.o. male presented for follow up. Patient has extensive medical history. He reported having multiple heart attacks with the first one at age 31 and the second one in , he had a cardiac cath and stent placed at that time. He followed up with his cardiologist up until 2020. Family history includes his siblings who have extensive heart disease including heart attacks and bypass surgery    History of ischemic cardiomyopathy secondary to multiple heart attacks, his most recent ejection fraction was 35% by echo back in 2020. He told me that he was offered defibrillator at some point but he was told that his heart function is good enough and no defibrillator needed. He has history of longstanding diabetes, hypertension and dyslipidemia he reported that he is not taking statin therapy because it does cause muscle pain. He said none of his family members were able to tolerate statins as well. He is lifelong non-smoker. He also reported having a history of Ménière's disease and chronic dizziness. He was admitted to [facility] from 2020 to 2020 because of acute stroke. As per discharge summary from [facility], NIH score was 2 for right upper and lower extremity dysmetria. He got up MRI of the brain, MRA of the head and neck and MRI of the cervical spine done.   Patient found to have multiple acute infarcts in the right cerebellar hemisphere. Additional punctate acute infarct of the right lateral kateryna with old infarction of the left frontal lobe in addition to small vessel disease. No focal significant arterial narrowing in the neck. During that same hospital admission he was evaluated by hematology because of thrombocytopenia, currently tolerating dual antiplatelet therapy with no bleeding complications. He was also evaluated by cardiology for ischemic cardiomyopathy and lisinopril started in addition to adding carvedilol twice daily. He was later seen by Dr. Cris Potter and recommended loop recorder but patient refused, he wanted to discuss with GAIL Quesada CNP first.      Echocardiogram done on 4/24/2020 showed mild left ventricular hypertrophy, global left ventricular systolic function is moderately reduced, estimated EF is 35%. [place] appears hypokinetic. Evidence of mild (grade I) diastolic dysfunction. Negative bubble study, no shunt noted. EKG done in office on 9/30/2020 showed sinus rhythm with evidence of old anterior myocardial infarction. No acute ischemic changes. Echo done on 10/13/2020: Because of poor image quality, definity echo contrast was used to better assess left ventricular wall motion and thickness. EF was 35%. Mild left ventricular hypertrophy. Akinetic apex with filling defect seen on contrast echo consistent with left ventricular apical thrombus. The left atrium is mildly dilated (29-33) with a left atrial volume index of 29 ml/m2. Evidence of moderate (grade II) diastolic dysfunction is seen. The aortic root is considered to be at the upper limits of normal in size when corrected for body surface area. Stress Test done on 10/13/2020: Abnormal myocardial perfusion study.   There is a moderate perfusion defect of severe intensity in the apical, anteroapical and inferoapical regions during stress and rest imaging which is most consistent with an old myocardial infarction. EF was 28%. Heart cath done on 10/29/2020: Severe three vessel coronary artery disease involving a 70% proximal and multiple 50-80% mid and distal LAD stenosis, 70-80% large mid OM1 stenosis and proximal 100% stenosis in the right coronary artery which did show left to right filling of a pretty good size PDA and PL branch of the RCA. Normal left ventricular end diastolic pressure. Patient is a strong believer of homeopathic medicine/chelation therapy. I clearly stated to him that he has left ventricular apical thrombus and that has caused his stroke but Dr. Terrence Valencia told him that this is not true and this is secondary to \"free radicals??\". He is also adamant about not taking any statins because of severe muscle pain. He said none of his family member will be able to tolerate statin therapy. He takes lecithin to dissolve the atherosclerotic plaque and he is a strong believer of this. Heart cath done on 1/7/2021 with Dr Louie Alberts at [facility] - Successful PCI / Drug Eluting Stent of the mid LCX. Successful PCI / Drug Eluting Stent of the proximal LAD. Holter done on 2/3/2021- Baseline rhythm is sinus with average HR of 67 bpm, ranging between 51 and 104 bpm with rare PACs (Less than 1%), mainly  isolated with one run ofectopic atrial tachycardia (8 beats at HR of 112 bpm). Occasional PVCs (less than 1%) with 2 ventricular runs, the longest was 4 beats at a HR of 145 bpm.    EKG done today in office 8/30/2021- No acute ischemic changes from previous. Mr. Shelley Rinaldi reports doing ok heart wise but says he has been very constipated and has been making him gain weight. He says that he has tried stool softener, Miralax etc. And says this is not working. He says he wont have a bowel movement for four days and finally have one after that. He says that he continues to have dizziness but did go see PT to assess crystals in his ear and is awaiting results on this.   Giovanni Galaviz says that he has been doing a stationary bicycle for exercise for about a half hour per day. He denied any shortness of breath. No chest pain, pressure or tightness. He denies stomach pain, nausea or vomiting. No heart palpitations. He denies any abdominal pain, bleeding problems, bowel issues, problems with his medications or any other concerns at this time. No pains in his legs or thighs when he walks around. He is able to do all his household chores with no exertional symptoms. Weight gain is more or less attributed with his constipation- no signs of fluid overload. Wt Readings from Last 3 Encounters:   03/02/22 283 lb 3.2 oz (128.5 kg)   01/12/22 280 lb (127 kg)   11/29/21 270 lb (122.5 kg)         PAST MEDICAL HISTORY:        Past Medical History:   Diagnosis Date    Abnormal cardiovascular stress test 10/2020    moderate perfusion defect of severe intensity in the apical anteroapical and inferoapical regions    CAD (coronary artery disease)     [facility] mellitus [facility]     Diabetic retinopathy ([facility] 11/05/2020    Hyperlipidemia     Hypertension     Meniere disease     MI (myocardial infarction) ([address]     MULTIPLE    Obesity     Stroke ([facility]     Thrombocytopenia ([facility]     TIA (transient ischemic attack)        CURRENT ALLERGIES: Statins and Metformin and related REVIEW OF SYSTEMS: 14 systems were reviewed. Pertinent positives and negatives as above, all else negative.      Past Surgical History:   Procedure Laterality Date    CARDIAC CATHETERIZATION  10/29/2020    Severe three vessel disease involving the LAD, circumflex and right coronary artery  /  DR Kristian Sahni  /  CT SURGERGY CONSULT    COLONOSCOPY      COLONOSCOPY N/A 9/3/2021    COLORECTAL CANCER SCREENING Colonscope with polypectomy  performed by Ynes Muinz DO at [facility] ENDOSCOPY  9/3/2021    EGD BIOPSY performed by Ynes Muniz DO at [address] (FREESTYLE) monitoring kit 1 kit by Does not apply route daily TEST BLOOD SUGAR QID. WHATEVER METER IS COVERED BY GALVEZ. DIAGNOSIS E11.9 1 kit 0    Lancets [facility] TEST BLOOD SUGAR QID. WHATEVER METER IS COVERED BY GALVEZ. DIAGNOSIS E11.9 100 each 3    lisinopril (PRINIVIL;ZESTRIL) 10 MG tablet Take 1 tablet by mouth daily 90 tablet 0       FAMILY HISTORY: family history includes Diabetes in his mother; Heart Attack in his brother, maternal grandfather, mother, and sister; Heart Disease in his father; Prostate Cancer in his father. Physical Examination:      BP (!) 145/85 (Site: Left Upper Arm, Position: Sitting, Cuff Size: Large Adult)   Pulse 67   Resp 18   Ht 6' 1\" (1.854 m)   Wt 283 lb 3.2 oz (128.5 kg)   SpO2 96%   BMI 37.36 kg/m²  Body mass index is 37.36 kg/m². Constitutional: He is oriented to person. He appears well-developed and well-nourished. In no acute distress. HEENT: Normocephalic and atraumatic. No JVD present. Carotid bruit is not present. No mass and no thyromegaly present. No lymphadenopathy present. Cardiovascular: Normal rate, regular rhythm, normal heart sounds. Exam reveals no gallop and no friction rubs. No murmur was heard. Pulmonary/Chest: Effort normal and breath sounds normal. No respiratory distress. He has no wheezes, rhonchi or rales. Abdominal: Soft, non-tender. Bowel sounds and aorta are normal. He exhibits no organomegaly, mass or bruit. Extremities: No edema. No cyanosis or clubbing. 2+ radial and carotid pulses. Distal extremity pulses: 2+ bilaterally. .  Neurological: He is alert and oriented to person. No evidence of gross cranial nerve deficit. Coordination appeared normal.   Skin: Skin is warm and dry. There is no rash or diaphoresis. Psychiatric: He has a normal mood and affect.  His speech is normal and behavior is normal.      MOST RECENT LABS ON RECORD:   Lab Results   Component Value Date    WBC 4.1 11/11/2021    HGB 15.2 11/11/2021    HCT 43.7 11/11/2021    PLT 99 (L) 11/11/2021    CHOL 186 11/11/2021    TRIG 140 11/11/2021    HDL 43 11/11/2021    ALT 44 (H) 11/11/2021    AST 30 11/11/2021     11/11/2021    K 4.0 11/11/2021     11/11/2021    CREATININE 0.98 11/11/2021    BUN 14 11/11/2021    CO2 26 11/11/2021    TSH 2.38 12/17/2021    PSA 3.04 06/19/2020    INR 2.5 02/04/2021    GLUF 202 06/19/2020    LABA1C 7.7 (H) 11/11/2021       ASSESSMENT:     1. Apical mural thrombus    2. Lightheadedness    3. Constipation, unspecified constipation type    4. ASHD (arteriosclerotic heart disease)    5. Ischemic cardiomyopathy    6. Essential hypertension    7. Mixed hyperlipidemia       PLAN:        Patient with extensive medical history as outlined in HPI. History of multiple heart attacks, stent placement 2004, ischemic cardiomyopathy. History of uncontrolled diabetes, hypertension and dyslipidemia. History of intolerance to statin therapy. Multiple strokes, the pattern of his multiple ischemic strokes is suggestive of embolic etiology. No prior history of atrial fibrillation. Recent echo showed left ventricular apical thrombus which is clearly the source of multiple embolic strokes. Patient stopped the warfarin. He is absolutely adamant about not taking any oral anticoagulant. He understands the risk of having recurrent strokes. S/P PCI to mid circumflex and proximal LAD using KENNETH by Dr. Kurtis Esteves on 1/7/2021. Lightheadedness/dizziness: Could be secondary to his Meniere Disease versus cerebellar stroke. Nonpharmacologic counseling: Because of his condition, I reminded him to try and keep himself  well-hydrated and to take extra time when moving from laying to sitting, sitting to standing and  standing to walking. I also explained to him to help improve his symptoms he should include   1 or 2 L  of sports drinks daily, knee-high compressions stockings.     Constipation  Recommendation: I did recommend that he try milk of magnesia for his constipation since he has not tried this yet. Apical akinesis with left ventricular apical thrombus seen by Echo from 10/13/2020 As outlined in HPI he did refuse to take his Warfarin or Brilinta. He is totally convinced that he should only use natural substance to dissolve the clot. Additional Testing: I Ordered an Echocardiogram with contrast to assess Mr. Soto's ejection fraction and to look for significant valvular heart disease as a source of Mr. Shelley Rinaldi symptoms      Atherosclerotic Heart Disease: S/P Stenting in 2004 and History of Stroke and MI, heart cath on 10/29/2020 70% proximal and multiple 50-80% mid and distal LAD stenosis, 70-80% large mid OM1 stenosis and proximal 100% stenosis in the right coronary artery which did show left to right filling of a pretty good size PDA and PL branch of the RCA. Successful PCI / Drug Eluting Stent of the mid LCX. Successful PCI / Drug Eluting Stent of the proximal LAD. S/P PCI to mid circumflex and proximal LAD using KENNETH by Dr. Louie Alberts on 1/7/2021. Antiplatelet: Continue ASA 81 mg once daily. Beta Blocker: Continue Carvedilol (Coreg) 6.25 mg twice daily. Anti-anginal medications: Continue to nitroglycerin 0.4 mg tablets as needed for chest pain. Patient is a strong believer of chelation/homeopathic medicine. I told him that he has serious cardiovascular disease and his multiple strokes are clearly secondary to the left ventricular apical thrombus. I counseled the patient extensively to come to the emergency room immediately if he has any chest pain or worsening shortness of breath that this can be life-threatening. Patient verbalized understanding. Ischemic Cardiomyopathy:   Beta Blocker: Continue Carvedilol (Coreg) 6.25 mg twice daily. ACE Inibitor/ARB: Continue lisinopril 10 mg daily. Heart failure counseling: I advised them to try and keep their legs up whenever possible and to limit salt in their diet.      Essential Hypertension: Borderline controlled,  Beta Blocker: Continue Carvedilol (Coreg) 6.25 mg twice daily. ACE Inibitor/ARB: Continue lisinopril 10 mg daily. Although his blood pressure was a bit elevated today on 2 checks, he says that this is typically in the 192'H systolic when he checks it at home and therefore I did not make any changes to his medications at this time. Hyperlipidemia: Mixed - Last LDL on 11/11/2021 was 115 mg/dL  Not taking Lipitor. Not willing to try Zetia or PCSK9 inhibitors. Is absolutely adamant about not taking any antidyslipidemic drugs. He is a strong believer of homeopathic medicine and he takes multiple over-the-counter herbal medicine. He said he is feeling good. I told him its not about how he feels right now ask about when he will get the next cardiovascular or cerebrovascular event. Again patient is refusing lipid-lowering therapy. Obesity: Body mass index is 37.36 kg/m². I also briefly discussed both diet and exercise strategies for him to continue to loses weight and he was very receptive to this. In the meantime, I encouraged Mr. Daquan Salguero to continue to take his other medications. FOLLOW UP:   I told Mr. Daquan Salguero to call my office if he had any problems, but otherwise I asked him to Return in about 6 months (around 9/2/2022). However, I would be happy to see him sooner should the need arise. Sincerely,  Shaka Vázquez MD, F.A.C.C. [facility] Cardiology Specialist    [address]  Phone: [phone], Fax: [phone]     I believe that the risk of significant morbidity and mortality related to the patient's current medical conditions are: intermediate-high. >30 minutes were spent during prep work, discussion and exam of the patient, and follow up documentation and all of their questions were answered. The documentation recorded by the scribe, accurately and completely reflects the services I personally performed and the decisions made by me. Wanda Moreno MD, F.A.C.C.  March 2, 2022

## 2022-03-17 ENCOUNTER — HOSPITAL ENCOUNTER (OUTPATIENT)
Dept: NON INVASIVE DIAGNOSTICS | Age: 67
Discharge: HOME | End: 2022-03-17
Payer: COMMERCIAL

## 2022-03-17 DIAGNOSIS — I51.3 APICAL MURAL THROMBUS: ICD-10-CM

## 2022-03-17 LAB
LV EF: 45 %
LVEF MODALITY: NORMAL

## 2022-03-17 PROCEDURE — 6360000004 HC RX CONTRAST MEDICATION: Performed by: FAMILY MEDICINE

## 2022-03-17 PROCEDURE — C8929 TTE W OR WO FOL WCON,DOPPLER: HCPCS

## 2022-03-17 RX ADMIN — PERFLUTREN 1.65 MG: 6.52 INJECTION, SUSPENSION INTRAVENOUS at 14:24

## 2022-03-18 ENCOUNTER — TELEPHONE (OUTPATIENT)
Dept: CARDIOLOGY | Age: 67
End: 2022-03-18

## 2022-03-18 NOTE — TELEPHONE ENCOUNTER
----- Message from Randal Spencer MD sent at 3/18/2022  8:42 AM EDT -----  Echo is good.   Thank you

## 2022-04-12 ENCOUNTER — OFFICE VISIT (OUTPATIENT)
Dept: UROLOGY | Age: 67
End: 2022-04-12
Payer: COMMERCIAL

## 2022-04-12 VITALS
DIASTOLIC BLOOD PRESSURE: 87 MMHG | HEIGHT: 73 IN | RESPIRATION RATE: 18 BRPM | SYSTOLIC BLOOD PRESSURE: 152 MMHG | TEMPERATURE: 97.6 F | BODY MASS INDEX: 37.77 KG/M2 | WEIGHT: 285 LBS

## 2022-04-12 DIAGNOSIS — N13.8 BPH WITH OBSTRUCTION/LOWER URINARY TRACT SYMPTOMS: Primary | ICD-10-CM

## 2022-04-12 DIAGNOSIS — N39.41 URGE INCONTINENCE: ICD-10-CM

## 2022-04-12 DIAGNOSIS — Z12.5 SCREENING PSA (PROSTATE SPECIFIC ANTIGEN): ICD-10-CM

## 2022-04-12 DIAGNOSIS — N40.1 BPH WITH OBSTRUCTION/LOWER URINARY TRACT SYMPTOMS: Primary | ICD-10-CM

## 2022-04-12 DIAGNOSIS — K59.00 CONSTIPATION, UNSPECIFIED CONSTIPATION TYPE: ICD-10-CM

## 2022-04-12 PROCEDURE — 1036F TOBACCO NON-USER: CPT | Performed by: PHYSICIAN ASSISTANT

## 2022-04-12 PROCEDURE — 99214 OFFICE O/P EST MOD 30 MIN: CPT | Performed by: PHYSICIAN ASSISTANT

## 2022-04-12 PROCEDURE — G8417 CALC BMI ABV UP PARAM F/U: HCPCS | Performed by: PHYSICIAN ASSISTANT

## 2022-04-12 PROCEDURE — 3017F COLORECTAL CA SCREEN DOC REV: CPT | Performed by: PHYSICIAN ASSISTANT

## 2022-04-12 PROCEDURE — G8427 DOCREV CUR MEDS BY ELIG CLIN: HCPCS | Performed by: PHYSICIAN ASSISTANT

## 2022-04-12 PROCEDURE — 51798 US URINE CAPACITY MEASURE: CPT | Performed by: PHYSICIAN ASSISTANT

## 2022-04-12 RX ORDER — INSULIN DEGLUDEC INJECTION 100 U/ML
30 INJECTION, SOLUTION SUBCUTANEOUS
COMMUNITY

## 2022-04-12 RX ORDER — CYCLOBENZAPRINE HCL 10 MG
10 TABLET ORAL 3 TIMES DAILY PRN
COMMUNITY
End: 2022-08-29

## 2022-04-12 RX ORDER — POLYETHYLENE GLYCOL 3350 17 G/17G
17 POWDER, FOR SOLUTION ORAL 2 TIMES DAILY
Qty: 1530 G | Refills: 3 | Status: SHIPPED | OUTPATIENT
Start: 2022-04-12 | End: 2022-07-19 | Stop reason: SDUPTHER

## 2022-04-12 RX ORDER — TAMSULOSIN HYDROCHLORIDE 0.4 MG/1
0.4 CAPSULE ORAL DAILY
COMMUNITY
End: 2022-07-19 | Stop reason: SDUPTHER

## 2022-04-12 RX ORDER — ONDANSETRON 4 MG/1
4 TABLET, FILM COATED ORAL EVERY 8 HOURS PRN
COMMUNITY
End: 2022-05-31 | Stop reason: DRUGHIGH

## 2022-04-12 RX ORDER — FAMOTIDINE 40 MG/1
40 TABLET, FILM COATED ORAL DAILY
COMMUNITY

## 2022-04-12 RX ORDER — DOCUSATE SODIUM 100 MG/1
100 CAPSULE, LIQUID FILLED ORAL 3 TIMES DAILY
Qty: 90 CAPSULE | Refills: 5 | Status: SHIPPED | OUTPATIENT
Start: 2022-04-12 | End: 2022-05-12

## 2022-04-12 ASSESSMENT — ENCOUNTER SYMPTOMS
CONSTIPATION: 1
SHORTNESS OF BREATH: 0
NAUSEA: 0
COLOR CHANGE: 0
VOMITING: 0
COUGH: 0
BACK PAIN: 0
EYE REDNESS: 0
ABDOMINAL PAIN: 0
WHEEZING: 0

## 2022-04-12 NOTE — PROGRESS NOTES
HPI:      Patient is a 61 y.o. male in no acute distress. He is alert and oriented to person, place, and time. History  12/2020 Referral from [facility] for LUTS.  Complaints of urgency and urge incontinence.  He has nocturia 0-2 times per night.  He has frequency every 30 minutes to an hour during the day. Agatha Shi does change his underwear once per night due to the incontinence. Agatha Shi does have a weak stream and a split stream at the start of urination. Agatha Shi is uncircumcised, but denies any issues retracting his foreskin. Agatha Shi does have significant constipation and was recently in the ER for constipation. He also reports a history of fecal smearing. He will often go 4 more days without a bowel movement.  While in the ER he was told he was not emptying his bladder.  He did have a CT completed while in the ER on 11/2020 that showed no evidence of  calcifications or hydronephrosis.  He is an uncontrolled insulin-dependent diabetic. Agatha Shi does consume a large amount of bladder irritants.  He denies history of stones. Agatha Shi has never seen urology in the past.              Started miralax daily                 Started flomax daily                 Referred for colonoscopy     2/2021 - cysto - Extensive trilobar hyperplasia      Ditropan increased to XL 10mg    PSA  11/2021 - 0.6 (1.2)  6/2020 - 3.04    Today:  Patient is here today for follow up BPH, OAB, constipation. He continues to take Zhufutcc16 mg XL, finasteride daily and flomax twice a day. Voided 50 mL, PVR 0 mL. Patient states he has nocturia x0-1. Patient states that he has minimal leakage. He continues to struggle with constipation. He states that he does take MiraLAX daily as well as an occasional stool softener. Patient does find himself having to use a stronger laxative multiple times a week. He drinks approximately 60 ounces of water a day.     Past Medical History:   Diagnosis Date    Abnormal cardiovascular stress test 10/2020    moderate perfusion defect of severe intensity in the apical anteroapical and inferoapical regions    CAD (coronary artery disease)     [facility]     Diabetic retinopathy ([facility] 11/05/2020    Hyperlipidemia     Hypertension     Meniere disease     MI (myocardial infarction) ([facility]     MULTIPLE    Obesity     Stroke ([facility]     Thrombocytopenia ([facility]     TIA (transient ischemic attack)      Past Surgical History:   Procedure Laterality Date    CARDIAC CATHETERIZATION  10/29/2020    Severe three vessel disease involving the LAD, circumflex and right coronary artery  /  DR Brinda Kwok  /  CT SURGERGY CONSULT    COLONOSCOPY      COLONOSCOPY N/A 9/3/2021    COLORECTAL CANCER SCREENING Colonscope with polypectomy  performed by Julissa Huang DO at [facility] ENDOSCOPY  9/3/2021    EGD BIOPSY performed by Julissa Huang DO at [facility] Encounter Medications as of 4/12/2022   Medication Sig Dispense Refill    Insulin Degludec (TRESIBA FLEXTOUCH) 100 UNIT/ML SOPN Inject 30 Units into the skin      insulin aspart (NOVOLOG) 100 UNIT/ML injection cartridge Inject into the skin 3 times daily (before meals)      SITagliptin (JANUVIA) 100 MG tablet Take 100 mg by mouth daily      famotidine (PEPCID) 40 MG tablet Take 40 mg by mouth daily      ondansetron (ZOFRAN) 4 MG tablet Take 4 mg by mouth every 8 hours as needed for Nausea or Vomiting      cyclobenzaprine (FLEXERIL) 10 MG tablet Take 10 mg by mouth 3 times daily as needed for Muscle spasms      tamsulosin (FLOMAX) 0.4 MG capsule Take 0.4 mg by mouth daily      docusate sodium (COLACE) 100 MG capsule Take 1 capsule by mouth in the morning, at noon, and at bedtime 90 capsule 5    polyethylene glycol (GLYCOLAX) 17 GM/SCOOP powder Take 17 g by mouth 2 times daily 1530 g 3    carvedilol (COREG) 6.25 MG tablet Take 1 tablet by mouth 2 times daily 180 tablet 3    pantoprazole (PROTONIX) 40 MG tablet Take 1 tablet by mouth once daily 90 tablet 0    aspirin 81 MG EC tablet Take 81 mg by mouth daily      oxybutynin (DITROPAN-XL) 10 MG extended release tablet TAKE 1 TABLET BY MOUTH ONCE DAILY IN THE EVENING 30 tablet 11    NONFORMULARY Lion HRT, suppliment for heart      NONFORMULARY prosta-genix Prostate suppliment BID      finasteride (PROSCAR) 5 MG tablet Take 1 tablet by mouth daily 30 tablet 11    Insulin Pen Needle 32G X 8 MM MISC by Does not apply route      insulin NPH (NOVOLIN N) 100 UNIT/ML injection vial Inject into the skin nightly Pt mostly takes this at HS \"To help bring down my sugar. \" Pt states to take approx 30 Units nightly. TAKES PRN ONLY      Insulin Aspart FlexPen 100 UNIT/ML SOPN Inject 30 Units into the skin 2 times daily Pt to take as 20 units after each meal as sliding scale. Pt states \"The 20 Units are not usually enough. \"      blood glucose test strips (RELION GLUCOSE TEST STRIPS) strip Test qid. Dx--E11.9 insulin dependent 100 each 5    glucose monitoring kit (FREESTYLE) monitoring kit 1 kit by Does not apply route daily TEST BLOOD SUGAR QID. WHATEVER METER IS COVERED BY GALVEZ. DIAGNOSIS E11.9 1 kit 0    Lancets MIS TEST BLOOD SUGAR QID. WHATEVER METER IS COVERED BY GALVEZ.  DIAGNOSIS E11.9 100 each 3    lisinopril (PRINIVIL;ZESTRIL) 10 MG tablet Take 1 tablet by mouth daily 90 tablet 0    [DISCONTINUED] magnesium hydroxide (MILK OF MAGNESIA) 400 MG/5ML suspension Take 15 mLs by mouth daily as needed for Constipation      [DISCONTINUED] magnesium 30 MG tablet Take 30 mg by mouth 2 times daily      [DISCONTINUED] TRULICITY 1.5 UZ/3.9DK SOPN  (Patient not taking: Reported on 4/12/2022)      [DISCONTINUED] LANTUS SOLOSTAR 100 UNIT/ML injection pen INJECT 56 UNITS SUBCUTANEOUSLY NIGHTLY (Patient taking differently: nightly 56-60 units) 15 mL 0    Continuous Blood Gluc Sensor (FREESTYLE CHARLOTTE 2 SENSOR) MISC  (Patient not taking: Reported on 4/12/2022)      Coenzyme Q10 (CO Q 10) 10 MG CAPS Take by mouth daily (Patient not taking: Reported on 4/12/2022)      [DISCONTINUED] LECITHIN CONCENTRATE PO Take 2 tablets by mouth daily \"decrease cholesterol\"       No facility-administered encounter medications on file as of 4/12/2022. Current Outpatient Medications on File Prior to Visit   Medication Sig Dispense Refill    Insulin Degludec (TRESIBA FLEXTOUCH) 100 UNIT/ML SOPN Inject 30 Units into the skin      insulin aspart (NOVOLOG) 100 UNIT/ML injection cartridge Inject into the skin 3 times daily (before meals)      SITagliptin (JANUVIA) 100 MG tablet Take 100 mg by mouth daily      famotidine (PEPCID) 40 MG tablet Take 40 mg by mouth daily      ondansetron (ZOFRAN) 4 MG tablet Take 4 mg by mouth every 8 hours as needed for Nausea or Vomiting      cyclobenzaprine (FLEXERIL) 10 MG tablet Take 10 mg by mouth 3 times daily as needed for Muscle spasms      tamsulosin (FLOMAX) 0.4 MG capsule Take 0.4 mg by mouth daily      carvedilol (COREG) 6.25 MG tablet Take 1 tablet by mouth 2 times daily 180 tablet 3    pantoprazole (PROTONIX) 40 MG tablet Take 1 tablet by mouth once daily 90 tablet 0    aspirin 81 MG EC tablet Take 81 mg by mouth daily      oxybutynin (DITROPAN-XL) 10 MG extended release tablet TAKE 1 TABLET BY MOUTH ONCE DAILY IN THE EVENING 30 tablet 11    NONFORMULARY Lion HRT, suppliment for heart      NONFORMULARY prosta-genix Prostate suppliment BID      finasteride (PROSCAR) 5 MG tablet Take 1 tablet by mouth daily 30 tablet 11    Insulin Pen Needle 32G X 8 MM MISC by Does not apply route      insulin NPH (NOVOLIN N) 100 UNIT/ML injection vial Inject into the skin nightly Pt mostly takes this at HS \"To help bring down my sugar. \" Pt states to take approx 30 Units nightly.    TAKES PRN ONLY      Insulin Aspart FlexPen 100 UNIT/ML SOPN Inject 30 Units into the skin 2 times daily Pt to take as 20 units after each meal as myalgias. Skin: Negative for color change, rash and wound. Neurological: Negative for dizziness, tremors and numbness. Hematological: Negative for adenopathy. Does not bruise/bleed easily. BP (!) 152/87 (Site: Right Upper Arm, Position: Sitting, Cuff Size: Medium Adult)   Temp 97.6 °F (36.4 °C) (Temporal)   Resp 18   Ht 6' 1\" (1.854 m)   Wt 285 lb (129.3 kg)   BMI 37.60 kg/m²     PHYSICAL EXAM:  Constitutional: Patient in no acute distress; Neuro: alert and oriented to person place and time. Psych: Mood and affect normal.  Lungs: Respiratory effort normal  Abdomen: Soft, non-tender, non-distended  Rectal: deferred     Lab Results   Component Value Date    BUN 14 11/11/2021     Lab Results   Component Value Date    CREATININE 0.98 11/11/2021     Lab Results   Component Value Date    PSA 3.04 06/19/2020       ASSESSMENT:   Diagnosis Orders   1. BPH with obstruction/lower urinary tract symptoms  [place] MEASUREMENT,POST-VOID RESIDUAL VOLUME BY US,NON-IMAGING   2. Urge incontinence  [place] MEASUREMENT,POST-VOID RESIDUAL VOLUME BY US,NON-IMAGING   3. Constipation, unspecified constipation type     4. Screening PSA (prostate specific antigen)  PSA Screening         PLAN:  Continue Proscar    Continue Flomax twice a day    Continue Ditropan 10mg XL     Attempt to urinate every 2-3 hours     Miralax 17g BID     Colace 100mg TID    Tight glucose control     Follow up in 4 months with PVR and to check on his constipation.     PSA in 12/2022

## 2022-04-12 NOTE — PATIENT INSTRUCTIONS
FOR CONSTIPATION    Take Miralax (or generic equivalent) 17g (one capful) twice a day. For Miralax to have maximal effectiveness, be sure to increase your water intake - aim for 80 oz daily unless you are on a fluid-restriction from another provider. Colace (docusate) 100mg three times a day     SURVEY:    You may be receiving a survey from [employer] regarding your visit today. Please complete the survey to enable us to provide the highest quality of care to you and your family. If you cannot score us a very good on any question, please call the office to discuss how we could have made your experience a very good one. Thank you.

## 2022-06-09 ENCOUNTER — HOSPITAL ENCOUNTER (EMERGENCY)
Age: 67
Discharge: HOME | End: 2022-06-09
Attending: EMERGENCY MEDICINE
Payer: COMMERCIAL

## 2022-06-09 ENCOUNTER — APPOINTMENT (OUTPATIENT)
Dept: VASCULAR LAB | Age: 67
End: 2022-06-09
Payer: COMMERCIAL

## 2022-06-09 VITALS
TEMPERATURE: 97.9 F | SYSTOLIC BLOOD PRESSURE: 117 MMHG | DIASTOLIC BLOOD PRESSURE: 85 MMHG | RESPIRATION RATE: 15 BRPM | BODY MASS INDEX: 35.62 KG/M2 | WEIGHT: 270 LBS | OXYGEN SATURATION: 96 % | HEART RATE: 76 BPM

## 2022-06-09 DIAGNOSIS — R60.0 LEG EDEMA: Primary | ICD-10-CM

## 2022-06-09 PROCEDURE — 99284 EMERGENCY DEPT VISIT MOD MDM: CPT

## 2022-06-09 PROCEDURE — 93970 EXTREMITY STUDY: CPT

## 2022-06-09 ASSESSMENT — PAIN - FUNCTIONAL ASSESSMENT: PAIN_FUNCTIONAL_ASSESSMENT: NONE - DENIES PAIN

## 2022-06-09 NOTE — ED PROVIDER NOTES
[facility]  EMERGENCY DEPARTMENT ENCOUNTER      Pt Name: Loli Selby  MRN: [id number]  [place] 1955  Date of evaluation: 6/9/2022  Provider: Johanna Chavez MD    [address] [place] [address]       Chief Complaint   Patient presents with    Leg Swelling     leg swelling worse over the past couple of days, cough that won't go away         HISTORY OF PRESENT ILLNESS   (Location/Symptom, Timing/Onset, Context/Setting, Quality, Duration, Modifying Factors, Severity)  Note limiting factors. Loli Selby is a 61 y.o. male who presents to the emergency department      Very pleasant 62 yo gentleman with bilateral ankle swelling x 2 days. No chest pain, no SOB. Mild non productive cough. No fevers,  No leg pain. No other acute concerns. Nursing Notes were reviewed. REVIEW OF SYSTEMS    (2-9 systems for level 4, 10 or more for level 5)     Review of Systems   All other systems reviewed and are negative. Except as noted above the remainder of the review of systems was reviewed and negative.        PAST MEDICAL HISTORY     Past Medical History:   Diagnosis Date    Abnormal cardiovascular stress test 10/2020    moderate perfusion defect of severe intensity in the apical anteroapical and inferoapical regions    CAD (coronary artery disease)     Diabetes mellitus ([address]     Diabetic retinopathy ([address] 11/05/2020    Hyperlipidemia     Hypertension     Meniere disease     MI (myocardial infarction) ([address]     MULTIPLE    Obesity     Stroke ([address]     Thrombocytopenia ([address]     TIA (transient ischemic attack)          SURGICAL HISTORY       Past Surgical History:   Procedure Laterality Date    CARDIAC CATHETERIZATION  10/29/2020    Severe three vessel disease involving the LAD, circumflex and right coronary artery  /  DR Racheal Wyatt  /  CT SURGERGY CONSULT    COLONOSCOPY      COLONOSCOPY N/A 9/3/2021    COLORECTAL CANCER SCREENING Colonscope with polypectomy  performed by Nazario Reyes DO at [facility]    CORONARY ANGIOPLASTY WITH STENT PLACEMENT      UPPER GASTROINTESTINAL ENDOSCOPY  9/3/2021    EGD BIOPSY performed by Sania Ramirez DO at [facility]       Discharge Medication List as of 6/9/2022  6:39 PM      CONTINUE these medications which have NOT CHANGED    Details   ondansetron (ZOFRAN) 4 MG tablet Take 1 tablet by mouth 3 times daily as needed for Nausea or Vomiting, Disp-30 tablet, R-0Normal      Insulin Degludec (TRESIBA FLEXTOUCH) 100 UNIT/ML SOPN Inject 30 Units into the skinHistorical Med      insulin aspart (NOVOLOG) 100 UNIT/ML injection cartridge Inject into the skin 3 times daily (before meals)Historical Med      SITagliptin (JANUVIA) 100 MG tablet Take 100 mg by mouth dailyHistorical Med      famotidine (PEPCID) 40 MG tablet Take 40 mg by mouth dailyHistorical Med      cyclobenzaprine (FLEXERIL) 10 MG tablet Take 10 mg by mouth 3 times daily as needed for Muscle spasmsHistorical Med      tamsulosin (FLOMAX) 0.4 MG capsule Take 0.4 mg by mouth dailyHistorical Med      polyethylene glycol (GLYCOLAX) 17 GM/SCOOP powder Take 17 g by mouth 2 times daily, Disp-1530 g, R-3Normal      carvedilol (COREG) 6.25 MG tablet Take 1 tablet by mouth 2 times daily, Disp-180 tablet, R-3Normal      pantoprazole (PROTONIX) 40 MG tablet Take 1 tablet by mouth once daily, Disp-90 tablet, R-0Normal      aspirin 81 MG EC tablet Take 81 mg by mouth dailyHistorical Med      oxybutynin (DITROPAN-XL) 10 MG extended release tablet TAKE 1 TABLET BY MOUTH ONCE DAILY IN THE EVENING, Disp-30 tablet, R-11Normal      !! NONFORMULARY Lion HRT, suppliment for heartHistorical Med      !! NONFORMULARY prosta-genix Prostate suppliment BIDHistorical Med      finasteride (PROSCAR) 5 MG tablet Take 1 tablet by mouth daily, Disp-30 tablet, R-11Normal      Continuous Blood Gluc Sensor (FREESTYLE CHARLOTTE 2 SENSOR) MISC Historical Med      Insulin Pen Needle 32G X 8 MM MISC Historical Med      insulin NPH (NOVOLIN N) 100 UNIT/ML injection vial Inject into the skin nightly Pt mostly takes this at HS \"To help bring down my sugar. \" Pt states to take approx 30 Units nightly. TAKES PRN ONLYHistorical Med      Insulin Aspart FlexPen 100 UNIT/ML SOPN Inject 30 Units into the skin 2 times daily Pt to take as 20 units after each meal as sliding scale. Pt states \"The 20 Units are not usually enough. \"Historical Med      blood glucose test strips (RELION GLUCOSE TEST STRIPS) strip Test qid. Dx--E11.9 insulin dependent, Disp-100 each,R-5Normal      Coenzyme Q10 (CO Q 10) 10 MG CAPS Take by mouth daily Historical Med      glucose monitoring kit (FREESTYLE) monitoring kit DAILY Starting Fri 9/4/2020, Disp-1 kit,R-0, NormalTEST BLOOD SUGAR QID. WHATEVER METER IS COVERED BY GALVEZ. DIAGNOSIS E11.9      Lancets MISC Disp-100 each,R-3, NormalTEST BLOOD SUGAR QID. WHATEVER METER IS COVERED BY GALVEZ. DIAGNOSIS E11.9      lisinopril (PRINIVIL;ZESTRIL) 10 MG tablet Take 1 tablet by mouth daily, Disp-90 tablet,R-0Normal       !! - Potential duplicate medications found. Please discuss with provider.           ALLERGIES     Statins and Metformin and related    FAMILY HISTORY       Family History   Problem Relation Age of Onset    Heart Attack Mother     Diabetes Mother     Heart Disease Father     Prostate Cancer Father     Heart Attack Sister     Heart Attack Brother     Heart Attack Maternal Grandfather           SOCIAL HISTORY       Social History     Socioeconomic History    Marital status: Single     Spouse name: None    Number of children: None    Years of education: None    Highest education level: None   Occupational History    None   Tobacco Use    Smoking status: Never Smoker    Smokeless tobacco: Never Used   Vaping Use    Vaping Use: Never used   Substance and Sexual Activity    Alcohol use: Not Currently    Drug use: Never    Sexual activity: Not Currently   Other Topics Concern    None   Social History Narrative    None     Social Determinants of Health     Financial Resource Strain: Low Risk     Difficulty of Paying Living Expenses: Not hard at all   Food Insecurity: No Food Insecurity    Worried About Running Out of Food in the Last Year: Never true    Jody of Food in the Last Year: Never true   Transportation Needs: No Transportation Needs    Lack of Transportation (Medical): No    Lack of Transportation (Non-Medical): No   Physical Activity: Inactive    Days of Exercise per Week: 0 days    Minutes of Exercise per Session: 0 min   Stress: No Stress Concern Present    Feeling of Stress : Not at all   Social Connections:     Frequency of Communication with Friends and Family: Not on file    Frequency of Social Gatherings with Friends and Family: Not on file    Attends Buddhism Services: Not on file    Active Member of [address] [place] [address] [employer] or Organizations: Not on file    Attends Club or Organization Meetings: Not on file    Marital Status: Not on file   Intimate Partner Violence:     Fear of Current or Ex-Partner: Not on file    Emotionally Abused: Not on file    Physically Abused: Not on file    Sexually Abused: Not on file   Housing Stability:     Unable to Pay for Housing in the Last Year: Not on file    Number of [place] in the Last Year: Not on file    Unstable Housing in the Last Year: Not on file       SCREENINGS        Katie Coma Scale  Eye Opening: Spontaneous  Best Verbal Response: Oriented  Best Motor Response: Obeys commands  Bono Coma Scale Score: 15               PHYSICAL EXAM    (up to 7 for level 4, 8 or more for level 5)     ED Triage Vitals [06/09/22 1608]   BP Temp Temp Source Heart Rate Resp SpO2 Height Weight   117/85 97.9 °F (36.6 °C) Tympanic 76 15 96 % -- 270 lb (122.5 kg)       Physical Exam  Vitals and nursing note reviewed. Constitutional:       General: He is not in acute distress. Appearance: He is not toxic-appearing.    HENT:      Head: Normocephalic and atraumatic. Cardiovascular:      Rate and Rhythm: Normal rate and regular rhythm. Pulmonary:      Effort: Pulmonary effort is normal. No respiratory distress. Breath sounds: Normal breath sounds. Musculoskeletal:      Cervical back: Normal range of motion. Comments: Bilateral 1 + ankle edema. No calf tenderness   Skin:     General: Skin is warm and dry. Findings: No rash. Neurological:      General: No focal deficit present. Mental Status: He is alert and oriented to person, place, and time. DIAGNOSTIC RESULTS     EKG: All EKG's are interpreted by the Emergency Department Physician who either signs or Co-signs this chart in the absence of a cardiologist.        RADIOLOGY:   Non-plain film images such as CT, Ultrasound and MRI are read by the radiologist. Plain radiographic images are visualized and preliminarily interpreted by the emergency physician with the below findings:        Interpretation per the Radiologist below, if available at the time of this note:    VL DUP LOWER EXTREMITY VENOUS BILATERAL   Final Result            ED BEDSIDE ULTRASOUND:   Performed by ED Physician - none    LABS:  Labs Reviewed - No data to display    All other labs were within normal range or not returned as of this dictation. EMERGENCY DEPARTMENT COURSE and DIFFERENTIAL DIAGNOSIS/MDM:   Vitals:    Vitals:    06/09/22 1608   BP: 117/85   Pulse: 76   Resp: 15   Temp: 97.9 °F (36.6 °C)   TempSrc: Tympanic   SpO2: 96%   Weight: 270 lb (122.5 kg)           MDM  Number of Diagnoses or Management Options  Leg edema  Diagnosis management comments: Bilateral ankel edema. Dopplers negative. No calf tenderness. No SOB. Home care ED return and follow up istrucitons discussed prior to discharge. MIPS       REASSESSMENT          CRITICAL CARE TIME   Total Critical Care time was  minutes, excluding separately reportable procedures.   There was a high probability of clinically significant/life threatening deterioration in the patient's condition which required my urgent intervention. CONSULTS:  None    PROCEDURES:  Unless otherwise noted below, none     Procedures        FINAL IMPRESSION      1. Leg edema          DISPOSITION/PLAN   DISPOSITION Decision To Discharge 06/09/2022 06:38:31 PM      PATIENT REFERRED TO:  GAIL Flynn CNP  [facility], [address] [address]  [phone]      Within 3 to 5 days      DISCHARGE MEDICATIONS:  Discharge Medication List as of 6/9/2022  6:39 PM        Controlled Substances Monitoring:     No flowsheet data found.     (Please note that portions of this note were completed with a voice recognition program.  Efforts were made to edit the dictations but occasionally words are mis-transcribed.)    Tawanda Dobbins MD (electronically signed)  Attending Emergency Physician             Tawanda Dobbins MD  06/13/22 [id number]

## 2022-06-13 RX ORDER — FINASTERIDE 5 MG/1
TABLET, FILM COATED ORAL
Qty: 30 TABLET | Refills: 11 | Status: SHIPPED | OUTPATIENT
Start: 2022-06-13 | End: 2022-07-19 | Stop reason: SDUPTHER

## 2022-06-13 NOTE — TELEPHONE ENCOUNTER
Patient was seen within the past year. Patient's chart was reviewed. Patient tolerates finasteride well. We will refill this medication.

## 2022-07-19 ENCOUNTER — TELEPHONE (OUTPATIENT)
Dept: UROLOGY | Age: 67
End: 2022-07-19

## 2022-07-19 DIAGNOSIS — R39.15 URINARY URGENCY: ICD-10-CM

## 2022-07-19 DIAGNOSIS — N13.8 BPH WITH OBSTRUCTION/LOWER URINARY TRACT SYMPTOMS: ICD-10-CM

## 2022-07-19 DIAGNOSIS — N39.41 URGE INCONTINENCE: ICD-10-CM

## 2022-07-19 DIAGNOSIS — N40.1 BPH WITH OBSTRUCTION/LOWER URINARY TRACT SYMPTOMS: ICD-10-CM

## 2022-07-19 RX ORDER — DOCUSATE SODIUM 100 MG/1
100 CAPSULE, LIQUID FILLED ORAL 3 TIMES DAILY PRN
Qty: 270 CAPSULE | Refills: 0 | Status: SHIPPED | OUTPATIENT
Start: 2022-07-19

## 2022-07-19 RX ORDER — OXYBUTYNIN CHLORIDE 10 MG/1
TABLET, EXTENDED RELEASE ORAL
Qty: 90 TABLET | Refills: 0 | Status: SHIPPED | OUTPATIENT
Start: 2022-07-19

## 2022-07-19 RX ORDER — TAMSULOSIN HYDROCHLORIDE 0.4 MG/1
0.4 CAPSULE ORAL DAILY
Qty: 90 CAPSULE | Refills: 0 | Status: SHIPPED | OUTPATIENT
Start: 2022-07-19 | End: 2022-08-29

## 2022-07-19 RX ORDER — FINASTERIDE 5 MG/1
TABLET, FILM COATED ORAL
Qty: 90 TABLET | Refills: 0 | Status: SHIPPED | OUTPATIENT
Start: 2022-07-19

## 2022-07-19 RX ORDER — POLYETHYLENE GLYCOL 3350 17 G/17G
17 POWDER, FOR SOLUTION ORAL 2 TIMES DAILY
Qty: 1530 G | Refills: 3 | Status: SHIPPED | OUTPATIENT
Start: 2022-07-19 | End: 2022-08-29

## 2022-07-19 NOTE — TELEPHONE ENCOUNTER
We have him on miralax not docusate. Please clarify    We also have him on oxybutynin, proscar and flomax. Does he need these refilled?

## 2022-07-19 NOTE — TELEPHONE ENCOUNTER
Please let the patient know her labs are looking good, she is cleared for surgery. Please let her know I hope the surgery is smooth and safe!    Thanks!  NEHA Rodriguez     Rx's sent

## 2022-07-19 NOTE — TELEPHONE ENCOUNTER
Patient is taking Miralax and docusate. He needs them both sent to [facility] as well as Finasteride and Oxybutynin. The Flomax was already transferred there.

## 2022-07-19 NOTE — TELEPHONE ENCOUNTER
Patient switched pharmacies. He needs Finasteride and Docusate Sodium sent to [facility].

## 2022-08-10 ENCOUNTER — OFFICE VISIT (OUTPATIENT)
Dept: UROLOGY | Age: 67
End: 2022-08-10
Payer: COMMERCIAL

## 2022-08-10 VITALS
SYSTOLIC BLOOD PRESSURE: 135 MMHG | HEART RATE: 70 BPM | BODY MASS INDEX: 36.94 KG/M2 | DIASTOLIC BLOOD PRESSURE: 80 MMHG | WEIGHT: 280 LBS

## 2022-08-10 DIAGNOSIS — K59.00 CONSTIPATION, UNSPECIFIED CONSTIPATION TYPE: ICD-10-CM

## 2022-08-10 DIAGNOSIS — N13.8 BPH WITH OBSTRUCTION/LOWER URINARY TRACT SYMPTOMS: Primary | ICD-10-CM

## 2022-08-10 DIAGNOSIS — N39.41 URGE INCONTINENCE: ICD-10-CM

## 2022-08-10 DIAGNOSIS — N40.1 BPH WITH OBSTRUCTION/LOWER URINARY TRACT SYMPTOMS: Primary | ICD-10-CM

## 2022-08-10 PROCEDURE — 1036F TOBACCO NON-USER: CPT | Performed by: PHYSICIAN ASSISTANT

## 2022-08-10 PROCEDURE — 3017F COLORECTAL CA SCREEN DOC REV: CPT | Performed by: PHYSICIAN ASSISTANT

## 2022-08-10 PROCEDURE — G8428 CUR MEDS NOT DOCUMENT: HCPCS | Performed by: PHYSICIAN ASSISTANT

## 2022-08-10 PROCEDURE — 51798 US URINE CAPACITY MEASURE: CPT | Performed by: PHYSICIAN ASSISTANT

## 2022-08-10 PROCEDURE — G8417 CALC BMI ABV UP PARAM F/U: HCPCS | Performed by: PHYSICIAN ASSISTANT

## 2022-08-10 PROCEDURE — 99213 OFFICE O/P EST LOW 20 MIN: CPT | Performed by: PHYSICIAN ASSISTANT

## 2022-08-10 ASSESSMENT — ENCOUNTER SYMPTOMS
ABDOMINAL PAIN: 0
BACK PAIN: 0
SHORTNESS OF BREATH: 0
NAUSEA: 0
EYE REDNESS: 0
VOMITING: 0
COLOR CHANGE: 0
COUGH: 0
WHEEZING: 0
CONSTIPATION: 0

## 2022-08-10 NOTE — PROGRESS NOTES
HPI:      Patient is a 61 y.o. male in no acute distress. He is alert and oriented to person, place, and time. History  12/2020 Referral from [facility] Emery for LUTS. Complaints of urgency and urge incontinence. He has nocturia 0-2 times per night. He has frequency every 30 minutes to an hour during the day. He does change his underwear once per night due to the incontinence. He does have a weak stream and a split stream at the start of urination. He is uncircumcised, but denies any issues retracting his foreskin. He does have significant constipation and was recently in the ER for constipation. He also reports a history of fecal smearing. He will often go 4 more days without a bowel movement. While in the ER he was told he was not emptying his bladder. He did have a CT completed while in the ER on 11/2020 that showed no evidence of  calcifications or hydronephrosis. He is an uncontrolled insulin-dependent diabetic. He does consume a large amount of bladder irritants. He denies history of stones. He has never seen urology in the past.              Started miralax daily                 Started flomax daily                 Referred for colonoscopy     2/2021 - cysto - Extensive trilobar hyperplasia      Ditropan increased to XL 10mg    PSA  11/2021 - 0.6 (1.2)  6/2020 - 3.04    Today:  Patient is here today for follow up BPH, OAB, constipation. Patient is on Xortcfve88 mg XL, finasteride 5mg daily and flomax twice a day. Nocturia x0-1. Patient states that he has minimal leakage. He states that when he sits for extended periods of time he has a strong urge to void when he gets up after falling asleep in his chair. He states that often x5 hours have passed. He is having a daily bowel movement with MiraLAX twice a day. Bladderscan performed in office today: Pt voided - 30 mL, PVR - 55 mL.       Past Medical History:   Diagnosis Date    Abnormal cardiovascular stress test 10/2020    moderate perfusion defect of severe intensity in the apical anteroapical and inferoapical regions    CAD (coronary artery disease)     Diabetes mellitus ([facility]     Diabetic retinopathy ([facility] 11/05/2020    Hyperlipidemia     Hypertension     Meniere disease     MI (myocardial infarction) ([facility]     MULTIPLE    Obesity     Stroke ([facility]     Thrombocytopenia ([facility]     TIA (transient ischemic attack)      Past Surgical History:   Procedure Laterality Date    CARDIAC CATHETERIZATION  10/29/2020    Severe three vessel disease involving the LAD, circumflex and right coronary artery  /  DR Perla Brood  /  CT SURGERGY CONSULT    COLONOSCOPY      COLONOSCOPY N/A 9/3/2021    COLORECTAL CANCER SCREENING Colonscope with polypectomy  performed by Karen Crouch DO at [facility]  9/3/2021    EGD BIOPSY performed by Karen Crouch DO at [facility] Encounter Medications as of 8/10/2022   Medication Sig Dispense Refill    finasteride (PROSCAR) 5 MG tablet TAKE ONE TABLET BY MOUTH DAILY 90 tablet 0    oxybutynin (DITROPAN-XL) 10 MG extended release tablet TAKE ONE TABLET BY MOUTH EVERY EVENING 90 tablet 0    tamsulosin (FLOMAX) 0.4 MG capsule Take 1 capsule by mouth in the morning. 90 capsule 0    docusate sodium (COLACE) 100 MG capsule Take 1 capsule by mouth 3 times daily as needed for Constipation 270 capsule 0    polyethylene glycol (GLYCOLAX) 17 GM/SCOOP powder Take 17 g by mouth in the morning and 17 g before bedtime.  1530 g 3    pantoprazole (PROTONIX) 40 MG tablet Take 1 tablet by mouth once daily 90 tablet 1    blood glucose test strips (TRUE METRIX BLOOD GLUCOSE TEST) strip USE AS DIRECTED ONCE DAILY TO TEST BLOOD SUGAR 100 each 3    ondansetron (ZOFRAN) 4 MG tablet Take 1 tablet by mouth 3 times daily as needed for Nausea or Vomiting 30 tablet 0    Insulin Degludec (TRESIBA FLEXTOUCH) 100 UNIT/ML SOPN Inject 30 Units into the skin oxybutynin (DITROPAN-XL) 10 MG extended release tablet TAKE ONE TABLET BY MOUTH EVERY EVENING 90 tablet 0    tamsulosin (FLOMAX) 0.4 MG capsule Take 1 capsule by mouth in the morning. 90 capsule 0    docusate sodium (COLACE) 100 MG capsule Take 1 capsule by mouth 3 times daily as needed for Constipation 270 capsule 0    polyethylene glycol (GLYCOLAX) 17 GM/SCOOP powder Take 17 g by mouth in the morning and 17 g before bedtime. 1530 g 3    pantoprazole (PROTONIX) 40 MG tablet Take 1 tablet by mouth once daily 90 tablet 1    blood glucose test strips (TRUE METRIX BLOOD GLUCOSE TEST) strip USE AS DIRECTED ONCE DAILY TO TEST BLOOD SUGAR 100 each 3    ondansetron (ZOFRAN) 4 MG tablet Take 1 tablet by mouth 3 times daily as needed for Nausea or Vomiting 30 tablet 0    Insulin Degludec (TRESIBA FLEXTOUCH) 100 UNIT/ML SOPN Inject 30 Units into the skin      SITagliptin (JANUVIA) 100 MG tablet Take 100 mg by mouth in the morning. famotidine (PEPCID) 40 MG tablet Take 40 mg by mouth daily      cyclobenzaprine (FLEXERIL) 10 MG tablet Take 10 mg by mouth 3 times daily as needed for Muscle spasms      aspirin 81 MG EC tablet Take 81 mg by mouth daily      NONFORMULARY Lion HRT, suppliment for heart      NONFORMULARY prosta-genix Prostate suppliment BID      Continuous Blood Gluc Sensor (FREESTYLE [place] 2 SENSOR) MISC       Insulin Pen Needle 32G X 8 MM MISC by Does not apply route      Insulin Aspart FlexPen 100 UNIT/ML SOPN Inject 30 Units into the skin 2 times daily Pt to take as 20 units after each meal as sliding scale. Pt states \"The 20 Units are not usually enough. \"      Coenzyme Q10 (CO Q 10) 10 MG CAPS Take by mouth daily       glucose monitoring kit (FREESTYLE) monitoring kit 1 kit by Does not apply route daily TEST BLOOD SUGAR QID. WHATEVER METER IS COVERED BY GALVEZ. DIAGNOSIS E11.9 1 kit 0    Lancets MISC TEST BLOOD SUGAR QID. WHATEVER METER IS COVERED BY GALVEZ.  DIAGNOSIS E11.9 100 each 3    lisinopril (PRINIVIL;ZESTRIL) 10 MG tablet Take 1 tablet by mouth daily 90 tablet 0    insulin aspart (NOVOLOG) 100 UNIT/ML injection cartridge Inject into the skin 3 times daily (before meals) (Patient not taking: Reported on 8/10/2022)      carvedilol (COREG) 6.25 MG tablet Take 1 tablet by mouth 2 times daily (Patient not taking: Reported on 8/10/2022) 180 tablet 3    insulin NPH (HUMULIN N;NOVOLIN N) 100 UNIT/ML injection vial Inject into the skin nightly Pt mostly takes this at HS \"To help bring down my sugar. \" Pt states to take approx 30 Units nightly. TAKES PRN ONLY (Patient not taking: No sig reported)       No current facility-administered medications on file prior to visit. Statins and Metformin and related  Family History   Problem Relation Age of Onset    Heart Attack Mother     Diabetes Mother     Heart Disease Father     Prostate Cancer Father     Heart Attack Sister     Heart Attack Brother     Heart Attack Maternal Grandfather      Social History     Tobacco Use   Smoking Status Never   Smokeless Tobacco Never       Social History     Substance and Sexual Activity   Alcohol Use Not Currently       Review of Systems   Constitutional:  Negative for appetite change, chills and fever. Eyes:  Negative for redness and visual disturbance. Respiratory:  Negative for cough, shortness of breath and wheezing. Cardiovascular:  Negative for chest pain and leg swelling. Gastrointestinal:  Negative for abdominal pain, constipation, nausea and vomiting. Genitourinary:  Negative for decreased urine volume, difficulty urinating, dysuria, enuresis, flank pain, frequency, hematuria, penile discharge, penile pain, scrotal swelling, testicular pain and urgency. Positive for urgency   Musculoskeletal:  Negative for back pain, joint swelling and myalgias. Skin:  Negative for color change, rash and wound. Neurological:  Negative for dizziness, tremors and numbness. Hematological:  Negative for adenopathy. Does not bruise/bleed easily. /80 (Site: Right Upper Arm, Position: Sitting, Cuff Size: Large Adult)   Pulse 70   Wt 280 lb (127 kg)   BMI 36.94 kg/m²       PHYSICAL EXAM:  Constitutional: Patient in no acute distress; Neuro: alert and oriented to person place and time. Psych: Mood and affect normal.  Lungs: Respiratory effort normal  Abdomen: Soft, non-tender, non-distended  Rectal: deferred     Lab Results   Component Value Date    BUN 15 05/16/2022     Lab Results   Component Value Date    CREATININE 1.06 05/16/2022     Lab Results   Component Value Date    PSA 3.04 06/19/2020     ASSESSMENT:   Diagnosis Orders   1. BPH with obstruction/lower urinary tract symptoms  OH MEASUREMENT,POST-VOID RESIDUAL VOLUME BY US,NON-IMAGING      2. Urge incontinence        3. Constipation, unspecified constipation type          PLAN:  Continue Flomax twice a day    Continue finasteride daily    Continue MiraLAX twice a day    Recommend that he prompts to void every 2-3 hours while he is awake.     He will be due for a screening PSA 12/2022    F/u in 4 months with PVR and PSA

## 2022-08-23 ENCOUNTER — TELEPHONE (OUTPATIENT)
Dept: VASCULAR SURGERY | Age: 67
End: 2022-08-23

## 2022-08-23 NOTE — TELEPHONE ENCOUNTER
Patient called to inquire about his [employer] disability papers.   Writer returned call and left message I will speak to doctor on Thursday and get back to him

## 2022-08-29 ENCOUNTER — OFFICE VISIT (OUTPATIENT)
Dept: NEUROLOGY | Age: 67
End: 2022-08-29
Payer: COMMERCIAL

## 2022-08-29 VITALS
HEIGHT: 73 IN | DIASTOLIC BLOOD PRESSURE: 75 MMHG | BODY MASS INDEX: 37.19 KG/M2 | TEMPERATURE: 97 F | SYSTOLIC BLOOD PRESSURE: 139 MMHG | HEART RATE: 73 BPM | RESPIRATION RATE: 18 BRPM | WEIGHT: 280.6 LBS

## 2022-08-29 DIAGNOSIS — R42 DIZZY SPELLS: Primary | ICD-10-CM

## 2022-08-29 PROCEDURE — 99213 OFFICE O/P EST LOW 20 MIN: CPT | Performed by: NEUROMUSCULOSKELETAL MEDICINE, SPORTS MEDICINE

## 2022-08-29 PROCEDURE — 3017F COLORECTAL CA SCREEN DOC REV: CPT | Performed by: NEUROMUSCULOSKELETAL MEDICINE, SPORTS MEDICINE

## 2022-08-29 PROCEDURE — G8427 DOCREV CUR MEDS BY ELIG CLIN: HCPCS | Performed by: NEUROMUSCULOSKELETAL MEDICINE, SPORTS MEDICINE

## 2022-08-29 PROCEDURE — 1036F TOBACCO NON-USER: CPT | Performed by: NEUROMUSCULOSKELETAL MEDICINE, SPORTS MEDICINE

## 2022-08-29 PROCEDURE — G8417 CALC BMI ABV UP PARAM F/U: HCPCS | Performed by: NEUROMUSCULOSKELETAL MEDICINE, SPORTS MEDICINE

## 2022-08-29 NOTE — PATIENT INSTRUCTIONS
SURVEY:    You may be receiving a survey from [employer] regarding your visit today. Please complete the survey to enable us to provide the highest quality of care to you and your family. If you cannot score us a very good on any question, please call the office to discuss how we could have made your experience a very good one. Thank you.

## 2022-08-29 NOTE — PROGRESS NOTES
NEUROLOGY follow up    Patient Name:  Cecelia Gentile  :   1955  Clinic Visit Date: 2022    I saw Mr. Cecelia eGntile  in the neurology clinic today for follow-up of symptoms of poor balance and dizzy spells. .   59-year-old right-handed gentleman with a history of hypertension diabetes, hyperlipidemia, coronary artery disease history of a right cerebellar ischemic stroke in 2020. He continues have poor balance, with a tendency to fall to the right side, intermittent dizzy spells especially when he looks up. No vomiting nausea or double vision hearing loss facial numbness slurred speech difficulty swallowing or altered sensorium. Has not been able to work since the stroke due to the above-mentioned symptoms. Vestibular evaluation did not reveal any clinical evidence of BPPV. Therapy helped right-sided neck pain. REVIEW OF SYSTEMS    Constitutional Weight changes: absent, change in appetite: absent Fatigue: present; Fevers : present, Any recent hospitalizations:  absent   HEENT Ears: normal,  Visual disturbance: absent   Respiratory Shortness of breath: absent, choking:  absent, Cough: absent, Snoring : absent   Cardiovascular Chest pain: absent, Leg swelling :present, palpitations : absent, fainting : absent   GI Constipation: present, Diarrhea: present, Swallowing change: present    Urinary frequency: present, Urinary urgency: present, Urinary incontinence: present   Musculoskeletal Neck pain: present, Back pain: absent, Stiffness: absent, Muscle pain: present, Joint pain: present, restless leg : present   Dermatological Hair loss: absent, Skin changes: absent   Neurological Confusion: absent, Trouble concentrating: absent, Seizures: absent;  Memory loss: present, balance problem: present, Dizziness: present, vertigo: present, Weakness: present, Numbness present, Tremor: absent, Spasm: absent, involuntary movement: present, Speech difficulty: absent, Headache: absent, Light sensitivity: present   Psychiatric Anxiety: absent, Depression  absent, drug abuse: absent, Hallucination: absent, mood disorder: absent, Suicidal ideations absent   Hematologic Abnormal bleeding: absent, Anemia: absent, Lymph gland changes: absent Clotting disorder: absent     Past Medical History:   Diagnosis Date    Abnormal cardiovascular stress test 10/2020    moderate perfusion defect of severe intensity in the apical anteroapical and inferoapical regions    CAD (coronary artery disease)     Diabetes mellitus ([facility]     Diabetic retinopathy ([facility] 11/05/2020    Hyperlipidemia     Hypertension     Meniere disease     MI (myocardial infarction) ([facility]     MULTIPLE    Obesity     Stroke ([facility]     [facility]     TIA (transient ischemic attack)        Past Surgical History:   Procedure Laterality Date    CARDIAC CATHETERIZATION  10/29/2020    Severe three vessel disease involving the LAD, circumflex and right coronary artery  /  DR Elsa Ward  /  CT SURGERGY CONSULT    COLONOSCOPY      COLONOSCOPY N/A 9/3/2021    COLORECTAL CANCER SCREENING Colonscope with polypectomy  performed by Colon DO Ezequiel at [facility] ENDOSCOPY  9/3/2021    EGD BIOPSY performed by Nacho Nieves DO at [facility] History     Socioeconomic History    Marital status: Single     Spouse name: Not on file    Number of children: Not on file    Years of education: Not on file    Highest education level: Not on file   Occupational History    Not on file   Tobacco Use    Smoking status: Never    Smokeless tobacco: Never   Vaping Use    Vaping Use: Never used   Substance and Sexual Activity    Alcohol use: Not Currently    Drug use: Never    Sexual activity: Not Currently   Other Topics Concern    Not on file   Social History Narrative    Not on file     Social Determinants of Health     Financial Resource Strain: Low Risk     Difficulty of Paying Living Expenses: Not hard at all   Food Insecurity: No Food Insecurity    Worried About [facility] in the Last Year: Never true    Ran Out of Food in the Last Year: Never true   Transportation Needs: No Transportation Needs    Lack of Transportation (Medical): No    Lack of Transportation (Non-Medical): No   Physical Activity: Inactive    Days of Exercise per Week: 0 days    Minutes of Exercise per Session: 0 min   Stress: No Stress Concern Present    Feeling of Stress : Not at all   Social Connections: Not on file   Intimate Partner Violence: Not on file   Housing Stability: Not on file       Family History   Problem Relation Age of Onset    Heart Attack Mother     Diabetes Mother     Heart Disease Father     Prostate Cancer Father     Heart Attack Sister     Heart Attack Brother     Heart Attack Maternal Grandfather        Current Outpatient Medications   Medication Sig Dispense Refill    finasteride (PROSCAR) 5 MG tablet TAKE ONE TABLET BY MOUTH DAILY 90 tablet 0    oxybutynin (DITROPAN-XL) 10 MG extended release tablet TAKE ONE TABLET BY MOUTH EVERY EVENING 90 tablet 0    docusate sodium (COLACE) 100 MG capsule Take 1 capsule by mouth 3 times daily as needed for Constipation 270 capsule 0    pantoprazole (PROTONIX) 40 MG tablet Take 1 tablet by mouth once daily 90 tablet 1    blood glucose test strips (TRUE METRIX BLOOD GLUCOSE TEST) strip USE AS DIRECTED ONCE DAILY TO TEST BLOOD SUGAR 100 each 3    Insulin Degludec (TRESIBA FLEXTOUCH) 100 UNIT/ML SOPN Inject 30 Units into the skin      SITagliptin (JANUVIA) 100 MG tablet Take 100 mg by mouth in the morning. aspirin 81 MG EC tablet Take 81 mg by mouth daily      NONFORMULARY Lion HRT, suppliment for heart      NONFORMULARY prosta-genix Prostate suppliment BID      Insulin Pen Needle 32G X 8 MM MISC by Does not apply route      Insulin Aspart FlexPen 100 UNIT/ML SOPN Inject 30 Units into the skin 2 times daily Pt to take as 20 units after each meal as sliding scale. Pt states \"The 20 Units are not usually enough. \"      Coenzyme Q10 (CO Q 10) 10 MG CAPS Take by mouth daily       glucose monitoring kit (FREESTYLE) monitoring kit 1 kit by Does not apply route daily TEST BLOOD SUGAR QID. WHATEVER METER IS COVERED BY GALVEZ. DIAGNOSIS E11.9 1 kit 0    Lancets MISC TEST BLOOD SUGAR QID. WHATEVER METER IS COVERED BY GALVEZ. DIAGNOSIS E11.9 100 each 3    lisinopril (PRINIVIL;ZESTRIL) 10 MG tablet Take 1 tablet by mouth daily 90 tablet 0    famotidine (PEPCID) 40 MG tablet Take 40 mg by mouth daily (Patient not taking: Reported on 8/29/2022)       No current facility-administered medications for this visit. DATA:  Lab Results   Component Value Date    WBC 4.1 05/16/2022    HGB 15.3 05/16/2022    PLT 96 (L) 05/16/2022    CHOL 186 11/11/2021    TRIG 140 11/11/2021    HDL 43 11/11/2021    ALT 38 05/16/2022    AST 28 05/16/2022     05/16/2022    K 4.0 05/16/2022     05/16/2022    CREATININE 1.06 05/16/2022    BUN 15 05/16/2022    CO2 25 05/16/2022    TSH 2.38 12/17/2021    INR 2.5 02/04/2021    GLUF 202 06/19/2020    LABA1C 7.3 (H) 05/16/2022       /75 (Site: Right Upper Arm, Position: Sitting, Cuff Size: Medium Adult)   Pulse 73   Temp 97 °F (36.1 °C) (Temporal)   Resp 18   Ht 6' 1\" (1.854 m)   Wt 280 lb 9.6 oz (127.3 kg)   BMI 37.02 kg/m²     NEUROLOGICAL EXAMINATION:     MENTAL STATUS: Alert and oriented x3. No confusion or aphasia. Memory is normal.      CRANIAL NERVES: Pupils are equal and reactive. EOMS are normal..  No nystagmus or any other abnormal eye movements. Facial sensation is normal.  No facial weakness. Hearing is normal.  Palate and tongue movements are normal.  Shoulder shrug is symmetrical.     MOTOR EXAMINATION: Muscle tone is normal in all the limbs. Strength is 5/5 in both upper and lower limbs. No abnormal limb movements. SENSORY EXAMINATION: Normal.      STRETCH REFLEXES: Symmetrical in both the upper and lower limbs. GAIT: Unable to walk tandem     IMPRESSION:    1. Chronic intermittent dizziness/vertigo and poor balance secondary to right cerebellar ischemic infarction [2020]  2. Hypertension   3. Diabetes. PLAN:    1. Continue aspirin therapy. 2.  Follow-up in 3 months. NOTE: This neurology evaluation is part of outpatient coverage at [facility]  1-2 days per week. Patients requiring frequent evaluations or uncomfortable with potential 3-4 day turnaround on questions or calls  may be better served by a neurologist in the area full time. [facility] at [facility]. [place] is available for outpatient visits and procedures including EMG/NCS. Non-[facility] neurologists also practice in [facility] (Dr. Carolina Bell) and [facility] (Lisa De Los Santos).        Romulo Jarquin MD   8/29/2022  4:25 PM

## 2022-08-30 NOTE — TELEPHONE ENCOUNTER
Milton Bassett  was seen on Aug 29 in Dr. Aldo Soliz office. Papers were filled out and faxed to [employer].

## 2022-12-06 ENCOUNTER — TELEPHONE (OUTPATIENT)
Dept: UROLOGY | Age: 67
End: 2022-12-06

## 2022-12-06 DIAGNOSIS — Z12.5 SCREENING PSA (PROSTATE SPECIFIC ANTIGEN): ICD-10-CM

## 2022-12-06 LAB — PROSTATE SPECIFIC ANTIGEN: 0.76 NG/ML

## 2022-12-08 ENCOUNTER — OFFICE VISIT (OUTPATIENT)
Dept: CARDIOLOGY | Age: 67
End: 2022-12-08

## 2022-12-08 VITALS
WEIGHT: 286.6 LBS | BODY MASS INDEX: 37.98 KG/M2 | DIASTOLIC BLOOD PRESSURE: 71 MMHG | RESPIRATION RATE: 18 BRPM | HEART RATE: 63 BPM | HEIGHT: 73 IN | OXYGEN SATURATION: 98 % | SYSTOLIC BLOOD PRESSURE: 138 MMHG

## 2022-12-08 DIAGNOSIS — Z91.14 NONCOMPLIANCE WITH MEDICATION REGIMEN: ICD-10-CM

## 2022-12-08 DIAGNOSIS — I10 ESSENTIAL HYPERTENSION: ICD-10-CM

## 2022-12-08 DIAGNOSIS — I25.10 ASHD (ARTERIOSCLEROTIC HEART DISEASE): ICD-10-CM

## 2022-12-08 DIAGNOSIS — E66.9 OBESITY, UNSPECIFIED CLASSIFICATION, UNSPECIFIED OBESITY TYPE, UNSPECIFIED WHETHER SERIOUS COMORBIDITY PRESENT: ICD-10-CM

## 2022-12-08 DIAGNOSIS — E78.2 MIXED HYPERLIPIDEMIA: ICD-10-CM

## 2022-12-08 DIAGNOSIS — E11.69 TYPE 2 DIABETES MELLITUS WITH OTHER SPECIFIED COMPLICATION, WITH LONG-TERM CURRENT USE OF INSULIN (HCC): ICD-10-CM

## 2022-12-08 DIAGNOSIS — I51.3 APICAL MURAL THROMBUS: ICD-10-CM

## 2022-12-08 DIAGNOSIS — Z78.9 STATIN INTOLERANCE: ICD-10-CM

## 2022-12-08 DIAGNOSIS — Z79.4 TYPE 2 DIABETES MELLITUS WITH OTHER SPECIFIED COMPLICATION, WITH LONG-TERM CURRENT USE OF INSULIN (HCC): ICD-10-CM

## 2022-12-08 DIAGNOSIS — I25.5 ISCHEMIC CARDIOMYOPATHY: ICD-10-CM

## 2022-12-08 DIAGNOSIS — R42 LIGHTHEADEDNESS: Primary | ICD-10-CM

## 2022-12-08 PROCEDURE — 3052F HG A1C>EQUAL 8.0%<EQUAL 9.0%: CPT | Performed by: INTERNAL MEDICINE

## 2022-12-08 PROCEDURE — 3078F DIAST BP <80 MM HG: CPT | Performed by: INTERNAL MEDICINE

## 2022-12-08 PROCEDURE — 99211 OFF/OP EST MAY X REQ PHY/QHP: CPT | Performed by: INTERNAL MEDICINE

## 2022-12-08 PROCEDURE — 1123F ACP DISCUSS/DSCN MKR DOCD: CPT | Performed by: INTERNAL MEDICINE

## 2022-12-08 PROCEDURE — 99214 OFFICE O/P EST MOD 30 MIN: CPT | Performed by: INTERNAL MEDICINE

## 2022-12-08 PROCEDURE — 93005 ELECTROCARDIOGRAM TRACING: CPT | Performed by: INTERNAL MEDICINE

## 2022-12-08 PROCEDURE — 3074F SYST BP LT 130 MM HG: CPT | Performed by: INTERNAL MEDICINE

## 2022-12-08 RX ORDER — INSULIN LISPRO 100 [IU]/ML
INJECTION, SOLUTION INTRAVENOUS; SUBCUTANEOUS
COMMUNITY
Start: 2022-09-14

## 2022-12-08 RX ORDER — DULAGLUTIDE 0.75 MG/.5ML
0.75 INJECTION, SOLUTION SUBCUTANEOUS
COMMUNITY
Start: 2022-08-31 | End: 2023-02-27

## 2022-12-08 RX ORDER — TAMSULOSIN HYDROCHLORIDE 0.4 MG/1
CAPSULE ORAL
COMMUNITY
Start: 2022-11-25

## 2022-12-08 NOTE — PROGRESS NOTES
Nacho Vidal am scribing for and in the presence of Shelby Winslow MD, F.A.C.C..      Patient: Mignon Andre  : 1955  Date of Visit: 2022    REASON FOR VISIT / CONSULTATION: Follow-up (EKG done today. HX:CAD, Ischemic cardiomyopathy, HTN,HLD Pt is here for 6 month follow up he is doing well he does have sob with exertion. Lightheaded/dizziness due to stroke Denies:CP, palp )      History of Present Illness:        Dear Elmer Armendariz, GAIL - IVONNE and Dr Tho Weller,     I had the pleasure of seeing Mignon Andre in my office today. Mr. Tal Dowling is a 74 y.o. male presented for follow up. Patient has extensive medical history. He reported having multiple heart attacks with the first one at age 31 and the second one in , he had a cardiac cath and stent placed at that time. He followed up with his cardiologist up until 2020. Family history includes his siblings who have extensive heart disease including heart attacks and bypass surgery    History of ischemic cardiomyopathy secondary to multiple heart attacks, his most recent ejection fraction was 35% by echo back in 2020. He told me that he was offered defibrillator at some point but he was told that his heart function is good enough and no defibrillator needed. He has history of longstanding diabetes, hypertension and dyslipidemia he reported that he is not taking statin therapy because it does cause muscle pain. He said none of his family members were able to tolerate statins as well. He is lifelong non-smoker. He also reported having a history of Ménière's disease and chronic dizziness. He was admitted to [facility] from 2020 to 2020 because of acute stroke. As per discharge summary from [facility], NIH score was 2 for right upper and lower extremity dysmetria. He got up MRI of the brain, MRA of the head and neck and MRI of the cervical spine done.   Patient found to have multiple acute infarcts in the right cerebellar hemisphere. Additional punctate acute infarct of the right lateral kateryna with old infarction of the left frontal lobe in addition to small vessel disease. No focal significant arterial narrowing in the neck. During that same hospital admission he was evaluated by hematology because of thrombocytopenia, currently tolerating dual antiplatelet therapy with no bleeding complications. He was also evaluated by cardiology for ischemic cardiomyopathy and lisinopril started in addition to adding carvedilol twice daily. He was later seen by Dr. Joseph Barrios and recommended loop recorder but patient refused, he wanted to discuss with GAIL Lewis CNP first.      Echocardiogram done on 4/24/2020 showed mild left ventricular hypertrophy, global left ventricular systolic function is moderately reduced, estimated EF is 35%. Independence appears hypokinetic. Evidence of mild (grade I) diastolic dysfunction. Negative bubble study, no shunt noted. EKG done in office on 9/30/2020 showed sinus rhythm with evidence of old anterior myocardial infarction. No acute ischemic changes. Echo done on 10/13/2020: Because of poor image quality, definity echo contrast was used to better assess left ventricular wall motion and thickness. EF was 35%. Mild left ventricular hypertrophy. Akinetic apex with filling defect seen on contrast echo consistent with left ventricular apical thrombus. The left atrium is mildly dilated (29-33) with a left atrial volume index of 29 ml/m2. Evidence of moderate (grade II) diastolic dysfunction is seen. The aortic root is considered to be at the upper limits of normal in size when corrected for body surface area. Stress Test done on 10/13/2020: Abnormal myocardial perfusion study.   There is a moderate perfusion defect of severe intensity in the apical, anteroapical and inferoapical regions during stress and rest imaging which is most consistent with an old myocardial infarction. EF was 28%. Heart cath done on 10/29/2020: Severe three vessel coronary artery disease involving a 70% proximal and multiple 50-80% mid and distal LAD stenosis, 70-80% large mid OM1 stenosis and proximal 100% stenosis in the right coronary artery which did show left to right filling of a pretty good size PDA and PL branch of the RCA. Normal left ventricular end diastolic pressure. Patient is a strong believer of homeopathic medicine/chelation therapy. I clearly stated to him that he has left ventricular apical thrombus and that has caused his stroke but Dr. Marichuy Fitzgerald told him that this is not true and this is secondary to \"free radicals??\". He is also adamant about not taking any statins because of severe muscle pain. He said none of his family member will be able to tolerate statin therapy. He takes lecithin to dissolve the atherosclerotic plaque and he is a strong believer of this. Heart cath done on 1/7/2021 with Dr Jasmin Brown at [facility] - Successful PCI / Drug Eluting Stent of the mid LCX. Successful PCI / Drug Eluting Stent of the proximal LAD. Holter done on 2/3/2021- Baseline rhythm is sinus with average HR of 67 bpm, ranging between 51 and 104 bpm with rare PACs (Less than 1%), mainly  isolated with one run ofectopic atrial tachycardia (8 beats at HR of 112 bpm). Occasional PVCs (less than 1%) with 2 ventricular runs, the longest was 4 beats at a HR of 145 bpm.    EKG done in office 8/30/2021- No acute ischemic changes from previous. EKG done today in office 12/8/2022- Normal sinus rhythm     Echo done on 3/17/2022- EF 45% Mild left ventricular hypertrophy and with normal left ventricular cavity size. Unable to assess specific wall motion abnormalities due to image quality. Previously seen apical filling defect is no longer seen. No significant valvular abnormalities. Evidence of mild diastolic dysfunction is seen. Mr. James Ponce is here today for six month follow up. He states over all he is doing well. He does continue to have lightheaded/dizziness off and on but he reports it is not any worse. He was in ER on 6/9/2022 for leg swelling and does continue to have some swelling off and on. He does have mucous discharge with cough. He denied any shortness of breath. No chest pain, pressure or tightness. He denies stomach pain, nausea or vomiting. No heart palpitations. He denies any abdominal pain, bleeding problems, bowel issues, problems with his medications or any other concerns at this time. No pains in his legs or thighs when he walks around. He is able to do all his household chores with no exertional symptoms. Weight gain is more or less attributed with his constipation- no signs of fluid overload. Wt Readings from Last 3 Encounters:   12/08/22 286 lb 9.6 oz (130 kg)   12/02/22 285 lb (129.3 kg)   08/29/22 280 lb 9.6 oz (127.3 kg)         PAST MEDICAL HISTORY:        Past Medical History:   Diagnosis Date    Abnormal cardiovascular stress test 10/2020    moderate perfusion defect of severe intensity in the apical anteroapical and inferoapical regions    CAD (coronary artery disease)     Diabetes mellitus [facility])     [address] [facility]    Diabetic retinopathy ([facility] [address] 11/05/2020    Fatty liver disease, nonalcoholic     Hyperlipidemia     Hypertension     Meniere disease     MI (myocardial infarction) ([facility] [address]     MULTIPLE    Obesity     Stroke ([facility] [address]     Thrombocytopenia ([facility] [address]     TIA (transient ischemic attack)        CURRENT ALLERGIES: Statins and Metformin and related REVIEW OF SYSTEMS: 14 systems were reviewed. Pertinent positives and negatives as above, all else negative.      Past Surgical History:   Procedure Laterality Date    CARDIAC CATHETERIZATION  10/29/2020    Severe three vessel disease involving the LAD, circumflex and right coronary artery  /  DR Brady  /  CT SURGERGY CONSULT    COLONOSCOPY      COLONOSCOPY N/A 9/3/2021    COLORECTAL CANCER SCREENING Colonscope with polypectomy  performed by Kamryn Rios DO at [address] ENDOSCOPY  9/3/2021    EGD BIOPSY performed by Kamryn Rios DO at [address] History:  Social History     Tobacco Use    Smoking status: Never    Smokeless tobacco: Never   Vaping Use    Vaping Use: Never used   Substance Use Topics    Alcohol use: Not Currently    Drug use: Never        CURRENT MEDICATIONS:        Outpatient Medications Marked as Taking for the 12/8/22 encounter (Office Visit) with Art Given, MD   Medication Sig Dispense Refill    Dulaglutide (TRULICITY) 1.69 PA/1.9SC SOPN Inject 0.75 mg into the skin every 7 days      insulin lispro, 1 Unit Dial, (HUMALOG/ADMELOG) 100 UNIT/ML SOPN Inject 15 (fifteen) Units under the skin 3 (three) times a day before meals With sliding scale, see medication adjustment sheet .       tamsulosin (FLOMAX) 0.4 MG capsule TAKE 1 CAPSULE BY MOUTH TWICE DAILY      LINZESS 290 MCG CAPS capsule TAKE 1 CAPSULE BY MOUTH ONCE DAILY      lisinopril (PRINIVIL;ZESTRIL) 10 MG tablet Take 1 tablet by mouth daily 90 tablet 0    finasteride (PROSCAR) 5 MG tablet TAKE ONE TABLET BY MOUTH DAILY 90 tablet 0    oxybutynin (DITROPAN-XL) 10 MG extended release tablet TAKE ONE TABLET BY MOUTH EVERY EVENING 90 tablet 0    docusate sodium (COLACE) 100 MG capsule Take 1 capsule by mouth 3 times daily as needed for Constipation 270 capsule 0    pantoprazole (PROTONIX) 40 MG tablet Take 1 tablet by mouth once daily 90 tablet 1    blood glucose test strips (TRUE METRIX BLOOD GLUCOSE TEST) strip USE AS DIRECTED ONCE DAILY TO TEST BLOOD SUGAR 100 each 3    Insulin Degludec (TRESIBA FLEXTOUCH) 100 UNIT/ML SOPN Inject 30 Units into the skin      aspirin 81 MG EC tablet Take 81 mg by mouth daily      NONFORMULARY Lion HRT, suppliment for heart      NONFORMULARY prosta-genix Prostate suppliment BID      Insulin Pen Needle 32G X 8 MM MISC by Does not apply route      Insulin Aspart FlexPen 100 UNIT/ML SOPN Inject 30 Units into the skin 2 times daily Pt to take as 20 units after each meal as sliding scale. Pt states \"The 20 Units are not usually enough. \"      Coenzyme Q10 (CO Q 10) 10 MG CAPS Take by mouth daily       glucose monitoring kit (FREESTYLE) monitoring kit 1 kit by Does not apply route daily TEST BLOOD SUGAR QID. WHATEVER METER IS COVERED BY GALVEZ. DIAGNOSIS E11.9 1 kit 0    Lancets MISC TEST BLOOD SUGAR QID. WHATEVER METER IS COVERED BY GALVEZ. DIAGNOSIS E11.9 100 each 3       FAMILY HISTORY: family history includes Diabetes in his mother; Heart Attack in his brother, maternal grandfather, mother, and sister; Heart Disease in his father; Prostate Cancer in his father. Physical Examination:      /71 (Site: Right Upper Arm, Position: Sitting, Cuff Size: Large Adult)   Pulse 63   Resp 18   Ht 6' 1\" (1.854 m)   Wt 286 lb 9.6 oz (130 kg)   SpO2 98%   BMI 37.81 kg/m²  Body mass index is 37.81 kg/m². Constitutional: He is oriented to person. He appears well-developed and well-nourished. In no acute distress. HEENT: Normocephalic and atraumatic. No JVD present. Carotid bruit is not present. No mass and no thyromegaly present. No lymphadenopathy present. Cardiovascular: Normal rate, regular rhythm, normal heart sounds. Exam reveals no gallop and no friction rubs. No murmur was heard. Pulmonary/Chest: Effort normal and breath sounds normal. No respiratory distress. He has no wheezes, rhonchi or rales. Abdominal: Soft, non-tender. Bowel sounds and aorta are normal. He exhibits no organomegaly, mass or bruit. Extremities: Trace  edema. No cyanosis or clubbing. 2+ radial and carotid pulses. Distal extremity pulses: 2+ bilaterally. .  Neurological: He is alert and oriented to person. No evidence of gross cranial nerve deficit. Coordination appeared normal.   Skin: Skin is warm and dry. There is no rash or diaphoresis.    Psychiatric: He has a normal mood and affect. His speech is normal and behavior is normal.      MOST RECENT LABS ON RECORD:   Lab Results   Component Value Date    WBC 3.7 11/28/2022    HGB 14.6 11/28/2022    HCT 42.1 11/28/2022    PLT 75 (L) 11/28/2022    CHOL 183 11/28/2022    TRIG 276 (H) 11/28/2022    HDL 40 11/28/2022    ALT 44 11/28/2022    AST 31 11/28/2022     11/28/2022    K 4.0 11/28/2022     11/28/2022    CREATININE 0.90 11/28/2022    BUN 12 11/28/2022    CO2 25 11/28/2022    TSH 2.38 12/17/2021    PSA 0.76 12/02/2022    INR 2.5 02/04/2021    GLUF 202 06/19/2020    LABA1C 8.8 (H) 11/28/2022       ASSESSMENT:     1. Lightheadedness    2. Apical mural thrombus    3. ASHD (arteriosclerotic heart disease)    4. Ischemic cardiomyopathy    5. Essential hypertension    6. Mixed hyperlipidemia    7. Obesity, unspecified classification, unspecified obesity type, unspecified whether serious comorbidity present    8. Type 2 diabetes mellitus with other specified complication, with long-term current use of insulin (HCC)    9. Statin intolerance    10. Noncompliance with medication regimen         PLAN:        Patient with extensive medical history as outlined in HPI. History of multiple heart attacks, stent placement 2004, ischemic cardiomyopathy. History of uncontrolled diabetes, hypertension and dyslipidemia. History of intolerance to statin therapy. Multiple strokes, the pattern of his multiple ischemic strokes is suggestive of embolic etiology. No prior history of atrial fibrillation. Recent echo showed left ventricular apical thrombus which is clearly the source of multiple embolic strokes. Patient stopped the warfarin. He is absolutely adamant about not taking any oral anticoagulant. He understands the risk of having recurrent strokes. S/P PCI to mid circumflex and proximal LAD using KENNETH by Dr. Jasmin Brown on 1/7/2021.     Lightheadedness/dizziness: Could be secondary to his Meniere Disease versus cerebellar stroke. Symptoms are stable since last visit. No falls, Near falls or passing out episodes. He told me that his neurologist told him that this will not change. Nonpharmacologic counseling: Because of his condition, I reminded him to try and keep himself well-hydrated and to take extra time when moving from laying to sitting, sitting to standing and tanding to walking. I also explained to him to help improve his symptoms he should include   1 or 2 L of sports drinks daily, knee-high compressions stockings. Apical akinesis with left ventricular apical thrombus seen by Echo from 10/13/2020 As outlined in HPI he did refuse to take his Warfarin or Brilinta. He is totally convinced that he should only use natural substance to dissolve the clot. Atherosclerotic Heart Disease: S/P Stenting in 2004 and History of Stroke and MI, heart cath on 10/29/2020 70% proximal and multiple 50-80% mid and distal LAD stenosis, 70-80% large mid OM1 stenosis and proximal 100% stenosis in the right coronary artery which did show left to right filling of a pretty good size PDA and PL branch of the RCA. Successful PCI / Drug Eluting Stent of the mid LCX. Successful PCI / Drug Eluting Stent of the proximal LAD. S/P PCI to mid circumflex and proximal LAD using KENNETH by Dr. Rodriguez Boyle on 1/7/2021. Antiplatelet: Continue ASA 81 mg once daily. Beta Blocker: Not indicated at this time. Anti-anginal medications: Continue to nitroglycerin 0.4 mg tablets as needed for chest pain. Patient is a strong believer of chelation/homeopathic medicine. I told him that he has serious cardiovascular disease and his multiple strokes are clearly secondary to the left ventricular apical thrombus. I counseled the patient extensively to come to the emergency room immediately if he has any chest pain or worsening shortness of breath that this can be life-threatening. Patient verbalized understanding.     Ischemic Cardiomyopathy:   Beta Blocker: Not willing to take beta-blocker therapy. ACE Inibitor/ARB: Continue lisinopril 10 mg daily. Heart failure counseling: I advised them to try and keep their legs up whenever possible and to limit salt in their diet. Essential Hypertension: Controlled,  Beta Blocker: Not indicated at this time. ACE Inibitor/ARB: Continue lisinopril 10 mg daily. Hyperlipidemia: Mixed - Last LDL on 11/28/2022 was 88 mg/dL  Not taking Lipitor. Not willing to try Zetia or PCSK9 inhibitors. Is absolutely adamant about not taking any antidyslipidemic drugs. He is a strong believer of homeopathic medicine and he takes multiple over-the-counter herbal medicine. He said he is feeling good. I told him its not about how he feels right now ask about when he will get the next cardiovascular or cerebrovascular event. Again patient is refusing lipid-lowering therapy. Obesity: Body mass index is 37.81 kg/m². I also briefly discussed both diet and exercise strategies for him to continue to loses weight and he was very receptive to this. Counseling  Counseled patient extensively regarding control of his diabetes mellitus, controlling his weight and taking his medicines. He is only agreeing to certain medications as above and not willing to take the recommended standard medications. Fourtently his symptoms are stable but I told him he is at a very high risk for recurrent stroke and recurrent myocardial infarction which very likely can be life-threatening        In the meantime, I encouraged Mr. Joe Schmidt to continue to take his other medications. FOLLOW UP:   I told Mr. Joe Schmidt to call my office if he had any problems, but otherwise I asked him to Return in about 1 year (around 12/8/2023). However, I would be happy to see him sooner should the need arise. Sincerely,  Maya Bonner MD, F.A.C.C.   [facility] Cardiology Specialist    [address]  [facility], [address] [place] [address]  Phone: [phone], Fax: [phone]     I believe that the risk of significant morbidity and mortality related to the patient's current medical conditions are: intermediate-high. Approximately 40 minutes were spent during prep work, discussion and exam of the patient, and follow up documentation and all of their questions were answered. The documentation recorded by the scribe, accurately and completely reflects the services I personally performed and the decisions made by me. Guerita Morocho MD, F.A.C.C.  December 8, 2022

## 2022-12-08 NOTE — PATIENT INSTRUCTIONS
SURVEY:    You may be receiving a survey from [employer] regarding your visit today. Please complete the survey to enable us to provide the highest quality of care to you and your family. If you cannot score us a very good on any question, please call the office to discuss how we could have made your experience a very good one. Thank you.

## 2022-12-15 ENCOUNTER — OFFICE VISIT (OUTPATIENT)
Dept: ONCOLOGY | Age: 67
End: 2022-12-15

## 2022-12-15 VITALS
HEART RATE: 66 BPM | DIASTOLIC BLOOD PRESSURE: 82 MMHG | BODY MASS INDEX: 35.65 KG/M2 | SYSTOLIC BLOOD PRESSURE: 157 MMHG | TEMPERATURE: 97.3 F | WEIGHT: 269 LBS | HEIGHT: 73 IN | RESPIRATION RATE: 18 BRPM

## 2022-12-15 DIAGNOSIS — E66.9 OBESITY, UNSPECIFIED CLASSIFICATION, UNSPECIFIED OBESITY TYPE, UNSPECIFIED WHETHER SERIOUS COMORBIDITY PRESENT: ICD-10-CM

## 2022-12-15 DIAGNOSIS — I25.5 ISCHEMIC CARDIOMYOPATHY: ICD-10-CM

## 2022-12-15 DIAGNOSIS — K76.0 FATTY LIVER: ICD-10-CM

## 2022-12-15 DIAGNOSIS — D69.6 THROMBOCYTOPENIA (HCC): Primary | ICD-10-CM

## 2022-12-15 PROCEDURE — 1123F ACP DISCUSS/DSCN MKR DOCD: CPT | Performed by: INTERNAL MEDICINE

## 2022-12-15 PROCEDURE — 99205 OFFICE O/P NEW HI 60 MIN: CPT | Performed by: INTERNAL MEDICINE

## 2022-12-15 PROCEDURE — 3078F DIAST BP <80 MM HG: CPT | Performed by: INTERNAL MEDICINE

## 2022-12-15 PROCEDURE — 3074F SYST BP LT 130 MM HG: CPT | Performed by: INTERNAL MEDICINE

## 2022-12-15 PROCEDURE — 99204 OFFICE O/P NEW MOD 45 MIN: CPT | Performed by: INTERNAL MEDICINE

## 2022-12-15 NOTE — PROGRESS NOTES
Patient ID: Ros Montemayor, 1955, [id number], 74 y.o. Referred by :  Adwoa Diaz, *   Reason for consultation: Thrombocytopenia      HISTORY OF PRESENT ILLNESS:    Oncologic History:    Ros Montemayor is a very pleasant 74 y.o. male.   With cardiomyopathy fatty liver/SOLO morbid obesity who had a chronic thrombocytopenia and was seen by hematologist 7 to 8 years ago however according to the patient the platelet were fluctuating and went up to normal and did not see the hematologist again, CBC done on November 28, 2022 and WBC 3.7 hemoglobin 14.6 with a platelet count at 75,   Strong family history of ischemic heart disease    Past Medical History:   Diagnosis Date    Abnormal cardiovascular stress test 10/2020    moderate perfusion defect of severe intensity in the apical anteroapical and inferoapical regions    CAD (coronary artery disease)     Diabetes mellitus [facility])     [facility]    Diabetic retinopathy ([facility] [address] 11/05/2020    Fatty liver disease, nonalcoholic     Hyperlipidemia     Hypertension     Meniere disease     MI (myocardial infarction) ([facility] [address]     MULTIPLE    Obesity     Stroke ([facility] [address]     Thrombocytopenia ([facility] [address]     TIA (transient ischemic attack)        Past Surgical History:   Procedure Laterality Date    CARDIAC CATHETERIZATION  10/29/2020    Severe three vessel disease involving the LAD, circumflex and right coronary artery  /  DR Romero Payne  /  CT SURGERGY CONSULT    COLONOSCOPY      COLONOSCOPY N/A 9/3/2021    COLORECTAL CANCER SCREENING Colonscope with polypectomy  performed by Lynsey Sanchez DO at [address] [facility] ENDOSCOPY  9/3/2021    EGD BIOPSY performed by Lynsey Sanchez DO at [address]   Allergen Reactions    Statins      Cramping  Muscle aches    Metformin And Related Nausea And Vomiting     Diarrhea with regular Metformin does ok with XR form       Current Outpatient Medications 12/15/2022)       No current facility-administered medications for this visit. Social History     Socioeconomic History    Marital status: Single     Spouse name: Not on file    Number of children: Not on file    Years of education: Not on file    Highest education level: Not on file   Occupational History    Not on file   Tobacco Use    Smoking status: Never    Smokeless tobacco: Never   Vaping Use    Vaping Use: Never used   Substance and Sexual Activity    Alcohol use: Not Currently    Drug use: Never    Sexual activity: Not Currently   Other Topics Concern    Not on file   Social History Narrative    Not on file     Social Determinants of Health     Financial Resource Strain: Low Risk     Difficulty of Paying Living Expenses: Not hard at all   Food Insecurity: No Food Insecurity    Worried About Running Out of Food in the Last Year: Never true    [address] in the Last Year: Never true   Transportation Needs: No Transportation Needs    Lack of Transportation (Medical): No    Lack of Transportation (Non-Medical): No   Physical Activity: Inactive    Days of Exercise per Week: 0 days    Minutes of Exercise per Session: 0 min   Stress: No Stress Concern Present    Feeling of Stress : Not at all   Social Connections:  Moderately Isolated    Frequency of Communication with Friends and Family: More than three times a week    Frequency of Social Gatherings with Friends and Family: More than three times a week    Attends [employer] Services: More than 4 times per year    Active Member of [employer] or Organizations: No    Attends Club or Organization Meetings: Never    Marital Status: Never    Intimate Partner Violence: Not At Risk    Fear of Current or Ex-Partner: No    Emotionally Abused: No    Physically Abused: No    Sexually Abused: No   Housing Stability: Unknown    Unable to Pay for Housing in the Last Year: Not on file    Number of [place] in the Last Year: Not on file    Unstable Housing in the Last Year: No       Family History   Problem Relation Age of Onset    Heart Attack Mother     Diabetes Mother     Heart Disease Father     Prostate Cancer Father     Heart Attack Sister     Heart Attack Brother     Heart Attack Maternal Grandfather         REVIEW OF SYSTEM:     Constitutional: No fever or chills. No night sweats, no weight loss   Eyes: No eye discharge, double vision, or eye pain   HEENT: negative for sore mouth, sore throat, hoarseness and voice change   Respiratory: negative for cough , sputum, dyspnea, wheezing, hemoptysis, chest pain   Cardiovascular: negative for chest pain, dyspnea, palpitations, orthopnea, PND   Gastrointestinal: negative for nausea, vomiting, diarrhea, constipation, abdominal pain, Dysphagia, hematemesis and hematochezia   Genitourinary: negative for frequency, dysuria, nocturia, urinary incontinence, and hematuria   Integument: negative for rash, skin lesions, bruises.    Hematologic/Lymphatic: negative for easy bruising, bleeding, lymphadenopathy, petechiae and swelling/edema   Endocrine: negative for heat or cold intolerance, tremor, weight changes, change in bowel habits and hair loss   Musculoskeletal: negative for myalgias, arthralgias, pain, joint swelling,and bone pain   Neurological: negative for headaches, dizziness, seizures, weakness, numbness       OBJECTIVE:         Vitals:    12/15/22 1550   BP: (!) 157/82   Pulse: 66   Resp: 18   Temp: 97.3 °F (36.3 °C)       PHYSICAL EXAM:   General appearance - well appearing, no in pain or distress   Mental status - alert and cooperative   Eyes - pupils equal and reactive, extraocular eye movements intact   Ears - bilateral TM's and external ear canals normal   Mouth - mucous membranes moist, pharynx normal without lesions   Neck - supple, no significant adenopathy   Lymphatics - no palpable lymphadenopathy, no hepatosplenomegaly   Chest - clear to auscultation, no wheezes, rales or rhonchi, symmetric air entry   Heart - normal rate, regular rhythm, normal S1, S2, no murmurs, rubs, clicks or gallops   Abdomen - soft, nontender, nondistended, no masses or organomegaly   Neurological - alert, oriented, normal speech, no focal findings or movement disorder noted   Musculoskeletal - no joint tenderness, deformity or swelling   Extremities - peripheral pulses normal, no pedal edema, no clubbing or cyanosis   Skin - normal coloration and turgor, no rashes, no suspicious skin lesions noted ,      LABORATORY DATA:     Lab Results   Component Value Date    WBC 3.7 11/28/2022    HGB 14.6 11/28/2022    HCT 42.1 11/28/2022    MCV 93.8 11/28/2022    PLT 75 (L) 11/28/2022    LYMPHOPCT 18.0 05/16/2022    RBC 4.49 (L) 11/28/2022    MCH 32.5 11/28/2022    MCHC 34.7 11/28/2022    RDW 12.4 11/28/2022    NEUTOPHILPCT 67.1 05/16/2022    MONOPCT 11.2 05/16/2022    EOSPCT 3.1 05/16/2022    BASOPCT 0.6 05/16/2022    NEUTROABS 2.8 05/16/2022    LYMPHSABS 0.7 (L) 05/16/2022    MONOSABS 0.5 05/16/2022    EOSABS 0.1 05/16/2022    BASOSABS 0.0 05/16/2022         Chemistry        Component Value Date/Time     11/28/2022 1608    K 4.0 11/28/2022 1608     11/28/2022 1608    CO2 25 11/28/2022 1608    BUN 12 11/28/2022 1608    CREATININE 0.90 11/28/2022 1608        Component Value Date/Time    CALCIUM 8.9 11/28/2022 1608    ALKPHOS 93 11/28/2022 1608    ALKPHOS 61 05/24/2021 1331    AST 31 11/28/2022 1608    ALT 44 11/28/2022 1608    BILITOT 0.5 11/28/2022 1608            PATHOLOGY DATA:         IMAGING DATA:      VL DUP [address]   Vascular Lower Extremities DVT Study Procedure      Patient Name  [address]       Date of Study         06/09/2022                 Janny Traceefarideh      Date of Birth 1955   Gender                Male      Age           61 year(s)   Race                        Room Number   04      Corporate ID  [id number]   #      Patient Gerardo  [de-identified]   #      MR #          [id number] Sonographer           Emmy TACO Jorge      Accession #   [id number]  Interpreting          Renate Sena DO                              Physician      Referring                  Referring Physician   [facility]   Nurse   Practitioner     Procedure  Type of Study:      Veins: Lower Extremities DVT Study, Venous Scan Lower Bilateral.     Indications for Study:Edema. Patient Status:ER. Technical Quality:Good visualization. Comments:Simultaneous real time imaging utilizing B-Mode, color doppler and  spectral waveform analysis was performed on the bilateral lower extremities  for venous examination of the deep and superficial systems. Conclusions      Summary      No evidence of superficial or deep venous thrombosis in both lower   extremities. Signature      ----------------------------------------------------------------   Electronically signed by Brianna Morin RDMS, RVT(Sonographer)   on 06/09/2022 05:15 PM   ----------------------------------------------------------------      ----------------------------------------------------------------   Electronically signed by Renate Sena DO(Interpreting   physician) on 06/10/2022 01:34 PM   ----------------------------------------------------------------     Findings:      Right Impression:                    Left Impression:   The common femoral, femoral,         The common femoral, femoral,   popliteal, tibials and saphenous     popliteal, tibials and saphenous   veins are compressible with normal   veins are compressible with normal   doppler responses. doppler responses. Allergies    - Allergy:Metformin(Drug). - Allergy:*Unlisted(Drug). Comments:statins    Velocities are measured in cm/s ; Diameters are measured in cm    Right Lower Extremities DVT Study Measurements  Right 2D Measurements  +------------------------------------+----------+---------------+----------+  ! Location !Visualized! Compressibility! Thrombosis! +------------------------------------+----------+---------------+----------+  ! Common Femoral                      !Yes       ! Yes            ! None      !  +------------------------------------+----------+---------------+----------+  ! Prox Femoral                        !Yes       ! Yes            ! None      !  +------------------------------------+----------+---------------+----------+  ! Mid Femoral                         !Yes       ! Yes            ! None      !  +------------------------------------+----------+---------------+----------+  ! Dist Femoral                        !Yes       ! Yes            ! None      !  +------------------------------------+----------+---------------+----------+  ! Deep Femoral                        !Yes       ! Yes            ! None      !  +------------------------------------+----------+---------------+----------+  ! Popliteal                           !Yes       ! Yes            ! None      !  +------------------------------------+----------+---------------+----------+  ! Sapheno Femoral Junction            ! Yes       ! Yes            ! None      !  +------------------------------------+----------+---------------+----------+  ! PTV                                 ! Yes       ! Yes            ! None      !  +------------------------------------+----------+---------------+----------+  ! Peroneal                            !Yes       ! Yes            ! None      !  +------------------------------------+----------+---------------+----------+  ! Gastroc                             ! Yes       ! Yes            ! None      !  +------------------------------------+----------+---------------+----------+  ! GSV Thigh                           ! Yes       ! Yes            ! None      !  +------------------------------------+----------+---------------+----------+  ! GSV Knee                            ! Yes       ! Yes            ! None !  +------------------------------------+----------+---------------+----------+  ! GSV Ankle                           ! Yes       ! Yes            ! None      !  +------------------------------------+----------+---------------+----------+  ! SSV                                 ! Yes       ! Yes            ! None      !  +------------------------------------+----------+---------------+----------+    Right Doppler Measurements  +---------------------------+------+------+--------------------------------+  ! Location                   ! Signal!Reflux! Reflux (msec)                   ! +---------------------------+------+------+--------------------------------+  ! Common Femoral             !Phasic!      !                                !  +---------------------------+------+------+--------------------------------+  ! Prox Femoral               !Phasic!      !                                !  +---------------------------+------+------+--------------------------------+  ! Popliteal                  !Phasic!      !                                !  +---------------------------+------+------+--------------------------------+    Left Lower Extremities DVT Study Measurements  Left 2D Measurements  +------------------------------------+----------+---------------+----------+  ! Location                            ! Visualized! Compressibility! Thrombosis! +------------------------------------+----------+---------------+----------+  ! Common Femoral                      !Yes       ! Yes            ! None      !  +------------------------------------+----------+---------------+----------+  ! Prox Femoral                        !Yes       ! Yes            ! None      !  +------------------------------------+----------+---------------+----------+  ! Mid Femoral                         !Yes       ! Yes            ! None      !  +------------------------------------+----------+---------------+----------+  ! Dist Femoral                        !Yes !Phasic!      !                                !  +---------------------------+------+------+--------------------------------+  ! Prox Femoral               !Phasic!      !                                !  +---------------------------+------+------+--------------------------------+  ! Popliteal                  !Phasic!      !                                !  +---------------------------+------+------+--------------------------------+       ASSESSMENT:       Diagnosis Orders   1. Thrombocytopenia (HCC)  APTT    Protime-INR    Haptoglobin    Path Review, Smear    CBC with Auto Differential      2. Ischemic cardiomyopathy        3. Fatty liver        4. Obesity, unspecified classification, unspecified obesity type, unspecified whether serious comorbidity present                 PLAN:     I personally reviewed results of lab work-up imaging studies,outside records and other relevant clinical data. I had a detailed discussion with the patient. I explained the significance of these abnormalities and possible etiology and management options   fluctuating isolated thrombocytopenia which is usually typical for thrombocytopenia related to liver disease usually aggravated with liver decompensation/liver congestion/cardiomyopathy at the basis of fatty liver, at this time patient had no bleed explained to the patient that no treatment required other than controlled the liver disease/fluid overload associated with cardiomyopathy, if platelet below 50 and he had to have elective procedure can give TPO to keep platelet count above 50,000's otherwise in case of emergency can proceed with surgical intervention with platelet transfusion(usually to keep platelet above 43,990)  Will review blood smear/ultrasound liver coagulation studies and hemolysis panel  RTC in 12 weeks    Thank you for the consult                                      [facility] This note is created with the assistance of a speech recognition program.  While intending to generate a document that actually reflects the content of the visit, the document can still have some errors including those of syntax and sound a like substitutions which may escape proof reading. It such instances, actual meaning can be extrapolated by contextual diversion.

## 2022-12-19 ENCOUNTER — HOSPITAL ENCOUNTER (OUTPATIENT)
Age: 67
Discharge: HOME | End: 2022-12-19
Payer: MEDICARE

## 2022-12-19 DIAGNOSIS — D69.6 THROMBOCYTOPENIA (HCC): ICD-10-CM

## 2022-12-19 LAB
ABSOLUTE EOS #: 0.17 K/UL (ref 0–0.44)
ABSOLUTE IMMATURE GRANULOCYTE: 0.03 K/UL (ref 0–0.3)
ABSOLUTE LYMPH #: 0.83 K/UL (ref 1.1–3.7)
ABSOLUTE MONO #: 0.4 K/UL (ref 0.1–1.2)
ABSOLUTE RETIC #: 0.11 M/UL (ref 0.03–0.08)
BASOPHILS # BLD: 1 % (ref 0–2)
BASOPHILS ABSOLUTE: 0.03 K/UL (ref 0–0.2)
EOSINOPHILS RELATIVE PERCENT: 5 % (ref 1–4)
HAPTOGLOBIN: 41 MG/DL (ref 30–200)
HCT VFR BLD CALC: 43 % (ref 40.7–50.3)
HEMOGLOBIN: 14.8 G/DL (ref 13–17)
IMMATURE GRANULOCYTES: 1 %
IMMATURE RETIC FRACT: 20.6 % (ref 2.7–18.3)
INR BLD: 1.1
LYMPHOCYTES # BLD: 25 % (ref 24–43)
MCH RBC QN AUTO: 33 PG (ref 25.2–33.5)
MCHC RBC AUTO-ENTMCNC: 34.4 G/DL (ref 28.4–34.8)
MCV RBC AUTO: 96 FL (ref 82.6–102.9)
MONOCYTES # BLD: 12 % (ref 3–12)
MORPHOLOGY: ABNORMAL
NRBC AUTOMATED: 0 PER 100 WBC
PARTIAL THROMBOPLASTIN TIME: 28.8 SEC (ref 26.8–34.8)
PDW BLD-RTO: 12.4 % (ref 11.8–14.4)
PLATELET # BLD: ABNORMAL K/UL (ref 138–453)
PLATELET, FLUORESCENCE: 95 K/UL (ref 138–453)
PLATELET, IMMATURE FRACTION: 2.7 % (ref 1.1–10.3)
PROTHROMBIN TIME: 14.2 SEC (ref 11.5–14.2)
RBC # BLD: 4.48 M/UL (ref 4.21–5.77)
RETIC %: 2.5 % (ref 0.5–1.9)
RETIC HEMOGLOBIN: 37.5 PG (ref 28.2–35.7)
SEG NEUTROPHILS: 56 % (ref 36–65)
SEGMENTED NEUTROPHILS ABSOLUTE COUNT: 1.84 K/UL (ref 1.5–8.1)
WBC # BLD: 3.3 K/UL (ref 3.5–11.3)

## 2022-12-19 PROCEDURE — 85055 RETICULATED PLATELET ASSAY: CPT

## 2022-12-19 PROCEDURE — 85025 COMPLETE CBC W/AUTO DIFF WBC: CPT

## 2022-12-19 PROCEDURE — 83010 ASSAY OF HAPTOGLOBIN QUANT: CPT

## 2022-12-19 PROCEDURE — 36415 COLL VENOUS BLD VENIPUNCTURE: CPT

## 2022-12-19 PROCEDURE — 85610 PROTHROMBIN TIME: CPT

## 2022-12-19 PROCEDURE — 85730 THROMBOPLASTIN TIME PARTIAL: CPT

## 2022-12-19 PROCEDURE — 85045 AUTOMATED RETICULOCYTE COUNT: CPT

## 2022-12-20 ENCOUNTER — TELEPHONE (OUTPATIENT)
Dept: UROLOGY | Age: 67
End: 2022-12-20

## 2022-12-20 DIAGNOSIS — N13.8 BPH WITH OBSTRUCTION/LOWER URINARY TRACT SYMPTOMS: ICD-10-CM

## 2022-12-20 DIAGNOSIS — R39.15 URINARY URGENCY: ICD-10-CM

## 2022-12-20 DIAGNOSIS — N40.1 BPH WITH OBSTRUCTION/LOWER URINARY TRACT SYMPTOMS: ICD-10-CM

## 2022-12-20 DIAGNOSIS — N39.41 URGE INCONTINENCE: ICD-10-CM

## 2022-12-20 LAB
PATHOLOGIST REVIEW: NORMAL
SURGICAL PATHOLOGY REPORT: NORMAL

## 2022-12-20 RX ORDER — OXYBUTYNIN CHLORIDE 10 MG/1
TABLET, EXTENDED RELEASE ORAL
Qty: 90 TABLET | Refills: 1 | Status: SHIPPED | OUTPATIENT
Start: 2022-12-20

## 2023-01-03 ENCOUNTER — HOSPITAL ENCOUNTER (OUTPATIENT)
Dept: ULTRASOUND IMAGING | Age: 68
Discharge: HOME | End: 2023-01-05
Payer: MEDICARE

## 2023-01-03 DIAGNOSIS — D69.6 THROMBOCYTOPENIA (HCC): ICD-10-CM

## 2023-01-03 PROCEDURE — 76705 ECHO EXAM OF ABDOMEN: CPT

## 2023-01-04 ENCOUNTER — OFFICE VISIT (OUTPATIENT)
Dept: UROLOGY | Age: 68
End: 2023-01-04
Payer: MEDICARE

## 2023-01-04 VITALS
WEIGHT: 287 LBS | DIASTOLIC BLOOD PRESSURE: 79 MMHG | BODY MASS INDEX: 38.04 KG/M2 | HEIGHT: 73 IN | HEART RATE: 75 BPM | SYSTOLIC BLOOD PRESSURE: 155 MMHG | TEMPERATURE: 97.5 F

## 2023-01-04 DIAGNOSIS — N40.1 BPH WITH OBSTRUCTION/LOWER URINARY TRACT SYMPTOMS: Primary | ICD-10-CM

## 2023-01-04 DIAGNOSIS — N13.8 BPH WITH OBSTRUCTION/LOWER URINARY TRACT SYMPTOMS: Primary | ICD-10-CM

## 2023-01-04 DIAGNOSIS — G47.19 EXCESSIVE DAYTIME SLEEPINESS: ICD-10-CM

## 2023-01-04 DIAGNOSIS — Z12.5 SCREENING PSA (PROSTATE SPECIFIC ANTIGEN): ICD-10-CM

## 2023-01-04 DIAGNOSIS — K59.00 CONSTIPATION, UNSPECIFIED CONSTIPATION TYPE: ICD-10-CM

## 2023-01-04 PROCEDURE — 51798 US URINE CAPACITY MEASURE: CPT | Performed by: PHYSICIAN ASSISTANT

## 2023-01-04 PROCEDURE — 99214 OFFICE O/P EST MOD 30 MIN: CPT | Performed by: PHYSICIAN ASSISTANT

## 2023-01-04 PROCEDURE — G8417 CALC BMI ABV UP PARAM F/U: HCPCS | Performed by: PHYSICIAN ASSISTANT

## 2023-01-04 PROCEDURE — 1123F ACP DISCUSS/DSCN MKR DOCD: CPT | Performed by: PHYSICIAN ASSISTANT

## 2023-01-04 PROCEDURE — 1036F TOBACCO NON-USER: CPT | Performed by: PHYSICIAN ASSISTANT

## 2023-01-04 PROCEDURE — 3017F COLORECTAL CA SCREEN DOC REV: CPT | Performed by: PHYSICIAN ASSISTANT

## 2023-01-04 PROCEDURE — 3077F SYST BP >= 140 MM HG: CPT | Performed by: PHYSICIAN ASSISTANT

## 2023-01-04 PROCEDURE — 3078F DIAST BP <80 MM HG: CPT | Performed by: PHYSICIAN ASSISTANT

## 2023-01-04 PROCEDURE — G8427 DOCREV CUR MEDS BY ELIG CLIN: HCPCS | Performed by: PHYSICIAN ASSISTANT

## 2023-01-04 PROCEDURE — G8484 FLU IMMUNIZE NO ADMIN: HCPCS | Performed by: PHYSICIAN ASSISTANT

## 2023-01-04 RX ORDER — POLYETHYLENE GLYCOL 3350 17 G/17G
17 POWDER, FOR SOLUTION ORAL 2 TIMES DAILY
COMMUNITY

## 2023-01-04 RX ORDER — CARVEDILOL 6.25 MG/1
6.25 TABLET ORAL 2 TIMES DAILY WITH MEALS
COMMUNITY

## 2023-01-04 RX ORDER — TROSPIUM CHLORIDE 20 MG/1
20 TABLET, FILM COATED ORAL 2 TIMES DAILY
Qty: 60 TABLET | Refills: 3 | Status: SHIPPED | OUTPATIENT
Start: 2023-01-04

## 2023-01-04 ASSESSMENT — ENCOUNTER SYMPTOMS
BACK PAIN: 0
WHEEZING: 0
CONSTIPATION: 0
EYE REDNESS: 0
COLOR CHANGE: 0
COUGH: 0
VOMITING: 0
SHORTNESS OF BREATH: 0
NAUSEA: 0
ABDOMINAL PAIN: 0

## 2023-01-04 NOTE — PROGRESS NOTES
HPI:      Patient is a 74 y.o. male in no acute distress. He is alert and oriented to person, place, and time. History  12/2020 Referral from YEN Padilla for LUTS. Complaints of urgency and urge incontinence. He has nocturia 0-2 times per night. He has frequency every 30 minutes to an hour during the day. He does change his underwear once per night due to the incontinence. He does have a weak stream and a split stream at the start of urination. He is uncircumcised, but denies any issues retracting his foreskin. He does have significant constipation and was recently in the ER for constipation. He also reports a history of fecal smearing. He will often go 4 more days without a bowel movement. While in the ER he was told he was not emptying his bladder. He did have a CT completed while in the ER on 11/2020 that showed no evidence of  calcifications or hydronephrosis. He is an uncontrolled insulin-dependent diabetic. He does consume a large amount of bladder irritants. He denies history of stones. He has never seen urology in the past.                Started miralax daily                 Started flomax daily                 Referred for colonoscopy     2/2021 - cysto - Extensive trilobar hyperplasia      Ditropan increased to XL 10mg    1/2023 - Ditropan stopped, trospium started     PSA  12/2022 - 0.76 (1.42)  11/2021 - 0.6 (1.2)  6/2020 - 3.04    Today:  Patient is here today for follow up BPH, OAB, constipation, PSA screening. Patient is on Mgfanhcl92 mg XL, finasteride 5mg daily and flomax twice a day. Nocturia x0-1. He is taking MiraLAX twice a day. He states that he is not having a daily bowel movement. He has been on Linzess recently without improvement. He states he has seen GI in [facility] and had a NM emptying study which patient said was negative. He states that he does have a sudden urge to urinate and upon standing he does have incontinence.   At last visit we recommended that he attempt to urinate every 2-3 hours while awake. Patient did have a PSA screening prior to visit today which did show a PSA of 0.76 (1.42), this is low and stable compared to last year 0.6 (1.2). Random bladderscan performed in office today: Pt last voided 30 mins ago, scan = 80 mL. Last Hba1c 8.8. Drinks ~80oz of water a day.  He does     Past Medical History:   Diagnosis Date    Abnormal cardiovascular stress test 10/2020    moderate perfusion defect of severe intensity in the apical anteroapical and inferoapical regions    CAD (coronary artery disease)     Diabetes mellitus [facility])     [address] [facility]ocr[facility]    Diabetic retinopathy ([facility] 11/05/2020    Fatty liver disease, nonalcoholic     Hyperlipidemia     Hypertension     Meniere disease     MI (myocardial infarction) ([facility]     MULTIPLE    Obesity     Stroke ([facility]     Thrombocytopenia ([facility]     TIA (transient ischemic attack)      Past Surgical History:   Procedure Laterality Date    CARDIAC CATHETERIZATION  10/29/2020    Severe three vessel disease involving the LAD, circumflex and right coronary artery  /  DR Gómez Valdovinos  /  CT SURGERGY CONSULT    COLONOSCOPY      COLONOSCOPY N/A 9/3/2021    COLORECTAL CANCER SCREENING Colonscope with polypectomy  performed by Ericka Brown DO at [address]  9/3/2021    EGD BIOPSY performed by Ericka Brown DO at [address] [facility] Encounter Medications as of 1/4/2023   Medication Sig Dispense Refill    polyethylene glycol (MIRALAX) 17 g PACK packet Take 17 g by mouth 2 times daily      carvedilol (COREG) 6.25 MG tablet Take 6.25 mg by mouth 2 times daily (with meals)      trospium (SANCTURA) 20 MG tablet Take 1 tablet by mouth 2 times daily 60 tablet 3    Dulaglutide (TRULICITY) 9.30 QU/2.0BF SOPN Inject 0.75 mg into the skin every 7 days      insulin lispro, 1 Unit Dial, (HUMALOG/ADMELOG) 100 UNIT/ML SOPN Inject 15 (fifteen) Units under the skin 3 (three) times a day before meals With sliding scale, see medication adjustment sheet . tamsulosin (FLOMAX) 0.4 MG capsule TAKE 1 CAPSULE BY MOUTH TWICE DAILY      LINZESS 290 MCG CAPS capsule TAKE 1 CAPSULE BY MOUTH ONCE DAILY      lisinopril (PRINIVIL;ZESTRIL) 10 MG tablet Take 1 tablet by mouth daily 90 tablet 0    finasteride (PROSCAR) 5 MG tablet TAKE ONE TABLET BY MOUTH DAILY 90 tablet 0    pantoprazole (PROTONIX) 40 MG tablet Take 1 tablet by mouth once daily 90 tablet 1    blood glucose test strips (TRUE METRIX BLOOD GLUCOSE TEST) strip USE AS DIRECTED ONCE DAILY TO TEST BLOOD SUGAR 100 each 3    Insulin Degludec (TRESIBA FLEXTOUCH) 100 UNIT/ML SOPN Inject 30 Units into the skin      aspirin 81 MG EC tablet Take 81 mg by mouth daily      NONFORMULARY Lion HRT, suppliment for heart      Insulin Pen Needle 32G X 8 MM MISC by Does not apply route      Insulin Aspart FlexPen 100 UNIT/ML SOPN Inject 30 Units into the skin 2 times daily Pt to take as 20 units after each meal as sliding scale. Pt states \"The 20 Units are not usually enough. \"      Coenzyme Q10 (CO Q 10) 10 MG CAPS Take by mouth daily       glucose monitoring kit (FREESTYLE) monitoring kit 1 kit by Does not apply route daily TEST BLOOD SUGAR QID. WHATEVER METER IS COVERED BY GALVEZ. DIAGNOSIS E11.9 1 kit 0    Lancets MISC TEST BLOOD SUGAR QID. WHATEVER METER IS COVERED BY GALVEZ. DIAGNOSIS E11.9 100 each 3    [DISCONTINUED] oxybutynin (DITROPAN-XL) 10 MG extended release tablet TAKE ONE TABLET BY MOUTH EVERY EVENING 90 tablet 1    [DISCONTINUED] docusate sodium (COLACE) 100 MG capsule Take 1 capsule by mouth 3 times daily as needed for Constipation (Patient not taking: Reported on 1/4/2023) 270 capsule 0    [DISCONTINUED] NONFORMULARY prosta-genix Prostate suppliment BID (Patient not taking: No sig reported)       No facility-administered encounter medications on file as of 1/4/2023.       Current Outpatient Medications on File Prior to Visit   Medication Sig Dispense Refill    polyethylene glycol (MIRALAX) 17 g PACK packet Take 17 g by mouth 2 times daily      carvedilol (COREG) 6.25 MG tablet Take 6.25 mg by mouth 2 times daily (with meals)      Dulaglutide (TRULICITY) 6.18 MG/9.1SF SOPN Inject 0.75 mg into the skin every 7 days      insulin lispro, 1 Unit Dial, (HUMALOG/ADMELOG) 100 UNIT/ML SOPN Inject 15 (fifteen) Units under the skin 3 (three) times a day before meals With sliding scale, see medication adjustment sheet . tamsulosin (FLOMAX) 0.4 MG capsule TAKE 1 CAPSULE BY MOUTH TWICE DAILY      LINZESS 290 MCG CAPS capsule TAKE 1 CAPSULE BY MOUTH ONCE DAILY      lisinopril (PRINIVIL;ZESTRIL) 10 MG tablet Take 1 tablet by mouth daily 90 tablet 0    finasteride (PROSCAR) 5 MG tablet TAKE ONE TABLET BY MOUTH DAILY 90 tablet 0    pantoprazole (PROTONIX) 40 MG tablet Take 1 tablet by mouth once daily 90 tablet 1    blood glucose test strips (TRUE METRIX BLOOD GLUCOSE TEST) strip USE AS DIRECTED ONCE DAILY TO TEST BLOOD SUGAR 100 each 3    Insulin Degludec (TRESIBA FLEXTOUCH) 100 UNIT/ML SOPN Inject 30 Units into the skin      aspirin 81 MG EC tablet Take 81 mg by mouth daily      NONFORMULARY Lion HRT, suppliment for heart      Insulin Pen Needle 32G X 8 MM MISC by Does not apply route      Insulin Aspart FlexPen 100 UNIT/ML SOPN Inject 30 Units into the skin 2 times daily Pt to take as 20 units after each meal as sliding scale. Pt states \"The 20 Units are not usually enough. \"      Coenzyme Q10 (CO Q 10) 10 MG CAPS Take by mouth daily       glucose monitoring kit (FREESTYLE) monitoring kit 1 kit by Does not apply route daily TEST BLOOD SUGAR QID. WHATEVER METER IS COVERED BY GALVEZ. DIAGNOSIS E11.9 1 kit 0    Lancets MISC TEST BLOOD SUGAR QID. WHATEVER METER IS COVERED BY GALVEZ. DIAGNOSIS E11.9 100 each 3     No current facility-administered medications on file prior to visit.      Statins and Metformin and related  Family History   Problem Relation Age of Onset    Heart Attack Mother     Diabetes Mother     Heart Disease Father     Prostate Cancer Father     Heart Attack Sister     Heart Attack Brother     Heart Attack Maternal Grandfather      Social History     Tobacco Use   Smoking Status Never   Smokeless Tobacco Never       Social History     Substance and Sexual Activity   Alcohol Use Not Currently       Review of Systems   Constitutional:  Negative for appetite change, chills and fever. Eyes:  Negative for redness and visual disturbance. Respiratory:  Negative for cough, shortness of breath and wheezing. Cardiovascular:  Negative for chest pain and leg swelling. Gastrointestinal:  Negative for abdominal pain, constipation, nausea and vomiting. Genitourinary:  Negative for decreased urine volume, difficulty urinating, dysuria, enuresis, flank pain, frequency, hematuria, penile discharge, penile pain, scrotal swelling, testicular pain and urgency. Positive for nocturia, urgency and urge incontinence    Musculoskeletal:  Negative for back pain, joint swelling and myalgias. Skin:  Negative for color change, rash and wound. Neurological:  Negative for dizziness, tremors and numbness. Hematological:  Negative for adenopathy. Does not bruise/bleed easily. BP (!) 155/79 (Site: Right Upper Arm, Position: Sitting, Cuff Size: Large Adult)   Pulse 75   Temp 97.5 °F (36.4 °C)   Ht 6' 1\" (1.854 m)   Wt 287 lb (130.2 kg)   BMI 37.87 kg/m²       PHYSICAL EXAM:  Constitutional: Patient in no acute distress; Neuro: alert and oriented to person place and time.     Psych: Mood and affect normal.  Lungs: Respiratory effort normal  Abdomen: Soft, non-tender, non-distended   Rectal: deferred       Lab Results   Component Value Date    BUN 12 11/28/2022     Lab Results   Component Value Date    CREATININE 0.90 11/28/2022     Lab Results   Component Value Date    PSA 0.76 12/02/2022    PSA 3.04 06/19/2020       ASSESSMENT:   Diagnosis Orders   1. BPH with obstruction/lower urinary tract symptoms  WI MOHSEN POST-VOIDING RESIDUAL URINE&/BLADDER CAP      2. Screening PSA (prostate specific antigen)  PSA Screening      3. Constipation, unspecified constipation type        4. Excessive daytime sleepiness              PLAN:  Continue Flomax twice a day    Stop Ditropan 10 mg XL    Start Trospium BID    Avoiding Myrbetriq secondary to HTN/CAD history    Continue finasteride daily    Continue MiraLAX twice a day    Recommended he continues to see GI    Recommended sleep study, he declines a this time     Recommend that he prompts to void every 2-3 hours while he is awake.     Follow-up in 2 months with PVR    PSA 1 year

## 2023-01-04 NOTE — PATIENT INSTRUCTIONS
SURVEY:    You may be receiving a survey from [employer] regarding your visit today. Please complete the survey to enable us to provide the highest quality of care to you and your family. If you cannot score us a very good on any question, please call the office to discuss how we could have made your experience a very good one. Thank you.

## 2023-01-05 NOTE — RESULT ENCOUNTER NOTE
Please call pt and inform them their labwork results are abnormal. Shows fatty liver disease, no cirrhosis noted.  Continue f/u with hematology

## 2023-02-02 ENCOUNTER — OFFICE VISIT (OUTPATIENT)
Dept: NEUROLOGY | Age: 68
End: 2023-02-02
Payer: MEDICARE

## 2023-02-02 VITALS
SYSTOLIC BLOOD PRESSURE: 175 MMHG | BODY MASS INDEX: 38.21 KG/M2 | WEIGHT: 288.3 LBS | HEART RATE: 80 BPM | TEMPERATURE: 97 F | HEIGHT: 73 IN | DIASTOLIC BLOOD PRESSURE: 90 MMHG | RESPIRATION RATE: 18 BRPM

## 2023-02-02 DIAGNOSIS — R42 DIZZY SPELLS: Primary | ICD-10-CM

## 2023-02-02 PROCEDURE — 3079F DIAST BP 80-89 MM HG: CPT | Performed by: NEUROMUSCULOSKELETAL MEDICINE, SPORTS MEDICINE

## 2023-02-02 PROCEDURE — G8427 DOCREV CUR MEDS BY ELIG CLIN: HCPCS | Performed by: NEUROMUSCULOSKELETAL MEDICINE, SPORTS MEDICINE

## 2023-02-02 PROCEDURE — 1123F ACP DISCUSS/DSCN MKR DOCD: CPT | Performed by: NEUROMUSCULOSKELETAL MEDICINE, SPORTS MEDICINE

## 2023-02-02 PROCEDURE — 1036F TOBACCO NON-USER: CPT | Performed by: NEUROMUSCULOSKELETAL MEDICINE, SPORTS MEDICINE

## 2023-02-02 PROCEDURE — 3077F SYST BP >= 140 MM HG: CPT | Performed by: NEUROMUSCULOSKELETAL MEDICINE, SPORTS MEDICINE

## 2023-02-02 PROCEDURE — G8484 FLU IMMUNIZE NO ADMIN: HCPCS | Performed by: NEUROMUSCULOSKELETAL MEDICINE, SPORTS MEDICINE

## 2023-02-02 PROCEDURE — 3017F COLORECTAL CA SCREEN DOC REV: CPT | Performed by: NEUROMUSCULOSKELETAL MEDICINE, SPORTS MEDICINE

## 2023-02-02 PROCEDURE — 99212 OFFICE O/P EST SF 10 MIN: CPT | Performed by: NEUROMUSCULOSKELETAL MEDICINE, SPORTS MEDICINE

## 2023-02-02 PROCEDURE — G8417 CALC BMI ABV UP PARAM F/U: HCPCS | Performed by: NEUROMUSCULOSKELETAL MEDICINE, SPORTS MEDICINE

## 2023-02-02 NOTE — PROGRESS NOTES
NEUROLOGY Follow up    Patient Name:  Tawanda Bird  :   1955  Clinic Visit Date: 2023    I saw Mr. Tawanda Bird  in the neurology clinic today for follow-up of intermittent vertigo and dizzy spells. . 62-year-old right-handed gentleman with a history of hypertension diabetes, hyperlipidemia, coronary artery disease history of a right cerebellar ischemic stroke in 2020. He continues have poor balance, with a tendency to fall to the right side, intermittent dizzy spells especially when he looks up. He continues to have sudden transient dizzy spells with nausea and vomiting, with certain neck postures or certain head movements. Also had intermittent difficulty walking especially tandem. No history of falls. REVIEW OF SYSTEMS    Constitutional Weight changes: absent, change in appetite: absent Fatigue: absent; Fevers : absent, Any recent hospitalizations:  absent   HEENT Ears: diminished,  Visual disturbance: absent   Respiratory Shortness of breath: absent, choking:  absent, Cough: present, Snoring : absent   Cardiovascular Chest pain: absent, Leg swelling :present, palpitations : absent, fainting : absent   GI Constipation: present, Diarrhea: absent, Swallowing change: present    Urinary frequency: present, Urinary urgency: absent, Urinary incontinence: absent   Musculoskeletal Neck pain: present, Back pain: absent, Stiffness: absent, Muscle pain: absent, Joint pain: absent, restless leg : present   Dermatological Hair loss: present, Skin changes: present   Neurological Confusion: absent, Trouble concentrating: present, Seizures: absent;  Memory loss: present, balance problem: present, Dizziness: present, vertigo: present, Weakness: absent, Numbness present, Tremor: absent, Spasm: absent, involuntary movement: present, Speech difficulty: absent, Headache: present, Light sensitivity: absent   Psychiatric Anxiety: absent, Depression  absent, drug abuse: absent, Hallucination: absent, mood disorder: absent, Suicidal ideations absent   Hematologic Abnormal bleeding: absent, Anemia: absent, Lymph gland changes: absent Clotting disorder: absent     Past Medical History:   Diagnosis Date    Abnormal cardiovascular stress test 10/2020    moderate perfusion defect of severe intensity in the apical anteroapical and inferoapical regions    CAD (coronary artery disease)     Diabetes mellitus [facility])     [address] Endocrinology    Diabetic retinopathy ([facility] [address] 11/05/2020    Fatty liver disease, nonalcoholic     Hyperlipidemia     Hypertension     Meniere disease     MI ([facility] infarction) ([facility]     MULTIPLE    Obesity     Stroke ([facility]     Thrombocytopenia ([facility]     TIA (transient ischemic attack)        Past Surgical History:   Procedure Laterality Date    CARDIAC CATHETERIZATION  10/29/2020    Severe three vessel disease involving the LAD, circumflex and right coronary artery  /  DR Plata Semen  /  CT SURGERGY CONSULT    COLONOSCOPY      COLONOSCOPY N/A 9/3/2021    COLORECTAL CANCER SCREENING Colonscope with polypectomy  performed by Juliana Shaver DO at [address] [facility] ENDOSCOPY  9/3/2021    EGD BIOPSY performed by Juliana Shaver DO at [facility]     Socioeconomic History    Marital status: Single     Spouse name: Not on file    Number of children: Not on file    Years of education: Not on file    Highest education level: Not on file   Occupational History    Not on file   Tobacco Use    Smoking status: Never    Smokeless tobacco: Never   Vaping Use    Vaping Use: Never used   Substance and Sexual Activity    Alcohol use: Not Currently    Drug use: Never    Sexual activity: Not Currently   Other Topics Concern    Not on file   Social History Narrative    Not on file     Social Determinants of Health     Financial Resource Strain: Low Risk     Difficulty of Paying Living Expenses: Not hard at all   Food Insecurity: No Food Insecurity    Worried About Running Out of Food in the Last Year: Never true    [address] in the Last Year: Never true   Transportation Needs: No Transportation Needs    Lack of Transportation (Medical): No    Lack of Transportation (Non-Medical): No   Physical Activity: Inactive    Days of Exercise per Week: 0 days    Minutes of Exercise per Session: 0 min   Stress: No Stress Concern Present    Feeling of Stress : Not at all   Social Connections:  Moderately Isolated    Frequency of Communication with Friends and Family: More than three times a week    Frequency of Social Gatherings with Friends and Family: More than three times a week    Attends Baptism Services: More than 4 times per year    Active Member of [employer] or Organizations: No    Attends Club or Organization Meetings: Never    Marital Status: Never    Intimate Partner Violence: Not At Risk    Fear of Current or Ex-Partner: No    Emotionally Abused: No    Physically Abused: No    Sexually Abused: No   Housing Stability: Unknown    Unable to Pay for Housing in the Last Year: Not on file    Number of [place] in the Last Year: Not on file    Unstable Housing in the Last Year: No       Family History   Problem Relation Age of Onset    Heart Attack Mother     Diabetes Mother     Heart Disease Father     Prostate Cancer Father     Heart Attack Sister     Heart Attack Brother     Heart Attack Maternal Grandfather        Current Outpatient Medications   Medication Sig Dispense Refill    polyethylene glycol (MIRALAX) 17 g PACK packet Take 17 g by mouth 2 times daily      carvedilol (COREG) 6.25 MG tablet Take 6.25 mg by mouth 2 times daily (with meals)      trospium (SANCTURA) 20 MG tablet Take 1 tablet by mouth 2 times daily 60 tablet 3    Dulaglutide (TRULICITY) 4.67 GA/8.6MW SOPN Inject 0.75 mg into the skin every 7 days      insulin lispro, 1 Unit Dial, (HUMALOG/ADMELOG) 100 UNIT/ML SOPN Inject 15 (fifteen) Units under the skin 3 (three) times a day before meals With sliding scale, see medication adjustment sheet . tamsulosin (FLOMAX) 0.4 MG capsule TAKE 1 CAPSULE BY MOUTH TWICE DAILY      LINZESS 290 MCG CAPS capsule TAKE 1 CAPSULE BY MOUTH ONCE DAILY      lisinopril (PRINIVIL;ZESTRIL) 10 MG tablet Take 1 tablet by mouth daily 90 tablet 0    finasteride (PROSCAR) 5 MG tablet TAKE ONE TABLET BY MOUTH DAILY 90 tablet 0    pantoprazole (PROTONIX) 40 MG tablet Take 1 tablet by mouth once daily 90 tablet 1    blood glucose test strips (TRUE METRIX BLOOD GLUCOSE TEST) strip USE AS DIRECTED ONCE DAILY TO TEST BLOOD SUGAR 100 each 3    Insulin Degludec (TRESIBA FLEXTOUCH) 100 UNIT/ML SOPN Inject 30 Units into the skin      aspirin 81 MG EC tablet Take 81 mg by mouth daily      NONFORMULARY Lion HRT, suppliment for heart      Insulin Pen Needle 32G X 8 MM MISC by Does not apply route      Insulin Aspart FlexPen 100 UNIT/ML SOPN Inject 30 Units into the skin 2 times daily Pt to take as 20 units after each meal as sliding scale. Pt states \"The 20 Units are not usually enough. \"      Coenzyme Q10 (CO Q 10) 10 MG CAPS Take by mouth daily       glucose monitoring kit (FREESTYLE) monitoring kit 1 kit by Does not apply route daily TEST BLOOD SUGAR QID. WHATEVER METER IS COVERED BY GALVEZ. DIAGNOSIS E11.9 1 kit 0    Lancets MISC TEST BLOOD SUGAR QID. WHATEVER METER IS COVERED BY GALVEZ. DIAGNOSIS E11.9 100 each 3     No current facility-administered medications for this visit.              DATA:  Lab Results   Component Value Date    WBC 3.3 (L) 12/19/2022    HGB 14.8 12/19/2022    PLT See Reflexed IPF Result 12/19/2022    CHOL 183 11/28/2022    TRIG 276 (H) 11/28/2022    HDL 40 11/28/2022    ALT 44 11/28/2022    AST 31 11/28/2022     11/28/2022    K 4.0 11/28/2022     11/28/2022    CREATININE 0.90 11/28/2022    BUN 12 11/28/2022    CO2 25 11/28/2022    TSH 2.38 12/17/2021    INR 1.1 12/19/2022    GLUF 202 06/19/2020    LABA1C 8.8 (H) 11/28/2022 BP (!) 175/90 (Site: Left Lower Arm, Position: Sitting, Cuff Size: Medium Adult)   Pulse 80   Temp 97 °F (36.1 °C) (Temporal)   Resp 18   Ht 6' 1\" (1.854 m)   Wt 288 lb 4.8 oz (130.8 kg)   BMI 38.04 kg/m²     NEUROLOGICAL EXAMINATION:     MENTAL STATUS: Alert and oriented x3. No confusion or aphasia. Memory is normal.      CRANIAL NERVES: Pupils are equal and reactive. EOMS are normal..  No nystagmus or any other abnormal eye movements. Facial sensation is normal.  No facial weakness. Hearing is normal.  Palate and tongue movements are normal.  Shoulder shrug is symmetrical.     MOTOR EXAMINATION: Muscle tone is normal in all the limbs. Strength is 5/5 in both upper and lower limbs. No abnormal limb movements. SENSORY EXAMINATION: Normal.      STRETCH REFLEXES: Symmetrical in both the upper and lower limbs. GAIT:.  Poor tandem gait. IMPRESSION:     1. Chronic intermittent dizziness/vertigo and poor balance secondary to right cerebellar ischemic infarction [2020]  2. Hypertension   3. Diabetes. PLAN:     1. Continue aspirin therapy. 2.  Follow-up in 6 months. NOTE: This neurology evaluation is part of outpatient coverage at [employer] [facility]  1-2 days per week. Patients requiring frequent evaluations or uncomfortable with potential 3-4 day turnaround on questions or calls  may be better served by a neurologist in the area full time. [facility] at [facility]. Adonis/Vinicio is available for outpatient visits and procedures including EMG/NCS. Non-[facility] system neurologists also practice in [facility] (Dr. Eliana Banegas) and [place] (Lisa Young). Stephen Kitchen MD   8/29/2022  4:25 PM         NOTE: This neurology evaluation is part of outpatient coverage at [employer]  1-2 days per week.   Patients requiring frequent evaluations or uncomfortable with potential 3-4 day turnaround on questions or calls  may be better served by a neurologist in the area full time. [facility] at [facility]. Yeimi is available for outpatient visits and procedures including EMG/NCS. Non-[facility] neurologists also practice in [facility] (Dr. Rena Arthur) and [facility] (Lisa Lopez).        Lea Kaba MD   2/2/2023  4:37 PM

## 2023-02-02 NOTE — PATIENT INSTRUCTIONS
SURVEY:    You may be receiving a survey from [employer] regarding your visit today. Please complete the survey to enable us to provide the highest quality of care to you and your family. If you cannot score us a very good on any question, please call the office to discuss how we could have made your experience a very good one. Thank you.

## 2023-03-08 ENCOUNTER — OFFICE VISIT (OUTPATIENT)
Dept: UROLOGY | Age: 68
End: 2023-03-08
Payer: MEDICARE

## 2023-03-08 VITALS
HEART RATE: 66 BPM | HEIGHT: 73 IN | SYSTOLIC BLOOD PRESSURE: 135 MMHG | DIASTOLIC BLOOD PRESSURE: 81 MMHG | BODY MASS INDEX: 38.17 KG/M2 | TEMPERATURE: 98 F | WEIGHT: 288 LBS

## 2023-03-08 DIAGNOSIS — N39.41 URGE INCONTINENCE: ICD-10-CM

## 2023-03-08 DIAGNOSIS — N40.1 BPH WITH OBSTRUCTION/LOWER URINARY TRACT SYMPTOMS: Primary | ICD-10-CM

## 2023-03-08 DIAGNOSIS — R33.9 INCOMPLETE BLADDER EMPTYING: ICD-10-CM

## 2023-03-08 DIAGNOSIS — N13.8 BPH WITH OBSTRUCTION/LOWER URINARY TRACT SYMPTOMS: Primary | ICD-10-CM

## 2023-03-08 DIAGNOSIS — R39.14 FEELING OF INCOMPLETE BLADDER EMPTYING: ICD-10-CM

## 2023-03-08 DIAGNOSIS — R39.15 URINARY URGENCY: ICD-10-CM

## 2023-03-08 DIAGNOSIS — K59.00 CONSTIPATION, UNSPECIFIED CONSTIPATION TYPE: ICD-10-CM

## 2023-03-08 PROCEDURE — G8484 FLU IMMUNIZE NO ADMIN: HCPCS | Performed by: PHYSICIAN ASSISTANT

## 2023-03-08 PROCEDURE — 1036F TOBACCO NON-USER: CPT | Performed by: PHYSICIAN ASSISTANT

## 2023-03-08 PROCEDURE — 3075F SYST BP GE 130 - 139MM HG: CPT | Performed by: PHYSICIAN ASSISTANT

## 2023-03-08 PROCEDURE — 3017F COLORECTAL CA SCREEN DOC REV: CPT | Performed by: PHYSICIAN ASSISTANT

## 2023-03-08 PROCEDURE — 3079F DIAST BP 80-89 MM HG: CPT | Performed by: PHYSICIAN ASSISTANT

## 2023-03-08 PROCEDURE — G8417 CALC BMI ABV UP PARAM F/U: HCPCS | Performed by: PHYSICIAN ASSISTANT

## 2023-03-08 PROCEDURE — G8427 DOCREV CUR MEDS BY ELIG CLIN: HCPCS | Performed by: PHYSICIAN ASSISTANT

## 2023-03-08 PROCEDURE — 1123F ACP DISCUSS/DSCN MKR DOCD: CPT | Performed by: PHYSICIAN ASSISTANT

## 2023-03-08 PROCEDURE — 99214 OFFICE O/P EST MOD 30 MIN: CPT | Performed by: PHYSICIAN ASSISTANT

## 2023-03-08 PROCEDURE — 51798 US URINE CAPACITY MEASURE: CPT | Performed by: PHYSICIAN ASSISTANT

## 2023-03-08 RX ORDER — TAMSULOSIN HYDROCHLORIDE 0.4 MG/1
0.4 CAPSULE ORAL 2 TIMES DAILY
Qty: 180 CAPSULE | Refills: 3 | Status: SHIPPED | OUTPATIENT
Start: 2023-03-08

## 2023-03-08 RX ORDER — FINASTERIDE 5 MG/1
TABLET, FILM COATED ORAL
Qty: 90 TABLET | Refills: 3 | Status: SHIPPED | OUTPATIENT
Start: 2023-03-08

## 2023-03-08 ASSESSMENT — ENCOUNTER SYMPTOMS
EYE REDNESS: 0
ABDOMINAL PAIN: 0
WHEEZING: 0
CONSTIPATION: 1
BACK PAIN: 0
NAUSEA: 0
SHORTNESS OF BREATH: 0
COLOR CHANGE: 0
COUGH: 0
VOMITING: 0

## 2023-03-08 NOTE — PATIENT INSTRUCTIONS
SURVEY:    You may be receiving a survey from [employer] regarding your visit today. Please complete the survey to enable us to provide the highest quality of care to you and your family. If you cannot score us a very good on any question, please call the office to discuss how we could have made your experience a very good one. Thank you.

## 2023-03-08 NOTE — PROGRESS NOTES
HPI:      Patient is a 67 y.o. male in no acute distress.  He is alert and oriented to person, place, and time.       History  12/2020 Referral from [facility] for LUTS.  Complaints of urgency and urge incontinence.  He has nocturia 0-2 times per night.  He has frequency every 30 minutes to an hour during the day.  He does change his underwear once per night due to the incontinence.  He does have a weak stream and a split stream at the start of urination.  He is uncircumcised, but denies any issues retracting his foreskin.  He does have significant constipation and was recently in the ER for constipation. He also reports a history of fecal smearing. He will often go 4 more days without a bowel movement.  While in the ER he was told he was not emptying his bladder.  He did have a CT completed while in the ER on 11/2020 that showed no evidence of  calcifications or hydronephrosis.  He is an uncontrolled insulin-dependent diabetic.  He does consume a large amount of bladder irritants.  He denies history of stones.  He has never seen urology in the past.                Started miralax daily                 Started flomax daily                 Referred for colonoscopy     2/2021 - cysto - Extensive trilobar hyperplasia      Ditropan increased to XL 10mg    1/2023 - Ditropan stopped, trospium started     3/2023 -trospium stopped secondary to ineffectiveness and incomplete bladder emptying    Avoiding Myrbetriq secondary to cardiac history    PSA  12/2022 - 0.76 (1.42)  11/2021 - 0.6 (1.2)  6/2020 - 3.04    Today:  Patient is here today for follow up BPH, OAB, constipation.  At last visit we stopped Ditropan and started trospium for urgency and urge incontinence.  Continues to take finasteride 5mg daily and flomax twice a day.  Nocturia x0.  He is taking MiraLAX twice a day.  He states that he is not having a daily bowel movement.  He has noticed no improvement with the trospium.  He states that continues to have a  sudden urge to urinate and upon standing and incontinence episodes. He states a lot of his urinary symptoms are worsened by his sugars being higher than 120. He states that if they are 120 and below he has considerably less urgency. Patient states that he also has intermittency and feeling of incomplete bladder emptying. He denies any dysuria or hematuria.   Bladderscan performed in office today: Pt voided - 40 mL, PVR - 276 mL     Past Medical History:   Diagnosis Date    Abnormal cardiovascular stress test 10/2020    moderate perfusion defect of severe intensity in the apical anteroapical and inferoapical regions    CAD (coronary artery disease)     Diabetes mellitus [facility])     [address] [facility]    Diabetic retinopathy ([facility] [address] 11/05/2020    Fatty liver disease, nonalcoholic     Hyperlipidemia     Hypertension     Meniere disease     MI (myocardial infarction) ([facility] [address]     MULTIPLE    Obesity     Stroke ([facility] [address]     Thrombocytopenia ([facility] [address]     TIA (transient ischemic attack)      Past Surgical History:   Procedure Laterality Date    CARDIAC CATHETERIZATION  10/29/2020    Severe three vessel disease involving the LAD, circumflex and right coronary artery  /  DR Botello Busing  /  CT SURGERGY CONSULT    COLONOSCOPY      COLONOSCOPY N/A 9/3/2021    COLORECTAL CANCER SCREENING Colonscope with polypectomy  performed by Ino Avelar DO at [facility]  9/3/2021    EGD BIOPSY performed by Ino Avelar DO at [facility] Encounter Medications as of 3/8/2023   Medication Sig Dispense Refill    tamsulosin (FLOMAX) 0.4 MG capsule Take 1 capsule by mouth in the morning and at bedtime 180 capsule 3    finasteride (PROSCAR) 5 MG tablet TAKE ONE TABLET BY MOUTH DAILY 90 tablet 3    lisinopril (PRINIVIL;ZESTRIL) 10 MG tablet Take 1 tablet by mouth once daily 90 tablet 0    polyethylene glycol (MIRALAX) 17 g PACK packet Take 17 g by mouth 2 times daily      carvedilol (COREG) 6.25 MG tablet Take 6.25 mg by mouth 2 times daily (with meals)      insulin lispro, 1 Unit Dial, (HUMALOG/ADMELOG) 100 UNIT/ML SOPN Inject 15 (fifteen) Units under the skin 3 (three) times a day before meals With sliding scale, see medication adjustment sheet . LINZESS 290 MCG CAPS capsule TAKE 1 CAPSULE BY MOUTH ONCE DAILY      pantoprazole (PROTONIX) 40 MG tablet Take 1 tablet by mouth once daily 90 tablet 1    blood glucose test strips (TRUE METRIX BLOOD GLUCOSE TEST) strip USE AS DIRECTED ONCE DAILY TO TEST BLOOD SUGAR 100 each 3    Insulin Degludec (TRESIBA FLEXTOUCH) 100 UNIT/ML SOPN Inject 30 Units into the skin      aspirin 81 MG EC tablet Take 81 mg by mouth daily      NONFORMULARY Lion HRT, suppliment for heart      Insulin Pen Needle 32G X 8 MM MISC by Does not apply route      Insulin Aspart FlexPen 100 UNIT/ML SOPN Inject 30 Units into the skin 2 times daily Pt to take as 20 units after each meal as sliding scale. Pt states \"The 20 Units are not usually enough. \"      Coenzyme Q10 (CO Q 10) 10 MG CAPS Take by mouth daily       glucose monitoring kit (FREESTYLE) monitoring kit 1 kit by Does not apply route daily TEST BLOOD SUGAR QID. WHATEVER METER IS COVERED BY GALVEZ. DIAGNOSIS E11.9 1 kit 0    Lancets MISC TEST BLOOD SUGAR QID. WHATEVER METER IS COVERED BY GALVEZ. DIAGNOSIS E11.9 100 each 3    [DISCONTINUED] trospium (SANCTURA) 20 MG tablet Take 1 tablet by mouth 2 times daily 60 tablet 3    [DISCONTINUED] tamsulosin (FLOMAX) 0.4 MG capsule TAKE 1 CAPSULE BY MOUTH TWICE DAILY      [DISCONTINUED] finasteride (PROSCAR) 5 MG tablet TAKE ONE TABLET BY MOUTH DAILY 90 tablet 0     No facility-administered encounter medications on file as of 3/8/2023.       Current Outpatient Medications on File Prior to Visit   Medication Sig Dispense Refill    lisinopril (PRINIVIL;ZESTRIL) 10 MG tablet Take 1 tablet by mouth once daily 90 tablet 0    polyethylene glycol (MIRALAX) 17 g PACK packet Take 17 g by mouth 2 times daily      carvedilol (COREG) 6.25 MG tablet Take 6.25 mg by mouth 2 times daily (with meals)      insulin lispro, 1 Unit Dial, (HUMALOG/ADMELOG) 100 UNIT/ML SOPN Inject 15 (fifteen) Units under the skin 3 (three) times a day before meals With sliding scale, see medication adjustment sheet . LINZESS 290 MCG CAPS capsule TAKE 1 CAPSULE BY MOUTH ONCE DAILY      pantoprazole (PROTONIX) 40 MG tablet Take 1 tablet by mouth once daily 90 tablet 1    blood glucose test strips (TRUE METRIX BLOOD GLUCOSE TEST) strip USE AS DIRECTED ONCE DAILY TO TEST BLOOD SUGAR 100 each 3    Insulin Degludec (TRESIBA FLEXTOUCH) 100 UNIT/ML SOPN Inject 30 Units into the skin      aspirin 81 MG EC tablet Take 81 mg by mouth daily      NONFORMULARY Lion HRT, suppliment for heart      Insulin Pen Needle 32G X 8 MM MISC by Does not apply route      Insulin Aspart FlexPen 100 UNIT/ML SOPN Inject 30 Units into the skin 2 times daily Pt to take as 20 units after each meal as sliding scale. Pt states \"The 20 Units are not usually enough. \"      Coenzyme Q10 (CO Q 10) 10 MG CAPS Take by mouth daily       glucose monitoring kit (FREESTYLE) monitoring kit 1 kit by Does not apply route daily TEST BLOOD SUGAR QID. WHATEVER METER IS COVERED BY GALVEZ. DIAGNOSIS E11.9 1 kit 0    Lancets MISC TEST BLOOD SUGAR QID. WHATEVER METER IS COVERED BY GALVEZ. DIAGNOSIS E11.9 100 each 3     No current facility-administered medications on file prior to visit.      Statins and Metformin and related  Family History   Problem Relation Age of Onset    Heart Attack Mother     Diabetes Mother     Heart Disease Father     Prostate Cancer Father     Heart Attack Sister     Heart Attack Brother     Heart Attack Maternal Grandfather      Social History     Tobacco Use   Smoking Status Never   Smokeless Tobacco Never       Social History     Substance and Sexual Activity   Alcohol Use Not Currently       Review of Systems Constitutional:  Negative for appetite change, chills and fever. Eyes:  Negative for redness and visual disturbance. Respiratory:  Negative for cough, shortness of breath and wheezing. Cardiovascular:  Negative for chest pain and leg swelling. Gastrointestinal:  Positive for constipation. Negative for abdominal pain, nausea and vomiting. Genitourinary:  Positive for urgency. Negative for decreased urine volume, difficulty urinating, dysuria, enuresis, flank pain, frequency, hematuria, penile discharge, penile pain, scrotal swelling and testicular pain. Positive for urge incontinence   Musculoskeletal:  Negative for back pain, joint swelling and myalgias. Skin:  Negative for color change, rash and wound. Neurological:  Negative for dizziness, tremors and numbness. Hematological:  Negative for adenopathy. Does not bruise/bleed easily. /81 (Site: Right Upper Arm, Position: Sitting, Cuff Size: Large Adult)   Pulse 66   Temp 98 °F (36.7 °C)   Ht 6' 1\" (1.854 m)   Wt 288 lb (130.6 kg)   BMI 38.00 kg/m²       PHYSICAL EXAM:  Constitutional: Patient in no acute distress; Neuro: alert and oriented to person place and time. Psych: Mood and affect normal.  Lungs: Respiratory effort normal  Abdomen: Soft, non-tender, non-distended  Rectal: deferred     Lab Results   Component Value Date    BUN 12 11/28/2022     Lab Results   Component Value Date    CREATININE 0.90 11/28/2022     Lab Results   Component Value Date    PSA 0.76 12/02/2022    PSA 3.04 06/19/2020       ASSESSMENT:   Diagnosis Orders   1. BPH with obstruction/lower urinary tract symptoms  [place] MOHSEN POST-VOIDING RESIDUAL URINE&/BLADDER CAP    Basic Metabolic Panel      2. Incomplete bladder emptying        3. Feeling of incomplete bladder emptying  Basic Metabolic Panel      4. Urinary urgency        5. Urge incontinence        6.  Constipation, unspecified constipation type              PLAN:  BMP today secondary to his incomplete bladder emptying, we will call him with results. We did discuss with him that he has a known extensive trilobar hyperplasia on cystoscopy approximately 2 years ago. We do feel as though he would benefit from a PVP greenlight. We did discuss this procedure with him at length. We would want to perform a cystoscopy prior to scheduling him for this procedure. At this time he wishes to avoid additional procedures. Continue Flomax twice a day    STOP Trospium BID    Recommended good glucose control    Avoiding Myrbetriq secondary to HTN/CAD history    Continue finasteride daily    Continue MiraLAX twice a day    Attempt to void every 2-3 hours while he is awake. Follow-up in 3 to 4 months for reevaluation of symptoms.     PSA 1/2024

## 2023-03-16 ENCOUNTER — TELEPHONE (OUTPATIENT)
Dept: UROLOGY | Age: 68
End: 2023-03-16

## 2023-03-16 DIAGNOSIS — R39.14 FEELING OF INCOMPLETE BLADDER EMPTYING: ICD-10-CM

## 2023-03-16 DIAGNOSIS — N40.1 BPH WITH OBSTRUCTION/LOWER URINARY TRACT SYMPTOMS: ICD-10-CM

## 2023-03-16 DIAGNOSIS — N13.8 BPH WITH OBSTRUCTION/LOWER URINARY TRACT SYMPTOMS: ICD-10-CM

## 2023-03-16 NOTE — TELEPHONE ENCOUNTER
----- [facility] sent at 3/16/2023  2:48 PM EDT -----  Please let him know that his kidney function is normal.  Continue with plan as discussed in the office

## 2023-03-23 ENCOUNTER — OFFICE VISIT (OUTPATIENT)
Dept: ONCOLOGY | Age: 68
End: 2023-03-23
Payer: MEDICARE

## 2023-03-23 VITALS
WEIGHT: 283 LBS | HEART RATE: 67 BPM | HEIGHT: 73 IN | TEMPERATURE: 96.8 F | BODY MASS INDEX: 37.51 KG/M2 | SYSTOLIC BLOOD PRESSURE: 122 MMHG | DIASTOLIC BLOOD PRESSURE: 74 MMHG | RESPIRATION RATE: 18 BRPM

## 2023-03-23 DIAGNOSIS — K76.0 FATTY LIVER: ICD-10-CM

## 2023-03-23 DIAGNOSIS — D69.6 THROMBOCYTOPENIA (HCC): Primary | ICD-10-CM

## 2023-03-23 PROCEDURE — 1036F TOBACCO NON-USER: CPT | Performed by: INTERNAL MEDICINE

## 2023-03-23 PROCEDURE — 3017F COLORECTAL CA SCREEN DOC REV: CPT | Performed by: INTERNAL MEDICINE

## 2023-03-23 PROCEDURE — 3078F DIAST BP <80 MM HG: CPT | Performed by: INTERNAL MEDICINE

## 2023-03-23 PROCEDURE — 1123F ACP DISCUSS/DSCN MKR DOCD: CPT | Performed by: INTERNAL MEDICINE

## 2023-03-23 PROCEDURE — G8428 CUR MEDS NOT DOCUMENT: HCPCS | Performed by: INTERNAL MEDICINE

## 2023-03-23 PROCEDURE — G8417 CALC BMI ABV UP PARAM F/U: HCPCS | Performed by: INTERNAL MEDICINE

## 2023-03-23 PROCEDURE — 3074F SYST BP LT 130 MM HG: CPT | Performed by: INTERNAL MEDICINE

## 2023-03-23 PROCEDURE — G8484 FLU IMMUNIZE NO ADMIN: HCPCS | Performed by: INTERNAL MEDICINE

## 2023-03-23 PROCEDURE — 99214 OFFICE O/P EST MOD 30 MIN: CPT | Performed by: INTERNAL MEDICINE

## 2023-03-23 NOTE — PROGRESS NOTES
Medication Sig Dispense Refill    pantoprazole (PROTONIX) 40 MG tablet Take 1 tablet by mouth once daily 90 tablet 0    tamsulosin (FLOMAX) 0.4 MG capsule Take 1 capsule by mouth in the morning and at bedtime 180 capsule 3    finasteride (PROSCAR) 5 MG tablet TAKE ONE TABLET BY MOUTH DAILY 90 tablet 3    lisinopril (PRINIVIL;ZESTRIL) 10 MG tablet Take 1 tablet by mouth once daily 90 tablet 0    polyethylene glycol (MIRALAX) 17 g PACK packet Take 17 g by mouth 2 times daily      carvedilol (COREG) 6.25 MG tablet Take 6.25 mg by mouth 2 times daily (with meals)      insulin lispro, 1 Unit Dial, (HUMALOG/ADMELOG) 100 UNIT/ML SOPN Inject 15 (fifteen) Units under the skin 3 (three) times a day before meals With sliding scale, see medication adjustment sheet . LINZESS 290 MCG CAPS capsule TAKE 1 CAPSULE BY MOUTH ONCE DAILY      blood glucose test strips (TRUE METRIX BLOOD GLUCOSE TEST) strip USE AS DIRECTED ONCE DAILY TO TEST BLOOD SUGAR 100 each 3    Insulin Degludec (TRESIBA FLEXTOUCH) 100 UNIT/ML SOPN Inject 30 Units into the skin      aspirin 81 MG EC tablet Take 81 mg by mouth daily      NONFORMULARY Lion HRT, suppliment for heart      Insulin Pen Needle 32G X 8 MM MISC by Does not apply route      Insulin Aspart FlexPen 100 UNIT/ML SOPN Inject 30 Units into the skin 2 times daily Pt to take as 20 units after each meal as sliding scale. Pt states \"The 20 Units are not usually enough. \"      Coenzyme Q10 (CO Q 10) 10 MG CAPS Take by mouth daily       glucose monitoring kit (FREESTYLE) monitoring kit 1 kit by Does not apply route daily TEST BLOOD SUGAR QID. WHATEVER METER IS COVERED BY GALVEZ. DIAGNOSIS E11.9 1 kit 0    Lancets MISC TEST BLOOD SUGAR QID. WHATEVER METER IS COVERED BY GALVEZ. DIAGNOSIS E11.9 100 each 3     No current facility-administered medications for this visit.        Social History     Socioeconomic History    Marital status: Single     Spouse name: Not on file    Number of children: Not on

## 2023-05-12 RX ORDER — CARVEDILOL 6.25 MG/1
6.25 TABLET ORAL 2 TIMES DAILY WITH MEALS
Qty: 180 TABLET | Refills: 3 | Status: SHIPPED | OUTPATIENT
Start: 2023-05-12

## 2023-06-07 ENCOUNTER — OFFICE VISIT (OUTPATIENT)
Dept: UROLOGY | Age: 68
End: 2023-06-07
Payer: MEDICARE

## 2023-06-07 VITALS
BODY MASS INDEX: 38.04 KG/M2 | TEMPERATURE: 97.7 F | DIASTOLIC BLOOD PRESSURE: 82 MMHG | SYSTOLIC BLOOD PRESSURE: 146 MMHG | HEART RATE: 61 BPM | WEIGHT: 287 LBS | HEIGHT: 73 IN

## 2023-06-07 DIAGNOSIS — N13.8 BPH WITH OBSTRUCTION/LOWER URINARY TRACT SYMPTOMS: Primary | ICD-10-CM

## 2023-06-07 DIAGNOSIS — N39.41 URGE INCONTINENCE: ICD-10-CM

## 2023-06-07 DIAGNOSIS — K59.00 CONSTIPATION, UNSPECIFIED CONSTIPATION TYPE: ICD-10-CM

## 2023-06-07 DIAGNOSIS — R33.9 INCOMPLETE BLADDER EMPTYING: ICD-10-CM

## 2023-06-07 DIAGNOSIS — R39.14 FEELING OF INCOMPLETE BLADDER EMPTYING: ICD-10-CM

## 2023-06-07 DIAGNOSIS — N40.1 BPH WITH OBSTRUCTION/LOWER URINARY TRACT SYMPTOMS: Primary | ICD-10-CM

## 2023-06-07 PROCEDURE — 1036F TOBACCO NON-USER: CPT | Performed by: PHYSICIAN ASSISTANT

## 2023-06-07 PROCEDURE — 3079F DIAST BP 80-89 MM HG: CPT | Performed by: PHYSICIAN ASSISTANT

## 2023-06-07 PROCEDURE — G8427 DOCREV CUR MEDS BY ELIG CLIN: HCPCS | Performed by: PHYSICIAN ASSISTANT

## 2023-06-07 PROCEDURE — 99214 OFFICE O/P EST MOD 30 MIN: CPT | Performed by: PHYSICIAN ASSISTANT

## 2023-06-07 PROCEDURE — 1123F ACP DISCUSS/DSCN MKR DOCD: CPT | Performed by: PHYSICIAN ASSISTANT

## 2023-06-07 PROCEDURE — 51798 US URINE CAPACITY MEASURE: CPT | Performed by: PHYSICIAN ASSISTANT

## 2023-06-07 PROCEDURE — G8417 CALC BMI ABV UP PARAM F/U: HCPCS | Performed by: PHYSICIAN ASSISTANT

## 2023-06-07 PROCEDURE — 3017F COLORECTAL CA SCREEN DOC REV: CPT | Performed by: PHYSICIAN ASSISTANT

## 2023-06-07 PROCEDURE — 3077F SYST BP >= 140 MM HG: CPT | Performed by: PHYSICIAN ASSISTANT

## 2023-06-07 ASSESSMENT — ENCOUNTER SYMPTOMS
COLOR CHANGE: 0
WHEEZING: 0
VOMITING: 0
SHORTNESS OF BREATH: 0
BACK PAIN: 0
NAUSEA: 0
EYE REDNESS: 0
CONSTIPATION: 1
COUGH: 0
ABDOMINAL PAIN: 0

## 2023-06-07 NOTE — PATIENT INSTRUCTIONS
SURVEY:    You may be receiving a survey from [employer] regarding your visit today. Please complete the survey to enable us to provide the highest quality of care to you and your family. If you cannot score us a very good on any question, please call the office to discuss how we could have made your experience a very good one. Thank you.

## 2023-06-07 NOTE — PROGRESS NOTES
Bladderscan performed in office today:  Pt voided - about 30 minutes ago, PVR - 179 mL
and affect normal.  Lungs: Respiratory effort normal  Abdomen: Soft, non-tender, non-distended  Rectal: deferred       Lab Results   Component Value Date    BUN 12 11/28/2022     Lab Results   Component Value Date    CREATININE 0.90 11/28/2022     Lab Results   Component Value Date    PSA 0.76 12/02/2022    PSA 3.04 06/19/2020       ASSESSMENT:   Diagnosis Orders   1. BPH with obstruction/lower urinary tract symptoms  [place] MOHSEN POST-VOIDING RESIDUAL URINE&/BLADDER CAP      2. Feeling of incomplete bladder emptying  ND MOHSEN POST-VOIDING RESIDUAL URINE&/BLADDER CAP      3. Urge incontinence        4. Constipation, unspecified constipation type        5. Incomplete bladder emptying  ND MOHSEN POST-VOIDING RESIDUAL URINE&/BLADDER CAP            PLAN:  We discussed with patient that taking a total of 3 capsules of Flomax is inappropriate. We do want him to return to Flomax twice a day. We do feel that he would be a good candidate for PVP greenlight. Patient does have extensive trilobar hyperplasia as seen on prior cystoscopy. Prior to schedule him for a greenlight procedure we would want to repeat cystoscopy. At this time he wishes to hold off. Continue Proscar    Continue Ditropan 10 mg XL    The MiraLAX regimen    Patient wants to try the \"Liu Marcos\" prostate supplement as he did have 2 people that he no who swear by it. We did discuss with him that we do not endorse any products that are not FDA approved. He voiced understanding. But he would like to still give it a try. Follow-up in 4 months for reevaluation and PVR    PSA due 1/2024      Spent 32 mins, >50% time face-to-face, discussing diagnoses, any applicable test results, treatment plan/options, and prognosis.

## 2023-07-18 RX ORDER — OXYBUTYNIN CHLORIDE 10 MG/1
TABLET, EXTENDED RELEASE ORAL
Qty: 90 TABLET | Refills: 0 | Status: SHIPPED | OUTPATIENT
Start: 2023-07-18

## 2023-08-03 ENCOUNTER — OFFICE VISIT (OUTPATIENT)
Dept: NEUROLOGY | Age: 68
End: 2023-08-03
Payer: MEDICARE

## 2023-08-03 VITALS
DIASTOLIC BLOOD PRESSURE: 73 MMHG | BODY MASS INDEX: 37.61 KG/M2 | SYSTOLIC BLOOD PRESSURE: 132 MMHG | WEIGHT: 283.8 LBS | HEIGHT: 73 IN | TEMPERATURE: 95.8 F | HEART RATE: 62 BPM

## 2023-08-03 DIAGNOSIS — R07.82 INTERCOSTAL PAIN: Primary | ICD-10-CM

## 2023-08-03 PROCEDURE — 3075F SYST BP GE 130 - 139MM HG: CPT | Performed by: NEUROMUSCULOSKELETAL MEDICINE, SPORTS MEDICINE

## 2023-08-03 PROCEDURE — G8417 CALC BMI ABV UP PARAM F/U: HCPCS | Performed by: NEUROMUSCULOSKELETAL MEDICINE, SPORTS MEDICINE

## 2023-08-03 PROCEDURE — 1036F TOBACCO NON-USER: CPT | Performed by: NEUROMUSCULOSKELETAL MEDICINE, SPORTS MEDICINE

## 2023-08-03 PROCEDURE — 3017F COLORECTAL CA SCREEN DOC REV: CPT | Performed by: NEUROMUSCULOSKELETAL MEDICINE, SPORTS MEDICINE

## 2023-08-03 PROCEDURE — 1123F ACP DISCUSS/DSCN MKR DOCD: CPT | Performed by: NEUROMUSCULOSKELETAL MEDICINE, SPORTS MEDICINE

## 2023-08-03 PROCEDURE — 99213 OFFICE O/P EST LOW 20 MIN: CPT | Performed by: NEUROMUSCULOSKELETAL MEDICINE, SPORTS MEDICINE

## 2023-08-03 PROCEDURE — G8427 DOCREV CUR MEDS BY ELIG CLIN: HCPCS | Performed by: NEUROMUSCULOSKELETAL MEDICINE, SPORTS MEDICINE

## 2023-08-03 PROCEDURE — 3078F DIAST BP <80 MM HG: CPT | Performed by: NEUROMUSCULOSKELETAL MEDICINE, SPORTS MEDICINE

## 2023-08-03 RX ORDER — MECLIZINE HCL 12.5 MG/1
12.5 TABLET ORAL 3 TIMES DAILY PRN
Qty: 30 TABLET | Refills: 0 | Status: SHIPPED | OUTPATIENT
Start: 2023-08-03 | End: 2023-08-13

## 2023-08-03 NOTE — PATIENT INSTRUCTIONS
SURVEY:    You may be receiving a survey from [employer] regarding your visit today. Please complete the survey to enable us to provide the highest quality of care to you and your family. If you cannot score us a very good on any question, please call the office to discuss how we could have made your experience a very good one. Thank you.

## 2023-08-03 NOTE — PROGRESS NOTES
NEUROLOGY follow-up. Patient Name:  Lauren Lucero  :   1955  Clinic Visit Date: 8/3/2023    I saw Mr. Lauren Lucero  in the neurology clinic today for a routine 6-month follow up. 72-year-old right-handed gentleman with a history of hypertension diabetes, hyperlipidemia, coronary artery disease history of a right cerebellar ischemic stroke in 2020. Main complaints today are persistent intermittent pain over the back of the chest, pain over the intrascapular region and above the right shoulder, and s intermittent transient lightheadedness in the morning on waking up. Also has mild neck pain or any definite radicular symptoms. No weakness. No cough shortness of breath or fever. MRI of the spine in  demonstrated the following. IMPRESSION:  Multilevel degenerative disc disease, greatest at C5-6 and C6-7. See above  for details. He sees a chiropractor for the neck pain and shoulder pain on a regular basis    REVIEW OF SYSTEMS    Constitutional Weight changes: absent, change in appetite: absent Fatigue: absent; Fevers : absent, Any recent hospitalizations:  absent   HEENT Ears: normal,  Visual disturbance: absent   Respiratory Shortness of breath: absent, choking:  absent, Cough: absent, Snoring : absent   Cardiovascular Chest pain: absent, Leg swelling :present, palpitations : absent, fainting : absent   GI Constipation: present, Diarrhea: absent, Swallowing change: present-food gets stuck    Urinary frequency: absent, Urinary urgency: present, Urinary incontinence: absent   Musculoskeletal Neck pain: absent, Back pain: present, Stiffness: absent, Muscle pain: absent, Joint pain: absent, restless leg : absent   Dermatological Hair loss: absent, Skin changes: absent   Neurological Confusion: absent, Trouble concentrating: absent, Seizures: absent;  Memory loss: present, balance problem: absent, Dizziness: present, vertigo: present, Weakness: absent, Numbness absent, Tremor: absent,

## 2023-08-07 ENCOUNTER — HOSPITAL ENCOUNTER (OUTPATIENT)
Age: 68
Discharge: HOME | End: 2023-08-09
Payer: MEDICARE

## 2023-08-07 ENCOUNTER — HOSPITAL ENCOUNTER (OUTPATIENT)
Dept: GENERAL RADIOLOGY | Age: 68
Discharge: HOME | End: 2023-08-09
Payer: MEDICARE

## 2023-08-07 DIAGNOSIS — R07.82 INTERCOSTAL PAIN: ICD-10-CM

## 2023-08-07 PROCEDURE — 71046 X-RAY EXAM CHEST 2 VIEWS: CPT

## 2023-08-11 ENCOUNTER — TELEPHONE (OUTPATIENT)
Dept: NEUROLOGY | Age: 68
End: 2023-08-11

## 2023-08-11 NOTE — TELEPHONE ENCOUNTER
I spoke with patient and gave him his chest xray results (normal-no rib fx). Patient expressed concern that the xray tech \"took the images on the left side but his pain is on the right side. \" Patient also stated \"my chiropractor said that broken ribs wouldn't show on an xray. \" Patient stated he will continue following up with his PCP as well as chiropractor as he continues to have pain. Please advise.

## 2023-08-15 PROBLEM — E11.65 TYPE 2 DIABETES MELLITUS WITH HYPERGLYCEMIA, WITH LONG-TERM CURRENT USE OF INSULIN (HCC): Status: ACTIVE | Noted: 2023-08-15

## 2023-08-15 PROBLEM — M79.10 MYALGIA DUE TO STATIN: Status: ACTIVE | Noted: 2023-08-15

## 2023-08-15 PROBLEM — Z79.4 TYPE 2 DIABETES MELLITUS WITH HYPERGLYCEMIA, WITH LONG-TERM CURRENT USE OF INSULIN (HCC): Status: ACTIVE | Noted: 2023-08-15

## 2023-08-15 PROBLEM — E55.9 VITAMIN D DEFICIENCY: Status: ACTIVE | Noted: 2023-08-15

## 2023-08-15 PROBLEM — T46.6X5A MYALGIA DUE TO STATIN: Status: ACTIVE | Noted: 2023-08-15

## 2023-08-15 PROBLEM — R53.83 FATIGUE: Status: ACTIVE | Noted: 2023-08-15

## 2023-09-07 LAB
ABSOLUTE BASO #: 0.05 K/UL (ref 0–0.2)
ABSOLUTE EOS #: 0.13 K/UL (ref 0–0.5)
ABSOLUTE LYMPH #: 0.97 K/UL (ref 1–4)
ABSOLUTE MONO #: 0.52 K/UL (ref 0.2–1)
ABSOLUTE NEUT #: 2.75 K/UL (ref 1.5–7.5)
BASOPHILS RELATIVE PERCENT: 1.1 %
COMMENT: ABNORMAL
EOSINOPHILS RELATIVE PERCENT: 2.9 %
HCT VFR BLD CALC: 42.9 % (ref 40–51)
HEMOGLOBIN: 15.2 G/DL (ref 13.5–17)
LYMPHOCYTE %: 21.9 %
MCH RBC QN AUTO: 33.4 PG (ref 25–33)
MCHC RBC AUTO-ENTMCNC: 35.4 G/DL (ref 31–36)
MCV RBC AUTO: 94.3 FL (ref 80–99)
MONOCYTES # BLD: 11.7 %
NEUTROPHILS RELATIVE PERCENT: 62.2 %
PDW BLD-RTO: 12.4 % (ref 11.5–15)
PLATELETS: 84 K/UL (ref 130–400)
PMV BLD AUTO: 11.2 FL (ref 9.3–13)
RBC: 4.55 M/UL (ref 4.5–6.1)
WBC: 4.4 K/UL (ref 3.5–11)

## 2023-09-14 ENCOUNTER — TELEPHONE (OUTPATIENT)
Dept: NEUROLOGY | Age: 68
End: 2023-09-14

## 2023-09-14 NOTE — TELEPHONE ENCOUNTER
Refill request form [employer] for Meclizine. Called and left patient a voicemail to clarify how much medication he last left-if he has enough to last until Dr. Nicolasa Worley returns to the office 9/25/23.

## 2023-09-14 NOTE — TELEPHONE ENCOUNTER
Pt returned call and stated he is taking Meloxicam about once a week. Pt stated he does not need refills at this time.

## 2023-09-20 NOTE — TELEPHONE ENCOUNTER
Pending medication    Health Maintenance   Topic Date Due    DTaP/Tdap/Td vaccine (1 - Tdap) Never done    Shingles vaccine (1 of 2) Never done    Hepatitis B vaccine (1 of 3 - Risk 3-dose series) Never done    Diabetic retinal exam  11/05/2021    Diabetic Alb to Cr ratio (uACR) test  05/16/2023    Flu vaccine (1) Never done    A1C test (Diabetic or Prediabetic)  09/07/2023    COVID-19 Vaccine (1) 12/02/2023 (Originally 4/1/1956)    Lipids  11/28/2023    Diabetic foot exam  12/02/2023    Pneumococcal 65+ years Vaccine (2 - PCV) 12/02/2023    Annual Wellness Visit (AWV)  12/03/2023    Depression Screen  05/02/2024    GFR test (Diabetes, CKD 3-4, OR last GFR 15-59)  06/07/2024    Colorectal Cancer Screen  09/03/2026    Hepatitis A vaccine  Aged Out    Hib vaccine  Aged Out    Meningococcal (ACWY) vaccine  Aged Out    Pneumococcal 0-64 years Vaccine  Discontinued    Hepatitis C screen  Discontinued    HIV screen  Discontinued    Prostate Specific Antigen (PSA) Screening or Monitoring  Discontinued             (applicable per patient's age: Cancer Screenings, Depression Screening, Fall Risk Screening, Immunizations)    Hemoglobin A1C (%)   Date Value   06/07/2023 9.8 (H)   11/28/2022 8.8 (H)   05/16/2022 7.3 (H)     LDL Cholesterol (mg/dL)   Date Value   04/25/2020 114     LDL Calculated (mg/dL)   Date Value   11/28/2022 88     AST (U/L)   Date Value   06/07/2023 31     ALT (U/L)   Date Value   06/07/2023 50     BUN (mg/dL)   Date Value   06/07/2023 14      (goal A1C is < 7)   (goal LDL is <100) need 30-50% reduction from baseline     BP Readings from Last 3 Encounters:   08/15/23 131/72   08/03/23 132/73   06/07/23 (!) 146/82    (goal /80)      All Future Testing planned in [facility]:  Lab Frequency Next Occurrence   Comprehensive Metabolic Panel Once 04/67/9757   Hemoglobin A1C Once 06/02/2023   Microalbumin, Ur Once 06/02/2023   PSA Screening Once 01/04/2024   CBC with Auto Differential Once 09/23/2023

## 2023-09-21 RX ORDER — OXYBUTYNIN CHLORIDE 10 MG/1
10 TABLET, EXTENDED RELEASE ORAL NIGHTLY
Qty: 90 TABLET | Refills: 0 | Status: SHIPPED | OUTPATIENT
Start: 2023-09-21

## 2023-09-28 ENCOUNTER — TELEPHONE (OUTPATIENT)
Dept: NEUROLOGY | Age: 68
End: 2023-09-28

## 2023-09-28 ENCOUNTER — OFFICE VISIT (OUTPATIENT)
Dept: ONCOLOGY | Age: 68
End: 2023-09-28
Payer: MEDICARE

## 2023-09-28 VITALS
RESPIRATION RATE: 16 BRPM | TEMPERATURE: 96.7 F | SYSTOLIC BLOOD PRESSURE: 145 MMHG | HEART RATE: 78 BPM | BODY MASS INDEX: 37.1 KG/M2 | WEIGHT: 281.2 LBS | DIASTOLIC BLOOD PRESSURE: 79 MMHG

## 2023-09-28 DIAGNOSIS — K76.0 FATTY LIVER: ICD-10-CM

## 2023-09-28 DIAGNOSIS — E66.01 CLASS 3 SEVERE OBESITY WITH SERIOUS COMORBIDITY IN ADULT, UNSPECIFIED BMI, UNSPECIFIED OBESITY TYPE (HCC): ICD-10-CM

## 2023-09-28 DIAGNOSIS — D69.6 THROMBOCYTOPENIA (HCC): Primary | ICD-10-CM

## 2023-09-28 PROCEDURE — 3077F SYST BP >= 140 MM HG: CPT | Performed by: INTERNAL MEDICINE

## 2023-09-28 PROCEDURE — G8417 CALC BMI ABV UP PARAM F/U: HCPCS | Performed by: INTERNAL MEDICINE

## 2023-09-28 PROCEDURE — 99213 OFFICE O/P EST LOW 20 MIN: CPT | Performed by: INTERNAL MEDICINE

## 2023-09-28 PROCEDURE — 1036F TOBACCO NON-USER: CPT | Performed by: INTERNAL MEDICINE

## 2023-09-28 PROCEDURE — 3017F COLORECTAL CA SCREEN DOC REV: CPT | Performed by: INTERNAL MEDICINE

## 2023-09-28 PROCEDURE — 3078F DIAST BP <80 MM HG: CPT | Performed by: INTERNAL MEDICINE

## 2023-09-28 PROCEDURE — 1123F ACP DISCUSS/DSCN MKR DOCD: CPT | Performed by: INTERNAL MEDICINE

## 2023-09-28 PROCEDURE — G8427 DOCREV CUR MEDS BY ELIG CLIN: HCPCS | Performed by: INTERNAL MEDICINE

## 2023-09-28 NOTE — TELEPHONE ENCOUNTER
Patient dropped off disability paperwork for Dr. Natasha Castañeda to fill out and sign. In progress-should be ready for  Monday per Dr. Natasha Castañeda.

## 2023-10-04 ENCOUNTER — OFFICE VISIT (OUTPATIENT)
Dept: UROLOGY | Age: 68
End: 2023-10-04
Payer: MEDICARE

## 2023-10-04 VITALS
HEIGHT: 73 IN | WEIGHT: 280 LBS | SYSTOLIC BLOOD PRESSURE: 135 MMHG | HEART RATE: 71 BPM | TEMPERATURE: 98 F | BODY MASS INDEX: 37.11 KG/M2 | DIASTOLIC BLOOD PRESSURE: 79 MMHG

## 2023-10-04 DIAGNOSIS — R39.14 FEELING OF INCOMPLETE BLADDER EMPTYING: ICD-10-CM

## 2023-10-04 DIAGNOSIS — Z12.5 SCREENING PSA (PROSTATE SPECIFIC ANTIGEN): ICD-10-CM

## 2023-10-04 DIAGNOSIS — N13.8 BPH WITH OBSTRUCTION/LOWER URINARY TRACT SYMPTOMS: Primary | ICD-10-CM

## 2023-10-04 DIAGNOSIS — R39.15 URINARY URGENCY: ICD-10-CM

## 2023-10-04 DIAGNOSIS — N40.1 BPH WITH OBSTRUCTION/LOWER URINARY TRACT SYMPTOMS: Primary | ICD-10-CM

## 2023-10-04 PROCEDURE — G8484 FLU IMMUNIZE NO ADMIN: HCPCS | Performed by: PHYSICIAN ASSISTANT

## 2023-10-04 PROCEDURE — 1036F TOBACCO NON-USER: CPT | Performed by: PHYSICIAN ASSISTANT

## 2023-10-04 PROCEDURE — 51798 US URINE CAPACITY MEASURE: CPT | Performed by: PHYSICIAN ASSISTANT

## 2023-10-04 PROCEDURE — 3075F SYST BP GE 130 - 139MM HG: CPT | Performed by: PHYSICIAN ASSISTANT

## 2023-10-04 PROCEDURE — 99214 OFFICE O/P EST MOD 30 MIN: CPT | Performed by: PHYSICIAN ASSISTANT

## 2023-10-04 PROCEDURE — 1123F ACP DISCUSS/DSCN MKR DOCD: CPT | Performed by: PHYSICIAN ASSISTANT

## 2023-10-04 PROCEDURE — G8417 CALC BMI ABV UP PARAM F/U: HCPCS | Performed by: PHYSICIAN ASSISTANT

## 2023-10-04 PROCEDURE — 3078F DIAST BP <80 MM HG: CPT | Performed by: PHYSICIAN ASSISTANT

## 2023-10-04 PROCEDURE — G8428 CUR MEDS NOT DOCUMENT: HCPCS | Performed by: PHYSICIAN ASSISTANT

## 2023-10-04 PROCEDURE — 3017F COLORECTAL CA SCREEN DOC REV: CPT | Performed by: PHYSICIAN ASSISTANT

## 2023-10-04 RX ORDER — SEMAGLUTIDE 0.68 MG/ML
0.05 INJECTION, SOLUTION SUBCUTANEOUS
COMMUNITY
Start: 2023-10-02

## 2023-10-04 ASSESSMENT — ENCOUNTER SYMPTOMS
VOMITING: 0
SHORTNESS OF BREATH: 0
BACK PAIN: 0
ABDOMINAL PAIN: 0
COLOR CHANGE: 0
CONSTIPATION: 0
WHEEZING: 0
EYE REDNESS: 0
NAUSEA: 0
COUGH: 0

## 2023-10-04 NOTE — PROGRESS NOTES
Bladderscan performed in office today:  Patient voided - 110 mL, PVR - 124 mL
that we cannot recommend supplements or medications that have not been not been approved by the FDA. Patient is not interested in any surgical procedures. He would like to try Prostadine. He states that he has done his research. He has talked to multiple people who deal with alternative medicine as well as a retired physician that he trusts. He states that all of them have recommend that he started this supplement. We do feel that he would be a good candidate for PVP greenlight. Patient does have extensive trilobar hyperplasia as seen on prior cystoscopy. Continue Proscar    Continue Ditropan 10 mg XL    MiraLAX    Up in 3 to 4 months with PSA and PVR    Spent 35 mins, >50% time face-to-face, discussing diagnoses, any applicable test results, treatment plan/options, and prognosis.

## 2023-10-10 ENCOUNTER — TELEPHONE (OUTPATIENT)
Dept: NEUROLOGY | Age: 68
End: 2023-10-10

## 2023-10-10 NOTE — TELEPHONE ENCOUNTER
Patient called checking in on disability paperwork he dropped off to be completed and faxed to insurance company. Stated the insurance is telling him they will remove his disability status if the paperwork isn't completed. I informed him Dr. Portia Bal will be here this Thursday and I will give patient a call when paperwork is complete and faxed.

## 2023-10-12 NOTE — TELEPHONE ENCOUNTER
Paperwork is complete and signed by Dr. Monik Li. I called and notified patient that the \"insured's signature line\" is unsigned. He requested for us to still fax the paperwork to [facility] and stated he was told by the disability company that he did not need to sign it. Fax confirmation and copy of paperwork scanned into chart.

## 2023-11-22 RX ORDER — OXYBUTYNIN CHLORIDE 10 MG/1
10 TABLET, EXTENDED RELEASE ORAL NIGHTLY
Qty: 90 TABLET | Refills: 0 | Status: SHIPPED | OUTPATIENT
Start: 2023-11-22

## 2023-12-07 RX ORDER — CARVEDILOL 6.25 MG/1
6.25 TABLET ORAL 2 TIMES DAILY WITH MEALS
Qty: 200 TABLET | Refills: 2 | Status: SHIPPED | OUTPATIENT
Start: 2023-12-07

## 2024-01-05 RX ORDER — TAMSULOSIN HYDROCHLORIDE 0.4 MG/1
CAPSULE ORAL
Qty: 180 CAPSULE | Refills: 0 | Status: SHIPPED | OUTPATIENT
Start: 2024-01-05

## 2024-01-05 NOTE — TELEPHONE ENCOUNTER
Patient has upcoming appointment later on this month.  Will give 1 refill of his Flomax at this time

## 2024-01-10 LAB — PROSTATE SPECIFIC ANTIGEN: 2.4 NG/ML

## 2024-02-05 ENCOUNTER — TELEPHONE (OUTPATIENT)
Dept: UROLOGY | Age: 69
End: 2024-02-05

## 2024-02-05 DIAGNOSIS — Z12.5 SCREENING PSA (PROSTATE SPECIFIC ANTIGEN): Primary | ICD-10-CM

## 2024-02-05 NOTE — TELEPHONE ENCOUNTER
Need a new psa order for our office, our other once  on .   (He does have another psa order in there from [place] that is due in )  I did advised him that to have the psa screening covered it should be done only once a year.  He voiced understanding   Thank You

## 2024-02-08 ENCOUNTER — HOSPITAL ENCOUNTER (EMERGENCY)
Age: 69
Discharge: HOME | End: 2024-02-08
Attending: EMERGENCY MEDICINE
Payer: MEDICARE

## 2024-02-08 ENCOUNTER — OFFICE VISIT (OUTPATIENT)
Dept: NEUROLOGY | Age: 69
End: 2024-02-08
Payer: MEDICARE

## 2024-02-08 ENCOUNTER — APPOINTMENT (OUTPATIENT)
Dept: GENERAL RADIOLOGY | Age: 69
End: 2024-02-08
Payer: MEDICARE

## 2024-02-08 VITALS
BODY MASS INDEX: 37.88 KG/M2 | DIASTOLIC BLOOD PRESSURE: 57 MMHG | TEMPERATURE: 96.3 F | SYSTOLIC BLOOD PRESSURE: 117 MMHG | HEART RATE: 65 BPM | RESPIRATION RATE: 20 BRPM | WEIGHT: 285.8 LBS | HEIGHT: 73 IN

## 2024-02-08 VITALS
RESPIRATION RATE: 18 BRPM | DIASTOLIC BLOOD PRESSURE: 75 MMHG | BODY MASS INDEX: 37.77 KG/M2 | WEIGHT: 285 LBS | OXYGEN SATURATION: 95 % | HEART RATE: 74 BPM | TEMPERATURE: 97.9 F | SYSTOLIC BLOOD PRESSURE: 176 MMHG | HEIGHT: 73 IN

## 2024-02-08 DIAGNOSIS — R42 DIZZY SPELLS: Primary | ICD-10-CM

## 2024-02-08 DIAGNOSIS — M79.672 LEFT FOOT PAIN: Primary | ICD-10-CM

## 2024-02-08 PROCEDURE — 99283 EMERGENCY DEPT VISIT LOW MDM: CPT

## 2024-02-08 PROCEDURE — 73630 X-RAY EXAM OF FOOT: CPT

## 2024-02-08 PROCEDURE — G8484 FLU IMMUNIZE NO ADMIN: HCPCS | Performed by: NEUROMUSCULOSKELETAL MEDICINE, SPORTS MEDICINE

## 2024-02-08 PROCEDURE — 3017F COLORECTAL CA SCREEN DOC REV: CPT | Performed by: NEUROMUSCULOSKELETAL MEDICINE, SPORTS MEDICINE

## 2024-02-08 PROCEDURE — 3078F DIAST BP <80 MM HG: CPT | Performed by: NEUROMUSCULOSKELETAL MEDICINE, SPORTS MEDICINE

## 2024-02-08 PROCEDURE — G8427 DOCREV CUR MEDS BY ELIG CLIN: HCPCS | Performed by: NEUROMUSCULOSKELETAL MEDICINE, SPORTS MEDICINE

## 2024-02-08 PROCEDURE — 6370000000 HC RX 637 (ALT 250 FOR IP): Performed by: EMERGENCY MEDICINE

## 2024-02-08 PROCEDURE — 1123F ACP DISCUSS/DSCN MKR DOCD: CPT | Performed by: NEUROMUSCULOSKELETAL MEDICINE, SPORTS MEDICINE

## 2024-02-08 PROCEDURE — G8417 CALC BMI ABV UP PARAM F/U: HCPCS | Performed by: NEUROMUSCULOSKELETAL MEDICINE, SPORTS MEDICINE

## 2024-02-08 PROCEDURE — 3074F SYST BP LT 130 MM HG: CPT | Performed by: NEUROMUSCULOSKELETAL MEDICINE, SPORTS MEDICINE

## 2024-02-08 PROCEDURE — 1036F TOBACCO NON-USER: CPT | Performed by: NEUROMUSCULOSKELETAL MEDICINE, SPORTS MEDICINE

## 2024-02-08 PROCEDURE — 99212 OFFICE O/P EST SF 10 MIN: CPT | Performed by: NEUROMUSCULOSKELETAL MEDICINE, SPORTS MEDICINE

## 2024-02-08 ASSESSMENT — PAIN DESCRIPTION - LOCATION: LOCATION: FOOT

## 2024-02-08 ASSESSMENT — PAIN - FUNCTIONAL ASSESSMENT: PAIN_FUNCTIONAL_ASSESSMENT: 0-10

## 2024-02-08 ASSESSMENT — PAIN SCALES - GENERAL: PAINLEVEL_OUTOF10: 5

## 2024-02-08 ASSESSMENT — PAIN DESCRIPTION - ORIENTATION: ORIENTATION: LEFT

## 2024-02-08 ASSESSMENT — PAIN DESCRIPTION - PAIN TYPE: TYPE: ACUTE PAIN

## 2024-02-08 NOTE — PROGRESS NOTES
NEUROLOGY follow-up.      Patient Name:  Chester Soto  :   1955  Clinic Visit Date: 2024    I saw . Chester Soto  in the neurology clinic today for a routine follow up. . 68-year-old right-handed gentleman with a history of hypertension diabetes, hyperlipidemia, coronary artery disease history of a right cerebellar ischemic stroke in 2020.  He has been complaining of intermittent \"crackling \", sensation in the neck with pain radiating into the right upper part of the chest and right intrascapular region.  No weakness or numbness in the upper extremities.  He has seen a chiropractor for the problem.  MRI of the spine in  demonstrated the following.  IMPRESSION:  Multilevel degenerative disc disease, greatest at C5-6 and C6-7.      REVIEW OF SYSTEMS    Constitutional Weight changes: absent, change in appetite: absent Fatigue: absent;Fevers : absent, Any recent hospitalizations:  absent   HEENT Ears: normal,  Visual disturbance: present   Respiratory Shortness of breath: absent, choking:  absent, Cough: absent, Snoring : absent   Cardiovascular Chest pain: absent, Leg swelling :present, palpitations : absent, fainting : absent   GI Constipation: present, Diarrhea: absent, Swallowing change: absent    Urinary frequency: present, Urinary urgency: present, Urinary incontinence: present   Musculoskeletal Neck pain: present, Back pain: absent, Stiffness: absent, Muscle pain: present, Joint pain: absent, restless leg : present   Dermatological Hair loss: absent, Skin changes: absent   Neurological Confusion: absent, Trouble concentrating: present, Seizures: absent;  Memory loss: present, balance problem: absent, Dizziness: present, vertigo: present, Weakness: absent, Numbness absent, Tremor: absent, Spasm: present, involuntary movement: absent, Speech difficulty: absent, Headache: absent, Light sensitivity: absent   Psychiatric Anxiety: absent, Depression  absent, drug abuse: absent,

## 2024-02-08 NOTE — PATIENT INSTRUCTIONS
SURVEY:    Thank you for allowing us to care for you today.    You may be receiving a survey from [facility] regarding your visit today- electronically or via mail.      Please help us by completing the survey as this will provide the needed feedback to ensure we are providing the very best care for you and your family.    If you cannot score us a very good on any question, please call the office to discuss how we could have made your experience a very good one.    Thank you.       STAFF:    Isabell Mills Rhonda Rose      CLINICAL STAFF:    Iris Lama LPN, CMA

## 2024-02-09 NOTE — DISCHARGE INSTRUCTIONS
Tylenol as needed for pain.  Norco in place of Tylenol for pain not controlled with Tylenol.  Cover the area with over-the-counter corn pads or similar.  Use a cane for standing or walking.  Follow-up with podiatry as soon as possible.  Please return immediately should he develop any worsening symptoms or any other acute concerns

## 2024-02-09 NOTE — ED PROVIDER NOTES
[facility] ED  EMERGENCY DEPARTMENT ENCOUNTER      Pt Name: Chester Soto  MRN: [id number]  Birthdate 1955  Date of evaluation: 2/8/2024  Provider: Keiko Rodgers MD    CHIEF COMPLAINT       Chief Complaint   Patient presents with    Foreign Body in Skin     Foreign object (wood splinter) in left foot happened about 1 week  PTA.           HISTORY OF PRESENT ILLNESS   (Location/Symptom, Timing/Onset, Context/Setting, Quality, Duration, Modifying Factors, Severity)  Note limiting factors.   Chester Soto is a 68 y.o. male who presents to the emergency department      67 yo male discomfort left lateral foot for about 1 week.  H/o neuropathy.  Has not seen podiatry for this.  Does not recall specific injury.  Feels like there is something small in his foot especially when standing/walking        Nursing Notes were reviewed.    REVIEW OF SYSTEMS    (2-9 systems for level 4, 10 or more for level 5)     Review of Systems   All other systems reviewed and are negative.      Except as noted above the remainder of the review of systems was reviewed and negative.       PAST MEDICAL HISTORY     Past Medical History:   Diagnosis Date    Abnormal cardiovascular stress test 10/2020    moderate perfusion defect of severe intensity in the apical anteroapical and inferoapical regions    CAD (coronary artery disease)     Diabetes mellitus (HCC)     [facility]    Diabetic retinopathy (HCC) 11/05/2020    Fatty liver disease, nonalcoholic     Hyperlipidemia     Hypertension     Meniere disease     MI (myocardial infarction) (HCC)     MULTIPLE    Obesity     Stroke (HCC)     Thrombocytopenia (HCC)     TIA (transient ischemic attack)          SURGICAL HISTORY       Past Surgical History:   Procedure Laterality Date    CARDIAC CATHETERIZATION  10/29/2020    Severe three vessel disease involving the LAD, circumflex and right coronary artery  /  DR Brady  /  CT SURGERGY CONSULT    COLONOSCOPY      COLONOSCOPY  TAKE 1 TABLET BY MOUTH DAILY, Disp-90 tablet, R-3Please send a replace/new response with 100-Day Supply if appropriate to maximize member benefit. Requesting 1 year supply.Normal      metroNIDAZOLE (METROGEL) 0.75 % gel Apply topically 2 times daily., Disp-1 each, R-1, Normal      !! Insulin Pen Needle (PEN NEEDLES) 31G X 6 MM MISC DAILY Starting Mon 10/2/2023, Disp-100 each, R-3, Normal      !! Insulin Pen Needle 32G X 8 MM MISC Disp-400 each, R-3, NormalTimes 4 per day      !! blood glucose test strips (ONETOUCH VERIO) strip Times 4 per day, Disp-400 each, R-3Normal      polyethylene glycol (MIRALAX) 17 g PACK packet Take 17 g by mouth as neededHistorical Med      !! blood glucose test strips (TRUE METRIX BLOOD GLUCOSE TEST) strip USE AS DIRECTED ONCE DAILY TO TEST BLOOD SUGAR, Disp-100 each, R-3Normal      aspirin 81 MG EC tablet Take 1 tablet by mouth dailyHistorical Med      NONFORMULARY Lion HRT, suppliment for heartHistorical Med      Coenzyme Q10 (CO Q 10) 10 MG CAPS Take by mouth daily Historical Med      glucose monitoring kit (FREESTYLE) monitoring kit DAILY Starting Fri 9/4/2020, Disp-1 kit, R-0, NormalTEST BLOOD SUGAR QID. WHATEVER METER IS COVERED BY GALVEZ. DIAGNOSIS E11.9      Lancets MISC Disp-100 each, R-3, NormalTEST BLOOD SUGAR QID. WHATEVER METER IS COVERED BY GALVEZ. DIAGNOSIS E11.9       !! - Potential duplicate medications found. Please discuss with provider.          ALLERGIES     Statins and Metformin and related    FAMILY HISTORY       Family History   Problem Relation Age of Onset    Heart Attack Mother     Diabetes Mother     Heart Disease Father     Prostate Cancer Father     Heart Attack Sister     Heart Attack Brother     Heart Attack Maternal Grandfather           SOCIAL HISTORY       Social History     Socioeconomic History    Marital status: Single     Spouse name: None    Number of children: None    Years of education: None    Highest education level: None   Tobacco Use    Smoking

## 2024-02-12 DIAGNOSIS — Z12.5 SCREENING PSA (PROSTATE SPECIFIC ANTIGEN): ICD-10-CM

## 2024-02-13 ENCOUNTER — TELEPHONE (OUTPATIENT)
Dept: UROLOGY | Age: 69
End: 2024-02-13

## 2024-02-13 ENCOUNTER — OFFICE VISIT (OUTPATIENT)
Dept: UROLOGY | Age: 69
End: 2024-02-13
Payer: MEDICARE

## 2024-02-13 ENCOUNTER — HOSPITAL ENCOUNTER (OUTPATIENT)
Age: 69
Setting detail: SPECIMEN
Discharge: HOME | End: 2024-02-13
Payer: MEDICARE

## 2024-02-13 VITALS
SYSTOLIC BLOOD PRESSURE: 144 MMHG | DIASTOLIC BLOOD PRESSURE: 90 MMHG | WEIGHT: 286 LBS | HEART RATE: 65 BPM | BODY MASS INDEX: 37.73 KG/M2 | TEMPERATURE: 98.2 F

## 2024-02-13 DIAGNOSIS — N13.8 BPH WITH OBSTRUCTION/LOWER URINARY TRACT SYMPTOMS: Primary | ICD-10-CM

## 2024-02-13 DIAGNOSIS — N32.81 OAB (OVERACTIVE BLADDER): ICD-10-CM

## 2024-02-13 DIAGNOSIS — R97.20 ELEVATED PSA: ICD-10-CM

## 2024-02-13 DIAGNOSIS — N40.1 BPH WITH OBSTRUCTION/LOWER URINARY TRACT SYMPTOMS: ICD-10-CM

## 2024-02-13 DIAGNOSIS — N13.8 BPH WITH OBSTRUCTION/LOWER URINARY TRACT SYMPTOMS: ICD-10-CM

## 2024-02-13 DIAGNOSIS — K59.00 CONSTIPATION, UNSPECIFIED CONSTIPATION TYPE: ICD-10-CM

## 2024-02-13 DIAGNOSIS — N40.1 BPH WITH OBSTRUCTION/LOWER URINARY TRACT SYMPTOMS: Primary | ICD-10-CM

## 2024-02-13 PROCEDURE — G8484 FLU IMMUNIZE NO ADMIN: HCPCS | Performed by: NURSE PRACTITIONER

## 2024-02-13 PROCEDURE — 3077F SYST BP >= 140 MM HG: CPT | Performed by: NURSE PRACTITIONER

## 2024-02-13 PROCEDURE — 1123F ACP DISCUSS/DSCN MKR DOCD: CPT | Performed by: NURSE PRACTITIONER

## 2024-02-13 PROCEDURE — 3017F COLORECTAL CA SCREEN DOC REV: CPT | Performed by: NURSE PRACTITIONER

## 2024-02-13 PROCEDURE — 1036F TOBACCO NON-USER: CPT | Performed by: NURSE PRACTITIONER

## 2024-02-13 PROCEDURE — G8427 DOCREV CUR MEDS BY ELIG CLIN: HCPCS | Performed by: NURSE PRACTITIONER

## 2024-02-13 PROCEDURE — 51798 US URINE CAPACITY MEASURE: CPT | Performed by: NURSE PRACTITIONER

## 2024-02-13 PROCEDURE — 87086 URINE CULTURE/COLONY COUNT: CPT

## 2024-02-13 PROCEDURE — G8417 CALC BMI ABV UP PARAM F/U: HCPCS | Performed by: NURSE PRACTITIONER

## 2024-02-13 PROCEDURE — 99214 OFFICE O/P EST MOD 30 MIN: CPT | Performed by: NURSE PRACTITIONER

## 2024-02-13 PROCEDURE — 3080F DIAST BP >= 90 MM HG: CPT | Performed by: NURSE PRACTITIONER

## 2024-02-13 RX ORDER — CIPROFLOXACIN 500 MG/1
500 TABLET, FILM COATED ORAL 2 TIMES DAILY
Qty: 20 TABLET | Refills: 0 | Status: SHIPPED | OUTPATIENT
Start: 2024-02-13 | End: 2024-02-13 | Stop reason: SDUPTHER

## 2024-02-13 RX ORDER — POLYETHYLENE GLYCOL 3350 17 G/17G
17 POWDER, FOR SOLUTION ORAL 2 TIMES DAILY
Qty: 1575 EACH | Refills: 1 | Status: SHIPPED | OUTPATIENT
Start: 2024-02-13 | End: 2024-02-13 | Stop reason: SDUPTHER

## 2024-02-13 RX ORDER — OXYBUTYNIN CHLORIDE 10 MG/1
10 TABLET, EXTENDED RELEASE ORAL NIGHTLY
Qty: 90 TABLET | Refills: 3 | Status: SHIPPED | OUTPATIENT
Start: 2024-02-13 | End: 2024-02-13 | Stop reason: SDUPTHER

## 2024-02-13 RX ORDER — CIPROFLOXACIN 500 MG/1
500 TABLET, FILM COATED ORAL 2 TIMES DAILY
Qty: 20 TABLET | Refills: 0 | Status: SHIPPED | OUTPATIENT
Start: 2024-02-13 | End: 2024-02-23

## 2024-02-13 RX ORDER — POLYETHYLENE GLYCOL 3350 17 G/17G
17 POWDER, FOR SOLUTION ORAL 2 TIMES DAILY
Qty: 1575 EACH | Refills: 1 | Status: SHIPPED | OUTPATIENT
Start: 2024-02-13

## 2024-02-13 RX ORDER — OXYBUTYNIN CHLORIDE 10 MG/1
10 TABLET, EXTENDED RELEASE ORAL NIGHTLY
Qty: 90 TABLET | Refills: 3 | Status: SHIPPED | OUTPATIENT
Start: 2024-02-13

## 2024-02-13 NOTE — TELEPHONE ENCOUNTER
Patient wants all his scripts sent to [facility]. Ca you send the ones that she sent to [facility], to [facility]?  I called [facility] to cancel.

## 2024-02-13 NOTE — PROGRESS NOTES
HPI:          Patient is a 68 y.o. male in no acute distress.  He is alert and oriented to person, place, and time.         History  12/2020 Referral from [facility] RE[facility]OLDS for LUTS.  Complaints of urgency and urge incontinence.  He has nocturia 0-2 times per night, frequency every 30 minutes to an hour during the day.  He does change his underwear once per night due to the incontinence.  He does have a weak stream and a split stream at the start of urination.  He is uncircumcised, but denies any issues retracting his foreskin.  He does have significant constipation and was recently in the ER for constipation. He also reports a history of fecal smearing. He will often go 4 more days without a bowel movement.  While in the ER he was told he was not emptying his bladder.  He did have a CT completed while in the ER on 11/2020 that showed no evidence of  calcifications or hydronephrosis.  He is an uncontrolled insulin-dependent diabetic.  He does consume a large amount of bladder irritants.  He denies history of stones.  He has never seen urology in the past.                Started miralax daily                 Started flomax daily                 Referred for colonoscopy     2/2021 - cysto - Extensive trilobar hyperplasia   Declined greenlight    2021 Started proscar      Ditropan increased to XL 10mg    1/2023 - Ditropan stopped, trospium started     3/2023 -trospium stopped secondary to ineffectiveness and incomplete bladder emptying      PSA (on proscar)  1/2024 - 2.40 (4.80)  12/2022 - 0.76 (1.42)  11/2021 - 0.6 (1.2)  6/2020 - 3.04      Today  Here today for PSA screening, BPH and OAB. Most recent PSA is 2.40. This is 4.80 due to proscar. This is the highest his PSA has ever been. He denies unintentional weight loss, decreased energy or appetite, new or worsening bone/hip/back pain. He denies any lower extremity numbness and tingling. He denies new or worsening LUTS. He denies gross hematuria or dysuria. He

## 2024-02-14 PROBLEM — K59.00 CONSTIPATION: Status: ACTIVE | Noted: 2024-02-14

## 2024-02-14 PROBLEM — N40.1 BPH WITH OBSTRUCTION/LOWER URINARY TRACT SYMPTOMS: Status: ACTIVE | Noted: 2024-02-14

## 2024-02-14 PROBLEM — N13.8 BPH WITH OBSTRUCTION/LOWER URINARY TRACT SYMPTOMS: Status: ACTIVE | Noted: 2024-02-14

## 2024-02-14 PROBLEM — R97.20 ELEVATED PSA: Status: ACTIVE | Noted: 2024-02-14

## 2024-02-14 PROBLEM — N32.81 OAB (OVERACTIVE BLADDER): Status: ACTIVE | Noted: 2024-02-14

## 2024-02-14 LAB
MICROORGANISM SPEC CULT: NO GROWTH
SPECIMEN DESCRIPTION: NORMAL

## 2024-02-15 ENCOUNTER — TELEPHONE (OUTPATIENT)
Dept: UROLOGY | Age: 69
End: 2024-02-15

## 2024-02-15 NOTE — TELEPHONE ENCOUNTER
----- Message from GAIL Adhikari - CNP sent at 2/15/2024  8:19 AM EST -----  Call pt - urine cx reviewed and negative for UTI. Finish antibiotics for elevated PSA

## 2024-03-06 ENCOUNTER — HOSPITAL ENCOUNTER (OUTPATIENT)
Age: 69
Discharge: HOME | End: 2024-03-08
Attending: INTERNAL MEDICINE
Payer: MEDICARE

## 2024-03-06 VITALS
WEIGHT: 285.94 LBS | SYSTOLIC BLOOD PRESSURE: 144 MMHG | BODY MASS INDEX: 37.9 KG/M2 | DIASTOLIC BLOOD PRESSURE: 90 MMHG | HEIGHT: 73 IN

## 2024-03-06 DIAGNOSIS — I10 ESSENTIAL HYPERTENSION: ICD-10-CM

## 2024-03-06 DIAGNOSIS — I25.5 ISCHEMIC CARDIOMYOPATHY: ICD-10-CM

## 2024-03-06 DIAGNOSIS — Z78.9 STATIN INTOLERANCE: ICD-10-CM

## 2024-03-06 DIAGNOSIS — E78.2 MIXED HYPERLIPIDEMIA: ICD-10-CM

## 2024-03-06 DIAGNOSIS — E11.69 TYPE 2 DIABETES MELLITUS WITH OTHER SPECIFIED COMPLICATION, WITH LONG-TERM CURRENT USE OF INSULIN (HCC): ICD-10-CM

## 2024-03-06 DIAGNOSIS — Z91.148 NONCOMPLIANCE WITH MEDICATION REGIMEN: ICD-10-CM

## 2024-03-06 DIAGNOSIS — Z79.4 TYPE 2 DIABETES MELLITUS WITH OTHER SPECIFIED COMPLICATION, WITH LONG-TERM CURRENT USE OF INSULIN (HCC): ICD-10-CM

## 2024-03-06 DIAGNOSIS — I63.9 ISCHEMIC STROKE (HCC): ICD-10-CM

## 2024-03-06 DIAGNOSIS — I25.10 ASHD (ARTERIOSCLEROTIC HEART DISEASE): ICD-10-CM

## 2024-03-06 DIAGNOSIS — E66.9 CLASS 2 OBESITY WITH BODY MASS INDEX (BMI) OF 36.0 TO 36.9 IN ADULT, UNSPECIFIED OBESITY TYPE, UNSPECIFIED WHETHER SERIOUS COMORBIDITY PRESENT: ICD-10-CM

## 2024-03-06 LAB
ECHO AO ROOT DIAM: 3.4 CM
ECHO AV ACCELERATION TIME: 69.99 MS
ECHO AV CUSP MM: 1.7 CM
ECHO AV MEAN GRADIENT: 4 MMHG
ECHO AV MEAN VELOCITY: 1 M/S
ECHO AV PEAK VELOCITY: 1.3 M/S
ECHO AV VTI: 27.5 CM
ECHO BSA: 2.58 M2
ECHO LA DIAMETER INDEX: 2.08 CM/M2
ECHO LA DIAMETER: 5.2 CM
ECHO LA MAJOR AXIS: 6.2 CM
ECHO LA TO AORTIC ROOT RATIO: 1.53
ECHO LA VOL BP: 94 ML (ref 18–58)
ECHO LA VOL MOD A2C: 90 ML (ref 18–58)
ECHO LA VOL MOD A4C: 89 ML (ref 18–58)
ECHO LA VOL/BSA BIPLANE: 38 ML/M2 (ref 16–34)
ECHO LA VOLUME AREA LENGTH: 100 ML
ECHO LA VOLUME INDEX AREA LENGTH: 40 ML/M2 (ref 16–34)
ECHO LA VOLUME INDEX MOD A2C: 36 ML/M2 (ref 16–34)
ECHO LA VOLUME INDEX MOD A4C: 36 ML/M2 (ref 16–34)
ECHO LV E' LATERAL VELOCITY: 9 CM/S
ECHO LV EDV A2C: 194 ML
ECHO LV EDV A4C: 219 ML
ECHO LV EDV BP: 211 ML (ref 67–155)
ECHO LV EDV INDEX A4C: 88 ML/M2
ECHO LV EDV INDEX BP: 84 ML/M2
ECHO LV EDV NDEX A2C: 78 ML/M2
ECHO LV EJECTION FRACTION BIPLANE: 48 % (ref 55–100)
ECHO LV ESV A2C: 99 ML
ECHO LV ESV A4C: 118 ML
ECHO LV ESV BP: 110 ML (ref 22–58)
ECHO LV ESV INDEX A2C: 40 ML/M2
ECHO LV ESV INDEX A4C: 47 ML/M2
ECHO LV ESV INDEX BP: 44 ML/M2
ECHO LV INTERNAL DIMENSION DIASTOLE INDEX: 2.16 CM/M2
ECHO LV INTERNAL DIMENSION DIASTOLIC: 5.4 CM (ref 4.2–5.9)
ECHO LV INTERNAL DIMENSION SYSTOLIC INDEX: 1.56 CM/M2
ECHO LV INTERNAL DIMENSION SYSTOLIC: 3.9 CM
ECHO LV IVSS: 1.8 CM
ECHO LV MASS 2D: 311.7 G (ref 88–224)
ECHO LV MASS INDEX 2D: 124.7 G/M2 (ref 49–115)
ECHO LV POSTERIOR WALL DIASTOLIC: 1.3 CM (ref 0.6–1)
ECHO LV POSTERIOR WALL SYSTOLIC: 1.7 CM
ECHO LV RELATIVE WALL THICKNESS RATIO: 0.48
ECHO LVOT AV VTI INDEX: 0.63
ECHO LVOT MEAN GRADIENT: 1 MMHG
ECHO LVOT VTI: 17.2 CM
ECHO MV A VELOCITY: 0.7 M/S
ECHO MV E DECELERATION TIME (DT): 173.1 MS
ECHO MV E VELOCITY: 0.93 M/S
ECHO MV E/A RATIO: 1.33
ECHO PV MAX VELOCITY: 0.9 M/S
ECHO RV INTERNAL DIMENSION: 3.3 CM
ECHO TV REGURGITANT MAX VELOCITY: 0.84 M/S

## 2024-03-06 PROCEDURE — 6360000004 HC RX CONTRAST MEDICATION: Performed by: INTERNAL MEDICINE

## 2024-03-06 PROCEDURE — 93306 TTE W/DOPPLER COMPLETE: CPT | Performed by: INTERNAL MEDICINE

## 2024-03-06 PROCEDURE — C8929 TTE W OR WO FOL WCON,DOPPLER: HCPCS

## 2024-03-06 RX ADMIN — PERFLUTREN 1.5 ML: 6.52 INJECTION, SUSPENSION INTRAVENOUS at 14:17

## 2024-03-07 ENCOUNTER — TELEPHONE (OUTPATIENT)
Dept: CARDIOLOGY | Age: 69
End: 2024-03-07

## 2024-03-07 NOTE — TELEPHONE ENCOUNTER
----- Message from Bisi Del Real MD sent at 3/7/2024 12:06 AM EST -----  Please let me see him in 1 to 2 weeks to address his echo resolved.  Thank you.

## 2024-03-08 RX ORDER — TAMSULOSIN HYDROCHLORIDE 0.4 MG/1
CAPSULE ORAL
Qty: 180 CAPSULE | Refills: 0 | Status: SHIPPED | OUTPATIENT
Start: 2024-03-08

## 2024-03-18 ENCOUNTER — OFFICE VISIT (OUTPATIENT)
Dept: CARDIOLOGY | Age: 69
End: 2024-03-18
Payer: MEDICARE

## 2024-03-18 VITALS
HEART RATE: 65 BPM | OXYGEN SATURATION: 97 % | HEIGHT: 72 IN | DIASTOLIC BLOOD PRESSURE: 84 MMHG | RESPIRATION RATE: 18 BRPM | SYSTOLIC BLOOD PRESSURE: 134 MMHG | WEIGHT: 285 LBS | BODY MASS INDEX: 38.6 KG/M2

## 2024-03-18 DIAGNOSIS — I25.10 CORONARY ARTERY DISEASE INVOLVING NATIVE CORONARY ARTERY OF NATIVE HEART WITHOUT ANGINA PECTORIS: Primary | ICD-10-CM

## 2024-03-18 DIAGNOSIS — E11.9 TYPE 2 DIABETES MELLITUS WITHOUT COMPLICATION, WITH LONG-TERM CURRENT USE OF INSULIN (HCC): ICD-10-CM

## 2024-03-18 DIAGNOSIS — I73.9 INTERMITTENT CLAUDICATION (HCC): ICD-10-CM

## 2024-03-18 DIAGNOSIS — E66.9 CLASS 2 OBESITY WITH BODY MASS INDEX (BMI) OF 38.0 TO 38.9 IN ADULT, UNSPECIFIED OBESITY TYPE, UNSPECIFIED WHETHER SERIOUS COMORBIDITY PRESENT: ICD-10-CM

## 2024-03-18 DIAGNOSIS — I10 ESSENTIAL HYPERTENSION: ICD-10-CM

## 2024-03-18 DIAGNOSIS — Z86.73 HISTORY OF STROKE: ICD-10-CM

## 2024-03-18 DIAGNOSIS — E78.2 MIXED HYPERLIPIDEMIA: ICD-10-CM

## 2024-03-18 DIAGNOSIS — Z95.820 S/P ANGIOPLASTY WITH STENT: ICD-10-CM

## 2024-03-18 DIAGNOSIS — Z79.4 TYPE 2 DIABETES MELLITUS WITHOUT COMPLICATION, WITH LONG-TERM CURRENT USE OF INSULIN (HCC): ICD-10-CM

## 2024-03-18 DIAGNOSIS — I25.5 ISCHEMIC CARDIOMYOPATHY: ICD-10-CM

## 2024-03-18 DIAGNOSIS — I87.2 VENOUS INSUFFICIENCY: ICD-10-CM

## 2024-03-18 PROCEDURE — 99214 OFFICE O/P EST MOD 30 MIN: CPT | Performed by: PHYSICIAN ASSISTANT

## 2024-03-18 PROCEDURE — 3017F COLORECTAL CA SCREEN DOC REV: CPT | Performed by: PHYSICIAN ASSISTANT

## 2024-03-18 PROCEDURE — 3078F DIAST BP <80 MM HG: CPT | Performed by: PHYSICIAN ASSISTANT

## 2024-03-18 PROCEDURE — 1036F TOBACCO NON-USER: CPT | Performed by: PHYSICIAN ASSISTANT

## 2024-03-18 PROCEDURE — 3075F SYST BP GE 130 - 139MM HG: CPT | Performed by: PHYSICIAN ASSISTANT

## 2024-03-18 PROCEDURE — 1123F ACP DISCUSS/DSCN MKR DOCD: CPT | Performed by: PHYSICIAN ASSISTANT

## 2024-03-18 PROCEDURE — 2022F DILAT RTA XM EVC RTNOPTHY: CPT | Performed by: PHYSICIAN ASSISTANT

## 2024-03-18 PROCEDURE — G8427 DOCREV CUR MEDS BY ELIG CLIN: HCPCS | Performed by: PHYSICIAN ASSISTANT

## 2024-03-18 PROCEDURE — G8484 FLU IMMUNIZE NO ADMIN: HCPCS | Performed by: PHYSICIAN ASSISTANT

## 2024-03-18 PROCEDURE — 3046F HEMOGLOBIN A1C LEVEL >9.0%: CPT | Performed by: PHYSICIAN ASSISTANT

## 2024-03-18 PROCEDURE — G8417 CALC BMI ABV UP PARAM F/U: HCPCS | Performed by: PHYSICIAN ASSISTANT

## 2024-03-18 NOTE — PATIENT INSTRUCTIONS
SURVEY:    You may be receiving a survey from [employer] regarding your visit today.    Please complete the survey to enable us to provide the highest quality of care to you and your family.    If you cannot score us a very good on any question, please call the office to discuss how we could have made your experience a very good one.    Thank you.    
Routine  care and anticipatory guidance

## 2024-03-18 NOTE — PROGRESS NOTES
Currently stable with no chest pains or worsening shortness of breath.    Antiplatelet Agent: Continue Aspirin 81 mg daily.   Beta Blocker: Continue Carvedilol (Coreg) 6.25 mg twice daily.   Cholesterol Reduction Therapy: Refused by patient.  Additional counseling: I advised them to call our office or go to the emergency room if they developed worsening or persistent chest pain or increased shortness of breath as this could be life threatening.    Multiple strokes, the pattern of his multiple ischemic strokes is suggestive of embolic etiology.  No prior history of atrial fibrillation: Recent echo showed left ventricular apical thrombus which is clearly the source of multiple embolic strokes. Patient stopped the warfarin. He is absolutely adamant about not taking any oral anticoagulant. He understands the risk of having recurrent strokes.  Antiplatelet Agent: Continue Aspirin 81 mg daily.   Cholesterol Reduction Therapy: Refused by patient.    Ischemic Cardiomyopathy: Echo done on 3/6/2024 showed an EF of 40-45%. We did discuss this in great deatil today however he was unwilling to make any changes at this time.   Beta Blocker: Continue Carvedilol (Coreg) 6.25 mg twice daily.   ACE Inibitor/ARB: Continue lisinopril 10 mg daily.  Heart failure counseling: I advised them to try and keep their legs up whenever possible and to limit salt in their diet.      Essential Hypertension: Controlled  Beta Blocker: Continue Carvedilol (Coreg) 6.25 mg twice daily.   ACE Inibitor/ARB: Continue lisinopril 10 mg daily.     Hyperlipidemia: Mixed, LDL done on 1/10/2024 was 101 mg/ dL  Cholesterol Reduction Therapy: Refused by patient.    Diabetes Mellitis: Last hemoglobin A1c was done on 1/10/2024 and it was 8.6%.   Continue current therapy and follow up with PCP.     Obesity: Body mass index is 38.65 kg/m².   I also briefly discussed both diet and exercise strategies for him to continue to loses weight and he was very receptive to

## 2024-03-26 LAB
ABSOLUTE BASO #: 0.04 K/UL (ref 0–0.2)
ABSOLUTE EOS #: 0.14 K/UL (ref 0–0.5)
ABSOLUTE LYMPH #: 0.74 K/UL (ref 1–4)
ABSOLUTE MONO #: 0.39 K/UL (ref 0.2–1)
ABSOLUTE NEUT #: 1.92 K/UL (ref 1.5–7.5)
ALBUMIN SERPL-MCNC: 4.4 G/DL (ref 3.5–5.2)
ALK PHOSPHATASE: 88 U/L (ref 40–125)
ALT SERPL-CCNC: 94 U/L (ref 5–50)
ANION GAP SERPL CALCULATED.3IONS-SCNC: 10 MEQ/L (ref 7–16)
AST SERPL-CCNC: 70 U/L (ref 9–50)
BASOPHILS RELATIVE PERCENT: 1.2 %
BILIRUB SERPL-MCNC: 1 MG/DL
BUN BLDV-MCNC: 16 MG/DL (ref 8–23)
CALCIUM SERPL-MCNC: 9 MG/DL (ref 8.5–10.5)
CHLORIDE BLD-SCNC: 106 MEQ/L (ref 95–107)
CO2: 25 MEQ/L (ref 19–31)
COMMENT: ABNORMAL
CREAT SERPL-MCNC: 1.06 MG/DL (ref 0.8–1.4)
EGFR IF NONAFRICAN AMERICAN: 77 ML/MIN/1.73
EOSINOPHILS RELATIVE PERCENT: 4.3 %
GLUCOSE: 184 MG/DL (ref 70–99)
HCT VFR BLD CALC: 42.9 % (ref 40–51)
HEMOGLOBIN: 15.2 G/DL (ref 13.5–17)
LYMPHOCYTE %: 22.9 %
MCH RBC QN AUTO: 33.4 PG (ref 25–33)
MCHC RBC AUTO-ENTMCNC: 35.4 G/DL (ref 31–36)
MCV RBC AUTO: 94.3 FL (ref 80–99)
MONOCYTES # BLD: 12.1 %
NEUTROPHILS RELATIVE PERCENT: 59.5 %
PDW BLD-RTO: 12.4 % (ref 11.5–15)
PLATELETS: 57 K/UL (ref 130–400)
PMV BLD AUTO: 11.6 FL (ref 9.3–13)
POTASSIUM SERPL-SCNC: 3.9 MEQ/L (ref 3.5–5.4)
PSA, ULTRASENSITIVE: 3.71 NG/ML
RBC: 4.55 M/UL (ref 4.5–6.1)
SODIUM BLD-SCNC: 141 MEQ/L (ref 133–146)
TOTAL PROTEIN: 6.4 G/DL (ref 6.1–8.3)
WBC: 3.2 K/UL (ref 3.5–11)

## 2024-03-28 ENCOUNTER — HOSPITAL ENCOUNTER (OUTPATIENT)
Dept: VASCULAR LAB | Age: 69
Discharge: HOME | End: 2024-03-30
Payer: MEDICARE

## 2024-03-28 DIAGNOSIS — I87.2 VENOUS INSUFFICIENCY: ICD-10-CM

## 2024-03-28 DIAGNOSIS — I73.9 INTERMITTENT CLAUDICATION (HCC): ICD-10-CM

## 2024-03-28 PROCEDURE — 93922 UPR/L XTREMITY ART 2 LEVELS: CPT

## 2024-03-29 LAB
VAS LEFT ABI: 1.11
VAS LEFT ARM BP: 163 MMHG
VAS LEFT ATA MID PSV: 64 CM/S
VAS LEFT CFA DIST PSV: 105.4 CM/S
VAS LEFT DORSALIS PEDIS BP: 154 MMHG
VAS LEFT PERONEAL MID PSV: 67.3 CM/S
VAS LEFT PFA PROX PSV: 57.8 CM/S
VAS LEFT POP A DIST PSV: 79.4 CM/S
VAS LEFT POP A PROX PSV: 79.4 CM/S
VAS LEFT POP A PROX VEL RATIO: 1.04
VAS LEFT PTA BP: 181 MMHG
VAS LEFT PTA MID PSV: 93.7 CM/S
VAS LEFT SFA DIST PSV: 76.1 CM/S
VAS LEFT SFA DIST VEL RATIO: 0.83
VAS LEFT SFA MID PSV: 92.2 CM/S
VAS LEFT SFA MID VEL RATIO: 0.95
VAS LEFT SFA PROX PSV: 97.4 CM/S
VAS LEFT SFA PROX VEL RATIO: 0.92
VAS RIGHT ARM BP: 159 MMHG
VAS RIGHT ATA MID PSV: 41.7 CM/S
VAS RIGHT CFA DIST PSV: 102.6 CM/S
VAS RIGHT DORSALIS PEDIS BP: 161 MMHG
VAS RIGHT PERONEAL MID PSV: 86.2 CM/S
VAS RIGHT PFA PROX PSV: 85.3 CM/S
VAS RIGHT POP A DIST PSV: 139.9 CM/S
VAS RIGHT POP A PROX PSV: 116.1 CM/S
VAS RIGHT POP A PROX VEL RATIO: 1.14
VAS RIGHT PTA BP: 255 MMHG
VAS RIGHT PTA MID PSV: 117.3 CM/S
VAS RIGHT SFA DIST PSV: 101.6 CM/S
VAS RIGHT SFA DIST VEL RATIO: 1.12
VAS RIGHT SFA MID PSV: 90.5 CM/S
VAS RIGHT SFA MID VEL RATIO: 1.1
VAS RIGHT SFA PROX PSV: 81.4 CM/S
VAS RIGHT SFA PROX VEL RATIO: 0.8

## 2024-04-01 ENCOUNTER — TELEPHONE (OUTPATIENT)
Dept: CARDIOLOGY | Age: 69
End: 2024-04-01

## 2024-04-01 NOTE — TELEPHONE ENCOUNTER
----- Message from Lindsey Carrion PA-C sent at 4/1/2024  8:55 AM EDT -----  Please notify patient that their NEAL results are normal.   Please continue current treatment and follow up.

## 2024-04-02 ENCOUNTER — HOSPITAL ENCOUNTER (OUTPATIENT)
Age: 69
Setting detail: SPECIMEN
Discharge: HOME | End: 2024-04-02
Payer: MEDICARE

## 2024-04-02 ENCOUNTER — OFFICE VISIT (OUTPATIENT)
Dept: UROLOGY | Age: 69
End: 2024-04-02
Payer: MEDICARE

## 2024-04-02 VITALS
WEIGHT: 281 LBS | SYSTOLIC BLOOD PRESSURE: 150 MMHG | HEART RATE: 69 BPM | BODY MASS INDEX: 38.11 KG/M2 | DIASTOLIC BLOOD PRESSURE: 90 MMHG | TEMPERATURE: 98.8 F

## 2024-04-02 DIAGNOSIS — N40.1 BPH WITH OBSTRUCTION/LOWER URINARY TRACT SYMPTOMS: ICD-10-CM

## 2024-04-02 DIAGNOSIS — R97.20 ELEVATED PSA: Primary | ICD-10-CM

## 2024-04-02 DIAGNOSIS — N13.8 BPH WITH OBSTRUCTION/LOWER URINARY TRACT SYMPTOMS: ICD-10-CM

## 2024-04-02 DIAGNOSIS — R97.20 ELEVATED PSA: ICD-10-CM

## 2024-04-02 DIAGNOSIS — N32.81 OAB (OVERACTIVE BLADDER): ICD-10-CM

## 2024-04-02 PROCEDURE — 3077F SYST BP >= 140 MM HG: CPT | Performed by: NURSE PRACTITIONER

## 2024-04-02 PROCEDURE — 3017F COLORECTAL CA SCREEN DOC REV: CPT | Performed by: NURSE PRACTITIONER

## 2024-04-02 PROCEDURE — G8417 CALC BMI ABV UP PARAM F/U: HCPCS | Performed by: NURSE PRACTITIONER

## 2024-04-02 PROCEDURE — 3079F DIAST BP 80-89 MM HG: CPT | Performed by: NURSE PRACTITIONER

## 2024-04-02 PROCEDURE — 99214 OFFICE O/P EST MOD 30 MIN: CPT | Performed by: NURSE PRACTITIONER

## 2024-04-02 PROCEDURE — 51798 US URINE CAPACITY MEASURE: CPT | Performed by: NURSE PRACTITIONER

## 2024-04-02 PROCEDURE — 87086 URINE CULTURE/COLONY COUNT: CPT

## 2024-04-02 PROCEDURE — 1036F TOBACCO NON-USER: CPT | Performed by: NURSE PRACTITIONER

## 2024-04-02 PROCEDURE — 1123F ACP DISCUSS/DSCN MKR DOCD: CPT | Performed by: NURSE PRACTITIONER

## 2024-04-02 PROCEDURE — G8427 DOCREV CUR MEDS BY ELIG CLIN: HCPCS | Performed by: NURSE PRACTITIONER

## 2024-04-02 RX ORDER — SULFAMETHOXAZOLE AND TRIMETHOPRIM 800; 160 MG/1; MG/1
1 TABLET ORAL 2 TIMES DAILY
Qty: 6 TABLET | Refills: 0 | Status: SHIPPED | OUTPATIENT
Start: 2024-04-02 | End: 2024-04-05

## 2024-04-02 NOTE — PROGRESS NOTES
History  12/2020 Referral from [facility] for LUTS.  Complaints of urgency and urge incontinence.  He has nocturia 0-2 times per night, frequency every 30 minutes to an hour during the day.  He does change his underwear once per night due to the incontinence.  He does have a weak stream and a split stream at the start of urination.  He is uncircumcised, but denies any issues retracting his foreskin.  He does have significant constipation and was recently in the ER for constipation. He also reports a history of fecal smearing. He will often go 4 more days without a bowel movement.  While in the ER he was told he was not emptying his bladder.  He did have a CT completed while in the ER on 11/2020 that showed no evidence of  calcifications or hydronephrosis.  He is an uncontrolled insulin-dependent diabetic.  He does consume a large amount of bladder irritants.  He denies history of stones.  He has never seen urology in the past.                Started miralax daily                 Started flomax daily                 Referred for colonoscopy     2/2021 - cysto - Extensive trilobar hyperplasia   Declined greenlight    2021 Started proscar      Ditropan increased to XL 10mg    1/2023 Ditropan stopped, trospium started     3/2023 trospium stopped secondary to ineffectiveness and incomplete bladder emptying      PSA (on proscar)  3/2024 - 3.71 (7.42)  1/2024 - 2.40 (4.80)  12/2022 - 0.76 (1.42)  11/2021 - 0.6 (1.2)  6/2020 - 3.04      Today  Here today for PSA screening, BPH and OAB. Most recent PSA is 3.71. This is 7.42 due to proscar. This is the highest his PSA has ever been, and elevated from prior despite a course of antibiotics. He denies unintentional weight loss, decreased energy or appetite, new or worsening bone/hip/back pain. He denies any lower extremity numbness and tingling. He denies new or worsening LUTS. He denies gross hematuria or dysuria. He continues to struggle with constipation.    Plan  We

## 2024-04-03 LAB
MICROORGANISM SPEC CULT: NORMAL
SPECIMEN DESCRIPTION: NORMAL

## 2024-04-04 ENCOUNTER — TELEPHONE (OUTPATIENT)
Dept: UROLOGY | Age: 69
End: 2024-04-04

## 2024-04-04 NOTE — TELEPHONE ENCOUNTER
----- Message from GAIL Adhikari - CNP sent at 4/4/2024  8:50 AM EDT -----  Call pt - urine cx reviewed and negative for UTI

## 2024-04-11 ENCOUNTER — OFFICE VISIT (OUTPATIENT)
Dept: ONCOLOGY | Age: 69
End: 2024-04-11
Payer: MEDICARE

## 2024-04-11 ENCOUNTER — TELEPHONE (OUTPATIENT)
Dept: UROLOGY | Age: 69
End: 2024-04-11

## 2024-04-11 VITALS
BODY MASS INDEX: 37.91 KG/M2 | WEIGHT: 286 LBS | HEIGHT: 73 IN | HEART RATE: 76 BPM | TEMPERATURE: 96.8 F | DIASTOLIC BLOOD PRESSURE: 93 MMHG | SYSTOLIC BLOOD PRESSURE: 165 MMHG | RESPIRATION RATE: 18 BRPM

## 2024-04-11 DIAGNOSIS — I25.5 ISCHEMIC CARDIOMYOPATHY: ICD-10-CM

## 2024-04-11 DIAGNOSIS — D72.819 LEUKOPENIA, UNSPECIFIED TYPE: ICD-10-CM

## 2024-04-11 DIAGNOSIS — R97.20 HIGH PROSTATE SPECIFIC ANTIGEN (PSA): ICD-10-CM

## 2024-04-11 DIAGNOSIS — K76.0 FATTY LIVER: ICD-10-CM

## 2024-04-11 DIAGNOSIS — D69.6 THROMBOCYTOPENIA (HCC): Primary | ICD-10-CM

## 2024-04-11 PROCEDURE — 99214 OFFICE O/P EST MOD 30 MIN: CPT | Performed by: INTERNAL MEDICINE

## 2024-04-11 PROCEDURE — G8417 CALC BMI ABV UP PARAM F/U: HCPCS | Performed by: INTERNAL MEDICINE

## 2024-04-11 PROCEDURE — G8427 DOCREV CUR MEDS BY ELIG CLIN: HCPCS | Performed by: INTERNAL MEDICINE

## 2024-04-11 PROCEDURE — 1036F TOBACCO NON-USER: CPT | Performed by: INTERNAL MEDICINE

## 2024-04-11 PROCEDURE — 3080F DIAST BP >= 90 MM HG: CPT | Performed by: INTERNAL MEDICINE

## 2024-04-11 PROCEDURE — 1123F ACP DISCUSS/DSCN MKR DOCD: CPT | Performed by: INTERNAL MEDICINE

## 2024-04-11 PROCEDURE — 3017F COLORECTAL CA SCREEN DOC REV: CPT | Performed by: INTERNAL MEDICINE

## 2024-04-11 PROCEDURE — 3077F SYST BP >= 140 MM HG: CPT | Performed by: INTERNAL MEDICINE

## 2024-04-11 NOTE — PROGRESS NOTES
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  While intending to generate a document that actually reflects the content of the visit, the document can still have some errors including those of syntax and sound a like substitutions which may escape proof reading.  It such instances, actual meaning can be extrapolated by contextual diversion.

## 2024-04-11 NOTE — TELEPHONE ENCOUNTER
The patient stopped by the office today to let us know Dr Lawson has advised him to have a MRI of the prostate prior to doing the biopsy. The patient stated Dr Lawson had advised against this, however after reading Dr Lawson's office note; it states the patient should consider a MRI of the prostate prior to his biopsy.    Please advise    PLAN:      I personally reviewed results of lab work-up imaging studies,outside records and other relevant clinical data. I had a detailed discussion with the patient.I explained the significance of these abnormalities and possible etiology and management options   fluctuating isolated thrombocytopenia which is usually typical for thrombocytopenia related to liver disease usually aggravated with liver decompensation/liver congestion/cardiomyopathy at the basis of fatty liver, at this time patient had no bleed explained to the patient that no treatment required other than controlled the liver disease/fluid overload associated with cardiomyopathy, if platelet below 50 and he had to have elective procedure/surgical intervention >platelet transfusion to keep platelet above 50,000)  ITP still on the differential diagnosis> if platelet below 30 we will give a trial of steroids or IVIG  If evidence of other cytopenia will do bone marrow biopsy and aspirate  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

## 2024-04-15 NOTE — TELEPHONE ENCOUNTER
Writer left message for patient with providers response and the office number if he should have any questions.

## 2024-04-15 NOTE — TELEPHONE ENCOUNTER
An MRI in prostate biopsy naive patients does not prevent him from having a prostate biopsy. Even if an MRI was negative he would still need a prostate biopsy. His PSA is 7.42. This is the highest his PSA has ever been. We do NOT recommend an MRI    We are in agreement with transfusing platelets.    Please schedule with in the office with Dr. FREEDMAN only to discuss biopsy again

## 2024-04-16 NOTE — TELEPHONE ENCOUNTER
The patient is scheduled on Monday at 10:45 with  to discuss the biopsy. He would like to know if there is a PSA test he could take prior to his appointment, just see where it is now. The patient stated, he was advised of doing this by his brother; whom is a doctor.

## 2024-04-16 NOTE — TELEPHONE ENCOUNTER
He just had a PSA done at the end of March. Per guidelines PSA should not be checked any sooner than every 3-6 months    There is no other type of blood test we can do in this particular circumstance

## 2024-04-19 ENCOUNTER — TELEPHONE (OUTPATIENT)
Dept: UROLOGY | Age: 69
End: 2024-04-19

## 2024-04-19 NOTE — TELEPHONE ENCOUNTER
Patient cancelled his appointment to discuss the biopsy on Monday. He did not reschedule.I told him it was against medical advice.   He said that he is not going to take advice from two doctors that can't agree.  He said one tells him not to do it because he is a bleeder and the other said you are doing it.  I told him it was a discussion on Monday but he wanted to cancel.

## 2024-05-29 NOTE — TELEPHONE ENCOUNTER
Patient called office regarding prostate MRI. Patient would like to have MRI Prostate done. At this time he is advised to hold off on biopsy due to low red or white blood count. He is waiting for numbers to go up. 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 He said he was waiting on a call back from us but no one ever called him back. Patient states we can schedule the MRI in the afternoon. He is not available June 4th or 18th.

## 2024-05-29 NOTE — TELEPHONE ENCOUNTER
See previous telephone encounter from 4/15/2024. We said:      An MRI in prostate biopsy naive patients does not prevent him from having a prostate biopsy. Even if an MRI was negative he would still need a prostate biopsy. His PSA is 7.42. This is the highest his PSA has ever been. We do NOT recommend an MRI     We are in agreement with transfusing platelets.     Please schedule with in the office with Dr. FREEDMAN only to discuss biopsy again        He canceled this apt. He was schedule on 4/19/2024

## 2024-05-29 NOTE — TELEPHONE ENCOUNTER
Writer attempted to call patient. Left detailed voicemail regarding patient needed to schedule follow up to discuss prostate biopsy.

## 2024-05-30 NOTE — TELEPHONE ENCOUNTER
Patient returned the office call this morning and is scheduled with  on Thursday June 13th at 230.

## 2024-06-10 ENCOUNTER — TELEPHONE (OUTPATIENT)
Age: 69
End: 2024-06-10

## 2024-06-13 ENCOUNTER — OFFICE VISIT (OUTPATIENT)
Dept: UROLOGY | Age: 69
End: 2024-06-13
Payer: MEDICARE

## 2024-06-13 ENCOUNTER — HOSPITAL ENCOUNTER (OUTPATIENT)
Age: 69
Setting detail: SPECIMEN
Discharge: HOME | End: 2024-06-13
Payer: MEDICARE

## 2024-06-13 VITALS
TEMPERATURE: 98.7 F | SYSTOLIC BLOOD PRESSURE: 140 MMHG | HEART RATE: 72 BPM | DIASTOLIC BLOOD PRESSURE: 70 MMHG | WEIGHT: 279 LBS | BODY MASS INDEX: 36.81 KG/M2

## 2024-06-13 DIAGNOSIS — N40.1 BPH WITH OBSTRUCTION/LOWER URINARY TRACT SYMPTOMS: ICD-10-CM

## 2024-06-13 DIAGNOSIS — K59.00 CONSTIPATION, UNSPECIFIED CONSTIPATION TYPE: ICD-10-CM

## 2024-06-13 DIAGNOSIS — N32.81 OAB (OVERACTIVE BLADDER): ICD-10-CM

## 2024-06-13 DIAGNOSIS — R97.20 ELEVATED PSA: ICD-10-CM

## 2024-06-13 DIAGNOSIS — N39.41 URGE INCONTINENCE: ICD-10-CM

## 2024-06-13 DIAGNOSIS — R39.15 URINARY URGENCY: ICD-10-CM

## 2024-06-13 DIAGNOSIS — N13.8 BPH WITH OBSTRUCTION/LOWER URINARY TRACT SYMPTOMS: ICD-10-CM

## 2024-06-13 DIAGNOSIS — R97.20 ELEVATED PSA: Primary | ICD-10-CM

## 2024-06-13 LAB
BACTERIA URNS QL MICRO: ABNORMAL
BILIRUB UR QL STRIP: NEGATIVE
CLARITY UR: CLEAR
COLOR UR: YELLOW
EPI CELLS #/AREA URNS HPF: ABNORMAL /HPF (ref 0–5)
GLUCOSE UR STRIP-MCNC: ABNORMAL MG/DL
HGB UR QL STRIP.AUTO: ABNORMAL
KETONES UR STRIP-MCNC: NEGATIVE MG/DL
LEUKOCYTE ESTERASE UR QL STRIP: NEGATIVE
MUCOUS THREADS URNS QL MICRO: ABNORMAL
NITRITE UR QL STRIP: NEGATIVE
PH UR STRIP: 6 [PH] (ref 5–9)
PROT UR STRIP-MCNC: NEGATIVE MG/DL
RBC #/AREA URNS HPF: ABNORMAL /HPF (ref 0–2)
SP GR UR STRIP: 1.02 (ref 1.01–1.02)
UROBILINOGEN UR STRIP-ACNC: NORMAL EU/DL (ref 0–1)
WBC #/AREA URNS HPF: ABNORMAL /HPF (ref 0–5)

## 2024-06-13 PROCEDURE — 3017F COLORECTAL CA SCREEN DOC REV: CPT | Performed by: UROLOGY

## 2024-06-13 PROCEDURE — G8427 DOCREV CUR MEDS BY ELIG CLIN: HCPCS | Performed by: UROLOGY

## 2024-06-13 PROCEDURE — 3078F DIAST BP <80 MM HG: CPT | Performed by: UROLOGY

## 2024-06-13 PROCEDURE — 1036F TOBACCO NON-USER: CPT | Performed by: UROLOGY

## 2024-06-13 PROCEDURE — 87086 URINE CULTURE/COLONY COUNT: CPT

## 2024-06-13 PROCEDURE — 99215 OFFICE O/P EST HI 40 MIN: CPT | Performed by: UROLOGY

## 2024-06-13 PROCEDURE — 3077F SYST BP >= 140 MM HG: CPT | Performed by: UROLOGY

## 2024-06-13 PROCEDURE — 81001 URINALYSIS AUTO W/SCOPE: CPT

## 2024-06-13 PROCEDURE — G8417 CALC BMI ABV UP PARAM F/U: HCPCS | Performed by: UROLOGY

## 2024-06-13 PROCEDURE — 1123F ACP DISCUSS/DSCN MKR DOCD: CPT | Performed by: UROLOGY

## 2024-06-13 RX ORDER — LEVOFLOXACIN 500 MG/1
500 TABLET, FILM COATED ORAL DAILY
Qty: 14 TABLET | Refills: 0 | Status: SHIPPED | OUTPATIENT
Start: 2024-06-13 | End: 2024-06-27

## 2024-06-13 ASSESSMENT — ENCOUNTER SYMPTOMS
COUGH: 0
NAUSEA: 0
VOMITING: 0
SHORTNESS OF BREATH: 0
BACK PAIN: 0
COLOR CHANGE: 0
EYE REDNESS: 0
WHEEZING: 0
CONSTIPATION: 0
ABDOMINAL PAIN: 0

## 2024-06-13 NOTE — PROGRESS NOTES
mins, >50% time face-to-face, discussing diagnoses, any applicable test results, treatment plan/options, and prognosis.

## 2024-06-14 LAB
MICROORGANISM SPEC CULT: NO GROWTH
SERVICE CMNT-IMP: NORMAL
SPECIMEN DESCRIPTION: NORMAL

## 2024-06-17 ENCOUNTER — TELEPHONE (OUTPATIENT)
Dept: UROLOGY | Age: 69
End: 2024-06-17

## 2024-06-17 NOTE — TELEPHONE ENCOUNTER
----- Message from GAIL Adhikari - CNP sent at 6/17/2024  8:20 AM EDT -----  Call pt - urine cx reviewed and negative for UTI & for significant microhematuria

## 2024-07-19 ENCOUNTER — OFFICE VISIT (OUTPATIENT)
Dept: ONCOLOGY | Age: 69
End: 2024-07-19
Payer: MEDICARE

## 2024-07-19 VITALS
WEIGHT: 278.1 LBS | HEART RATE: 62 BPM | TEMPERATURE: 97 F | BODY MASS INDEX: 36.69 KG/M2 | DIASTOLIC BLOOD PRESSURE: 81 MMHG | SYSTOLIC BLOOD PRESSURE: 155 MMHG | RESPIRATION RATE: 18 BRPM

## 2024-07-19 DIAGNOSIS — K76.0 FATTY LIVER: Primary | ICD-10-CM

## 2024-07-19 DIAGNOSIS — R97.20 ELEVATED PSA: ICD-10-CM

## 2024-07-19 DIAGNOSIS — D69.6 THROMBOCYTOPENIA (HCC): ICD-10-CM

## 2024-07-19 DIAGNOSIS — D72.819 LEUKOPENIA, UNSPECIFIED TYPE: ICD-10-CM

## 2024-07-19 PROCEDURE — 99215 OFFICE O/P EST HI 40 MIN: CPT | Performed by: INTERNAL MEDICINE

## 2024-07-19 PROCEDURE — 1123F ACP DISCUSS/DSCN MKR DOCD: CPT | Performed by: INTERNAL MEDICINE

## 2024-07-19 PROCEDURE — G8427 DOCREV CUR MEDS BY ELIG CLIN: HCPCS | Performed by: INTERNAL MEDICINE

## 2024-07-19 PROCEDURE — 1036F TOBACCO NON-USER: CPT | Performed by: INTERNAL MEDICINE

## 2024-07-19 PROCEDURE — 3079F DIAST BP 80-89 MM HG: CPT | Performed by: INTERNAL MEDICINE

## 2024-07-19 PROCEDURE — 3077F SYST BP >= 140 MM HG: CPT | Performed by: INTERNAL MEDICINE

## 2024-07-19 PROCEDURE — G8417 CALC BMI ABV UP PARAM F/U: HCPCS | Performed by: INTERNAL MEDICINE

## 2024-07-19 PROCEDURE — 3017F COLORECTAL CA SCREEN DOC REV: CPT | Performed by: INTERNAL MEDICINE

## 2024-07-19 NOTE — PROGRESS NOTES
for headaches, dizziness, seizures, weakness, numbness       OBJECTIVE:         Vitals:    07/19/24 1512   BP: (!) 155/81   Pulse: 62   Resp: 18   Temp: 97 °F (36.1 °C)       PHYSICAL EXAM:   General appearance - well appearing, no in pain or distress   Mental status - alert and cooperative   Eyes - pupils equal and reactive, extraocular eye movements intact   Ears - bilateral TM's and external ear canals normal   Mouth - mucous membranes moist, pharynx normal without lesions   Neck - supple, no significant adenopathy   Lymphatics - no palpable lymphadenopathy, no hepatosplenomegaly   Chest - clear to auscultation, no wheezes, rales or rhonchi, symmetric air entry   Heart - normal rate, regular rhythm, normal S1, S2, no murmurs, rubs, clicks or gallops   Abdomen - soft, nontender, nondistended, no masses or organomegaly   Neurological - alert, oriented, normal speech, no focal findings or movement disorder noted   Musculoskeletal - no joint tenderness, deformity or swelling   Extremities - peripheral pulses normal, no pedal edema, no clubbing or cyanosis   Skin - normal coloration and turgor, no rashes, no suspicious skin lesions noted ,      LABORATORY DATA:     Lab Results   Component Value Date    WBC 3.4 (L) 06/04/2024    HGB 14.9 06/04/2024    HCT 42.1 06/04/2024    MCV 95.7 06/04/2024    PLT 54 (L) 06/04/2024    LYMPHOPCT 22.9 03/25/2024    RBC 4.40 (L) 06/04/2024    MCH 33.9 (H) 06/04/2024    MCHC 35.4 06/04/2024    RDW 12.3 06/04/2024    NEUTOPHILPCT 59.5 03/25/2024    MONOPCT 12.1 03/25/2024    EOSPCT 4.3 03/25/2024    BASOPCT 1.2 03/25/2024    NEUTROABS 1.92 03/25/2024    LYMPHSABS 0.74 (L) 03/25/2024    MONOSABS 0.39 03/25/2024    EOSABS 0.14 03/25/2024    BASOSABS 0.04 03/25/2024         Chemistry        Component Value Date/Time     06/04/2024 1310    K 3.8 06/04/2024 1310     06/04/2024 1310    CO2 24 06/04/2024 1310    BUN 16 06/04/2024 1310    CREATININE 1.00 06/04/2024 1310

## 2024-07-19 NOTE — PATIENT INSTRUCTIONS
Ok for prostate bx and to be given one unit of single donor platelet day before or within 2 hours prior to procedure  Please coordinate with  office for prostate  bx  Rtc in 3 months

## 2024-07-20 LAB — PSA, ULTRASENSITIVE: 2.5 NG/ML

## 2024-07-22 ENCOUNTER — TELEPHONE (OUTPATIENT)
Dept: UROLOGY | Age: 69
End: 2024-07-22

## 2024-07-22 PROBLEM — E55.9 VITAMIN D DEFICIENCY: Chronic | Status: ACTIVE | Noted: 2023-08-15

## 2024-07-22 LAB
BASOPHILS ABSOLUTE: 0.03 K/UL (ref 0–0.2)
BASOPHILS RELATIVE PERCENT: 0.8 %
EOSINOPHILS ABSOLUTE: 0.19 K/UL (ref 0–0.5)
EOSINOPHILS RELATIVE PERCENT: 5.3 %
HCT VFR BLD CALC: 41.1 % (ref 40–51)
HEMOGLOBIN: 14.1 G/DL (ref 13.5–17)
IMMATURE GRANS (ABS): 0.01
IMMATURE GRANULOCYTES %: 0.3 %
LYMPHOCYTES ABSOLUTE: 0.77 K/UL (ref 1–4)
LYMPHOCYTES RELATIVE PERCENT: 21.5 %
MCH RBC QN AUTO: 33.8 PG (ref 25–33)
MCHC RBC AUTO-ENTMCNC: 34.3 G/DL (ref 31–36)
MCV RBC AUTO: 98.6 FL (ref 80–99)
MONOCYTES ABSOLUTE: 0.5 K/UL (ref 0.2–1)
MONOCYTES RELATIVE PERCENT: 14 %
NEUTROPHILS ABSOLUTE: 2.08 K/UL (ref 1.5–7.5)
NEUTROPHILS RELATIVE PERCENT: 58.1 %
PDW BLD-RTO: 13 % (ref 11.5–15)
PLATELET # BLD: 76 K/UL (ref 130–400)
PMV BLD AUTO: 11.1 FL (ref 9.3–13)
RBC # BLD: 4.17 M/UL (ref 4.5–6.1)
WBC # BLD: 3.6 K/UL (ref 3.5–11)

## 2024-07-22 NOTE — TELEPHONE ENCOUNTER
Patient called and said he saw Dr. Lawson and he can have the prostate biopsy now.  He said his platelets are low but he can give him a shot the day before or even two hours before that will boost his platelets.

## 2024-07-29 ENCOUNTER — OFFICE VISIT (OUTPATIENT)
Dept: UROLOGY | Age: 69
End: 2024-07-29
Payer: MEDICARE

## 2024-07-29 VITALS
TEMPERATURE: 96.9 F | SYSTOLIC BLOOD PRESSURE: 130 MMHG | DIASTOLIC BLOOD PRESSURE: 72 MMHG | WEIGHT: 280 LBS | BODY MASS INDEX: 36.94 KG/M2

## 2024-07-29 DIAGNOSIS — R97.20 ELEVATED PSA: Primary | ICD-10-CM

## 2024-07-29 DIAGNOSIS — N40.1 BPH WITH OBSTRUCTION/LOWER URINARY TRACT SYMPTOMS: ICD-10-CM

## 2024-07-29 DIAGNOSIS — K59.00 CONSTIPATION, UNSPECIFIED CONSTIPATION TYPE: ICD-10-CM

## 2024-07-29 DIAGNOSIS — N13.8 BPH WITH OBSTRUCTION/LOWER URINARY TRACT SYMPTOMS: ICD-10-CM

## 2024-07-29 PROCEDURE — 1036F TOBACCO NON-USER: CPT | Performed by: UROLOGY

## 2024-07-29 PROCEDURE — G8428 CUR MEDS NOT DOCUMENT: HCPCS | Performed by: UROLOGY

## 2024-07-29 PROCEDURE — 3017F COLORECTAL CA SCREEN DOC REV: CPT | Performed by: UROLOGY

## 2024-07-29 PROCEDURE — 99214 OFFICE O/P EST MOD 30 MIN: CPT | Performed by: UROLOGY

## 2024-07-29 PROCEDURE — 1123F ACP DISCUSS/DSCN MKR DOCD: CPT | Performed by: UROLOGY

## 2024-07-29 PROCEDURE — G8417 CALC BMI ABV UP PARAM F/U: HCPCS | Performed by: UROLOGY

## 2024-07-29 PROCEDURE — 3075F SYST BP GE 130 - 139MM HG: CPT | Performed by: UROLOGY

## 2024-07-29 PROCEDURE — 3078F DIAST BP <80 MM HG: CPT | Performed by: UROLOGY

## 2024-07-29 RX ORDER — POLYETHYLENE GLYCOL 3350 17 G/17G
17 POWDER, FOR SOLUTION ORAL DAILY PRN
COMMUNITY

## 2024-07-29 RX ORDER — TAMSULOSIN HYDROCHLORIDE 0.4 MG/1
CAPSULE ORAL
Qty: 180 CAPSULE | Refills: 3 | Status: SHIPPED | OUTPATIENT
Start: 2024-07-29

## 2024-07-29 ASSESSMENT — ENCOUNTER SYMPTOMS
NAUSEA: 0
VOMITING: 0
COLOR CHANGE: 0
CONSTIPATION: 0
SHORTNESS OF BREATH: 0
EYE REDNESS: 0
COUGH: 0
ABDOMINAL PAIN: 0
BACK PAIN: 0
WHEEZING: 0

## 2024-07-29 NOTE — PROGRESS NOTES
HPI:          Patient is a 68 y.o. male in no acute distress.  He is alert and oriented to person, place, and time.         History  12/2020 Referral from [facility] for LUTS.  Complaints of urgency and urge incontinence.  He has nocturia 0-2 times per night, frequency every 30 minutes to an hour during the day.  He does change his underwear once per night due to the incontinence.  He does have a weak stream and a split stream at the start of urination.  He is uncircumcised, but denies any issues retracting his foreskin.  He does have significant constipation and was recently in the ER for constipation. He also reports a history of fecal smearing. He will often go 4 more days without a bowel movement.  While in the ER he was told he was not emptying his bladder.  He did have a CT completed while in the ER on 11/2020 that showed no evidence of  calcifications or hydronephrosis.  He is an uncontrolled insulin-dependent diabetic.  He does consume a large amount of bladder irritants.  He denies history of stones.  He has never seen urology in the past.                Started miralax daily                 Started flomax daily                 Referred for colonoscopy     2/2021 - cysto - Extensive trilobar hyperplasia              Declined greenlight     2021 Started proscar      Ditropan increased to XL 10mg     1/2023 Ditropan stopped, trospium started      3/2023 trospium stopped secondary to ineffectiveness and incomplete bladder emptying        PSA (on proscar)  7/2024 - 2.50  3/2024 - 3.71 (7.42)  1/2024 - 2.40 (4.80)  12/2022 - 0.76 (1.42)  11/2021 - 0.6 (1.2)  6/2020 - 3.04     Currently  Patient is here today for 6-week follow-up.  At the last visit we did recommend prostate biopsy.  We did give patient a course of antibiotics.  We did repeat the PSA today.  This current value is 2.50.  This is still slightly elevated for his age but much improved from last visit. Pt is using beets, miralax and

## 2024-08-09 ENCOUNTER — TELEPHONE (OUTPATIENT)
Dept: CARDIOLOGY | Age: 69
End: 2024-08-09

## 2024-08-09 NOTE — TELEPHONE ENCOUNTER
Patient called office stating that insurance will pay for him to get an elliptical to exercise but he needs an order from a doctor and was wanting to know if he could get one.

## 2024-08-12 NOTE — TELEPHONE ENCOUNTER
Can not specifically  order  an elliptical. All I can recommend is exercise \"walking, treadmill, biking etc\". Thank you

## 2024-08-30 RX ORDER — CARVEDILOL 6.25 MG/1
6.25 TABLET ORAL 2 TIMES DAILY WITH MEALS
Qty: 180 TABLET | Refills: 3 | Status: SHIPPED | OUTPATIENT
Start: 2024-08-30

## 2024-10-10 LAB
A/G RATIO: 2 RATIO (ref 1–2.5)
ALBUMIN: 3.9 G/DL (ref 3.5–5.2)
ALK PHOSPHATASE: 74 U/L (ref 39–118)
ALT SERPL-CCNC: 45 U/L (ref 5–41)
AST SERPL-CCNC: 35 U/L (ref 9–50)
BASOPHILS ABSOLUTE: 0.04 K/UL (ref 0–0.2)
BASOPHILS RELATIVE PERCENT: 1.4 % (ref 0–2)
BILIRUB SERPL-MCNC: 0.8 MG/DL
BUN BLDV-MCNC: 15 MG/DL (ref 8–23)
CALCIUM SERPL-MCNC: 8.8 MG/DL (ref 8.6–10.5)
CHLORIDE BLD-SCNC: 104 MMOL/L (ref 96–107)
CO2: 24 MMOL/L (ref 18–32)
COMMENT: ABNORMAL
CREAT SERPL-MCNC: 0.99 MG/DL (ref 0.67–1.3)
EGFR IF NONAFRICAN AMERICAN: 83 ML/MIN/1.73M2
EOSINOPHILS ABSOLUTE: 0.1 K/UL (ref 0–0.8)
EOSINOPHILS RELATIVE PERCENT: 3.5 % (ref 0–5)
GLOBULIN: 2 G/DL (ref 1.8–3.8)
GLUCOSE: 188 MG/DL (ref 65–125)
HCT VFR BLD CALC: 40.7 % (ref 39–52)
HEMOGLOBIN: 13.9 G/DL (ref 13–18)
IMMATURE GRANS (ABS): 0 K/UL (ref 0–0.06)
IMMATURE GRANULOCYTES %: 0 % (ref 0–2)
LYMPHOCYTES ABSOLUTE: 0.56 K/UL (ref 0.9–5.2)
LYMPHOCYTES RELATIVE PERCENT: 19.5 % (ref 20–45)
MCH RBC QN AUTO: 33.6 PG (ref 26–32)
MCHC RBC AUTO-ENTMCNC: 34.2 G/DL (ref 32–35)
MCV RBC AUTO: 98 FL (ref 75–100)
MONOCYTES ABSOLUTE: 0.32 K/UL (ref 0.1–1)
MONOCYTES RELATIVE PERCENT: 11.1 % (ref 0–13)
NEUTROPHILS ABSOLUTE: 1.85 K/UL (ref 1.9–8)
NEUTROPHILS RELATIVE PERCENT: 64.5 % (ref 45–75)
PDW BLD-RTO: 12.1 % (ref 11.2–14.8)
PLATELET # BLD: 58 THOUS/CMM (ref 140–440)
POTASSIUM SERPL-SCNC: 3.6 MMOL/L (ref 3.5–5.4)
RBC # BLD: 4.14 MILL/CMM (ref 4.4–6.1)
SODIUM BLD-SCNC: 141 MMOL/L (ref 135–148)
TOTAL PROTEIN: 5.9 G/DL (ref 6–8.3)
WBC # BLD: 2.9 THDS/CMM (ref 3.6–11)

## 2024-10-14 ENCOUNTER — TELEPHONE (OUTPATIENT)
Dept: CARDIOLOGY | Age: 69
End: 2024-10-14

## 2024-10-14 NOTE — TELEPHONE ENCOUNTER
Patient called office stating that he was having tightness in chest and severe shortness of breath that came on a few days ago. I advised patient that he needs to be seen in ED for these symptoms. Patient stated he will not go to ED due to the cost. Made appt for pt tomorrow at 11 and advised if symptoms worsen to go to ED.

## 2024-10-15 ENCOUNTER — HOSPITAL ENCOUNTER (INPATIENT)
Age: 69
LOS: 3 days | Discharge: HOME | End: 2024-10-18
Attending: STUDENT IN AN ORGANIZED HEALTH CARE EDUCATION/TRAINING PROGRAM | Admitting: STUDENT IN AN ORGANIZED HEALTH CARE EDUCATION/TRAINING PROGRAM
Payer: MEDICARE

## 2024-10-15 ENCOUNTER — APPOINTMENT (OUTPATIENT)
Dept: GENERAL RADIOLOGY | Age: 69
End: 2024-10-15
Payer: MEDICARE

## 2024-10-15 ENCOUNTER — OFFICE VISIT (OUTPATIENT)
Dept: CARDIOLOGY | Age: 69
End: 2024-10-15

## 2024-10-15 ENCOUNTER — HOSPITAL ENCOUNTER (EMERGENCY)
Age: 69
Discharge: ANOTHER HOSPITAL | End: 2024-10-15
Payer: MEDICARE

## 2024-10-15 ENCOUNTER — APPOINTMENT (OUTPATIENT)
Age: 69
End: 2024-10-15
Attending: INTERNAL MEDICINE
Payer: MEDICARE

## 2024-10-15 VITALS
OXYGEN SATURATION: 98 % | SYSTOLIC BLOOD PRESSURE: 166 MMHG | DIASTOLIC BLOOD PRESSURE: 89 MMHG | WEIGHT: 288.4 LBS | HEIGHT: 73 IN | HEART RATE: 76 BPM | RESPIRATION RATE: 18 BRPM | BODY MASS INDEX: 38.22 KG/M2

## 2024-10-15 VITALS
DIASTOLIC BLOOD PRESSURE: 81 MMHG | OXYGEN SATURATION: 96 % | HEART RATE: 73 BPM | TEMPERATURE: 97.7 F | SYSTOLIC BLOOD PRESSURE: 160 MMHG | BODY MASS INDEX: 38 KG/M2 | WEIGHT: 288 LBS | RESPIRATION RATE: 18 BRPM

## 2024-10-15 DIAGNOSIS — E11.69 TYPE 2 DIABETES MELLITUS WITH OTHER SPECIFIED COMPLICATION, WITH LONG-TERM CURRENT USE OF INSULIN (HCC): ICD-10-CM

## 2024-10-15 DIAGNOSIS — Z86.73 HISTORY OF STROKE: ICD-10-CM

## 2024-10-15 DIAGNOSIS — E78.2 MIXED HYPERLIPIDEMIA: ICD-10-CM

## 2024-10-15 DIAGNOSIS — I25.5 ISCHEMIC CARDIOMYOPATHY: ICD-10-CM

## 2024-10-15 DIAGNOSIS — Z95.820 S/P ANGIOPLASTY WITH STENT: ICD-10-CM

## 2024-10-15 DIAGNOSIS — I63.9 CEREBELLAR STROKE (HCC): ICD-10-CM

## 2024-10-15 DIAGNOSIS — I20.0 UNSTABLE ANGINA PECTORIS (HCC): Primary | ICD-10-CM

## 2024-10-15 DIAGNOSIS — E66.01 CLASS 2 SEVERE OBESITY DUE TO EXCESS CALORIES WITH SERIOUS COMORBIDITY AND BODY MASS INDEX (BMI) OF 37.0 TO 37.9 IN ADULT: Primary | ICD-10-CM

## 2024-10-15 DIAGNOSIS — R07.89 CHEST DISCOMFORT: Primary | ICD-10-CM

## 2024-10-15 DIAGNOSIS — I20.0 UNSTABLE ANGINA (HCC): ICD-10-CM

## 2024-10-15 DIAGNOSIS — Z79.4 TYPE 2 DIABETES MELLITUS WITH OTHER SPECIFIED COMPLICATION, WITH LONG-TERM CURRENT USE OF INSULIN (HCC): ICD-10-CM

## 2024-10-15 DIAGNOSIS — I73.9 INTERMITTENT CLAUDICATION (HCC): ICD-10-CM

## 2024-10-15 DIAGNOSIS — I10 ESSENTIAL HYPERTENSION: ICD-10-CM

## 2024-10-15 DIAGNOSIS — R07.9 CHEST PAIN, UNSPECIFIED TYPE: ICD-10-CM

## 2024-10-15 DIAGNOSIS — E66.812 CLASS 2 OBESITY WITH BODY MASS INDEX (BMI) OF 38.0 TO 38.9 IN ADULT, UNSPECIFIED OBESITY TYPE, UNSPECIFIED WHETHER SERIOUS COMORBIDITY PRESENT: ICD-10-CM

## 2024-10-15 DIAGNOSIS — I25.10 ASHD (ARTERIOSCLEROTIC HEART DISEASE): ICD-10-CM

## 2024-10-15 DIAGNOSIS — E66.812 CLASS 2 SEVERE OBESITY DUE TO EXCESS CALORIES WITH SERIOUS COMORBIDITY AND BODY MASS INDEX (BMI) OF 37.0 TO 37.9 IN ADULT: Primary | ICD-10-CM

## 2024-10-15 LAB
ALBUMIN SERPL-MCNC: 4 G/DL (ref 3.5–5.2)
ALBUMIN/GLOB SERPL: 1.9 {RATIO} (ref 1–2.5)
ALP SERPL-CCNC: 72 U/L (ref 40–129)
ALT SERPL-CCNC: 47 U/L (ref 10–50)
ANION GAP SERPL CALCULATED.3IONS-SCNC: 11 MMOL/L (ref 9–16)
AST SERPL-CCNC: 36 U/L (ref 10–50)
BASOPHILS # BLD: 0.04 K/UL (ref 0–0.2)
BASOPHILS NFR BLD: 1 % (ref 0–2)
BILIRUB SERPL-MCNC: 1 MG/DL (ref 0–1.2)
BNP SERPL-MCNC: 734 PG/ML (ref 0–125)
BUN SERPL-MCNC: 14 MG/DL (ref 8–23)
BUN/CREAT SERPL: 14 (ref 9–20)
CALCIUM SERPL-MCNC: 8.8 MG/DL (ref 8.6–10.4)
CHLORIDE SERPL-SCNC: 106 MMOL/L (ref 98–107)
CO2 SERPL-SCNC: 25 MMOL/L (ref 20–31)
CREAT SERPL-MCNC: 1 MG/DL (ref 0.7–1.2)
ECHO AO SINUS VALSALVA DIAM: 3.3 CM
ECHO AO SINUS VALSALVA INDEX: 1.31 CM/M2
ECHO AO ST JNCT DIAM: 2.8 CM
ECHO AV CUSP MM: 2 CM
ECHO AV MEAN GRADIENT: 3 MMHG
ECHO AV MEAN VELOCITY: 0.8 M/S
ECHO AV PEAK GRADIENT: 6 MMHG
ECHO AV PEAK VELOCITY: 1.2 M/S
ECHO AV VELOCITY RATIO: 0.75
ECHO AV VTI: 22.3 CM
ECHO BSA: 2.6 M2
ECHO LV E' LATERAL VELOCITY: 6.4 CM/S
ECHO LV EF PHYSICIAN: 40 %
ECHO LV FRACTIONAL SHORTENING: 24 % (ref 28–44)
ECHO LV INTERNAL DIMENSION DIASTOLE INDEX: 1.99 CM/M2
ECHO LV INTERNAL DIMENSION DIASTOLIC: 5 CM (ref 4.2–5.9)
ECHO LV INTERNAL DIMENSION SYSTOLIC INDEX: 1.51 CM/M2
ECHO LV INTERNAL DIMENSION SYSTOLIC: 3.8 CM
ECHO LV IVSD: 1.5 CM (ref 0.6–1)
ECHO LV MASS 2D: 322.6 G (ref 88–224)
ECHO LV MASS INDEX 2D: 128.5 G/M2 (ref 49–115)
ECHO LV POSTERIOR WALL DIASTOLIC: 1.5 CM (ref 0.6–1)
ECHO LV RELATIVE WALL THICKNESS RATIO: 0.6
ECHO LVOT AV VTI INDEX: 0.83
ECHO LVOT MEAN GRADIENT: 2 MMHG
ECHO LVOT PEAK GRADIENT: 3 MMHG
ECHO LVOT PEAK VELOCITY: 0.9 M/S
ECHO LVOT VTI: 18.4 CM
ECHO MV A VELOCITY: 0.81 M/S
ECHO MV E DECELERATION TIME (DT): 169 MS
ECHO MV E VELOCITY: 1.03 M/S
ECHO MV E/A RATIO: 1.27
ECHO MV E/E' LATERAL: 16.09
ECHO PV MAX VELOCITY: 0.9 M/S
ECHO PV PEAK GRADIENT: 3 MMHG
EOSINOPHIL # BLD: 0.11 K/UL (ref 0–0.44)
EOSINOPHILS RELATIVE PERCENT: 3 % (ref 1–4)
ERYTHROCYTE [DISTWIDTH] IN BLOOD BY AUTOMATED COUNT: 12.4 % (ref 11.8–14.4)
GFR, ESTIMATED: 86 ML/MIN/1.73M2
GLUCOSE SERPL-MCNC: 234 MG/DL (ref 74–99)
HCT VFR BLD AUTO: 41.6 % (ref 40.7–50.3)
HGB BLD-MCNC: 14.5 G/DL (ref 13–17)
IMM GRANULOCYTES # BLD AUTO: 0 K/UL (ref 0–0.3)
IMM GRANULOCYTES NFR BLD: 0 %
INR PPP: 1.2
LYMPHOCYTES NFR BLD: 0.53 K/UL (ref 1.1–3.7)
LYMPHOCYTES RELATIVE PERCENT: 15 % (ref 24–43)
MAGNESIUM SERPL-MCNC: 1.9 MG/DL (ref 1.6–2.4)
MCH RBC QN AUTO: 33.5 PG (ref 25.2–33.5)
MCHC RBC AUTO-ENTMCNC: 34.9 G/DL (ref 28.4–34.8)
MCV RBC AUTO: 96.1 FL (ref 82.6–102.9)
MONOCYTES NFR BLD: 0.39 K/UL (ref 0.1–1.2)
MONOCYTES NFR BLD: 11 % (ref 3–12)
MORPHOLOGY: ABNORMAL
NEUTROPHILS NFR BLD: 70 % (ref 36–65)
NEUTS SEG NFR BLD: 2.43 K/UL (ref 1.5–8.1)
NRBC BLD-RTO: 0 PER 100 WBC
PLATELET # BLD AUTO: ABNORMAL K/UL (ref 138–453)
PLATELET, FLUORESCENCE: 51 K/UL (ref 138–453)
PLATELETS.RETICULATED NFR BLD AUTO: 2.5 % (ref 1.1–10.3)
POTASSIUM SERPL-SCNC: 3.7 MMOL/L (ref 3.7–5.3)
PROT SERPL-MCNC: 6 G/DL (ref 6.6–8.7)
PROTHROMBIN TIME: 14.8 SEC (ref 11.7–14.1)
RBC # BLD AUTO: 4.33 M/UL (ref 4.21–5.77)
SODIUM SERPL-SCNC: 142 MMOL/L (ref 136–145)
TROPONIN I SERPL HS-MCNC: 23 NG/L (ref 0–22)
TROPONIN I SERPL HS-MCNC: 24 NG/L (ref 0–22)
WBC OTHER # BLD: 3.5 K/UL (ref 3.5–11.3)

## 2024-10-15 PROCEDURE — G8484 FLU IMMUNIZE NO ADMIN: HCPCS | Performed by: INTERNAL MEDICINE

## 2024-10-15 PROCEDURE — 2022F DILAT RTA XM EVC RTNOPTHY: CPT | Performed by: INTERNAL MEDICINE

## 2024-10-15 PROCEDURE — 83880 ASSAY OF NATRIURETIC PEPTIDE: CPT

## 2024-10-15 PROCEDURE — 3046F HEMOGLOBIN A1C LEVEL >9.0%: CPT | Performed by: INTERNAL MEDICINE

## 2024-10-15 PROCEDURE — 6360000004 HC RX CONTRAST MEDICATION: Performed by: FAMILY MEDICINE

## 2024-10-15 PROCEDURE — 1123F ACP DISCUSS/DSCN MKR DOCD: CPT | Performed by: INTERNAL MEDICINE

## 2024-10-15 PROCEDURE — 93306 TTE W/DOPPLER COMPLETE: CPT | Performed by: INTERNAL MEDICINE

## 2024-10-15 PROCEDURE — G8427 DOCREV CUR MEDS BY ELIG CLIN: HCPCS | Performed by: INTERNAL MEDICINE

## 2024-10-15 PROCEDURE — 80053 COMPREHEN METABOLIC PANEL: CPT

## 2024-10-15 PROCEDURE — 96374 THER/PROPH/DIAG INJ IV PUSH: CPT

## 2024-10-15 PROCEDURE — G8417 CALC BMI ABV UP PARAM F/U: HCPCS | Performed by: INTERNAL MEDICINE

## 2024-10-15 PROCEDURE — C8929 TTE W OR WO FOL WCON,DOPPLER: HCPCS

## 2024-10-15 PROCEDURE — 84484 ASSAY OF TROPONIN QUANT: CPT

## 2024-10-15 PROCEDURE — 2060000000 HC ICU INTERMEDIATE R&B

## 2024-10-15 PROCEDURE — 6370000000 HC RX 637 (ALT 250 FOR IP): Performed by: STUDENT IN AN ORGANIZED HEALTH CARE EDUCATION/TRAINING PROGRAM

## 2024-10-15 PROCEDURE — 3077F SYST BP >= 140 MM HG: CPT | Performed by: INTERNAL MEDICINE

## 2024-10-15 PROCEDURE — 93000 ELECTROCARDIOGRAM COMPLETE: CPT | Performed by: INTERNAL MEDICINE

## 2024-10-15 PROCEDURE — 1036F TOBACCO NON-USER: CPT | Performed by: INTERNAL MEDICINE

## 2024-10-15 PROCEDURE — 99222 1ST HOSP IP/OBS MODERATE 55: CPT | Performed by: STUDENT IN AN ORGANIZED HEALTH CARE EDUCATION/TRAINING PROGRAM

## 2024-10-15 PROCEDURE — 2580000003 HC RX 258: Performed by: STUDENT IN AN ORGANIZED HEALTH CARE EDUCATION/TRAINING PROGRAM

## 2024-10-15 PROCEDURE — 99285 EMERGENCY DEPT VISIT HI MDM: CPT

## 2024-10-15 PROCEDURE — 71046 X-RAY EXAM CHEST 2 VIEWS: CPT

## 2024-10-15 PROCEDURE — 3079F DIAST BP 80-89 MM HG: CPT | Performed by: INTERNAL MEDICINE

## 2024-10-15 PROCEDURE — 99215 OFFICE O/P EST HI 40 MIN: CPT | Performed by: INTERNAL MEDICINE

## 2024-10-15 PROCEDURE — 85025 COMPLETE CBC W/AUTO DIFF WBC: CPT

## 2024-10-15 PROCEDURE — 83735 ASSAY OF MAGNESIUM: CPT

## 2024-10-15 PROCEDURE — 85610 PROTHROMBIN TIME: CPT

## 2024-10-15 PROCEDURE — 3017F COLORECTAL CA SCREEN DOC REV: CPT | Performed by: INTERNAL MEDICINE

## 2024-10-15 RX ORDER — PANTOPRAZOLE SODIUM 40 MG/1
40 TABLET, DELAYED RELEASE ORAL DAILY
Status: DISCONTINUED | OUTPATIENT
Start: 2024-10-15 | End: 2024-10-16

## 2024-10-15 RX ORDER — ONDANSETRON 2 MG/ML
4 INJECTION INTRAMUSCULAR; INTRAVENOUS EVERY 6 HOURS PRN
Status: DISCONTINUED | OUTPATIENT
Start: 2024-10-15 | End: 2024-10-18 | Stop reason: HOSPADM

## 2024-10-15 RX ORDER — ONDANSETRON 4 MG/1
4 TABLET, ORALLY DISINTEGRATING ORAL EVERY 8 HOURS PRN
Status: DISCONTINUED | OUTPATIENT
Start: 2024-10-15 | End: 2024-10-18 | Stop reason: HOSPADM

## 2024-10-15 RX ORDER — GLUCAGON 1 MG/ML
1 KIT INJECTION PRN
Status: DISCONTINUED | OUTPATIENT
Start: 2024-10-15 | End: 2024-10-18 | Stop reason: HOSPADM

## 2024-10-15 RX ORDER — TAMSULOSIN HYDROCHLORIDE 0.4 MG/1
0.4 CAPSULE ORAL 2 TIMES DAILY
Status: DISCONTINUED | OUTPATIENT
Start: 2024-10-15 | End: 2024-10-18 | Stop reason: HOSPADM

## 2024-10-15 RX ORDER — SODIUM CHLORIDE 0.9 % (FLUSH) 0.9 %
5-40 SYRINGE (ML) INJECTION EVERY 12 HOURS SCHEDULED
Status: DISCONTINUED | OUTPATIENT
Start: 2024-10-15 | End: 2024-10-18 | Stop reason: HOSPADM

## 2024-10-15 RX ORDER — CARVEDILOL 3.12 MG/1
6.25 TABLET ORAL 2 TIMES DAILY WITH MEALS
Status: DISCONTINUED | OUTPATIENT
Start: 2024-10-15 | End: 2024-10-18

## 2024-10-15 RX ORDER — HEPARIN SODIUM 1000 [USP'U]/ML
4000 INJECTION, SOLUTION INTRAVENOUS; SUBCUTANEOUS ONCE
Status: DISCONTINUED | OUTPATIENT
Start: 2024-10-15 | End: 2024-10-15

## 2024-10-15 RX ORDER — SODIUM CHLORIDE 9 MG/ML
INJECTION, SOLUTION INTRAVENOUS CONTINUOUS
Status: DISCONTINUED | OUTPATIENT
Start: 2024-10-15 | End: 2024-10-16

## 2024-10-15 RX ORDER — HEPARIN SODIUM 1000 [USP'U]/ML
2000 INJECTION, SOLUTION INTRAVENOUS; SUBCUTANEOUS PRN
Status: DISCONTINUED | OUTPATIENT
Start: 2024-10-15 | End: 2024-10-17

## 2024-10-15 RX ORDER — INSULIN LISPRO 100 [IU]/ML
0-8 INJECTION, SOLUTION INTRAVENOUS; SUBCUTANEOUS
Status: DISCONTINUED | OUTPATIENT
Start: 2024-10-15 | End: 2024-10-18 | Stop reason: HOSPADM

## 2024-10-15 RX ORDER — HEPARIN SODIUM 10000 [USP'U]/100ML
5-30 INJECTION, SOLUTION INTRAVENOUS CONTINUOUS
Status: DISCONTINUED | OUTPATIENT
Start: 2024-10-15 | End: 2024-10-17

## 2024-10-15 RX ORDER — SODIUM CHLORIDE 9 MG/ML
INJECTION, SOLUTION INTRAVENOUS PRN
Status: DISCONTINUED | OUTPATIENT
Start: 2024-10-15 | End: 2024-10-18 | Stop reason: HOSPADM

## 2024-10-15 RX ORDER — SODIUM CHLORIDE 0.9 % (FLUSH) 0.9 %
5-40 SYRINGE (ML) INJECTION PRN
Status: DISCONTINUED | OUTPATIENT
Start: 2024-10-15 | End: 2024-10-18 | Stop reason: HOSPADM

## 2024-10-15 RX ORDER — MAGNESIUM SULFATE IN WATER 40 MG/ML
2000 INJECTION, SOLUTION INTRAVENOUS PRN
Status: DISCONTINUED | OUTPATIENT
Start: 2024-10-15 | End: 2024-10-18 | Stop reason: HOSPADM

## 2024-10-15 RX ORDER — HEPARIN SODIUM 1000 [USP'U]/ML
2000 INJECTION, SOLUTION INTRAVENOUS; SUBCUTANEOUS PRN
Status: DISCONTINUED | OUTPATIENT
Start: 2024-10-15 | End: 2024-10-15

## 2024-10-15 RX ORDER — ACETAMINOPHEN 325 MG/1
650 TABLET ORAL EVERY 6 HOURS PRN
Status: DISCONTINUED | OUTPATIENT
Start: 2024-10-15 | End: 2024-10-18 | Stop reason: HOSPADM

## 2024-10-15 RX ORDER — HEPARIN SODIUM 1000 [USP'U]/ML
4000 INJECTION, SOLUTION INTRAVENOUS; SUBCUTANEOUS ONCE
Status: DISCONTINUED | OUTPATIENT
Start: 2024-10-15 | End: 2024-10-17

## 2024-10-15 RX ORDER — DEXTROSE MONOHYDRATE 100 MG/ML
INJECTION, SOLUTION INTRAVENOUS CONTINUOUS PRN
Status: DISCONTINUED | OUTPATIENT
Start: 2024-10-15 | End: 2024-10-18 | Stop reason: HOSPADM

## 2024-10-15 RX ORDER — POTASSIUM CHLORIDE 1500 MG/1
40 TABLET, EXTENDED RELEASE ORAL PRN
Status: DISCONTINUED | OUTPATIENT
Start: 2024-10-15 | End: 2024-10-18 | Stop reason: HOSPADM

## 2024-10-15 RX ORDER — POTASSIUM CHLORIDE 7.45 MG/ML
10 INJECTION INTRAVENOUS PRN
Status: DISCONTINUED | OUTPATIENT
Start: 2024-10-15 | End: 2024-10-18 | Stop reason: HOSPADM

## 2024-10-15 RX ORDER — HEPARIN SODIUM 1000 [USP'U]/ML
4000 INJECTION, SOLUTION INTRAVENOUS; SUBCUTANEOUS PRN
Status: DISCONTINUED | OUTPATIENT
Start: 2024-10-15 | End: 2024-10-17

## 2024-10-15 RX ORDER — HEPARIN SODIUM 1000 [USP'U]/ML
4000 INJECTION, SOLUTION INTRAVENOUS; SUBCUTANEOUS PRN
Status: DISCONTINUED | OUTPATIENT
Start: 2024-10-15 | End: 2024-10-15

## 2024-10-15 RX ORDER — ACETAMINOPHEN 650 MG/1
650 SUPPOSITORY RECTAL EVERY 6 HOURS PRN
Status: DISCONTINUED | OUTPATIENT
Start: 2024-10-15 | End: 2024-10-18 | Stop reason: HOSPADM

## 2024-10-15 RX ORDER — HEPARIN SODIUM 10000 [USP'U]/100ML
5-30 INJECTION, SOLUTION INTRAVENOUS CONTINUOUS
Status: DISCONTINUED | OUTPATIENT
Start: 2024-10-15 | End: 2024-10-15

## 2024-10-15 RX ADMIN — SODIUM CHLORIDE: 9 INJECTION, SOLUTION INTRAVENOUS at 20:04

## 2024-10-15 RX ADMIN — CARVEDILOL 6.25 MG: 3.12 TABLET, FILM COATED ORAL at 20:45

## 2024-10-15 RX ADMIN — PERFLUTREN 1.5 ML: 6.52 INJECTION, SUSPENSION INTRAVENOUS at 13:49

## 2024-10-15 RX ADMIN — SODIUM CHLORIDE, PRESERVATIVE FREE 10 ML: 5 INJECTION INTRAVENOUS at 20:45

## 2024-10-15 RX ADMIN — TAMSULOSIN HYDROCHLORIDE 0.4 MG: 0.4 CAPSULE ORAL at 20:45

## 2024-10-15 ASSESSMENT — HEART SCORE: ECG: NORMAL

## 2024-10-15 ASSESSMENT — PAIN - FUNCTIONAL ASSESSMENT: PAIN_FUNCTIONAL_ASSESSMENT: NONE - DENIES PAIN

## 2024-10-15 NOTE — PROGRESS NOTES
I, Sunita Cannon am scribing for and in the presence of Bisi Del Real MD, F.A.C.C..    Patient: Chester Soto  : 1955  Date of Visit: October 15, 2024    REASON FOR VISIT / CONSULTATION: Chest Pain (HX:CAD, Ischemic cardiomyopathy, HTN,HLD. CP/SOB. The pain starts in his neck and goes straight down the center. Worse with walking, went away with sitting down. Feels sharp/squeezing.//Denies: Lightheaded/dizziness,  Palpitations)    History of Present Illness:        Dear Yesica, Felipe Morris, APRN - CNP     I had the pleasure of seeing Chester Soto in my office today. Mr. Soto is a 69 y.o. male presented for follow up.    Patient has extensive medical history.  He reported having multiple heart attacks with the first one at age 36 and the second one in , he had a cardiac cath and stent placed at that time.  He followed up with his cardiologist up until 2020.    Family history includes his siblings who have extensive heart disease including heart attacks and bypass surgery    History of ischemic cardiomyopathy secondary to multiple heart attacks, his most recent ejection fraction was 35% by echo back in 2020.  He told me that he was offered defibrillator at some point but he was told that his heart function is good enough and no defibrillator needed.     He has history of longstanding diabetes, hypertension and dyslipidemia he reported that he is not taking statin therapy because it does cause muscle pain. He said none of his family members were able to tolerate statins as well.  He is lifelong non-smoker. He also reported having a history of Ménière's disease and chronic dizziness.    He was admitted to [facility] from 2020 to 2020 because of acute stroke. As per discharge summary from [facility], NIH score was 2 for right upper and lower extremity dysmetria.  He got up MRI of the brain, MRA of the head and neck and MRI of the cervical spine done.  Patient

## 2024-10-15 NOTE — H&P
[facility]  Office: [phone]  Gerald Murillo DO, Arjun Carrera DO, Surya Baker DO, Reyes Smith DO, Sunshine Conroy MD, Nikkie Rivas MD, Liz Del Real MD, Maliha Alberto MD,  Ed Sky MD, Jonas Deleon MD, Belen Sebastian MD,  Desi Leal DO, Duncan Antunez MD, Alfredo Saleh MD, Don Murillo DO, Urvashi Bhatia MD,  Dread France DO, Marie Dale MD, Ruby Manzo MD, Zhane Reed MD, Viki Hernandez MD,  Casey Boyd MD, Jami Mendez MD, Michael Mejia MD, Roshan Wray MD, Ramírez Rivas MD, Jorge Sandoval MD, Chester Damon DO, Antwan Parkinson MD, Shirley Waterhouse, CNP,  Meg King CNP, Chester Ricks, IVONNE,  Joyce Irwin, ADOLFO, Hortensia Olson, CNP, Rolanda Albright, CNP, Kathleen Boo, CNP, Elli Zhang, CNP, Gardenia Gallardo PA-C, Vivian Zaman PA-C, Gail Deshpande, CNP, Manan Bernardo, CNP,  Estefani Dyson, CNP, Alisa Grimes, CNP,  Leigha Marcum, CNP, Kisha Alfaro, CNP         [facility]   IN-PATIENT SERVICE   [facility]    HISTORY AND PHYSICAL EXAMINATION            Date:   10/15/2024  Patient name:  Chester Soto  Date of admission:  10/15/2024  5:56 PM  MRN:   [id number]  Account:  [id number]  YOB: 1955  PCP:    Felipe Robert APRN - CNP  Room:   [facility]  Code Status:    Full Code    Chief Complaint:     Shortness of breath    History Obtained From:     patient, electronic medical record    History of Present Illness:     Chester Soto is a 69 y.o. Non- / non  male who presents with No chief complaint on file.   and is admitted to the hospital for the management of Unstable angina (HCC).    Six 9-year-old male past medical history of coronary artery disease status post drug-eluting stents x 2, history of CVA, obesity,  diabetes type 2 presents to outlying ER with exertional dyspnea.  Has been progressing for the past 3 days.  The patient has troponins of 24 and 23 due to

## 2024-10-15 NOTE — ED NOTES
Called [facility] for transfer to [place], they will page hopsitalist.  Dr Del Real- cardiology is initiating the transfer

## 2024-10-15 NOTE — ED PROVIDER NOTES
[facility] ED  [address] [facility] [facility] [address]  Dept: [phone]  Dept Fax: [phone]  Loc: [phone]  eMERGENCYdEPARTMENT eNCOUnter      Pt Name: Chester Soto  MRN: [id number]  Birthdate 1955of evaluation: 10/15/2024  Provider:GAIL HUA - CNP    CHIEF COMPLAINT       Chief Complaint   Patient presents with    Chest Pain     Sent from Dr. Del Real's office for 3-4 days of intermittent, midsternal CP, onset while walking outside.  Endorses squeezing feeling in upper chest with productive cough.            HISTORY OF PRESENT ILLNESS  (Location/Symptom, Timing/Onset, Context/Setting, Quality, Duration,Modifying Factors, Severity.)   Chester Soto is a 69 y.o. male who presents to the emergency department with 3-4 day history or intermittent mid sternal chest pain.  Pt reports on Saturday he developed exertional chest pain while walking in the yard to feed his chickens.  He describes pain as a tight band around his chest.  He has associated shortness of breath.  Pain relieved with rest after 5-10 minutes.  Since then he has intermittent episodes of pain to substernal chest.  He reports orthopnea.  No dizziness or lightheadedness.  No nausea or vomiting.  No diaphoresis.          Nursing Notes were reviewedand agreed with or any disagreements were addressed in the HPI.    REVIEW OF SYSTEMS    (2-9 systems for level 4, 10 or more for level 5)     Review of Systems   All other systems reviewed and are negative.      Except as noted above the remainder of the review of systems was reviewed and negative.       PAST MEDICAL HISTORY         Diagnosis Date    Abnormal cardiovascular stress test 10/2020    moderate perfusion defect of severe intensity in the apical anteroapical and inferoapical regions    CAD (coronary artery disease)     Diabetes mellitus (HCC)     [facility]    Diabetic retinopathy (HCC) 11/05/2020    Fatty liver disease,

## 2024-10-16 ENCOUNTER — TELEPHONE (OUTPATIENT)
Dept: CARDIOLOGY | Age: 69
End: 2024-10-16

## 2024-10-16 PROBLEM — D69.3 ACUTE IDIOPATHIC THROMBOCYTOPENIC PURPURA (HCC): Status: ACTIVE | Noted: 2024-10-16

## 2024-10-16 PROBLEM — D69.6 ACQUIRED THROMBOCYTOPENIA (HCC): Status: ACTIVE | Noted: 2024-10-16

## 2024-10-16 LAB
ANION GAP SERPL CALCULATED.3IONS-SCNC: 10 MMOL/L (ref 9–17)
BASOPHILS # BLD: 0.03 K/UL (ref 0–0.2)
BASOPHILS NFR BLD: 1 % (ref 0–2)
BUN SERPL-MCNC: 13 MG/DL (ref 8–23)
CALCIUM SERPL-MCNC: 9.2 MG/DL (ref 8.6–10.4)
CHLORIDE SERPL-SCNC: 106 MMOL/L (ref 98–107)
CO2 SERPL-SCNC: 22 MMOL/L (ref 20–31)
CREAT SERPL-MCNC: 0.7 MG/DL (ref 0.7–1.2)
EOSINOPHIL # BLD: 0.15 K/UL (ref 0–0.4)
EOSINOPHILS RELATIVE PERCENT: 4 % (ref 1–4)
ERYTHROCYTE [DISTWIDTH] IN BLOOD BY AUTOMATED COUNT: 12.5 % (ref 12.5–15.4)
GFR, ESTIMATED: >90 ML/MIN/1.73M2
GLUCOSE BLD-MCNC: 249 MG/DL (ref 75–110)
GLUCOSE BLD-MCNC: 263 MG/DL (ref 75–110)
GLUCOSE BLD-MCNC: 263 MG/DL (ref 75–110)
GLUCOSE BLD-MCNC: 271 MG/DL (ref 75–110)
GLUCOSE SERPL-MCNC: 260 MG/DL (ref 70–99)
HCT VFR BLD AUTO: 42.6 % (ref 41–53)
HGB BLD-MCNC: 14.5 G/DL (ref 13.5–17.5)
LYMPHOCYTES NFR BLD: 0.83 K/UL (ref 1–4.8)
LYMPHOCYTES RELATIVE PERCENT: 20 % (ref 24–44)
MCH RBC QN AUTO: 32.8 PG (ref 26–34)
MCHC RBC AUTO-ENTMCNC: 34 G/DL (ref 31–37)
MCV RBC AUTO: 96.4 FL (ref 80–100)
MONOCYTES NFR BLD: 0.55 K/UL (ref 0.1–1.2)
MONOCYTES NFR BLD: 14 % (ref 2–11)
NEUTROPHILS NFR BLD: 61 % (ref 36–66)
NEUTS SEG NFR BLD: 2.52 K/UL (ref 1.8–7.7)
PARTIAL THROMBOPLASTIN TIME: 24.7 SEC (ref 21.3–31.3)
PARTIAL THROMBOPLASTIN TIME: 25.3 SEC (ref 21.3–31.3)
PLATELET # BLD AUTO: 50 K/UL (ref 140–450)
PMV BLD AUTO: 11.7 FL (ref 8–14)
POTASSIUM SERPL-SCNC: 4.2 MMOL/L (ref 3.7–5.3)
RBC # BLD AUTO: 4.42 M/UL (ref 4.5–5.9)
SODIUM SERPL-SCNC: 138 MMOL/L (ref 135–144)
WBC OTHER # BLD: 4.1 K/UL (ref 3.5–11)

## 2024-10-16 PROCEDURE — 99223 1ST HOSP IP/OBS HIGH 75: CPT | Performed by: INTERNAL MEDICINE

## 2024-10-16 PROCEDURE — 80048 BASIC METABOLIC PNL TOTAL CA: CPT

## 2024-10-16 PROCEDURE — 6360000002 HC RX W HCPCS: Performed by: INTERNAL MEDICINE

## 2024-10-16 PROCEDURE — 82947 ASSAY GLUCOSE BLOOD QUANT: CPT

## 2024-10-16 PROCEDURE — 85730 THROMBOPLASTIN TIME PARTIAL: CPT

## 2024-10-16 PROCEDURE — 6370000000 HC RX 637 (ALT 250 FOR IP): Performed by: INTERNAL MEDICINE

## 2024-10-16 PROCEDURE — 2580000003 HC RX 258: Performed by: STUDENT IN AN ORGANIZED HEALTH CARE EDUCATION/TRAINING PROGRAM

## 2024-10-16 PROCEDURE — 99232 SBSQ HOSP IP/OBS MODERATE 35: CPT | Performed by: STUDENT IN AN ORGANIZED HEALTH CARE EDUCATION/TRAINING PROGRAM

## 2024-10-16 PROCEDURE — 85025 COMPLETE CBC W/AUTO DIFF WBC: CPT

## 2024-10-16 PROCEDURE — 2060000000 HC ICU INTERMEDIATE R&B

## 2024-10-16 PROCEDURE — 36415 COLL VENOUS BLD VENIPUNCTURE: CPT

## 2024-10-16 PROCEDURE — 6370000000 HC RX 637 (ALT 250 FOR IP): Performed by: STUDENT IN AN ORGANIZED HEALTH CARE EDUCATION/TRAINING PROGRAM

## 2024-10-16 RX ORDER — ASPIRIN 81 MG/1
81 TABLET, CHEWABLE ORAL DAILY
Status: DISCONTINUED | OUTPATIENT
Start: 2024-10-16 | End: 2024-10-18 | Stop reason: HOSPADM

## 2024-10-16 RX ORDER — INSULIN GLARGINE 100 [IU]/ML
15 INJECTION, SOLUTION SUBCUTANEOUS 2 TIMES DAILY
Status: DISCONTINUED | OUTPATIENT
Start: 2024-10-16 | End: 2024-10-18 | Stop reason: HOSPADM

## 2024-10-16 RX ORDER — LISINOPRIL 10 MG/1
10 TABLET ORAL DAILY
Status: DISCONTINUED | OUTPATIENT
Start: 2024-10-16 | End: 2024-10-17

## 2024-10-16 RX ORDER — PANTOPRAZOLE SODIUM 40 MG/1
40 TABLET, DELAYED RELEASE ORAL
Status: DISCONTINUED | OUTPATIENT
Start: 2024-10-16 | End: 2024-10-18 | Stop reason: HOSPADM

## 2024-10-16 RX ADMIN — TAMSULOSIN HYDROCHLORIDE 0.4 MG: 0.4 CAPSULE ORAL at 20:49

## 2024-10-16 RX ADMIN — INSULIN GLARGINE 15 UNITS: 100 INJECTION, SOLUTION SUBCUTANEOUS at 20:49

## 2024-10-16 RX ADMIN — INSULIN LISPRO 2 UNITS: 100 INJECTION, SOLUTION INTRAVENOUS; SUBCUTANEOUS at 08:30

## 2024-10-16 RX ADMIN — PANTOPRAZOLE SODIUM 40 MG: 40 TABLET, DELAYED RELEASE ORAL at 16:34

## 2024-10-16 RX ADMIN — IMMUNE GLOBULIN (HUMAN) 30000 MG: 10 INJECTION INTRAVENOUS; SUBCUTANEOUS at 18:21

## 2024-10-16 RX ADMIN — SODIUM CHLORIDE, PRESERVATIVE FREE 10 ML: 5 INJECTION INTRAVENOUS at 20:49

## 2024-10-16 RX ADMIN — ASPIRIN 81 MG: 81 TABLET, CHEWABLE ORAL at 10:51

## 2024-10-16 RX ADMIN — INSULIN LISPRO 4 UNITS: 100 INJECTION, SOLUTION INTRAVENOUS; SUBCUTANEOUS at 11:42

## 2024-10-16 RX ADMIN — INSULIN LISPRO 4 UNITS: 100 INJECTION, SOLUTION INTRAVENOUS; SUBCUTANEOUS at 20:55

## 2024-10-16 RX ADMIN — TAMSULOSIN HYDROCHLORIDE 0.4 MG: 0.4 CAPSULE ORAL at 09:38

## 2024-10-16 RX ADMIN — LISINOPRIL 10 MG: 10 TABLET ORAL at 10:51

## 2024-10-16 RX ADMIN — CARVEDILOL 6.25 MG: 3.12 TABLET, FILM COATED ORAL at 08:30

## 2024-10-16 RX ADMIN — ALUMINUM HYDROXIDE, MAGNESIUM HYDROXIDE, AND SIMETHICONE: 200; 200; 20 SUSPENSION ORAL at 10:50

## 2024-10-16 RX ADMIN — INSULIN LISPRO 4 UNITS: 100 INJECTION, SOLUTION INTRAVENOUS; SUBCUTANEOUS at 17:51

## 2024-10-16 RX ADMIN — CARVEDILOL 6.25 MG: 3.12 TABLET, FILM COATED ORAL at 17:51

## 2024-10-16 RX ADMIN — INSULIN GLARGINE 15 UNITS: 100 INJECTION, SOLUTION SUBCUTANEOUS at 12:10

## 2024-10-16 ASSESSMENT — PAIN SCALES - GENERAL
PAINLEVEL_OUTOF10: 1
PAINLEVEL_OUTOF10: 3
PAINLEVEL_OUTOF10: 3

## 2024-10-16 ASSESSMENT — PAIN DESCRIPTION - LOCATION
LOCATION: ABDOMEN
LOCATION: ABDOMEN

## 2024-10-16 ASSESSMENT — PAIN DESCRIPTION - FREQUENCY: FREQUENCY: INTERMITTENT

## 2024-10-16 ASSESSMENT — PAIN DESCRIPTION - DESCRIPTORS
DESCRIPTORS: ACHING
DESCRIPTORS: ACHING

## 2024-10-16 ASSESSMENT — PAIN DESCRIPTION - ORIENTATION
ORIENTATION: MID
ORIENTATION: MID

## 2024-10-16 ASSESSMENT — PAIN - FUNCTIONAL ASSESSMENT: PAIN_FUNCTIONAL_ASSESSMENT: ACTIVITIES ARE NOT PREVENTED

## 2024-10-16 NOTE — PROGRESS NOTES
[facility]  Office: [phone]  Gerald Murillo DO, Arjun Carrera DO, Surya Baker DO, Reyes Smith DO, Sunshine Conroy MD, Nikkie Rivas MD, Liz Del Real MD, Maliha Alberto MD,  Ed Sky MD, Jonas Deleon MD, Belen Sebastian MD,  Desi eLal DO, Duncan Antunez MD, Alfredo Saleh MD, Don Murillo DO, Urvashi Bhatia MD,  Dread France DO, Marie Dale MD, Ruby Manzo MD, Zhane Reed MD, Viki Hernandez MD,  Casey Boyd MD, Jami Mendez MD, Michael Mejia MD, Roshan Wray MD, Ramírez Rivas MD, Jorge Sandoval MD, Chester Damon DO, Antwan Parkinson MD, Shirley Waterhouse, CNP,  Meg King CNP, Chester Ricks, IVONNE,  Joyce Irwin, ADOLFO, Hortensia Olson, CNP, Rolanda Albright, CNP, Kathleen Boo, CNP, Elli Zhang, CNP, ETHAN GonzalezC, ETHAN KoehlerC, Gail Deshpande, CNP, Manan Bernardo, CNP,  Estefani Dyson, CNP, Alisa Grimes, CNP,  Leigha Marcum, CNP, Kisha Alfaro, CNP         [facility]   IN-PATIENT SERVICE   [facility]    Progress Note    10/16/2024    10:34 AM    Name:   Chester Soto  MRN:     [id number]     Acct:      [id number]   Room:   [address] [place] [address] Day:  1  Admit Date:  10/15/2024  5:56 PM    PCP:   Felipe Robert APRN - CNP  Code Status:  Full Code    Subjective:     C/C: No chief complaint on file.    Interval History Status: not changed.     Patient seen and examined at bedside. Slightly anxious. Sisters are at bedside. Patient has not had a good nights sleep. Waiitng for heart cath. Unfortunately we are still trying to coordinate.    Brief History:     69-year-old male past medical history of coronary artery disease status post drug-eluting stents x 2, history of CVA, obesity, diabetes type 2 presents to outlying ER with exertional dyspnea.     Review of Systems:     Constitutional:  negative for chills, fevers, sweats  Respiratory:  negative for cough, dyspnea on exertion, shortness of breath,  recommendaitons  GERD. Increase protonix. One dose GI cocktail  HTN. Coreg 6.25 BID  DM2, ISS  BPH flomax  Sleep study referral for LIMA sent, patient has score of 7. Often feels restless, not able to get sleep, incresaing weight, has documented obesity and episodes of multiple waking.    Belen Sebastian MD  10/16/2024  10:34 AM

## 2024-10-16 NOTE — CONSULTS
Today's Date: 10/16/2024  Patient Name: Chester Soto  Date of admission: 10/15/2024  5:56 PM  Patient's age: 69 y.o., 1955  Admission Dx: Unstable angina (HCC) [I20.0]    Reason for Consult: Thrombocytopenia  Requesting Physician: Belen Sebastian MD    CHIEF COMPLAINT: Chest pain/unstable angina    History Obtained From:  patient    HISTORY OF PRESENT ILLNESS:      The patient is a 69 y.o.  male who is admitted to the hospital for shortness of breath/unstable angina, patient is well-known to me he had chronic thrombocytopenia likely related to platelet sequestration with fatty liver/chronic liver disease patient transferred from [facility] for cardiac cath and possible intervention he did have significant comorbidity    Past Medical History:   has a past medical history of Abnormal cardiovascular stress test, CAD (coronary artery disease), Diabetes mellitus (HCC), Diabetic retinopathy (HCC), Fatty liver disease, nonalcoholic, Hyperlipidemia, Hypertension, Meniere disease, MI (myocardial infarction) (HCC), Obesity, Stroke (HCC), Thrombocytopenia (HCC), and TIA (transient ischemic attack).    Past Surgical History:   has a past surgical history that includes Coronary angioplasty with stent; Colonoscopy; Cardiac catheterization (10/29/2020); Colonoscopy (N/A, 9/3/2021); and Upper gastrointestinal endoscopy (9/3/2021).     Medications:    Reviewed in Epic     Allergies:  Poison ivy extract, Statins, and Metformin and related    Social History:   reports that he has never smoked. He has never used smokeless tobacco. He reports that he does not currently use alcohol. He reports that he does not use drugs.     Family History: family history includes Diabetes in his mother; Heart Attack in his brother, maternal grandfather, mother, and sister; Heart Disease in his father; Prostate Cancer in his father.    REVIEW OF SYSTEMS:    14 point review of system has been obtained unremarkable although it was  mentioned above    PHYSICAL EXAM:      BP (!) 158/73   Pulse 58   Temp 97.9 °F (36.6 °C) (Oral)   Resp 16   Ht 1.854 m (6' 0.99\")   Wt 130.1 kg (286 lb 13.1 oz)   SpO2 94%   BMI 37.85 kg/m²    Temp (24hrs), Av.8 °F (36.6 °C), Min:97.2 °F (36.2 °C), Max:98.4 °F (36.9 °C)    General appearance - well appearing, no in pain or distress   Mental status - alert and cooperative   Eyes - pupils equal and reactive, extraocular eye movements intact   Ears - bilateral TM's and external ear canals normal   Mouth - mucous membranes moist, pharynx normal without lesions   Neck - supple, no significant adenopathy   Lymphatics - no palpable lymphadenopathy, no hepatosplenomegaly   Chest - clear to auscultation, no wheezes, rales or rhonchi, symmetric air entry   Heart - normal rate, regular rhythm, normal S1, S2, no murmurs  Abdomen - soft, nontender, nondistended, no masses or organomegaly   Neurological - alert, oriented, normal speech, no focal findings or movement disorder noted   Musculoskeletal - no joint tenderness, deformity or swelling   Extremities - peripheral pulses normal, no pedal edema, no clubbing or cyanosis   Skin - normal coloration and turgor, no rashes, no suspicious skin lesions noted ,    DATA:    Labs:   CBC:   Recent Labs     10/15/24  1147 10/16/24  0602   WBC 3.5 4.1   HGB 14.5 14.5   HCT 41.6 42.6   PLT See Reflexed IPF Result 50*     BMP:   Recent Labs     10/15/24  1147 10/16/24  0602    138   K 3.7 4.2   CO2 25 22   BUN 14 13   CREATININE 1.0 0.7   LABGLOM 86 >90   GLUCOSE 234* 260*     PT/INR:   Recent Labs     10/15/24  114   PROTIME 14.8*   INR 1.2       IMAGING DATA:  No orders to display       Primary Problem  Unstable angina (HCC)    Active Hospital Problems    Diagnosis Date Noted    Unstable angina (HCC) [I20.0] 10/15/2024    BPH with obstruction/lower urinary tract symptoms [N40.1, N13.8] 2024    Gastroesophageal reflux disease without esophagitis [K21.9] 2021

## 2024-10-16 NOTE — CARE COORDINATION
Case Management Assessment  Initial Evaluation    Date/Time of Evaluation: 10/16/2024 9:29 AM  Assessment Completed by: Ilene Saucedo RN    If patient is discharged prior to next notation, then this note serves as note for discharge by case management.    Patient Name: Chester Soto                   YOB: 1955  Diagnosis: Unstable angina (HCC) [I20.0]                   Date / Time: 10/15/2024  5:56 PM    Patient Admission Status: Inpatient   Readmission Risk (Low < 19, Mod (19-27), High > 27): Readmission Risk Score: 10    Current PCP: Felipe Robert APRN - CNP  PCP verified by CM? Yes    Chart Reviewed: Yes      History Provided by: Patient  Patient Orientation: Alert and Oriented    Patient Cognition: Alert    Hospitalization in the last 30 days (Readmission):  No    If yes, Readmission Assessment in CM Navigator will be completed.    Advance Directives:      Code Status: Full Code   Patient's Primary Decision Maker is: Legal Next of Kin      Discharge Planning:    Patient lives with: Alone Type of Home: House  Primary Care Giver: Self  Patient Support Systems include: Family Members, Friends/Neighbors   Current Financial resources: Medicare, Medicaid  Current community resources: None  Current services prior to admission: None            Current DME:              Type of Home Care services:  None    ADLS  Prior functional level: Independent in ADLs/IADLs  Current functional level: Other (see comment) (Await PT/OT)    PT AM-PAC:   /24  OT AM-PAC:   /24    Family can provide assistance at DC: Yes  Would you like Case Management to discuss the discharge plan with any other family members/significant others, and if so, who?    Plans to Return to Present Housing: Yes  Other Identified Issues/Barriers to RETURNING to current housing: clinical status  Potential Assistance needed at discharge: N/A            Potential DME:    Patient expects to discharge to: House  Plan for transportation at

## 2024-10-16 NOTE — PROGRESS NOTES
Occupational Therapy    [facility]  Occupational Therapy Not Seen Note    DATE: 10/16/2024    NAME: Chester Soto  MRN: [id number]   : 1955      Patient not seen this date for Occupational Therapy due to:    Patient independent with ADLs and functional tasks with no acute OT needs. Will defer OT evaluation at this time. Please reorder OT if future needs arise.     Electronically signed by Edmundo Brush OT on 10/16/2024 at 2:03 PM

## 2024-10-16 NOTE — TELEPHONE ENCOUNTER
Mr. Soto was in our office yesterday and now is in [facility] and is going to do the heart cath and he is a bad diabetic but they have not given him anything for this and his sugar is 260, they were wondering what could be done if anything. Please advise, thanks.

## 2024-10-16 NOTE — PROGRESS NOTES
Physical Therapy                                                         Physical Therapy Cancel Note      DATE: 10/16/2024    NAME: Chester Soto  MRN: [id number]   : 1955      Patient not seen this date for Physical Therapy due to: RN defer need for acute PT as pt no difficulty with gait mobility up to bathroom. PT spoke w/ pt who reports hx CVA w/ R LE affected. Pt does report hx of taking care of chickens prior to admit and independent IADLs. Pt to undergo cardiac catheterization.    Patient at baseline functional level with no acute PT needs. Will defer PT evaluation at this time. Will moniter for need for PT evaluation after cardiac catheterization.       Electronically signed by Jocelyn Shah PT on 10/16/2024 at 1:54 PM

## 2024-10-16 NOTE — PLAN OF CARE
Problem: Discharge Planning  Goal: Discharge to home or other facility with appropriate resources  Outcome: Progressing  Flowsheets (Taken 10/15/2024 2238)  Discharge to home or other facility with appropriate resources:   Identify barriers to discharge with patient and caregiver   Arrange for needed discharge resources and transportation as appropriate   Identify discharge learning needs (meds, wound care, etc)     Problem: Chronic Conditions and Co-morbidities  Goal: Patient's chronic conditions and co-morbidity symptoms are monitored and maintained or improved  Flowsheets (Taken 10/15/2024 2238)  Care Plan - Patient's Chronic Conditions and Co-Morbidity Symptoms are Monitored and Maintained or Improved:   Monitor and assess patient's chronic conditions and comorbid symptoms for stability, deterioration, or improvement   Collaborate with multidisciplinary team to address chronic and comorbid conditions and prevent exacerbation or deterioration

## 2024-10-16 NOTE — PROGRESS NOTES
Spiritual Health History and Assessment/Progress Note  [facility]    (P) Initial Encounter,  ,  ,      Name: Chester Soto MRN: [id number]    Age: 69 y.o.     Sex: male   Language: English   Advent: Yarsanism   Unstable angina (HCC)     Date: 10/16/2024            Total Time Calculated: (P) 25 min              Spiritual Assessment began in [facility]        Referral/Consult From: (P) Multi-disciplinary team   Encounter Overview/Reason: (P) Initial Encounter  Service Provided For: (P) Patient    Janel, Belief, Meaning:   Patient is connected with a janel tradition or spiritual practice and has beliefs or practices that help with coping during difficult times  Family/Friends No family/friends present      Importance and Influence:  Patient has spiritual/personal beliefs that influence decisions regarding their health  Family/Friends No family/friends present    Community:  Patient feels well-supported. Support system includes: Extended family  Family/Friends No family/friends present    Assessment and Plan of Care:   Writer received a spiritual care consultation. Patient shared stories that revealed the strength of his relationship with and janel in the Lord. Pt spoke of overcoming serious medical events several times in his life. Pt affirmed his belief in God's message to him that he will live. Pt is praying for the Lord's guidance about what to do with his health matter. Pt was receptive to prayer.     Patient Interventions include: Facilitated expression of thoughts and feelings, Explored spiritual coping/struggle/distress, and Facilitated life review and/ or legacy  Family/Friends Interventions include: No family/friends present    Patient Plan of Care: Spiritual Care available upon further referral  Family/Friends Plan of Care: No family/friends present    Electronically signed by MITALI Adler on 10/16/2024 at 11:46 AM

## 2024-10-16 NOTE — CONSULTS
Cardiology Consultation         Date:   10/16/2024  Patient name: Chester Soto  Date of admission:  10/15/2024  5:56 PM  MRN:   [id number]  YOB: 1955    Reason for Admission: unstable angina     Chief Complaint: chest pain and dyspnea    History of present illness:   Patient with known underlying multiple co morbidities including cad with hx of pci to lad and lcx in 2021, multiple cva, hfref secondary to ischemic cardiomyopathy and chronic thrombocytopenia presented to [place] ED with severe substernal chest pain on exertion with recent episodes of exertional dyspnea which are increasing in frequency and intensity. He reports on Saturday, he was walking out to his chicken coop and he started having pain in his neck that radiated down throughout the chest. He feels like a sharp squeezing pain. Walking made it worse and it went away within seconds after being able to sit down. Then later that night, he had to get up out of bed to catch his breath, he was so short winded. ECG showed nonspecific changes. HS tropponins minimally elevated, patient was transferred for cardiac cath after discussion with Dr Del Real. Patients platelet counts are 50 K  today and hematology has already been consulted.     Past Medical History:   has a past medical history of Abnormal cardiovascular stress test, CAD (coronary artery disease), Diabetes mellitus (HCC), Diabetic retinopathy (HCC), Fatty liver disease, nonalcoholic, Hyperlipidemia, Hypertension, Meniere disease, MI (myocardial infarction) (HCC), Obesity, Stroke (HCC), Thrombocytopenia (HCC), and TIA (transient ischemic attack).    Past Surgical History:   has a past surgical history that includes Coronary angioplasty with stent; Colonoscopy; Cardiac catheterization (10/29/2020); Colonoscopy (N/A, 9/3/2021); and Upper gastrointestinal endoscopy (9/3/2021).     Home Medications:    Prior to Admission medications    Medication Sig Start Date End Date Taking?

## 2024-10-16 NOTE — PLAN OF CARE
Problem: Chronic Conditions and Co-morbidities  Goal: Patient's chronic conditions and co-morbidity symptoms are monitored and maintained or improved  Outcome: Progressing  Flowsheets (Taken 10/16/2024 0818)  Care Plan - Patient's Chronic Conditions and Co-Morbidity Symptoms are Monitored and Maintained or Improved: Monitor and assess patient's chronic conditions and comorbid symptoms for stability, deterioration, or improvement     Problem: Discharge Planning  Goal: Discharge to home or other facility with appropriate resources  Outcome: Progressing  Flowsheets (Taken 10/16/2024 0818)  Discharge to home or other facility with appropriate resources: Identify barriers to discharge with patient and caregiver     Problem: Safety - Adult  Goal: Free from fall injury  Outcome: Progressing     Problem: Pain  Goal: Verbalizes/displays adequate comfort level or baseline comfort level  Outcome: Progressing

## 2024-10-17 ENCOUNTER — APPOINTMENT (OUTPATIENT)
Dept: ULTRASOUND IMAGING | Age: 69
End: 2024-10-17
Attending: INTERNAL MEDICINE
Payer: MEDICARE

## 2024-10-17 PROBLEM — I50.20 HFREF (HEART FAILURE WITH REDUCED EJECTION FRACTION) (HCC): Status: ACTIVE | Noted: 2024-10-17

## 2024-10-17 LAB
ABO + RH BLD: NORMAL
ANION GAP SERPL CALCULATED.3IONS-SCNC: 10 MMOL/L (ref 9–17)
ARM BAND NUMBER: NORMAL
BASOPHILS # BLD: 0 K/UL (ref 0–0.2)
BASOPHILS NFR BLD: 0 % (ref 0–2)
BLOOD BANK SAMPLE EXPIRATION: NORMAL
BLOOD GROUP ANTIBODIES SERPL: NEGATIVE
BUN SERPL-MCNC: 13 MG/DL (ref 8–23)
CALCIUM SERPL-MCNC: 8.8 MG/DL (ref 8.6–10.4)
CHLORIDE SERPL-SCNC: 104 MMOL/L (ref 98–107)
CO2 SERPL-SCNC: 23 MMOL/L (ref 20–31)
CREAT SERPL-MCNC: 0.7 MG/DL (ref 0.7–1.2)
EOSINOPHIL # BLD: 0.08 K/UL (ref 0–0.4)
EOSINOPHILS RELATIVE PERCENT: 3 % (ref 1–4)
ERYTHROCYTE [DISTWIDTH] IN BLOOD BY AUTOMATED COUNT: 13.4 % (ref 12.5–15.4)
GFR, ESTIMATED: >90 ML/MIN/1.73M2
GLUCOSE BLD-MCNC: 137 MG/DL (ref 75–110)
GLUCOSE BLD-MCNC: 214 MG/DL (ref 75–110)
GLUCOSE BLD-MCNC: 243 MG/DL (ref 75–110)
GLUCOSE BLD-MCNC: 267 MG/DL (ref 75–110)
GLUCOSE SERPL-MCNC: 201 MG/DL (ref 70–99)
HCT VFR BLD AUTO: 41.3 % (ref 41–53)
HGB BLD-MCNC: 14 G/DL (ref 13.5–17.5)
LYMPHOCYTES NFR BLD: 0.24 K/UL (ref 1–4.8)
LYMPHOCYTES RELATIVE PERCENT: 9 % (ref 24–44)
MCH RBC QN AUTO: 32.8 PG (ref 26–34)
MCHC RBC AUTO-ENTMCNC: 33.8 G/DL (ref 31–37)
MCV RBC AUTO: 97 FL (ref 80–100)
MONOCYTES NFR BLD: 0.27 K/UL (ref 0.1–0.8)
MONOCYTES NFR BLD: 10 % (ref 1–7)
MORPHOLOGY: NORMAL
NEUTROPHILS NFR BLD: 78 % (ref 36–66)
NEUTS SEG NFR BLD: 2.11 K/UL (ref 1.8–7.7)
PARTIAL THROMBOPLASTIN TIME: 23.6 SEC (ref 21.3–31.3)
PLATELET # BLD AUTO: 41 K/UL (ref 140–450)
PLATELET # BLD AUTO: 59 K/UL (ref 140–450)
PMV BLD AUTO: 7.6 FL (ref 6–12)
POTASSIUM SERPL-SCNC: 3.6 MMOL/L (ref 3.7–5.3)
RBC # BLD AUTO: 4.26 M/UL (ref 4.5–5.9)
SODIUM SERPL-SCNC: 137 MMOL/L (ref 135–144)
WBC OTHER # BLD: 2.7 K/UL (ref 3.5–11)

## 2024-10-17 PROCEDURE — 85025 COMPLETE CBC W/AUTO DIFF WBC: CPT

## 2024-10-17 PROCEDURE — 6360000004 HC RX CONTRAST MEDICATION: Performed by: INTERNAL MEDICINE

## 2024-10-17 PROCEDURE — 86850 RBC ANTIBODY SCREEN: CPT

## 2024-10-17 PROCEDURE — 94761 N-INVAS EAR/PLS OXIMETRY MLT: CPT

## 2024-10-17 PROCEDURE — 36430 TRANSFUSION BLD/BLD COMPNT: CPT

## 2024-10-17 PROCEDURE — 76705 ECHO EXAM OF ABDOMEN: CPT

## 2024-10-17 PROCEDURE — 86901 BLOOD TYPING SEROLOGIC RH(D): CPT

## 2024-10-17 PROCEDURE — 85730 THROMBOPLASTIN TIME PARTIAL: CPT

## 2024-10-17 PROCEDURE — 30233R1 TRANSFUSION OF NONAUTOLOGOUS PLATELETS INTO PERIPHERAL VEIN, PERCUTANEOUS APPROACH: ICD-10-PCS | Performed by: STUDENT IN AN ORGANIZED HEALTH CARE EDUCATION/TRAINING PROGRAM

## 2024-10-17 PROCEDURE — 2580000003 HC RX 258: Performed by: STUDENT IN AN ORGANIZED HEALTH CARE EDUCATION/TRAINING PROGRAM

## 2024-10-17 PROCEDURE — 2700000000 HC OXYGEN THERAPY PER DAY

## 2024-10-17 PROCEDURE — 80048 BASIC METABOLIC PNL TOTAL CA: CPT

## 2024-10-17 PROCEDURE — 4A023N7 MEASUREMENT OF CARDIAC SAMPLING AND PRESSURE, LEFT HEART, PERCUTANEOUS APPROACH: ICD-10-PCS | Performed by: INTERNAL MEDICINE

## 2024-10-17 PROCEDURE — 6370000000 HC RX 637 (ALT 250 FOR IP): Performed by: INTERNAL MEDICINE

## 2024-10-17 PROCEDURE — 82947 ASSAY GLUCOSE BLOOD QUANT: CPT

## 2024-10-17 PROCEDURE — 2060000000 HC ICU INTERMEDIATE R&B

## 2024-10-17 PROCEDURE — 99232 SBSQ HOSP IP/OBS MODERATE 35: CPT | Performed by: INTERNAL MEDICINE

## 2024-10-17 PROCEDURE — 6360000002 HC RX W HCPCS: Performed by: INTERNAL MEDICINE

## 2024-10-17 PROCEDURE — 93458 L HRT ARTERY/VENTRICLE ANGIO: CPT | Performed by: INTERNAL MEDICINE

## 2024-10-17 PROCEDURE — B2111ZZ FLUOROSCOPY OF MULTIPLE CORONARY ARTERIES USING LOW OSMOLAR CONTRAST: ICD-10-PCS | Performed by: INTERNAL MEDICINE

## 2024-10-17 PROCEDURE — 2709999900 HC NON-CHARGEABLE SUPPLY: Performed by: INTERNAL MEDICINE

## 2024-10-17 PROCEDURE — 85049 AUTOMATED PLATELET COUNT: CPT

## 2024-10-17 PROCEDURE — 2500000003 HC RX 250 WO HCPCS: Performed by: INTERNAL MEDICINE

## 2024-10-17 PROCEDURE — B2151ZZ FLUOROSCOPY OF LEFT HEART USING LOW OSMOLAR CONTRAST: ICD-10-PCS | Performed by: INTERNAL MEDICINE

## 2024-10-17 PROCEDURE — P9073 PLATELETS PHERESIS PATH REDU: HCPCS

## 2024-10-17 PROCEDURE — 99232 SBSQ HOSP IP/OBS MODERATE 35: CPT | Performed by: STUDENT IN AN ORGANIZED HEALTH CARE EDUCATION/TRAINING PROGRAM

## 2024-10-17 PROCEDURE — 36415 COLL VENOUS BLD VENIPUNCTURE: CPT

## 2024-10-17 PROCEDURE — 99152 MOD SED SAME PHYS/QHP 5/>YRS: CPT | Performed by: INTERNAL MEDICINE

## 2024-10-17 PROCEDURE — 6370000000 HC RX 637 (ALT 250 FOR IP): Performed by: STUDENT IN AN ORGANIZED HEALTH CARE EDUCATION/TRAINING PROGRAM

## 2024-10-17 PROCEDURE — C1894 INTRO/SHEATH, NON-LASER: HCPCS | Performed by: INTERNAL MEDICINE

## 2024-10-17 PROCEDURE — 86900 BLOOD TYPING SEROLOGIC ABO: CPT

## 2024-10-17 RX ORDER — SODIUM CHLORIDE 9 MG/ML
INJECTION, SOLUTION INTRAVENOUS PRN
Status: CANCELLED | OUTPATIENT
Start: 2024-10-17

## 2024-10-17 RX ORDER — MIDAZOLAM HYDROCHLORIDE 1 MG/ML
INJECTION, SOLUTION INTRAMUSCULAR; INTRAVENOUS PRN
Status: DISCONTINUED | OUTPATIENT
Start: 2024-10-17 | End: 2024-10-17 | Stop reason: HOSPADM

## 2024-10-17 RX ORDER — FLUTICASONE PROPIONATE 50 MCG
2 SPRAY, SUSPENSION (ML) NASAL DAILY
Status: DISCONTINUED | OUTPATIENT
Start: 2024-10-17 | End: 2024-10-18 | Stop reason: HOSPADM

## 2024-10-17 RX ORDER — SODIUM CHLORIDE 0.9 % (FLUSH) 0.9 %
5-40 SYRINGE (ML) INJECTION EVERY 12 HOURS SCHEDULED
Status: CANCELLED | OUTPATIENT
Start: 2024-10-17

## 2024-10-17 RX ORDER — EZETIMIBE 10 MG/1
10 TABLET ORAL NIGHTLY
Status: DISCONTINUED | OUTPATIENT
Start: 2024-10-17 | End: 2024-10-18 | Stop reason: HOSPADM

## 2024-10-17 RX ORDER — ECHINACEA PURPUREA EXTRACT 125 MG
1 TABLET ORAL PRN
Status: DISCONTINUED | OUTPATIENT
Start: 2024-10-17 | End: 2024-10-18 | Stop reason: HOSPADM

## 2024-10-17 RX ORDER — RANOLAZINE 500 MG/1
500 TABLET, EXTENDED RELEASE ORAL 2 TIMES DAILY
Qty: 60 TABLET | Refills: 3 | Status: SHIPPED | OUTPATIENT
Start: 2024-10-17

## 2024-10-17 RX ORDER — FENTANYL CITRATE 50 UG/ML
INJECTION, SOLUTION INTRAMUSCULAR; INTRAVENOUS PRN
Status: DISCONTINUED | OUTPATIENT
Start: 2024-10-17 | End: 2024-10-17 | Stop reason: HOSPADM

## 2024-10-17 RX ORDER — ISOSORBIDE MONONITRATE 30 MG/1
30 TABLET, EXTENDED RELEASE ORAL DAILY
Status: DISCONTINUED | OUTPATIENT
Start: 2024-10-17 | End: 2024-10-18 | Stop reason: HOSPADM

## 2024-10-17 RX ORDER — RANOLAZINE 500 MG/1
500 TABLET, EXTENDED RELEASE ORAL 2 TIMES DAILY
Status: DISCONTINUED | OUTPATIENT
Start: 2024-10-17 | End: 2024-10-18 | Stop reason: HOSPADM

## 2024-10-17 RX ORDER — SODIUM CHLORIDE 9 MG/ML
INJECTION, SOLUTION INTRAVENOUS CONTINUOUS
Status: CANCELLED | OUTPATIENT
Start: 2024-10-17 | End: 2024-10-17

## 2024-10-17 RX ORDER — FLUTICASONE PROPIONATE 50 MCG
2 SPRAY, SUSPENSION (ML) NASAL DAILY
Qty: 16 G | Refills: 3 | Status: SHIPPED | OUTPATIENT
Start: 2024-10-18

## 2024-10-17 RX ORDER — ACETAMINOPHEN 325 MG/1
650 TABLET ORAL EVERY 4 HOURS PRN
Status: CANCELLED | OUTPATIENT
Start: 2024-10-17

## 2024-10-17 RX ORDER — ISOSORBIDE MONONITRATE 30 MG/1
30 TABLET, EXTENDED RELEASE ORAL DAILY
Qty: 30 TABLET | Refills: 3 | Status: SHIPPED | OUTPATIENT
Start: 2024-10-17

## 2024-10-17 RX ORDER — LISINOPRIL 20 MG/1
20 TABLET ORAL DAILY
Qty: 30 TABLET | Refills: 3 | Status: SHIPPED | OUTPATIENT
Start: 2024-10-18

## 2024-10-17 RX ORDER — IOPAMIDOL 755 MG/ML
INJECTION, SOLUTION INTRAVASCULAR PRN
Status: DISCONTINUED | OUTPATIENT
Start: 2024-10-17 | End: 2024-10-17 | Stop reason: HOSPADM

## 2024-10-17 RX ORDER — LISINOPRIL 20 MG/1
20 TABLET ORAL DAILY
Status: DISCONTINUED | OUTPATIENT
Start: 2024-10-17 | End: 2024-10-18 | Stop reason: HOSPADM

## 2024-10-17 RX ORDER — SODIUM CHLORIDE 9 MG/ML
INJECTION, SOLUTION INTRAVENOUS PRN
Status: DISCONTINUED | OUTPATIENT
Start: 2024-10-17 | End: 2024-10-18 | Stop reason: HOSPADM

## 2024-10-17 RX ORDER — ROSUVASTATIN CALCIUM 5 MG/1
5 TABLET, COATED ORAL NIGHTLY
Status: DISCONTINUED | OUTPATIENT
Start: 2024-10-17 | End: 2024-10-17

## 2024-10-17 RX ORDER — SODIUM CHLORIDE 0.9 % (FLUSH) 0.9 %
5-40 SYRINGE (ML) INJECTION PRN
Status: CANCELLED | OUTPATIENT
Start: 2024-10-17

## 2024-10-17 RX ORDER — EZETIMIBE 10 MG/1
10 TABLET ORAL NIGHTLY
Qty: 30 TABLET | Refills: 3 | Status: SHIPPED | OUTPATIENT
Start: 2024-10-17

## 2024-10-17 RX ADMIN — ONDANSETRON 4 MG: 4 TABLET, ORALLY DISINTEGRATING ORAL at 00:39

## 2024-10-17 RX ADMIN — INSULIN GLARGINE 15 UNITS: 100 INJECTION, SOLUTION SUBCUTANEOUS at 08:30

## 2024-10-17 RX ADMIN — MAGNESIUM HYDROXIDE 30 ML: 400 SUSPENSION ORAL at 01:12

## 2024-10-17 RX ADMIN — TAMSULOSIN HYDROCHLORIDE 0.4 MG: 0.4 CAPSULE ORAL at 08:30

## 2024-10-17 RX ADMIN — SODIUM CHLORIDE, PRESERVATIVE FREE 10 ML: 5 INJECTION INTRAVENOUS at 21:33

## 2024-10-17 RX ADMIN — INSULIN LISPRO 4 UNITS: 100 INJECTION, SOLUTION INTRAVENOUS; SUBCUTANEOUS at 21:40

## 2024-10-17 RX ADMIN — INSULIN LISPRO 2 UNITS: 100 INJECTION, SOLUTION INTRAVENOUS; SUBCUTANEOUS at 12:02

## 2024-10-17 RX ADMIN — RANOLAZINE 500 MG: 500 TABLET, EXTENDED RELEASE ORAL at 21:33

## 2024-10-17 RX ADMIN — SODIUM CHLORIDE, PRESERVATIVE FREE 10 ML: 5 INJECTION INTRAVENOUS at 08:31

## 2024-10-17 RX ADMIN — PANTOPRAZOLE SODIUM 40 MG: 40 TABLET, DELAYED RELEASE ORAL at 18:01

## 2024-10-17 RX ADMIN — ASPIRIN 81 MG: 81 TABLET, CHEWABLE ORAL at 08:30

## 2024-10-17 RX ADMIN — PANTOPRAZOLE SODIUM 40 MG: 40 TABLET, DELAYED RELEASE ORAL at 05:05

## 2024-10-17 RX ADMIN — IMMUNE GLOBULIN (HUMAN) 30000 MG: 10 INJECTION INTRAVENOUS; SUBCUTANEOUS at 18:10

## 2024-10-17 RX ADMIN — FLUTICASONE PROPIONATE 2 SPRAY: 50 SPRAY, METERED NASAL at 09:29

## 2024-10-17 RX ADMIN — EZETIMIBE 10 MG: 10 TABLET ORAL at 21:32

## 2024-10-17 RX ADMIN — LISINOPRIL 20 MG: 20 TABLET ORAL at 08:30

## 2024-10-17 RX ADMIN — ISOSORBIDE MONONITRATE 30 MG: 30 TABLET, EXTENDED RELEASE ORAL at 18:01

## 2024-10-17 RX ADMIN — TAMSULOSIN HYDROCHLORIDE 0.4 MG: 0.4 CAPSULE ORAL at 21:32

## 2024-10-17 RX ADMIN — INSULIN LISPRO 2 UNITS: 100 INJECTION, SOLUTION INTRAVENOUS; SUBCUTANEOUS at 08:29

## 2024-10-17 RX ADMIN — CARVEDILOL 6.25 MG: 3.12 TABLET, FILM COATED ORAL at 08:30

## 2024-10-17 RX ADMIN — INSULIN GLARGINE 15 UNITS: 100 INJECTION, SOLUTION SUBCUTANEOUS at 21:40

## 2024-10-17 ASSESSMENT — PAIN DESCRIPTION - FREQUENCY
FREQUENCY: INTERMITTENT
FREQUENCY: INTERMITTENT

## 2024-10-17 ASSESSMENT — PAIN SCALES - GENERAL
PAINLEVEL_OUTOF10: 5
PAINLEVEL_OUTOF10: 2
PAINLEVEL_OUTOF10: 2
PAINLEVEL_OUTOF10: 5

## 2024-10-17 ASSESSMENT — PAIN DESCRIPTION - ORIENTATION
ORIENTATION: MID
ORIENTATION: MID

## 2024-10-17 ASSESSMENT — PAIN DESCRIPTION - LOCATION
LOCATION: ABDOMEN
LOCATION: ABDOMEN

## 2024-10-17 ASSESSMENT — PAIN DESCRIPTION - DESCRIPTORS
DESCRIPTORS: ACHING
DESCRIPTORS: ACHING;OTHER (COMMENT)

## 2024-10-17 ASSESSMENT — PAIN - FUNCTIONAL ASSESSMENT
PAIN_FUNCTIONAL_ASSESSMENT: ACTIVITIES ARE NOT PREVENTED
PAIN_FUNCTIONAL_ASSESSMENT: ACTIVITIES ARE NOT PREVENTED

## 2024-10-17 NOTE — PLAN OF CARE
Problem: Chronic Conditions and Co-morbidities  Goal: Patient's chronic conditions and co-morbidity symptoms are monitored and maintained or improved  Outcome: Progressing  Flowsheets (Taken 10/17/2024 0800)  Care Plan - Patient's Chronic Conditions and Co-Morbidity Symptoms are Monitored and Maintained or Improved: Monitor and assess patient's chronic conditions and comorbid symptoms for stability, deterioration, or improvement     Problem: Discharge Planning  Goal: Discharge to home or other facility with appropriate resources  Outcome: Progressing  Flowsheets (Taken 10/17/2024 0800)  Discharge to home or other facility with appropriate resources: Identify barriers to discharge with patient and caregiver     Problem: Safety - Adult  Goal: Free from fall injury  Outcome: Progressing     Problem: Pain  Goal: Verbalizes/displays adequate comfort level or baseline comfort level  Outcome: Progressing  Flowsheets  Taken 10/17/2024 1121  Verbalizes/displays adequate comfort level or baseline comfort level: Encourage patient to monitor pain and request assistance  Taken 10/17/2024 0800  Verbalizes/displays adequate comfort level or baseline comfort level: Encourage patient to monitor pain and request assistance     Problem: Neurosensory - Adult  Goal: Achieves stable or improved neurological status  Outcome: Progressing  Flowsheets (Taken 10/17/2024 0800)  Achieves stable or improved neurological status: Assess for and report changes in neurological status     Problem: Respiratory - Adult  Goal: Achieves optimal ventilation and oxygenation  Outcome: Progressing  Flowsheets (Taken 10/17/2024 0800)  Achieves optimal ventilation and oxygenation: Assess for changes in respiratory status     Problem: Cardiovascular - Adult  Goal: Maintains optimal cardiac output and hemodynamic stability  Outcome: Progressing  Flowsheets (Taken 10/17/2024 0800)  Maintains optimal cardiac output and hemodynamic stability: Monitor blood

## 2024-10-17 NOTE — PROGRESS NOTES
not currently use alcohol. He reports that he does not use drugs.     Family History: family history includes Diabetes in his mother; Heart Attack in his brother, maternal grandfather, mother, and sister; Heart Disease in his father; Prostate Cancer in his father.    REVIEW OF SYSTEMS:    14 point review of system has been obtained unremarkable although it was mentioned above    PHYSICAL EXAM:      BP (!) 156/88   Pulse 66   Temp 98 °F (36.7 °C) (Oral)   Resp 16   Ht 1.854 m (6' 0.99\")   Wt 130.4 kg (287 lb 7.7 oz)   SpO2 93%   BMI 37.94 kg/m²    Temp (24hrs), Av °F (36.7 °C), Min:97.7 °F (36.5 °C), Max:98.3 °F (36.8 °C)    General appearance - well appearing, no in pain or distress   Mental status - alert and cooperative   Eyes - pupils equal and reactive, extraocular eye movements intact   Ears - bilateral TM's and external ear canals normal   Mouth - mucous membranes moist, pharynx normal without lesions   Neck - supple, no significant adenopathy   Lymphatics - no palpable lymphadenopathy, no hepatosplenomegaly   Chest - clear to auscultation, no wheezes, rales or rhonchi, symmetric air entry   Heart - normal rate, regular rhythm, normal S1, S2, no murmurs  Abdomen - soft, nontender, nondistended, no masses or organomegaly   Neurological - alert, oriented, normal speech, no focal findings or movement disorder noted   Musculoskeletal - no joint tenderness, deformity or swelling   Extremities - peripheral pulses normal, no pedal edema, no clubbing or cyanosis   Skin - normal coloration and turgor, no rashes, no suspicious skin lesions noted ,    DATA:    Labs:   CBC:   Recent Labs     10/16/24  0602 10/17/24  0313   WBC 4.1 2.7*   HGB 14.5 14.0   HCT 42.6 41.3   PLT 50* 41*     BMP:   Recent Labs     10/16/24  0602 10/17/24  0313    137   K 4.2 3.6*   CO2 22 23   BUN 13 13   CREATININE 0.7 0.7   LABGLOM >90 >90   GLUCOSE 260* 201*     PT/INR:   Recent Labs     10/15/24  1147   PROTIME 14.8*   INR  document that actually reflects the content of the visit, the document can still have some errors including those of syntax and sound a like substitutions which may escape proof reading.  It such instances, actual meaning can be extrapolated by contextual diversion.

## 2024-10-17 NOTE — PROGRESS NOTES
[facility]  Office: [phone]  Gerald Murillo DO, Arjun Carrera DO, Surya Baker DO, Reyes Smith DO, Sunshine Conroy MD, Nikkie Rivas MD, Liz Del Real MD, Maliha Alberto MD,  Ed Sky MD, Jonas Deleon MD, Belen Sebastian MD,  Desi Leal DO, Duncan Antunez MD, Alfredo Saleh MD, Don Murillo DO, Urvashi Bhatia MD,  Dread France DO, Marie Dale MD, Ruby Manzo MD, Zhane Reed MD, Viki Hernandez MD,  Casey Boyd MD, Jami Mendez MD, Michael Mejia MD, Roshan Wray MD, Ramírez Rivas MD, Jorge Sandoval MD, Chester Damon DO, Antwan Parkinson MD, Shirley Waterhouse, CNP,  Meg King CNP, Chester Ricks CNP,  Joyce Irwin, ADOLFO, Hortensia Olson, CNP, Rolanda Albright, CNP, Kathleen Boo, CNP, Elli Zhang, CNP, ETHAN GonzalezC, ETHAN KoehlerC, Gail Deshpande, IVONNE, Manan Bernardo, CNP,  Estefani Dyson, CNP, Alisa Grimes, CNP,  Leigha Marcum, CNP, Kisha Alfaro, CNP         [facility]   IN-PATIENT SERVICE   [facility]    Progress Note    10/17/2024    9:31 AM    Name:   Chester Soto  MRN:     [id number]     Acct:      [id number]   Room:   [address] [place] [address] Day:  2  Admit Date:  10/15/2024  5:56 PM    PCP:   Felipe Robert APRN - CNP  Code Status:  Full Code    Subjective:     C/C: No chief complaint on file.    Interval History Status: not changed.     Patient seen and examined. Feeling ok. Congestion improved. Platelets lower after IVIG. Plan to get  transfused 2 units of platelets.     Brief History:     69-year-old male past medical history of coronary artery disease status post drug-eluting stents x 2, history of CVA, obesity, diabetes type 2 presents to outlying ER with exertional dyspnea.     Review of Systems:     Constitutional:  negative for chills, fevers, sweats  Respiratory:  negative for cough, dyspnea on exertion, shortness of breath, wheezing  Cardiovascular:  negative for chest pain, chest  pressure/discomfort, lower extremity edema, palpitations  Gastrointestinal:  negative for abdominal pain, constipation, diarrhea, nausea, vomiting  Neurological:  negative for dizziness, headache    Medications:     Allergies:    Allergies   Allergen Reactions    Poison Ivy Extract     Statins      Cramping  Muscle aches    Metformin And Related Nausea And Vomiting     Diarrhea with regular Metformin does ok with XR form       Current Meds:   Scheduled Meds:    lisinopril  20 mg Oral Daily    fluticasone  2 spray Each Nostril Daily    pantoprazole  40 mg Oral BID AC    aspirin  81 mg Oral Daily    insulin glargine  15 Units SubCUTAneous BID    immune globulin  400 mg/kg (Ideal) IntraVENous Q24H    sodium chloride flush  5-40 mL IntraVENous 2 times per day    carvedilol  6.25 mg Oral BID WC    tamsulosin  0.4 mg Oral BID    insulin lispro  0-8 Units SubCUTAneous 4x Daily AC & HS    [Held by provider] heparin (porcine)  4,000 Units IntraVENous Once     Continuous Infusions:    sodium chloride      sodium chloride      dextrose      [Held by provider] heparin (PORCINE) Infusion Stopped (10/15/24 2035)     PRN Meds: sodium chloride, sodium chloride, sodium chloride flush, sodium chloride, potassium chloride **OR** potassium alternative oral replacement **OR** potassium chloride, magnesium sulfate, ondansetron **OR** ondansetron, magnesium hydroxide, acetaminophen **OR** acetaminophen, glucose, dextrose bolus **OR** dextrose bolus, glucagon (rDNA), dextrose, [Held by provider] heparin (porcine), [Held by provider] heparin (porcine)    Data:     Past Medical History:   has a past medical history of Abnormal cardiovascular stress test, CAD (coronary artery disease), Diabetes mellitus (HCC), Diabetic retinopathy (HCC), Fatty liver disease, nonalcoholic, Hyperlipidemia, Hypertension, Meniere disease, MI (myocardial infarction) (HCC), Obesity, Stroke (HCC), Thrombocytopenia (HCC), and TIA (transient ischemic

## 2024-10-17 NOTE — PROGRESS NOTES
Spoke with Bessie from cath lab on phone at [phone]. Updated her regarding platelet ordering and planned administration.     Received call back from Bessie at 0905. Dr. Santiago stated pt may eat breakfast and be NPO after 1000.     Plan is for heart cath at 1600; nurses to communicate if situations change.

## 2024-10-17 NOTE — BRIEF OP NOTE
Brief Postoperative Note      Patient: Chester Soto  YOB: 1955  MRN: [id number]    Date of Procedure: 10/17/2024    Pre-Op Diagnosis Codes:      * Unstable angina (HCC) [I20.0]    Post-Op Diagnosis: Same       Procedure(s):  Left heart cath / coronary angiography    Surgeon(s):  Jennifer Santiago MD    Assistant:  * No surgical staff found *    Anesthesia: IV Sedation    Estimated Blood Loss (mL): Minimal    Complications: None      Findings:    Three-vessel coronary artery disease  Patent stents in proximal LAD and mid left circumflex  Chronic total occlusion of mid RCA with left-to-right collaterals  Severe diffuse disease and small mid to distal LAD similar to last angiogram -not amenable to PCI    Plan:     Optimize antianginal therapy  Continue aspirin and statin  Monitor CBC  Add Ranexa 500 mg twice daily  In case of refractory symptoms on optimal medical therapy, will consider PTCA/POBA of mid LAD  No objection to discharge home later tonight from cardiology perspective    Electronically signed by Jennifer Santiago MD on 10/17/2024 at 5:10 PM

## 2024-10-17 NOTE — PLAN OF CARE
Problem: Chronic Conditions and Co-morbidities  Goal: Patient's chronic conditions and co-morbidity symptoms are monitored and maintained or improved  10/17/2024 0002 by Ilene Pollock RN  Outcome: Progressing  10/16/2024 1814 by Skylar Kaur RN  Outcome: Progressing  Flowsheets (Taken 10/16/2024 0818)  Care Plan - Patient's Chronic Conditions and Co-Morbidity Symptoms are Monitored and Maintained or Improved: Monitor and assess patient's chronic conditions and comorbid symptoms for stability, deterioration, or improvement     Problem: Discharge Planning  Goal: Discharge to home or other facility with appropriate resources  10/17/2024 0002 by Ilene Pollock RN  Outcome: Progressing  10/16/2024 1814 by Skylar Kaur RN  Outcome: Progressing  Flowsheets (Taken 10/16/2024 0818)  Discharge to home or other facility with appropriate resources: Identify barriers to discharge with patient and caregiver     Problem: Safety - Adult  Goal: Free from fall injury  10/17/2024 0002 by Ilene Pollock RN  Outcome: Progressing  10/16/2024 1814 by Skylar Kaur RN  Outcome: Progressing     Problem: Pain  Goal: Verbalizes/displays adequate comfort level or baseline comfort level  10/17/2024 0002 by Ilene Pollock RN  Outcome: Progressing  10/16/2024 1814 by Skylar Kaur RN  Outcome: Progressing     Problem: Neurosensory - Adult  Goal: Achieves stable or improved neurological status  Outcome: Progressing     Problem: Respiratory - Adult  Goal: Achieves optimal ventilation and oxygenation  Outcome: Progressing     Problem: Cardiovascular - Adult  Goal: Maintains optimal cardiac output and hemodynamic stability  Outcome: Progressing  Goal: Absence of cardiac dysrhythmias or at baseline  Outcome: Progressing     Problem: Skin/Tissue Integrity - Adult  Goal: Skin integrity remains intact  Outcome: Progressing     Problem: Musculoskeletal - Adult  Goal: Return ADL status to a safe level of function  Outcome: Progressing     Problem:

## 2024-10-17 NOTE — CONSENT
Informed Consent for Blood Component Transfusion Note    I have discussed with the patient the rationale for blood component transfusion; its benefits in treating or preventing fatigue, organ damage, or death; and its risk which includes mild transfusion reactions, rare risk of blood borne infection, or more serious but rare reactions. I have discussed the alternatives to transfusion, including the risk and consequences of not receiving transfusion. The patient had an opportunity to ask questions and had agreed to proceed with transfusion of blood components.    Electronically signed by Belen Sebastian MD on 10/17/24 at 8:47 AM EDT

## 2024-10-18 VITALS
RESPIRATION RATE: 12 BRPM | HEART RATE: 51 BPM | DIASTOLIC BLOOD PRESSURE: 73 MMHG | TEMPERATURE: 97.3 F | SYSTOLIC BLOOD PRESSURE: 143 MMHG | BODY MASS INDEX: 38.1 KG/M2 | WEIGHT: 287.48 LBS | HEIGHT: 73 IN | OXYGEN SATURATION: 93 %

## 2024-10-18 LAB
ANION GAP SERPL CALCULATED.3IONS-SCNC: 9 MMOL/L (ref 9–17)
BASOPHILS # BLD: 0.02 K/UL (ref 0–0.2)
BASOPHILS NFR BLD: 1 % (ref 0–2)
BLOOD BANK BLOOD PRODUCT EXPIRATION DATE: NORMAL
BLOOD BANK BLOOD PRODUCT EXPIRATION DATE: NORMAL
BLOOD BANK DISPENSE STATUS: NORMAL
BLOOD BANK DISPENSE STATUS: NORMAL
BLOOD BANK ISBT PRODUCT BLOOD TYPE: 5100
BLOOD BANK ISBT PRODUCT BLOOD TYPE: 6200
BLOOD BANK PRODUCT CODE: NORMAL
BLOOD BANK PRODUCT CODE: NORMAL
BLOOD BANK UNIT TYPE AND RH: NORMAL
BLOOD BANK UNIT TYPE AND RH: NORMAL
BPU ID: NORMAL
BPU ID: NORMAL
BUN SERPL-MCNC: 12 MG/DL (ref 8–23)
CALCIUM SERPL-MCNC: 8.6 MG/DL (ref 8.6–10.4)
CHLORIDE SERPL-SCNC: 100 MMOL/L (ref 98–107)
CO2 SERPL-SCNC: 26 MMOL/L (ref 20–31)
COMPONENT: NORMAL
COMPONENT: NORMAL
CREAT SERPL-MCNC: 0.8 MG/DL (ref 0.7–1.2)
EOSINOPHIL # BLD: 0.05 K/UL (ref 0–0.4)
EOSINOPHILS RELATIVE PERCENT: 3 % (ref 1–4)
ERYTHROCYTE [DISTWIDTH] IN BLOOD BY AUTOMATED COUNT: 13.5 % (ref 12.5–15.4)
GFR, ESTIMATED: >90 ML/MIN/1.73M2
GLUCOSE SERPL-MCNC: 189 MG/DL (ref 70–99)
HCT VFR BLD AUTO: 39.2 % (ref 41–53)
HGB BLD-MCNC: 13.4 G/DL (ref 13.5–17.5)
LYMPHOCYTES NFR BLD: 0.47 K/UL (ref 1–4.8)
LYMPHOCYTES RELATIVE PERCENT: 26 % (ref 24–44)
MCH RBC QN AUTO: 33.3 PG (ref 26–34)
MCHC RBC AUTO-ENTMCNC: 34.3 G/DL (ref 31–37)
MCV RBC AUTO: 97.2 FL (ref 80–100)
MONOCYTES NFR BLD: 0.41 K/UL (ref 0.1–0.8)
MONOCYTES NFR BLD: 23 % (ref 1–7)
MORPHOLOGY: NORMAL
NEUTROPHILS NFR BLD: 47 % (ref 36–66)
NEUTS SEG NFR BLD: 0.85 K/UL (ref 1.8–7.7)
PLATELET # BLD AUTO: 44 K/UL (ref 140–450)
PMV BLD AUTO: 8.4 FL (ref 6–12)
POTASSIUM SERPL-SCNC: 3.8 MMOL/L (ref 3.7–5.3)
RBC # BLD AUTO: 4.03 M/UL (ref 4.5–5.9)
SODIUM SERPL-SCNC: 135 MMOL/L (ref 135–144)
TRANSFUSION STATUS: NORMAL
TRANSFUSION STATUS: NORMAL
UNIT DIVISION: 0
UNIT DIVISION: 0
UNIT ISSUE DATE/TIME: NORMAL
UNIT ISSUE DATE/TIME: NORMAL
WBC OTHER # BLD: 1.8 K/UL (ref 3.5–11)

## 2024-10-18 PROCEDURE — 6370000000 HC RX 637 (ALT 250 FOR IP): Performed by: INTERNAL MEDICINE

## 2024-10-18 PROCEDURE — 36415 COLL VENOUS BLD VENIPUNCTURE: CPT

## 2024-10-18 PROCEDURE — 85025 COMPLETE CBC W/AUTO DIFF WBC: CPT

## 2024-10-18 PROCEDURE — 80048 BASIC METABOLIC PNL TOTAL CA: CPT

## 2024-10-18 PROCEDURE — 99232 SBSQ HOSP IP/OBS MODERATE 35: CPT | Performed by: STUDENT IN AN ORGANIZED HEALTH CARE EDUCATION/TRAINING PROGRAM

## 2024-10-18 PROCEDURE — 6370000000 HC RX 637 (ALT 250 FOR IP): Performed by: STUDENT IN AN ORGANIZED HEALTH CARE EDUCATION/TRAINING PROGRAM

## 2024-10-18 PROCEDURE — 2580000003 HC RX 258: Performed by: STUDENT IN AN ORGANIZED HEALTH CARE EDUCATION/TRAINING PROGRAM

## 2024-10-18 PROCEDURE — 99232 SBSQ HOSP IP/OBS MODERATE 35: CPT | Performed by: INTERNAL MEDICINE

## 2024-10-18 RX ADMIN — INSULIN GLARGINE 15 UNITS: 100 INJECTION, SOLUTION SUBCUTANEOUS at 08:40

## 2024-10-18 RX ADMIN — PANTOPRAZOLE SODIUM 40 MG: 40 TABLET, DELAYED RELEASE ORAL at 06:13

## 2024-10-18 RX ADMIN — ISOSORBIDE MONONITRATE 30 MG: 30 TABLET, EXTENDED RELEASE ORAL at 08:26

## 2024-10-18 RX ADMIN — SODIUM CHLORIDE, PRESERVATIVE FREE 10 ML: 5 INJECTION INTRAVENOUS at 08:48

## 2024-10-18 RX ADMIN — TAMSULOSIN HYDROCHLORIDE 0.4 MG: 0.4 CAPSULE ORAL at 08:26

## 2024-10-18 RX ADMIN — INSULIN LISPRO 2 UNITS: 100 INJECTION, SOLUTION INTRAVENOUS; SUBCUTANEOUS at 08:40

## 2024-10-18 RX ADMIN — LISINOPRIL 20 MG: 20 TABLET ORAL at 08:26

## 2024-10-18 RX ADMIN — RANOLAZINE 500 MG: 500 TABLET, EXTENDED RELEASE ORAL at 08:26

## 2024-10-18 RX ADMIN — ASPIRIN 81 MG: 81 TABLET, CHEWABLE ORAL at 08:26

## 2024-10-18 RX ADMIN — FLUTICASONE PROPIONATE 2 SPRAY: 50 SPRAY, METERED NASAL at 08:26

## 2024-10-18 NOTE — PROGRESS NOTES
Today's Date: 10/18/2024  Patient Name: Chester Soto  Date of admission: 10/15/2024  5:56 PM  Patient's age: 69 y.o., 1955  Admission Dx: Unstable angina (HCC) [I20.0]    Reason for Consult: Thrombocytopenia  Requesting Physician: Belen Sebastian MD    CHIEF COMPLAINT: Chest pain/unstable angina    History Obtained From:  patient  INTERVAL HISTORY:    Patient seen and examined  Patient had diagnostic cath yesterday which showed three-vessel coronary artery disease not amenable to PCI  Platelets today stable  Cardiology planning discharge    HISTORY OF PRESENT ILLNESS:    The patient is a 69 y.o.  male who is admitted to the hospital for shortness of breath/unstable angina, patient is well-known to me he had chronic thrombocytopenia likely related to platelet sequestration with fatty liver/chronic liver disease patient transferred from [facility] for cardiac cath and possible intervention he did have significant comorbidity    Past Medical History:   has a past medical history of Abnormal cardiovascular stress test, CAD (coronary artery disease), Diabetes mellitus (HCC), Diabetic retinopathy (HCC), Fatty liver disease, nonalcoholic, Hyperlipidemia, Hypertension, Meniere disease, MI (myocardial infarction) (HCC), Obesity, Stroke (HCC), Thrombocytopenia (HCC), and TIA (transient ischemic attack).    Past Surgical History:   has a past surgical history that includes Coronary angioplasty with stent; Colonoscopy; Cardiac catheterization (10/29/2020); Colonoscopy (N/A, 9/3/2021); and Upper gastrointestinal endoscopy (9/3/2021).     Medications:    Reviewed in Epic     Allergies:  Poison ivy extract, Statins, and Metformin and related    Social History:   reports that he has never smoked. He has never used smokeless tobacco. He reports that he does not currently use alcohol. He reports that he does not use drugs.     Family History: family history includes Diabetes in his mother; Heart Attack in his  steatosis; no liver lesion seen.  Borderline   splenomegaly.             Primary Problem  Unstable angina (HCC)    Active Hospital Problems    Diagnosis Date Noted    HFrEF (heart failure with reduced ejection fraction) (HCC) [I50.20] 10/17/2024     Priority: High    Acquired thrombocytopenia (HCC) [D69.6] 10/16/2024    Acute idiopathic thrombocytopenic purpura (HCC) [D69.3] 10/16/2024    Unstable angina (HCC) [I20.0] 10/15/2024    BPH with obstruction/lower urinary tract symptoms [N40.1, N13.8] 02/14/2024    Gastroesophageal reflux disease without esophagitis [K21.9] 09/03/2021    Obesity [E66.9] 09/30/2020    Cerebellar stroke (HCC) [I63.9] 04/24/2020         IMPRESSION:   Thrombocytopenia  Chronic liver disease  Unstable angina  Lymphocytopenia    RECOMMENDATIONS:  I personally reviewed results of lab work-up imaging studies,outside records and other relevant clinical data. I had a detailed discussion with the patient.I explained the significance of these abnormalities and possible etiology and management options   69-year-old man history of chronic thrombocytopenia presented with chest pain unstable angina, hematology consulted for clearance for cardiac cath and possible intervention with cardiac stent, patient had chronic thrombocytopenia related to chronic liver disease/fatty liver   He is following with Dr Lawson and he received 1 dose of IVIG with not much response.  Prednisone was not given because of the uncontrolled diabetes  Patient had cardiac cath diagnostic yesterday and noted to have three-vessel coronary artery disease not amenable to PCI.  Likely will be discharged home with optimizing medical management  Recommend follow-up with hematology as outpatient to consider thrombopoietin agonist as outpatient  Okay for discharge from hematology standpoint  Follow-up with hematology after discharge      Discussed with patient and Nurse.      Thank you for asking us to see this patient.     Jorge Harrison,

## 2024-10-18 NOTE — PROGRESS NOTES
Pt discharged via wheelchair with all belongings, scripts and discharge paperwork. Follow up appointments and discharge instructions reviewed with pt and brother. All question answered to satisfaction.    Writer escorted pt down stairs for discharge. While waiting for brother to bring the car to the door, pt started coughing and vomiting. Pt stated \"this happens all the time when I have an empty stomach.\" Previous education given on until on importance of eating when pt refused breakfast. See flowsheets.     Writer then asked pt to return to room upstairs. Pt declined and stated \"I just want to go home.\" Pt and brother educated and told to return to ED if nausea remains.     Pt remained in vehicle and brother agreed to take pt to ED if feeling unwell.

## 2024-10-18 NOTE — PLAN OF CARE
response   Implement non-pharmacological measures as appropriate and evaluate response   Notify Licensed Independent Practitioner if interventions unsuccessful or patient reports new pain     Problem: Neurosensory - Adult  Goal: Achieves stable or improved neurological status  10/18/2024 1000 by Ary Bloom RN  Outcome: Progressing  Flowsheets (Taken 10/18/2024 0830)  Achieves stable or improved neurological status:   Assess for and report changes in neurological status   Initiate measures to prevent increased intracranial pressure     Problem: Respiratory - Adult  Goal: Achieves optimal ventilation and oxygenation  10/18/2024 1000 by Ary Bloom RN  Outcome: Progressing  Flowsheets (Taken 10/18/2024 0830)  Achieves optimal ventilation and oxygenation:   Assess for changes in respiratory status   Assess for changes in mentation and behavior   Position to facilitate oxygenation and minimize respiratory effort   Oxygen supplementation based on oxygen saturation or arterial blood gases   Assess and instruct to report shortness of breath or any respiratory difficulty   Respiratory therapy support as indicated     Problem: Cardiovascular - Adult  Goal: Maintains optimal cardiac output and hemodynamic stability  10/18/2024 1000 by Ary Bloom RN  Outcome: Progressing  Flowsheets (Taken 10/18/2024 0830)  Maintains optimal cardiac output and hemodynamic stability:   Monitor blood pressure and heart rate   Monitor urine output and notify Licensed Independent Practitioner for values outside of normal range   Assess for signs of decreased cardiac output     Problem: Cardiovascular - Adult  Goal: Absence of cardiac dysrhythmias or at baseline  10/18/2024 1000 by Ary Bloom RN  Outcome: Progressing  Flowsheets (Taken 10/18/2024 0830)  Absence of cardiac dysrhythmias or at baseline:   Monitor cardiac rate and rhythm   Assess for signs of decreased cardiac output     Problem: Skin/Tissue Integrity -  Adult  Goal: Skin integrity remains intact  10/18/2024 1000 by Ary Bloom RN  Outcome: Progressing  Flowsheets (Taken 10/18/2024 0830)  Skin Integrity Remains Intact: Monitor for areas of redness and/or skin breakdown     Problem: Musculoskeletal - Adult  Goal: Return ADL status to a safe level of function  10/18/2024 1000 by Ary Bloom RN  Outcome: Progressing  Flowsheets (Taken 10/18/2024 0830)  Return ADL Status to a Safe Level of Function:   Administer medication as ordered   Assess activities of daily living deficits and provide assistive devices as needed     Problem: Gastrointestinal - Adult  Goal: Minimal or absence of nausea and vomiting  10/18/2024 1000 by Ary Bloom RN  Outcome: Progressing  Flowsheets (Taken 10/18/2024 0830)  Minimal or absence of nausea and vomiting: Administer IV fluids as ordered to ensure adequate hydration     Problem: Genitourinary - Adult  Goal: Absence of urinary retention  10/18/2024 1000 by Ary Bloom RN  Outcome: Progressing  Flowsheets (Taken 10/18/2024 0830)  Absence of urinary retention:   Assess patient’s ability to void and empty bladder   Monitor intake/output and perform bladder scan as needed

## 2024-10-18 NOTE — PLAN OF CARE
Problem: Chronic Conditions and Co-morbidities  Goal: Patient's chronic conditions and co-morbidity symptoms are monitored and maintained or improved  10/18/2024 0035 by Ilene Pollock RN  Outcome: Progressing  10/17/2024 1826 by Skylar Kaur RN  Outcome: Progressing  Flowsheets (Taken 10/17/2024 0800)  Care Plan - Patient's Chronic Conditions and Co-Morbidity Symptoms are Monitored and Maintained or Improved: Monitor and assess patient's chronic conditions and comorbid symptoms for stability, deterioration, or improvement     Problem: Discharge Planning  Goal: Discharge to home or other facility with appropriate resources  10/18/2024 0035 by Ilene Pollock RN  Outcome: Progressing  10/17/2024 1826 by Skylar Kaur RN  Outcome: Progressing  Flowsheets (Taken 10/17/2024 0800)  Discharge to home or other facility with appropriate resources: Identify barriers to discharge with patient and caregiver     Problem: Safety - Adult  Goal: Free from fall injury  10/18/2024 0035 by Ilene Pollock RN  Outcome: Progressing  10/17/2024 1826 by Skylar Kaur RN  Outcome: Progressing     Problem: Pain  Goal: Verbalizes/displays adequate comfort level or baseline comfort level  10/18/2024 0035 by Ilene Pollock RN  Outcome: Progressing  10/17/2024 1826 by Skylar Kaur RN  Outcome: Progressing  Flowsheets  Taken 10/17/2024 1121  Verbalizes/displays adequate comfort level or baseline comfort level: Encourage patient to monitor pain and request assistance  Taken 10/17/2024 0800  Verbalizes/displays adequate comfort level or baseline comfort level: Encourage patient to monitor pain and request assistance     Problem: Neurosensory - Adult  Goal: Achieves stable or improved neurological status  10/18/2024 0035 by Ilene Pollock RN  Outcome: Progressing  10/17/2024 1826 by Skylar Kaur RN  Outcome: Progressing  Flowsheets (Taken 10/17/2024 0800)  Achieves stable or improved neurological status: Assess for and report changes in  Progressing  Flowsheets (Taken 10/17/2024 0800)  Minimal or absence of nausea and vomiting: Administer ordered antiemetic medications as needed     Problem: Genitourinary - Adult  Goal: Absence of urinary retention  10/18/2024 0035 by Ilene Pollock, RN  Outcome: Progressing  10/17/2024 1826 by Skylar Kaur, RN  Outcome: Progressing

## 2024-10-18 NOTE — PROGRESS NOTES
[facility]  Office: [phone]  Gerald Murillo DO, Arjun Carrera DO, Surya Baker DO, Reyes Smith DO, Sunshine Conroy MD, Nikkie Rivas MD, Liz Del Real MD, Maliha Alberto MD,  Ed Sky MD, Jonas Deleon MD, Belen Sebastian MD,  Desi Leal DO, Duncan Antunez MD, Alfredo Saleh MD, Don Murillo DO, Urvashi Bhatia MD,  Dread France DO, Marie Dale MD, Ruby Manzo MD, Zhane Reed MD, Viki Hernandez MD,  Casey Boyd MD, Jami Mendez MD, Michael Mejia MD, Roshan Wray MD, Ramírez Rivas MD, Jorge Sandoval MD, Chester Damon DO, Antwan Parkinson MD, Shirley Waterhouse, CNP,  Meg King CNP, Chester Ricks CNP,  Joyce Irwin, ADOLFO, Hortensia Olson, CNP, Rolanda Albright, CNP, Kathleen Boo, CNP, Elli Zhang CNP, ETHAN GonzalezC, ETHAN KohelerC, Gail Deshpande, IVONNE, Manan Bernardo, IVONNE,  Estefani Dyson, CNP, Alisa Grimes, CNP,  Leigha Marcum, IVONNE, Kisha Alfaro, CNP         [facility]   IN-PATIENT SERVICE   [facility]    Progress Note    10/18/2024    8:32 AM    Name:   Chester Soto  MRN:     [id number]     Acct:      [id number]   Room:   [address] [place] [address] Day:  3  Admit Date:  10/15/2024  5:56 PM    PCP:   Felipe Robert APRN - CNP  Code Status:  Full Code    Subjective:     C/C: No chief complaint on file.    Interval History Status: not changed.     Patient seen and examined.no pot op complicatios status post cardiac cath    Brief History:     69-year-old male past medical history of coronary artery disease status post drug-eluting stents x 2, history of CVA, obesity, diabetes type 2 presents to outlying ER with exertional dyspnea.   Findings:     Three-vessel coronary artery disease  Patent stents in proximal LAD and mid left circumflex  Chronic total occlusion of mid RCA with left-to-right collaterals  Severe diffuse disease and small mid to distal LAD similar to last angiogram -not amenable to PCI

## 2024-10-18 NOTE — DISCHARGE SUMMARY
[facility]  Office: [phone]  Gerald Murillo DO, Arjun Carrera DO, Surya Baker DO, Reyes Smith DO, Sunshine Conroy MD, Nikkie Rivas MD, Liz Del Real MD, Maliha Alberto MD,  Ed Sky MD, Jonas Deleon MD, Belen Sebastian MD,  Desi Leal DO, Duncan Antunez MD, Alfredo Saleh MD, Don Murillo DO, Urvashi Bhatia MD,  Dread France DO, Marie Dale MD, Ruby Manzo MD, Zhane Reed MD, Viki Hernandez MD,  Casey Boyd MD, Jami Mendez MD, Michael Mejia MD, Roshan Wray MD, Ramírez Rivas MD, Jorge Sandoval MD, Chester Damon DO, Antwan Parkinson MD, Shirley Waterhouse, CNP,  Meg King CNP, Chester Ricks, IVONNE,  Joyce Irwin, ADOLFO, Hortensia Olson, CNP, Rolanda Albright, CNP, Kathleen Boo, CNP, Elli Zhang, CNP, Gardenia Gallardo PA-C, Vivian Zaman PA-C, Gail Deshpande, CNP, Manan Bernardo, CNP,  Estefani Dyson, CNP, Alisa Grimes, CNP,  Leigha Marcum, CNP, Kisha Alfaro, CNP         [facility]   IN-PATIENT SERVICE   [facility]    Discharge Summary     Patient ID: Chester Soto  :  1955   MRN: [id number]     ACCOUNT:  [id number]   Patient's PCP: Felipe Robert APRN - CNP  Admit Date: 10/15/2024   Discharge Date: 10/18/2024     Length of Stay: 3  Code Status:  Full Code  Admitting Physician: Belen Sebastian MD  Discharge Physician: Belen Sebastian MD     Active Discharge Diagnoses:     Hospital Problem Lists:  Principal Problem:    Unstable angina (HCC)  Active Problems:    HFrEF (heart failure with reduced ejection fraction) (HCC)    Cerebellar stroke (HCC)    Obesity    Gastroesophageal reflux disease without esophagitis    BPH with obstruction/lower urinary tract symptoms    Acquired thrombocytopenia (HCC)    Acute idiopathic thrombocytopenic purpura (HCC)  Resolved Problems:    * No resolved hospital problems. *      Admission Condition:  stable     Discharged Condition: stable    Hospital Stay:      Hospital Course:  Chester Soto is a 69 y.o. male who was admitted for the management of  Unstable angina (HCC) , presented to ER with No chief complaint on file.    69-year-old male past medical history of coronary artery disease status post drug-eluting stents x 2, history of CVA, obesity, diabetes type 2 presents to [facility] ER with exertional dyspnea.   Findings:     Three-vessel coronary artery disease  Patent stents in proximal LAD and mid left circumflex  Chronic total occlusion of mid RCA with left-to-right collaterals  Severe diffuse disease and small mid to distal LAD similar to last angiogram -not amenable to PCI     Plan:      Optimize antianginal therapy  Continue aspirin and statin  Monitor CBC  Add Ranexa 500 mg twice daily  In case of refractory symptoms on optimal medical therapy, will consider PTCA/POBA of mid LAD  No objection to discharge home later tonight from cardiology perspective     Electronically signed by Jennifer Santiago MD on 10/17/2024 at 5:10 PM      Significant therapeutic interventions: see above    Significant Diagnostic Studies:   Labs / Micro:  CBC:   Lab Results   Component Value Date/Time    WBC 1.8 10/18/2024 06:06 AM    RBC 4.03 10/18/2024 06:06 AM    RBC 4.55 03/25/2024 04:15 PM    HGB 13.4 10/18/2024 06:06 AM    HCT 39.2 10/18/2024 06:06 AM    MCV 97.2 10/18/2024 06:06 AM    MCH 33.3 10/18/2024 06:06 AM    MCHC 34.3 10/18/2024 06:06 AM    RDW 13.5 10/18/2024 06:06 AM    PLT 44 10/18/2024 06:06 AM     BMP:    Lab Results   Component Value Date/Time    GLUCOSE 189 10/18/2024 06:06 AM    GLUCOSE 188 10/09/2024 03:30 PM     10/18/2024 06:06 AM    K 3.8 10/18/2024 06:06 AM     10/18/2024 06:06 AM    CO2 26 10/18/2024 06:06 AM    ANIONGAP 9 10/18/2024 06:06 AM    BUN 12 10/18/2024 06:06 AM    CREATININE 0.8 10/18/2024 06:06 AM    CALCIUM 8.6 10/18/2024 06:06 AM    LABGLOM >90 10/18/2024 06:06 AM    GFRAA >60 05/24/2021 01:31 PM    GFR      05/24/2021 01:31

## 2024-10-19 LAB — ECHO BSA: 2.59 M2

## 2024-10-28 ENCOUNTER — OFFICE VISIT (OUTPATIENT)
Dept: ONCOLOGY | Age: 69
End: 2024-10-28
Payer: MEDICARE

## 2024-10-28 VITALS
SYSTOLIC BLOOD PRESSURE: 134 MMHG | WEIGHT: 274 LBS | RESPIRATION RATE: 18 BRPM | TEMPERATURE: 98 F | BODY MASS INDEX: 36.16 KG/M2 | DIASTOLIC BLOOD PRESSURE: 67 MMHG | HEART RATE: 66 BPM

## 2024-10-28 DIAGNOSIS — D69.6 THROMBOCYTOPENIA (HCC): Primary | ICD-10-CM

## 2024-10-28 DIAGNOSIS — D69.3 ACUTE IDIOPATHIC THROMBOCYTOPENIC PURPURA (HCC): ICD-10-CM

## 2024-10-28 DIAGNOSIS — K76.0 FATTY LIVER: ICD-10-CM

## 2024-10-28 DIAGNOSIS — I25.5 ISCHEMIC CARDIOMYOPATHY: ICD-10-CM

## 2024-10-28 PROCEDURE — G8417 CALC BMI ABV UP PARAM F/U: HCPCS | Performed by: INTERNAL MEDICINE

## 2024-10-28 PROCEDURE — 99215 OFFICE O/P EST HI 40 MIN: CPT | Performed by: INTERNAL MEDICINE

## 2024-10-28 PROCEDURE — 1123F ACP DISCUSS/DSCN MKR DOCD: CPT | Performed by: INTERNAL MEDICINE

## 2024-10-28 PROCEDURE — 1159F MED LIST DOCD IN RCRD: CPT | Performed by: INTERNAL MEDICINE

## 2024-10-28 PROCEDURE — 1111F DSCHRG MED/CURRENT MED MERGE: CPT | Performed by: INTERNAL MEDICINE

## 2024-10-28 PROCEDURE — 3075F SYST BP GE 130 - 139MM HG: CPT | Performed by: INTERNAL MEDICINE

## 2024-10-28 PROCEDURE — 1036F TOBACCO NON-USER: CPT | Performed by: INTERNAL MEDICINE

## 2024-10-28 PROCEDURE — 3078F DIAST BP <80 MM HG: CPT | Performed by: INTERNAL MEDICINE

## 2024-10-28 PROCEDURE — G8427 DOCREV CUR MEDS BY ELIG CLIN: HCPCS | Performed by: INTERNAL MEDICINE

## 2024-10-28 PROCEDURE — G8484 FLU IMMUNIZE NO ADMIN: HCPCS | Performed by: INTERNAL MEDICINE

## 2024-10-28 PROCEDURE — 3017F COLORECTAL CA SCREEN DOC REV: CPT | Performed by: INTERNAL MEDICINE

## 2024-10-28 RX ORDER — DIPHENHYDRAMINE HCL 25 MG
25 TABLET ORAL ONCE
OUTPATIENT
Start: 2024-11-04 | End: 2024-11-04

## 2024-10-28 RX ORDER — ACETAMINOPHEN 325 MG/1
650 TABLET ORAL ONCE
OUTPATIENT
Start: 2024-11-04 | End: 2024-11-04

## 2024-10-28 RX ORDER — DIPHENHYDRAMINE HYDROCHLORIDE 50 MG/ML
50 INJECTION INTRAMUSCULAR; INTRAVENOUS
OUTPATIENT
Start: 2024-11-04

## 2024-10-28 RX ORDER — SODIUM CHLORIDE 9 MG/ML
5-250 INJECTION, SOLUTION INTRAVENOUS PRN
OUTPATIENT
Start: 2024-11-04

## 2024-10-28 RX ORDER — ONDANSETRON 2 MG/ML
8 INJECTION INTRAMUSCULAR; INTRAVENOUS
OUTPATIENT
Start: 2024-11-04

## 2024-10-28 RX ORDER — EPINEPHRINE 1 MG/ML
0.3 INJECTION, SOLUTION, CONCENTRATE INTRAVENOUS PRN
OUTPATIENT
Start: 2024-11-04

## 2024-10-28 RX ORDER — FAMOTIDINE 10 MG/ML
20 INJECTION, SOLUTION INTRAVENOUS
OUTPATIENT
Start: 2024-11-04

## 2024-10-28 RX ORDER — ALBUTEROL SULFATE 90 UG/1
4 INHALANT RESPIRATORY (INHALATION) PRN
OUTPATIENT
Start: 2024-11-04

## 2024-10-28 RX ORDER — SODIUM CHLORIDE 0.9 % (FLUSH) 0.9 %
5-40 SYRINGE (ML) INJECTION PRN
OUTPATIENT
Start: 2024-11-04

## 2024-10-28 RX ORDER — HEPARIN SODIUM (PORCINE) LOCK FLUSH IV SOLN 100 UNIT/ML 100 UNIT/ML
500 SOLUTION INTRAVENOUS PRN
OUTPATIENT
Start: 2024-11-04

## 2024-10-28 RX ORDER — MEPERIDINE HYDROCHLORIDE 50 MG/ML
12.5 INJECTION INTRAMUSCULAR; INTRAVENOUS; SUBCUTANEOUS PRN
OUTPATIENT
Start: 2024-11-04

## 2024-10-28 RX ORDER — ACETAMINOPHEN 325 MG/1
650 TABLET ORAL
OUTPATIENT
Start: 2024-11-04

## 2024-10-28 RX ORDER — SODIUM CHLORIDE 9 MG/ML
INJECTION, SOLUTION INTRAVENOUS CONTINUOUS
OUTPATIENT
Start: 2024-11-04

## 2024-10-28 NOTE — PROGRESS NOTES
Patient ID: Chester Soto, 1955, [id number], 69 y.o.  Referred by :  No ref. provider found   Reason for consultation: Thrombocytopenia      HISTORY OF PRESENT ILLNESS:    Oncologic History:    Chester Soto is a very pleasant 69 y.o. male.  With cardiomyopathy fatty liver/SOLO morbid obesity who had a chronic thrombocytopenia and was seen by hematologist 7 to 8 years ago however according to the patient the platelet were fluctuating and went up to normal and did not see the hematologist again, CBC done on November 28, 2022 and WBC 3.7 hemoglobin 14.6 with a platelet count at 75,   Strong family history of ischemic heart disease    Interval history  Patient presents to the clinic for a follow-up visit and to discuss results of his lab work-up and other relevant clinical data.  Overall patient has been tolerating treatment without unexpected or severe side effects.    During this visit patient's allergy, social, medical, surgical history and medications were reviewed and updated.     Still did not have prostate biopsy   Admitted to the hospital with chest pain and found to have severe coronary disease and on only on aspirin  Platelet down to 44 status post IVIG and platelet transfusion      Past Medical History:   Diagnosis Date    Abnormal cardiovascular stress test 10/2020    moderate perfusion defect of severe intensity in the apical anteroapical and inferoapical regions    CAD (coronary artery disease)     Diabetes mellitus ([facility])     [facility]    Diabetic retinopathy ([facility]) 11/05/2020    Fatty liver disease, nonalcoholic     Hyperlipidemia     Hypertension     Meniere disease     MI (myocardial infarction) (HCC)     MULTIPLE    Obesity     Stroke (HCC)     Thrombocytopenia (HCC)     TIA (transient ischemic attack)        Past Surgical History:   Procedure Laterality Date    CARDIAC CATHETERIZATION  10/29/2020    Severe three vessel disease involving the LAD, circumflex and right

## 2024-10-29 ENCOUNTER — CLINICAL DOCUMENTATION (OUTPATIENT)
Facility: HOSPITAL | Age: 69
End: 2024-10-29

## 2024-10-29 NOTE — PROGRESS NOTES
Patient Assistance                   Additional notes: Reviewed chart and patient has Medicare and [place] Medicaid and will not need assistance for treatment.

## 2024-11-05 LAB
ALBUMIN: 3.9 G/DL (ref 3.5–5.2)
ALK PHOSPHATASE: 84 U/L (ref 39–118)
ALT SERPL-CCNC: 102 U/L (ref 5–41)
ANION GAP SERPL CALCULATED.3IONS-SCNC: 13 MMOL/L (ref 7–16)
AST SERPL-CCNC: 71 U/L (ref 9–50)
BASOPHILS ABSOLUTE: 0.04 K/UL (ref 0–0.2)
BASOPHILS RELATIVE PERCENT: 2 % (ref 0–2)
BILIRUB SERPL-MCNC: 0.5 MG/DL
BUN BLDV-MCNC: 16 MG/DL (ref 8–23)
CALCIUM SERPL-MCNC: 9.1 MG/DL (ref 8.6–10.5)
CHLORIDE BLD-SCNC: 103 MMOL/L (ref 96–107)
CO2: 25 MMOL/L (ref 18–32)
CREAT SERPL-MCNC: 0.9 MG/DL (ref 0.67–1.3)
EGFR IF NONAFRICAN AMERICAN: 94 ML/MIN/1.73M2
ELLIPTOCYTES: SLIGHT
EOSINOPHILS ABSOLUTE: 0.11 K/UL (ref 0–0.8)
EOSINOPHILS RELATIVE PERCENT: 5 % (ref 0–5)
FERRITIN: 975 NG/ML (ref 30–400)
GLUCOSE: 260 MG/DL (ref 70–100)
HCT VFR BLD CALC: 43.7 % (ref 39–52)
HEMOGLOBIN: 14.8 G/DL (ref 13–18)
IRON % SATURATION: 43 % (ref 13–45)
IRON: 125 UG/DL (ref 59–158)
LYMPHOCYTES ABSOLUTE: 0.4 K/UL (ref 0.9–5.2)
LYMPHOCYTES RELATIVE PERCENT: 18 % (ref 20–45)
MACROCYTOSIS: SLIGHT
MCH RBC QN AUTO: 33.6 PG (ref 26–32)
MCHC RBC AUTO-ENTMCNC: 33.9 G/DL (ref 32–35)
MCV RBC AUTO: 99 FL (ref 75–100)
MONOCYTES ABSOLUTE: 0.2 K/UL (ref 0.1–1)
MONOCYTES RELATIVE PERCENT: 9 % (ref 0–13)
NEUTROPHILS ABSOLUTE: 1.45 K/UL (ref 1.9–8)
NEUTROPHILS RELATIVE PERCENT: 66 % (ref 45–75)
NUCLEATED RED BLOOD CELLS: 1 % (ref 0–2)
PDW BLD-RTO: 12.3 % (ref 11.2–14.8)
PLATELET # BLD: 66 THOUS/CMM (ref 140–440)
POIKILOCYTOSIS: SLIGHT
POTASSIUM SERPL-SCNC: 4.3 MMOL/L (ref 3.5–5.4)
RBC # BLD: 4.41 MILL/CMM (ref 4.4–6.1)
SODIUM BLD-SCNC: 141 MMOL/L (ref 135–148)
TEAR DROP CELLS: SLIGHT
TOTAL IRON BINDING CAPACITY: 291 UG/DL (ref 250–450)
TOTAL PROTEIN: 6.6 G/DL (ref 6–8.3)
UNSATURATED IRON BINDING CAPACITY: 166 UG/DL (ref 112–347)
WBC # BLD: 2.2 THDS/CMM (ref 3.6–11)

## 2024-11-25 ENCOUNTER — HOSPITAL ENCOUNTER (OUTPATIENT)
Dept: INFUSION THERAPY | Age: 69
Discharge: HOME | End: 2024-11-25
Payer: MEDICARE

## 2024-11-25 VITALS
WEIGHT: 272 LBS | TEMPERATURE: 97.2 F | RESPIRATION RATE: 18 BRPM | HEART RATE: 59 BPM | BODY MASS INDEX: 35.89 KG/M2 | DIASTOLIC BLOOD PRESSURE: 82 MMHG | SYSTOLIC BLOOD PRESSURE: 159 MMHG

## 2024-11-25 DIAGNOSIS — D69.3 ACUTE IDIOPATHIC THROMBOCYTOPENIC PURPURA (HCC): Primary | ICD-10-CM

## 2024-11-25 PROCEDURE — 6370000000 HC RX 637 (ALT 250 FOR IP): Performed by: INTERNAL MEDICINE

## 2024-11-25 PROCEDURE — 96365 THER/PROPH/DIAG IV INF INIT: CPT

## 2024-11-25 PROCEDURE — 6360000002 HC RX W HCPCS: Performed by: INTERNAL MEDICINE

## 2024-11-25 PROCEDURE — 96366 THER/PROPH/DIAG IV INF ADDON: CPT

## 2024-11-25 PROCEDURE — 2580000003 HC RX 258: Performed by: INTERNAL MEDICINE

## 2024-11-25 RX ORDER — FAMOTIDINE 10 MG/ML
20 INJECTION, SOLUTION INTRAVENOUS
OUTPATIENT
Start: 2024-11-26

## 2024-11-25 RX ORDER — DIPHENHYDRAMINE HCL 25 MG
25 CAPSULE ORAL ONCE
OUTPATIENT
Start: 2024-11-26 | End: 2024-11-26

## 2024-11-25 RX ORDER — SODIUM CHLORIDE 9 MG/ML
5-250 INJECTION, SOLUTION INTRAVENOUS PRN
OUTPATIENT
Start: 2024-11-26

## 2024-11-25 RX ORDER — SODIUM CHLORIDE 9 MG/ML
INJECTION, SOLUTION INTRAVENOUS CONTINUOUS
OUTPATIENT
Start: 2024-11-26

## 2024-11-25 RX ORDER — ACETAMINOPHEN 325 MG/1
650 TABLET ORAL ONCE
OUTPATIENT
Start: 2024-11-26 | End: 2024-11-26

## 2024-11-25 RX ORDER — DIPHENHYDRAMINE HCL 25 MG
25 CAPSULE ORAL ONCE
Status: COMPLETED | OUTPATIENT
Start: 2024-11-25 | End: 2024-11-25

## 2024-11-25 RX ORDER — ALBUTEROL SULFATE 90 UG/1
4 INHALANT RESPIRATORY (INHALATION) PRN
OUTPATIENT
Start: 2024-11-26

## 2024-11-25 RX ORDER — DIPHENHYDRAMINE HCL 25 MG
25 CAPSULE ORAL ONCE
Status: CANCELLED | OUTPATIENT
Start: 2024-11-26 | End: 2024-11-26

## 2024-11-25 RX ORDER — HYDROCORTISONE SODIUM SUCCINATE 100 MG/2ML
100 INJECTION INTRAMUSCULAR; INTRAVENOUS
OUTPATIENT
Start: 2024-11-26

## 2024-11-25 RX ORDER — ACETAMINOPHEN 325 MG/1
650 TABLET ORAL ONCE
Status: COMPLETED | OUTPATIENT
Start: 2024-11-25 | End: 2024-11-25

## 2024-11-25 RX ORDER — HEPARIN 100 UNIT/ML
500 SYRINGE INTRAVENOUS PRN
OUTPATIENT
Start: 2024-11-26

## 2024-11-25 RX ORDER — EPINEPHRINE 1 MG/ML
0.3 INJECTION, SOLUTION, CONCENTRATE INTRAVENOUS PRN
OUTPATIENT
Start: 2024-11-26

## 2024-11-25 RX ORDER — ACETAMINOPHEN 325 MG/1
650 TABLET ORAL
OUTPATIENT
Start: 2024-11-26

## 2024-11-25 RX ORDER — SODIUM CHLORIDE 9 MG/ML
5-250 INJECTION, SOLUTION INTRAVENOUS PRN
Status: DISCONTINUED | OUTPATIENT
Start: 2024-11-25 | End: 2024-11-26 | Stop reason: HOSPADM

## 2024-11-25 RX ORDER — ONDANSETRON 2 MG/ML
8 INJECTION INTRAMUSCULAR; INTRAVENOUS
OUTPATIENT
Start: 2024-11-26

## 2024-11-25 RX ORDER — MEPERIDINE HYDROCHLORIDE 25 MG/ML
12.5 INJECTION INTRAMUSCULAR; INTRAVENOUS; SUBCUTANEOUS PRN
OUTPATIENT
Start: 2024-11-26

## 2024-11-25 RX ORDER — ACETAMINOPHEN 325 MG/1
650 TABLET ORAL ONCE
Status: CANCELLED | OUTPATIENT
Start: 2024-11-26 | End: 2024-11-26

## 2024-11-25 RX ORDER — SODIUM CHLORIDE 0.9 % (FLUSH) 0.9 %
5-40 SYRINGE (ML) INJECTION PRN
Status: DISCONTINUED | OUTPATIENT
Start: 2024-11-25 | End: 2024-11-26 | Stop reason: HOSPADM

## 2024-11-25 RX ORDER — DIPHENHYDRAMINE HYDROCHLORIDE 50 MG/ML
50 INJECTION INTRAMUSCULAR; INTRAVENOUS
OUTPATIENT
Start: 2024-11-26

## 2024-11-25 RX ORDER — SODIUM CHLORIDE 0.9 % (FLUSH) 0.9 %
5-40 SYRINGE (ML) INJECTION PRN
OUTPATIENT
Start: 2024-11-26

## 2024-11-25 RX ADMIN — SODIUM CHLORIDE 100 ML/HR: 9 INJECTION, SOLUTION INTRAVENOUS at 09:58

## 2024-11-25 RX ADMIN — IMMUNE GLOBULIN (HUMAN) 40000 MG: 10 INJECTION INTRAVENOUS; SUBCUTANEOUS at 10:33

## 2024-11-25 RX ADMIN — ACETAMINOPHEN 650 MG: 325 TABLET ORAL at 09:56

## 2024-11-25 RX ADMIN — DIPHENHYDRAMINE HYDROCHLORIDE 25 MG: 25 CAPSULE ORAL at 09:56

## 2024-12-03 ENCOUNTER — TELEPHONE (OUTPATIENT)
Dept: UROLOGY | Age: 69
End: 2024-12-03

## 2024-12-03 NOTE — TELEPHONE ENCOUNTER
Patient calls in for prostate bx discussion. He states June 2024 visit he could not accommodate this because his platelets were so low. He would like to discuss this again at his upcoming visit with  on 1/27/25.

## 2024-12-06 LAB
ALBUMIN: 4.3 G/DL (ref 3.5–5.2)
ALK PHOSPHATASE: 94 U/L (ref 39–118)
ALT SERPL-CCNC: 57 U/L (ref 5–41)
ANION GAP SERPL CALCULATED.3IONS-SCNC: 15 MMOL/L (ref 7–16)
AST SERPL-CCNC: 43 U/L (ref 9–50)
BASOPHILS ABSOLUTE: 0 K/UL (ref 0–0.2)
BASOPHILS RELATIVE PERCENT: 0 % (ref 0–2)
BILIRUB SERPL-MCNC: 0.6 MG/DL
BUN BLDV-MCNC: 14 MG/DL (ref 8–23)
CALCIUM SERPL-MCNC: 9.5 MG/DL (ref 8.6–10.5)
CHLORIDE BLD-SCNC: 104 MMOL/L (ref 96–107)
CO2: 23 MMOL/L (ref 18–32)
CREAT SERPL-MCNC: 1.03 MG/DL (ref 0.67–1.3)
EGFR IF NONAFRICAN AMERICAN: 80 ML/MIN/1.73M2
EOSINOPHILS ABSOLUTE: 0.05 K/UL (ref 0–0.8)
EOSINOPHILS RELATIVE PERCENT: 2 % (ref 0–5)
GLUCOSE: 184 MG/DL (ref 65–125)
HCT VFR BLD CALC: 45.6 % (ref 39–52)
HEMOGLOBIN: 16 G/DL (ref 13–18)
LYMPHOCYTES ABSOLUTE: 1.1 K/UL (ref 0.9–5.2)
LYMPHOCYTES RELATIVE PERCENT: 48 % (ref 20–45)
MACROCYTOSIS: SLIGHT
MCH RBC QN AUTO: 33.5 PG (ref 26–32)
MCHC RBC AUTO-ENTMCNC: 35.1 G/DL (ref 32–35)
MCV RBC AUTO: 95 FL (ref 75–100)
MONOCYTES ABSOLUTE: 0.18 K/UL (ref 0.1–1)
MONOCYTES RELATIVE PERCENT: 8 % (ref 0–13)
NEUTROPHILS ABSOLUTE: 0.97 K/UL (ref 1.9–8)
NEUTROPHILS RELATIVE PERCENT: 42 % (ref 45–75)
PDW BLD-RTO: 12.8 % (ref 11.2–14.8)
PLATELET # BLD: ABNORMAL THOUS/CMM (ref 140–440)
POTASSIUM SERPL-SCNC: 4 MMOL/L (ref 3.5–5.4)
RBC # BLD: 4.78 MILL/CMM (ref 4.4–6.1)
SODIUM BLD-SCNC: 142 MMOL/L (ref 135–148)
TOTAL PROTEIN: 7.1 G/DL (ref 6–8.3)
WBC # BLD: 2.3 THDS/CMM (ref 3.6–11)

## 2024-12-09 ENCOUNTER — OFFICE VISIT (OUTPATIENT)
Dept: ONCOLOGY | Age: 69
End: 2024-12-09
Payer: MEDICARE

## 2024-12-09 VITALS
HEIGHT: 72 IN | WEIGHT: 278 LBS | DIASTOLIC BLOOD PRESSURE: 71 MMHG | HEART RATE: 66 BPM | TEMPERATURE: 97.1 F | SYSTOLIC BLOOD PRESSURE: 141 MMHG | BODY MASS INDEX: 37.65 KG/M2 | RESPIRATION RATE: 18 BRPM

## 2024-12-09 DIAGNOSIS — R97.20 ELEVATED PSA: ICD-10-CM

## 2024-12-09 DIAGNOSIS — K76.0 FATTY LIVER: ICD-10-CM

## 2024-12-09 DIAGNOSIS — D72.819 LEUKOPENIA, UNSPECIFIED TYPE: ICD-10-CM

## 2024-12-09 DIAGNOSIS — D69.6 THROMBOCYTOPENIA (HCC): Primary | ICD-10-CM

## 2024-12-09 PROCEDURE — 1036F TOBACCO NON-USER: CPT | Performed by: INTERNAL MEDICINE

## 2024-12-09 PROCEDURE — 3077F SYST BP >= 140 MM HG: CPT | Performed by: INTERNAL MEDICINE

## 2024-12-09 PROCEDURE — 1159F MED LIST DOCD IN RCRD: CPT | Performed by: INTERNAL MEDICINE

## 2024-12-09 PROCEDURE — 1126F AMNT PAIN NOTED NONE PRSNT: CPT | Performed by: INTERNAL MEDICINE

## 2024-12-09 PROCEDURE — 3078F DIAST BP <80 MM HG: CPT | Performed by: INTERNAL MEDICINE

## 2024-12-09 PROCEDURE — G8484 FLU IMMUNIZE NO ADMIN: HCPCS | Performed by: INTERNAL MEDICINE

## 2024-12-09 PROCEDURE — G8417 CALC BMI ABV UP PARAM F/U: HCPCS | Performed by: INTERNAL MEDICINE

## 2024-12-09 PROCEDURE — G8427 DOCREV CUR MEDS BY ELIG CLIN: HCPCS | Performed by: INTERNAL MEDICINE

## 2024-12-09 PROCEDURE — 99214 OFFICE O/P EST MOD 30 MIN: CPT | Performed by: INTERNAL MEDICINE

## 2024-12-09 PROCEDURE — 3017F COLORECTAL CA SCREEN DOC REV: CPT | Performed by: INTERNAL MEDICINE

## 2024-12-09 PROCEDURE — 1123F ACP DISCUSS/DSCN MKR DOCD: CPT | Performed by: INTERNAL MEDICINE

## 2024-12-09 NOTE — PROGRESS NOTES
peripheral pulses normal, no pedal edema, no clubbing or cyanosis   Skin - normal coloration and turgor, no rashes, no suspicious skin lesions noted ,      LABORATORY DATA:     Lab Results   Component Value Date    WBC 2.3 (L) 12/05/2024    HGB 16.0 12/05/2024    HCT 45.6 12/05/2024    MCV 95 12/05/2024    PLT TNP 12/05/2024    LYMPHOPCT 48 (H) 12/05/2024    RBC 4.78 12/05/2024    MCH 33.5 (H) 12/05/2024    MCHC 35.1 (H) 12/05/2024    RDW 12.8 12/05/2024    NEUTOPHILPCT 42 (L) 12/05/2024    MONOPCT 8 12/05/2024    EOSPCT 2 12/05/2024    BASOPCT 0 12/05/2024    NEUTROABS 0.97 (L) 12/05/2024    LYMPHSABS 1.10 12/05/2024    MONOSABS 0.18 12/05/2024    EOSABS 0.05 12/05/2024    BASOSABS 0.00 12/05/2024         Chemistry        Component Value Date/Time     12/05/2024 1458    K 4.0 12/05/2024 1458     12/05/2024 1458    CO2 23 12/05/2024 1458    BUN 14 12/05/2024 1458    CREATININE 1.03 12/05/2024 1458        Component Value Date/Time    CALCIUM 9.5 12/05/2024 1458    ALKPHOS 94 12/05/2024 1458    ALKPHOS 72 10/15/2024 1147    AST 43 12/05/2024 1458    ALT 57 (H) 12/05/2024 1458    BILITOT 0.6 12/05/2024 1458            PATHOLOGY DATA:         IMAGING DATA:      Cardiac procedure    Multivessel coronary artery disease    Patent OM and proximal LAD stents    Severe diffuse disease in small and calcified mid to distal LAD not   amenable to PCI    Mildly elevated LVEDP         ASSESSMENT:       Diagnosis Orders   1. Thrombocytopenia (HCC)        2. Fatty liver        3. Leukopenia, unspecified type        4. Elevated PSA                     PLAN:     I personally reviewed results of lab work-up imaging studies,outside records and other relevant clinical data. I had a detailed discussion with the patient.I explained the significance of these abnormalities and possible etiology and management options   Chronic thrombocytopenia with a strong family history of low platelets likely fatty liver/chronic liver disease

## 2024-12-09 NOTE — PATIENT INSTRUCTIONS
2 units of platelet to be given prior to eye surgery  Patient have a copy of a clearance for surgery  Rtc in 4 months

## 2024-12-13 ENCOUNTER — HOSPITAL ENCOUNTER (OUTPATIENT)
Age: 69
Discharge: HOME | End: 2024-12-13
Payer: MEDICARE

## 2024-12-13 DIAGNOSIS — D69.3 ACUTE IDIOPATHIC THROMBOCYTOPENIC PURPURA (HCC): ICD-10-CM

## 2024-12-13 LAB
ALBUMIN SERPL-MCNC: 3.6 G/DL (ref 3.5–5.2)
ALBUMIN/GLOB SERPL: 1.4 {RATIO} (ref 1–2.5)
ALP SERPL-CCNC: 74 U/L (ref 40–129)
ALT SERPL-CCNC: 68 U/L (ref 10–50)
ANION GAP SERPL CALCULATED.3IONS-SCNC: 10 MMOL/L (ref 9–16)
AST SERPL-CCNC: 47 U/L (ref 10–50)
BASOPHILS # BLD: 0.03 K/UL (ref 0–0.2)
BASOPHILS NFR BLD: 1 % (ref 0–2)
BILIRUB SERPL-MCNC: 0.8 MG/DL (ref 0–1.2)
BUN SERPL-MCNC: 12 MG/DL (ref 8–23)
BUN/CREAT SERPL: 12 (ref 9–20)
CALCIUM SERPL-MCNC: 8.8 MG/DL (ref 8.6–10.4)
CHLORIDE SERPL-SCNC: 103 MMOL/L (ref 98–107)
CO2 SERPL-SCNC: 25 MMOL/L (ref 20–31)
CREAT SERPL-MCNC: 1 MG/DL (ref 0.7–1.2)
EOSINOPHIL # BLD: 0.08 K/UL (ref 0–0.44)
EOSINOPHILS RELATIVE PERCENT: 3 % (ref 1–4)
ERYTHROCYTE [DISTWIDTH] IN BLOOD BY AUTOMATED COUNT: 12.8 % (ref 11.8–14.4)
GFR, ESTIMATED: 82 ML/MIN/1.73M2
GLUCOSE SERPL-MCNC: 208 MG/DL (ref 74–99)
HCT VFR BLD AUTO: 42.5 % (ref 40.7–50.3)
HGB BLD-MCNC: 14.3 G/DL (ref 13–17)
IMM GRANULOCYTES # BLD AUTO: 0 K/UL (ref 0–0.3)
IMM GRANULOCYTES NFR BLD: 0 %
LYMPHOCYTES NFR BLD: 0.68 K/UL (ref 1.1–3.7)
LYMPHOCYTES RELATIVE PERCENT: 25 % (ref 24–43)
MCH RBC QN AUTO: 32.5 PG (ref 25.2–33.5)
MCHC RBC AUTO-ENTMCNC: 33.6 G/DL (ref 28.4–34.8)
MCV RBC AUTO: 96.6 FL (ref 82.6–102.9)
MONOCYTES NFR BLD: 0.41 K/UL (ref 0.1–1.2)
MONOCYTES NFR BLD: 15 % (ref 3–12)
MORPHOLOGY: ABNORMAL
NEUTROPHILS NFR BLD: 56 % (ref 36–65)
NEUTS SEG NFR BLD: 1.5 K/UL (ref 1.5–8.1)
NRBC BLD-RTO: 0 PER 100 WBC
PLATELET # BLD AUTO: ABNORMAL K/UL (ref 138–453)
PLATELET, FLUORESCENCE: 62 K/UL (ref 138–453)
PLATELETS.RETICULATED NFR BLD AUTO: 2.1 % (ref 1.1–10.3)
POTASSIUM SERPL-SCNC: 4.1 MMOL/L (ref 3.7–5.3)
PROT SERPL-MCNC: 6.3 G/DL (ref 6.6–8.7)
RBC # BLD AUTO: 4.4 M/UL (ref 4.21–5.77)
SODIUM SERPL-SCNC: 138 MMOL/L (ref 136–145)
WBC OTHER # BLD: 2.7 K/UL (ref 3.5–11.3)

## 2024-12-13 PROCEDURE — 36415 COLL VENOUS BLD VENIPUNCTURE: CPT

## 2024-12-13 PROCEDURE — 85025 COMPLETE CBC W/AUTO DIFF WBC: CPT

## 2024-12-13 PROCEDURE — 80053 COMPREHEN METABOLIC PANEL: CPT

## 2024-12-16 ENCOUNTER — HOSPITAL ENCOUNTER (OUTPATIENT)
Dept: INFUSION THERAPY | Age: 69
Discharge: HOME | End: 2024-12-16
Payer: MEDICARE

## 2024-12-16 VITALS
TEMPERATURE: 97.4 F | DIASTOLIC BLOOD PRESSURE: 66 MMHG | RESPIRATION RATE: 18 BRPM | SYSTOLIC BLOOD PRESSURE: 144 MMHG | HEART RATE: 66 BPM

## 2024-12-16 DIAGNOSIS — D69.6 THROMBOCYTOPENIA (HCC): Primary | ICD-10-CM

## 2024-12-16 LAB — ABO + RH BLD: NORMAL

## 2024-12-16 PROCEDURE — 2580000003 HC RX 258: Performed by: INTERNAL MEDICINE

## 2024-12-16 PROCEDURE — 86901 BLOOD TYPING SEROLOGIC RH(D): CPT

## 2024-12-16 PROCEDURE — 86900 BLOOD TYPING SEROLOGIC ABO: CPT

## 2024-12-16 PROCEDURE — P9073 PLATELETS PHERESIS PATH REDU: HCPCS

## 2024-12-16 PROCEDURE — 36430 TRANSFUSION BLD/BLD COMPNT: CPT

## 2024-12-16 RX ORDER — FAMOTIDINE 10 MG/ML
20 INJECTION, SOLUTION INTRAVENOUS
Status: CANCELLED | OUTPATIENT
Start: 2024-12-16

## 2024-12-16 RX ORDER — SODIUM CHLORIDE 9 MG/ML
25 INJECTION, SOLUTION INTRAVENOUS PRN
Status: CANCELLED | OUTPATIENT
Start: 2024-12-16

## 2024-12-16 RX ORDER — SODIUM CHLORIDE 9 MG/ML
INJECTION, SOLUTION INTRAVENOUS CONTINUOUS
Status: CANCELLED | OUTPATIENT
Start: 2024-12-16

## 2024-12-16 RX ORDER — SODIUM CHLORIDE 0.9 % (FLUSH) 0.9 %
5-40 SYRINGE (ML) INJECTION PRN
Status: DISCONTINUED | OUTPATIENT
Start: 2024-12-16 | End: 2024-12-17 | Stop reason: HOSPADM

## 2024-12-16 RX ORDER — ONDANSETRON 2 MG/ML
8 INJECTION INTRAMUSCULAR; INTRAVENOUS
Status: CANCELLED | OUTPATIENT
Start: 2024-12-16

## 2024-12-16 RX ORDER — EPINEPHRINE 1 MG/ML
0.3 INJECTION, SOLUTION, CONCENTRATE INTRAVENOUS PRN
OUTPATIENT
Start: 2024-12-16

## 2024-12-16 RX ORDER — EPINEPHRINE 1 MG/ML
0.3 INJECTION, SOLUTION, CONCENTRATE INTRAVENOUS PRN
Status: CANCELLED | OUTPATIENT
Start: 2024-12-16

## 2024-12-16 RX ORDER — SODIUM CHLORIDE 9 MG/ML
20 INJECTION, SOLUTION INTRAVENOUS CONTINUOUS
Status: DISCONTINUED | OUTPATIENT
Start: 2024-12-16 | End: 2024-12-17 | Stop reason: HOSPADM

## 2024-12-16 RX ORDER — SODIUM CHLORIDE 0.9 % (FLUSH) 0.9 %
5-40 SYRINGE (ML) INJECTION PRN
Status: CANCELLED | OUTPATIENT
Start: 2024-12-16

## 2024-12-16 RX ORDER — SODIUM CHLORIDE 9 MG/ML
INJECTION, SOLUTION INTRAVENOUS PRN
Status: DISCONTINUED | OUTPATIENT
Start: 2024-12-16 | End: 2024-12-17 | Stop reason: HOSPADM

## 2024-12-16 RX ORDER — DIPHENHYDRAMINE HYDROCHLORIDE 50 MG/ML
50 INJECTION INTRAMUSCULAR; INTRAVENOUS
Status: CANCELLED | OUTPATIENT
Start: 2024-12-16

## 2024-12-16 RX ORDER — SODIUM CHLORIDE 9 MG/ML
20 INJECTION, SOLUTION INTRAVENOUS CONTINUOUS
Status: CANCELLED | OUTPATIENT
Start: 2024-12-16

## 2024-12-16 RX ORDER — FAMOTIDINE 10 MG/ML
20 INJECTION, SOLUTION INTRAVENOUS
OUTPATIENT
Start: 2024-12-16

## 2024-12-16 RX ORDER — SODIUM CHLORIDE 9 MG/ML
INJECTION, SOLUTION INTRAVENOUS CONTINUOUS
OUTPATIENT
Start: 2024-12-16

## 2024-12-16 RX ORDER — ALBUTEROL SULFATE 90 UG/1
4 INHALANT RESPIRATORY (INHALATION) PRN
Status: CANCELLED | OUTPATIENT
Start: 2024-12-16

## 2024-12-16 RX ORDER — HYDROCORTISONE SODIUM SUCCINATE 100 MG/2ML
100 INJECTION INTRAMUSCULAR; INTRAVENOUS
Status: CANCELLED | OUTPATIENT
Start: 2024-12-16

## 2024-12-16 RX ORDER — ACETAMINOPHEN 325 MG/1
650 TABLET ORAL
Status: CANCELLED | OUTPATIENT
Start: 2024-12-16

## 2024-12-16 RX ORDER — ACETAMINOPHEN 325 MG/1
650 TABLET ORAL
OUTPATIENT
Start: 2024-12-16

## 2024-12-16 RX ORDER — SODIUM CHLORIDE 9 MG/ML
25 INJECTION, SOLUTION INTRAVENOUS PRN
Status: DISCONTINUED | OUTPATIENT
Start: 2024-12-16 | End: 2024-12-17 | Stop reason: HOSPADM

## 2024-12-16 RX ORDER — ONDANSETRON 2 MG/ML
8 INJECTION INTRAMUSCULAR; INTRAVENOUS
OUTPATIENT
Start: 2024-12-16

## 2024-12-16 RX ORDER — DIPHENHYDRAMINE HYDROCHLORIDE 50 MG/ML
50 INJECTION INTRAMUSCULAR; INTRAVENOUS
OUTPATIENT
Start: 2024-12-16

## 2024-12-16 RX ORDER — HYDROCORTISONE SODIUM SUCCINATE 100 MG/2ML
100 INJECTION INTRAMUSCULAR; INTRAVENOUS
OUTPATIENT
Start: 2024-12-16

## 2024-12-16 RX ORDER — ALBUTEROL SULFATE 90 UG/1
4 INHALANT RESPIRATORY (INHALATION) PRN
OUTPATIENT
Start: 2024-12-16

## 2024-12-16 RX ADMIN — SODIUM CHLORIDE, PRESERVATIVE FREE 10 ML: 5 INJECTION INTRAVENOUS at 13:16

## 2024-12-16 RX ADMIN — SODIUM CHLORIDE, PRESERVATIVE FREE 10 ML: 5 INJECTION INTRAVENOUS at 14:50

## 2024-12-16 RX ADMIN — SODIUM CHLORIDE, PRESERVATIVE FREE 10 ML: 5 INJECTION INTRAVENOUS at 14:51

## 2024-12-17 LAB
ARM BAND NUMBER: NORMAL
BLOOD BANK BLOOD PRODUCT EXPIRATION DATE: NORMAL
BLOOD BANK BLOOD PRODUCT EXPIRATION DATE: NORMAL
BLOOD BANK DISPENSE STATUS: NORMAL
BLOOD BANK DISPENSE STATUS: NORMAL
BLOOD BANK ISBT PRODUCT BLOOD TYPE: 6200
BLOOD BANK ISBT PRODUCT BLOOD TYPE: 6200
BLOOD BANK PRODUCT CODE: NORMAL
BLOOD BANK PRODUCT CODE: NORMAL
BLOOD BANK UNIT TYPE AND RH: NORMAL
BLOOD BANK UNIT TYPE AND RH: NORMAL
BPU ID: NORMAL
BPU ID: NORMAL
COMPONENT: NORMAL
COMPONENT: NORMAL
TRANSFUSION STATUS: NORMAL
TRANSFUSION STATUS: NORMAL
UNIT DIVISION: 0
UNIT DIVISION: 0
UNIT ISSUE DATE/TIME: NORMAL
UNIT ISSUE DATE/TIME: NORMAL

## 2025-01-01 ENCOUNTER — OFFICE VISIT (OUTPATIENT)
Dept: ONCOLOGY | Age: 70
End: 2025-01-01
Payer: MEDICARE

## 2025-01-01 VITALS
HEIGHT: 72 IN | HEART RATE: 83 BPM | SYSTOLIC BLOOD PRESSURE: 134 MMHG | WEIGHT: 270 LBS | DIASTOLIC BLOOD PRESSURE: 77 MMHG | TEMPERATURE: 96.3 F | BODY MASS INDEX: 36.57 KG/M2

## 2025-01-01 DIAGNOSIS — R97.20 ELEVATED PSA: ICD-10-CM

## 2025-01-01 DIAGNOSIS — D72.819 LEUKOPENIA, UNSPECIFIED TYPE: ICD-10-CM

## 2025-01-01 DIAGNOSIS — D69.6 THROMBOCYTOPENIA: Primary | ICD-10-CM

## 2025-01-01 DIAGNOSIS — K76.0 FATTY LIVER: ICD-10-CM

## 2025-01-01 PROCEDURE — G8427 DOCREV CUR MEDS BY ELIG CLIN: HCPCS | Performed by: INTERNAL MEDICINE

## 2025-01-01 PROCEDURE — 1036F TOBACCO NON-USER: CPT | Performed by: INTERNAL MEDICINE

## 2025-01-01 PROCEDURE — 3078F DIAST BP <80 MM HG: CPT | Performed by: INTERNAL MEDICINE

## 2025-01-01 PROCEDURE — 3017F COLORECTAL CA SCREEN DOC REV: CPT | Performed by: INTERNAL MEDICINE

## 2025-01-01 PROCEDURE — 3075F SYST BP GE 130 - 139MM HG: CPT | Performed by: INTERNAL MEDICINE

## 2025-01-01 PROCEDURE — 1159F MED LIST DOCD IN RCRD: CPT | Performed by: INTERNAL MEDICINE

## 2025-01-01 PROCEDURE — 1126F AMNT PAIN NOTED NONE PRSNT: CPT | Performed by: INTERNAL MEDICINE

## 2025-01-01 PROCEDURE — 99214 OFFICE O/P EST MOD 30 MIN: CPT | Performed by: INTERNAL MEDICINE

## 2025-01-01 PROCEDURE — G8417 CALC BMI ABV UP PARAM F/U: HCPCS | Performed by: INTERNAL MEDICINE

## 2025-01-01 PROCEDURE — 1123F ACP DISCUSS/DSCN MKR DOCD: CPT | Performed by: INTERNAL MEDICINE

## 2025-01-22 LAB — PSA, ULTRASENSITIVE: 3.06 NG/ML (ref 0–4)

## 2025-01-27 ENCOUNTER — OFFICE VISIT (OUTPATIENT)
Dept: UROLOGY | Age: 70
End: 2025-01-27
Payer: MEDICARE

## 2025-01-27 VITALS
WEIGHT: 283 LBS | OXYGEN SATURATION: 96 % | DIASTOLIC BLOOD PRESSURE: 70 MMHG | HEART RATE: 64 BPM | BODY MASS INDEX: 38.38 KG/M2 | SYSTOLIC BLOOD PRESSURE: 130 MMHG

## 2025-01-27 DIAGNOSIS — R97.20 ELEVATED PSA: Primary | ICD-10-CM

## 2025-01-27 DIAGNOSIS — K59.00 CONSTIPATION, UNSPECIFIED CONSTIPATION TYPE: ICD-10-CM

## 2025-01-27 DIAGNOSIS — N40.1 BPH WITH OBSTRUCTION/LOWER URINARY TRACT SYMPTOMS: ICD-10-CM

## 2025-01-27 DIAGNOSIS — N13.8 BPH WITH OBSTRUCTION/LOWER URINARY TRACT SYMPTOMS: ICD-10-CM

## 2025-01-27 PROCEDURE — 1123F ACP DISCUSS/DSCN MKR DOCD: CPT | Performed by: UROLOGY

## 2025-01-27 PROCEDURE — 3017F COLORECTAL CA SCREEN DOC REV: CPT | Performed by: UROLOGY

## 2025-01-27 PROCEDURE — 1036F TOBACCO NON-USER: CPT | Performed by: UROLOGY

## 2025-01-27 PROCEDURE — 3075F SYST BP GE 130 - 139MM HG: CPT | Performed by: UROLOGY

## 2025-01-27 PROCEDURE — G8417 CALC BMI ABV UP PARAM F/U: HCPCS | Performed by: UROLOGY

## 2025-01-27 PROCEDURE — 3078F DIAST BP <80 MM HG: CPT | Performed by: UROLOGY

## 2025-01-27 PROCEDURE — 99214 OFFICE O/P EST MOD 30 MIN: CPT | Performed by: UROLOGY

## 2025-01-27 PROCEDURE — 1159F MED LIST DOCD IN RCRD: CPT | Performed by: UROLOGY

## 2025-01-27 PROCEDURE — G8427 DOCREV CUR MEDS BY ELIG CLIN: HCPCS | Performed by: UROLOGY

## 2025-01-27 RX ORDER — OXYBUTYNIN CHLORIDE 10 MG/1
10 TABLET, EXTENDED RELEASE ORAL DAILY
COMMUNITY

## 2025-01-27 NOTE — PROGRESS NOTES
HPI:          Patient is a 69 y.o. male in no acute distress.  He is alert and oriented to person, place, and time.         History  12/2020 Referral from [facility] for LUTS.  Complaints of urgency and urge incontinence.  He has nocturia 0-2 times per night, frequency every 30 minutes to an hour during the day.  He does change his underwear once per night due to the incontinence.  He does have a weak stream and a split stream at the start of urination.  He is uncircumcised, but denies any issues retracting his foreskin.  He does have significant constipation and was recently in the ER for constipation. He also reports a history of fecal smearing. He will often go 4 more days without a bowel movement.  While in the ER he was told he was not emptying his bladder.  He did have a CT completed while in the ER on 11/2020 that showed no evidence of  calcifications or hydronephrosis.  He is an uncontrolled insulin-dependent diabetic.  He does consume a large amount of bladder irritants.  He denies history of stones.  He has never seen urology in the past.                Started miralax daily                 Started flomax daily                 Referred for colonoscopy     2/2021 - cysto - Extensive trilobar hyperplasia              Declined greenlight     2021 Started proscar      Ditropan increased to XL 10mg     1/2023 Ditropan stopped, trospium started      3/2023 trospium stopped secondary to ineffectiveness and incomplete bladder emptying        PSA (on proscar)  1/2025 - 3.060  7/2024 - 2.50  3/2024 - 3.71 (7.42)  1/2024 - 2.40 (4.80)  12/2022 - 0.76 (1.42)  11/2021 - 0.6 (1.2)  6/2020 - 3.04     Currently  Patient is 6-month follow-up.  We are here today to follow-up his PSA.  Patient does take finasteride.  Patient's most recent PSA was 3.06.  Patient is having issues with urgency and frequency.  Is having no gross hematuria or dysuria.  He reports unintentional weight loss or decreased appetite.  He

## 2025-03-28 ENCOUNTER — OFFICE VISIT (OUTPATIENT)
Dept: CARDIOLOGY | Age: 70
End: 2025-03-28

## 2025-03-28 ENCOUNTER — HOSPITAL ENCOUNTER (OUTPATIENT)
Age: 70
Discharge: HOME | End: 2025-03-28
Payer: MEDICARE

## 2025-03-28 VITALS
DIASTOLIC BLOOD PRESSURE: 67 MMHG | HEART RATE: 57 BPM | SYSTOLIC BLOOD PRESSURE: 119 MMHG | BODY MASS INDEX: 37.73 KG/M2 | WEIGHT: 278.6 LBS | RESPIRATION RATE: 18 BRPM | OXYGEN SATURATION: 97 % | HEIGHT: 72 IN

## 2025-03-28 DIAGNOSIS — I20.89 CHRONIC STABLE ANGINA: ICD-10-CM

## 2025-03-28 DIAGNOSIS — E11.69 TYPE 2 DIABETES MELLITUS WITH OTHER SPECIFIED COMPLICATION, WITH LONG-TERM CURRENT USE OF INSULIN: ICD-10-CM

## 2025-03-28 DIAGNOSIS — R06.02 SHORTNESS OF BREATH: ICD-10-CM

## 2025-03-28 DIAGNOSIS — I20.89 CHRONIC STABLE ANGINA: Primary | ICD-10-CM

## 2025-03-28 DIAGNOSIS — Z95.820 S/P ANGIOPLASTY WITH STENT: ICD-10-CM

## 2025-03-28 DIAGNOSIS — E66.812 CLASS 2 OBESITY WITH BODY MASS INDEX (BMI) OF 38.0 TO 38.9 IN ADULT, UNSPECIFIED OBESITY TYPE, UNSPECIFIED WHETHER SERIOUS COMORBIDITY PRESENT: ICD-10-CM

## 2025-03-28 DIAGNOSIS — I25.10 ASHD (ARTERIOSCLEROTIC HEART DISEASE): ICD-10-CM

## 2025-03-28 DIAGNOSIS — Z86.73 HISTORY OF MULTIPLE STROKES: ICD-10-CM

## 2025-03-28 DIAGNOSIS — Z79.4 TYPE 2 DIABETES MELLITUS WITH OTHER SPECIFIED COMPLICATION, WITH LONG-TERM CURRENT USE OF INSULIN: ICD-10-CM

## 2025-03-28 DIAGNOSIS — I10 ESSENTIAL HYPERTENSION: ICD-10-CM

## 2025-03-28 DIAGNOSIS — E78.2 MIXED HYPERLIPIDEMIA: ICD-10-CM

## 2025-03-28 DIAGNOSIS — I73.9 INTERMITTENT CLAUDICATION: ICD-10-CM

## 2025-03-28 DIAGNOSIS — I25.5 ISCHEMIC CARDIOMYOPATHY: ICD-10-CM

## 2025-03-28 LAB
ANION GAP SERPL CALCULATED.3IONS-SCNC: 8 MMOL/L (ref 9–16)
BNP SERPL-MCNC: 402 PG/ML (ref 0–125)
BUN SERPL-MCNC: 13 MG/DL (ref 8–23)
BUN/CREAT SERPL: 12 (ref 9–20)
CALCIUM SERPL-MCNC: 8.6 MG/DL (ref 8.6–10.4)
CHLORIDE SERPL-SCNC: 108 MMOL/L (ref 98–107)
CO2 SERPL-SCNC: 25 MMOL/L (ref 20–31)
CREAT SERPL-MCNC: 1.1 MG/DL (ref 0.7–1.2)
ERYTHROCYTE [DISTWIDTH] IN BLOOD BY AUTOMATED COUNT: 12.3 % (ref 11.8–14.4)
GFR, ESTIMATED: 77 ML/MIN/1.73M2
GLUCOSE SERPL-MCNC: 239 MG/DL (ref 74–99)
HCT VFR BLD AUTO: 39.8 % (ref 40.7–50.3)
HGB BLD-MCNC: 13.9 G/DL (ref 13–17)
MCH RBC QN AUTO: 33.5 PG (ref 25.2–33.5)
MCHC RBC AUTO-ENTMCNC: 34.9 G/DL (ref 28.4–34.8)
MCV RBC AUTO: 95.9 FL (ref 82.6–102.9)
NRBC BLD-RTO: 0 PER 100 WBC
PLATELET # BLD AUTO: ABNORMAL K/UL (ref 138–453)
PLATELET, FLUORESCENCE: 48 K/UL (ref 138–453)
PLATELETS.RETICULATED NFR BLD AUTO: 2.4 % (ref 1.1–10.3)
POTASSIUM SERPL-SCNC: 4.1 MMOL/L (ref 3.7–5.3)
RBC # BLD AUTO: 4.15 M/UL (ref 4.21–5.77)
SODIUM SERPL-SCNC: 141 MMOL/L (ref 136–145)
TROPONIN I SERPL HS-MCNC: 22 NG/L (ref 0–22)
WBC OTHER # BLD: 3.1 K/UL (ref 3.5–11.3)

## 2025-03-28 PROCEDURE — 85027 COMPLETE CBC AUTOMATED: CPT

## 2025-03-28 PROCEDURE — 84484 ASSAY OF TROPONIN QUANT: CPT

## 2025-03-28 PROCEDURE — 80048 BASIC METABOLIC PNL TOTAL CA: CPT

## 2025-03-28 PROCEDURE — 36415 COLL VENOUS BLD VENIPUNCTURE: CPT

## 2025-03-28 PROCEDURE — 83880 ASSAY OF NATRIURETIC PEPTIDE: CPT

## 2025-03-28 RX ORDER — CARVEDILOL 6.25 MG/1
6.25 TABLET ORAL 2 TIMES DAILY WITH MEALS
COMMUNITY

## 2025-03-28 RX ORDER — RANOLAZINE 1000 MG/1
1000 TABLET, EXTENDED RELEASE ORAL 2 TIMES DAILY
Qty: 180 TABLET | Refills: 3 | Status: SHIPPED | OUTPATIENT
Start: 2025-03-28

## 2025-03-28 NOTE — PROGRESS NOTES
occurs.  Anti-anginal medications: Continue isosorbide mononitrate (Imdur) 30 mg once daily.  Cholesterol Reduction Therapy: Continue Zetia 10 mg once daily Refuses statin therapy  Ordered Troponin STAT-   BMP to assess kidney function and potassium  CBC to assess hemoglobin and hematocrit  BNP to assess fluid status.   Dr. Del Real will call Dr Owens or Dr. Santiago to discuss further intervention and treatment including a possible heart catheterization next week. Mr. Soto was in agreement with this plan.     Atherosclerotic Heart Disease: Multiple heart attacks, stent placement 2004. S/P PCI to mid circumflex and proximal LAD using KENNETH by Dr. Escudero on 1/7/2021. repeat heart cath done on 10/19/2024:   Multivessel coronary artery disease Patent OM and proximal LAD stents Severe diffuse disease in small and calcified mid to distal LAD not amenable to PCI Mildly elevated LVEDP  Antiplatelet Agent: Continue Aspirin 81 mg daily.   Beta Blocker: Continue Carvedilol (Coreg) 6.25 mg twice daily.   Anti-anginal medications: INCREASE to ranolazine (Ranexa) 1000 mg twice daily. I also discussed the potential side effects of this medication including lightheadedness and dizziness and instructed them to stop the medication of this occurs and call our office if this occurs.  Anti-anginal medications: Continue isosorbide mononitrate (Imdur) 30 mg once daily.  Cholesterol Reduction Therapy: Continue zetia 10 mg daily Refuses statin therapy.    Multiple strokes, the pattern of his multiple ischemic strokes is suggestive of embolic etiology.  No prior history of atrial fibrillation: Recent echo showed left ventricular apical thrombus which is clearly the source of multiple embolic strokes. Patient stopped the warfarin. He is absolutely adamant about not taking any oral anticoagulant. He understands the risk of having recurrent strokes.  Antiplatelet Agent: Continue Aspirin 81 mg daily.   Cholesterol Reduction Therapy: Refused by

## 2025-03-31 ENCOUNTER — TELEPHONE (OUTPATIENT)
Dept: CARDIOLOGY | Age: 70
End: 2025-03-31

## 2025-03-31 ENCOUNTER — RESULTS FOLLOW-UP (OUTPATIENT)
Dept: CARDIOLOGY | Age: 70
End: 2025-03-31

## 2025-03-31 NOTE — TELEPHONE ENCOUNTER
----- Message from GAIL Felix CNP sent at 3/31/2025  8:00 AM EDT -----  Please let patient know lab work was ok, platelets are low. Please have him follow up with Dr. Lawson concerning this as he is his hematologist. Thank you so much

## 2025-03-31 NOTE — RESULT ENCOUNTER NOTE
Please let patient know lab work was ok, platelets are low. Please have him follow up with Dr. Lawson concerning this as he is his hematologist. Thank you so much

## 2025-03-31 NOTE — TELEPHONE ENCOUNTER
Pt called in and stated that he is confused as to why he needs to have a heart cath done.  Per Dr. Del Real pt is to have heart cath done in [place] with  due to his worsening symptoms and angina, he possible would need more stents but untimely the decision  is his.    At this time pt has declined heart cath and will follow up with Amanda to discuss further in 4/2825.

## 2025-04-01 RX ORDER — ISOSORBIDE MONONITRATE 30 MG/1
30 TABLET, EXTENDED RELEASE ORAL DAILY
Qty: 90 TABLET | Refills: 3 | Status: SHIPPED | OUTPATIENT
Start: 2025-04-01

## 2025-04-01 RX ORDER — EZETIMIBE 10 MG/1
10 TABLET ORAL NIGHTLY
Qty: 90 TABLET | Refills: 3 | Status: SHIPPED | OUTPATIENT
Start: 2025-04-01

## 2025-04-01 RX ORDER — LISINOPRIL 20 MG/1
20 TABLET ORAL DAILY
Qty: 90 TABLET | Refills: 3 | Status: SHIPPED | OUTPATIENT
Start: 2025-04-01

## 2025-04-01 NOTE — TELEPHONE ENCOUNTER
Pharmacy request for refills to be sent to [facility] pharmacy.   Patient states that he is taking lisinopril 20mg per Dr. Del Real, but his last note on 3/28/25 says lisinopril 10mg. Please advise.  Thank you

## 2025-04-03 ENCOUNTER — TELEPHONE (OUTPATIENT)
Dept: CARDIOLOGY | Age: 70
End: 2025-04-03

## 2025-04-03 NOTE — TELEPHONE ENCOUNTER
Pt called stating that he has been seeing advertisements for a watchman implant. He is curious if you have ever heard of this and think it would be a good idea for him as a replacement for blood thinners. He left a phone number to get information if you are not familiar with it [phone]. Please advise.

## 2025-04-24 ENCOUNTER — TELEPHONE (OUTPATIENT)
Dept: UROLOGY | Age: 70
End: 2025-04-24

## 2025-04-24 RX ORDER — OXYBUTYNIN CHLORIDE 10 MG/1
10 TABLET, EXTENDED RELEASE ORAL DAILY
Qty: 90 TABLET | Refills: 0 | Status: SHIPPED | OUTPATIENT
Start: 2025-04-24

## 2025-04-28 ENCOUNTER — OFFICE VISIT (OUTPATIENT)
Dept: CARDIOLOGY | Age: 70
End: 2025-04-28
Payer: MEDICARE

## 2025-04-28 VITALS
BODY MASS INDEX: 37.73 KG/M2 | DIASTOLIC BLOOD PRESSURE: 68 MMHG | HEIGHT: 72 IN | RESPIRATION RATE: 18 BRPM | OXYGEN SATURATION: 97 % | WEIGHT: 278.6 LBS | HEART RATE: 67 BPM | SYSTOLIC BLOOD PRESSURE: 126 MMHG

## 2025-04-28 DIAGNOSIS — E11.69 TYPE 2 DIABETES MELLITUS WITH OTHER SPECIFIED COMPLICATION, WITH LONG-TERM CURRENT USE OF INSULIN (HCC): ICD-10-CM

## 2025-04-28 DIAGNOSIS — Z86.73 HISTORY OF MULTIPLE STROKES: ICD-10-CM

## 2025-04-28 DIAGNOSIS — I25.5 ISCHEMIC CARDIOMYOPATHY: ICD-10-CM

## 2025-04-28 DIAGNOSIS — E66.812 CLASS 2 OBESITY WITH BODY MASS INDEX (BMI) OF 38.0 TO 38.9 IN ADULT, UNSPECIFIED OBESITY TYPE, UNSPECIFIED WHETHER SERIOUS COMORBIDITY PRESENT: ICD-10-CM

## 2025-04-28 DIAGNOSIS — Z95.820 S/P ANGIOPLASTY WITH STENT: ICD-10-CM

## 2025-04-28 DIAGNOSIS — E78.2 MIXED HYPERLIPIDEMIA: ICD-10-CM

## 2025-04-28 DIAGNOSIS — I20.89 CHRONIC STABLE ANGINA: Primary | ICD-10-CM

## 2025-04-28 DIAGNOSIS — I25.10 ASHD (ARTERIOSCLEROTIC HEART DISEASE): ICD-10-CM

## 2025-04-28 DIAGNOSIS — I10 ESSENTIAL HYPERTENSION: ICD-10-CM

## 2025-04-28 DIAGNOSIS — Z79.4 TYPE 2 DIABETES MELLITUS WITH OTHER SPECIFIED COMPLICATION, WITH LONG-TERM CURRENT USE OF INSULIN (HCC): ICD-10-CM

## 2025-04-28 PROCEDURE — 3074F SYST BP LT 130 MM HG: CPT | Performed by: NURSE PRACTITIONER

## 2025-04-28 PROCEDURE — 99214 OFFICE O/P EST MOD 30 MIN: CPT | Performed by: NURSE PRACTITIONER

## 2025-04-28 PROCEDURE — 1160F RVW MEDS BY RX/DR IN RCRD: CPT | Performed by: NURSE PRACTITIONER

## 2025-04-28 PROCEDURE — 2022F DILAT RTA XM EVC RTNOPTHY: CPT | Performed by: NURSE PRACTITIONER

## 2025-04-28 PROCEDURE — 1036F TOBACCO NON-USER: CPT | Performed by: NURSE PRACTITIONER

## 2025-04-28 PROCEDURE — 3017F COLORECTAL CA SCREEN DOC REV: CPT | Performed by: NURSE PRACTITIONER

## 2025-04-28 PROCEDURE — 3078F DIAST BP <80 MM HG: CPT | Performed by: NURSE PRACTITIONER

## 2025-04-28 PROCEDURE — G8417 CALC BMI ABV UP PARAM F/U: HCPCS | Performed by: NURSE PRACTITIONER

## 2025-04-28 PROCEDURE — 1123F ACP DISCUSS/DSCN MKR DOCD: CPT | Performed by: NURSE PRACTITIONER

## 2025-04-28 PROCEDURE — G8427 DOCREV CUR MEDS BY ELIG CLIN: HCPCS | Performed by: NURSE PRACTITIONER

## 2025-04-28 PROCEDURE — 3046F HEMOGLOBIN A1C LEVEL >9.0%: CPT | Performed by: NURSE PRACTITIONER

## 2025-04-28 PROCEDURE — 1159F MED LIST DOCD IN RCRD: CPT | Performed by: NURSE PRACTITIONER

## 2025-04-28 NOTE — PROGRESS NOTES
Cholesterol Reduction Therapy: Refused by patient.    Ischemic Cardiomyopathy: Echo done on 3/6/2024 showed an EF of 40-45%. Discussed  in [place] at last visit however he was unwilling to make any changes at this time. Repeat echocardiogram on 10/15/2024 showed EF:40-45%   Beta Blocker: Continue Carvedilol (Coreg) 6.25 mg twice daily.   ACE Inibitor/ARB: Continue lisinopril 10 mg daily. I did discuss switching his lisinopril to entresto and discussed the risk and benefits to the medication. However he was resistant to this and would like to stay on the lisinopril that he is already taking.   Heart failure counseling: I advised them to try and keep their legs up whenever possible and to limit salt in their diet.      Essential Hypertension: Controlled  Beta Blocker: Continue Carvedilol (Coreg) 6.25 mg twice daily.   ACE Inibitor/ARB: Continue lisinopril 10 mg daily.     Hyperlipidemia: Mixed, LDL done on 11/21/2024 was 91 mg/ dL  Cholesterol Reduction Therapy: Refuses statin therapy. Continue Zetia 10 mg once daily.   PCSK9 inhibitors: I briefly spent a few minutes discussing alternatitve cholesterol reducing medication including Repatha. However he was not interested in this either, and says he is going to take a more homeopathic approach and start Red Rice yeast.     Diabetes Mellitis: Last hemoglobin A1c was done on 11/21/2024 and it was 8.6%.   Continue current therapy  Continue to follow up with endocrinologist.     Obesity: Body mass index is 37.78 kg/m².   I also briefly discussed both diet and exercise strategies for him to continue to loses weight and he was very receptive to this.    Intermittent leg claudication, venous insufficiency changes on exam  NEAL's completed on 3/28/2024  We discussed continuing his aspirin and his need for cholesterol reduction.  He is unwilling to start a cholesterol medication.    History of Thrombocytopenia  Continue to follow up with Dr. Lawson     Finally, I

## 2025-05-15 NOTE — PROGRESS NOTES
Patient ID: Chester Soto, 1955, [id number], 69 y.o.  Referred by :  No ref. provider found   Reason for consultation: Thrombocytopenia      Problem list  Thrombocytopenia related to fatty liver/unresponsive to IVIG  Severe coronary disease      HISTORY OF PRESENT ILLNESS:    Oncologic History:    Chester Soto is a very pleasant 69 y.o. male.  With cardiomyopathy fatty liver/SOLO morbid obesity who had a chronic thrombocytopenia and was seen by hematologist 7 to 8 years ago however according to the patient the platelet were fluctuating and went up to normal and did not see the hematologist again, CBC done on November 28, 2022 and WBC 3.7 hemoglobin 14.6 with a platelet count at 75,   Strong family history of ischemic heart disease  Still did not have prostate biopsy   Admitted to the hospital with chest pain and found to have severe coronary disease and on only on aspirin  Platelet down to 44 status post IVIG and platelet transfusion with troponin  Not able to tolerate further dosage of IVIG with allergic reaction and strokelike symptoms    Interval history  Patient presents to the clinic for a follow-up visit and to discuss results of his lab work-up and other relevant clinical data.  Overall patient has been tolerating treatment without unexpected or severe side effects.    During this visit patient's allergy, social, medical, surgical history and medications were reviewed and updated.     Recurrent angina and TIA and plan for heart cath        Past Medical History:   Diagnosis Date    Abnormal cardiovascular stress test 10/2020    moderate perfusion defect of severe intensity in the apical anteroapical and inferoapical regions    CAD (coronary artery disease)     Diabetes mellitus ([facility])     [facility]    Diabetic retinopathy ([facility]) 11/05/2020    Fatty liver disease, nonalcoholic     Hyperlipidemia     Hypertension     Meniere disease     MI (myocardial infarction) ([facility])     MULTIPLE

## (undated) DEVICE — CANNULA ORAL NSL AD CO2 N INTUB O2 DEL DISP TRU LNK

## (undated) DEVICE — FORCEPS BX OVL CUP FEN DISPOSABLE CAP L 160CM RAD JAW 4

## (undated) DEVICE — TRAY SURG CUST CRD CATH TOLEDO

## (undated) DEVICE — GLIDESHEATH SLENDER STAINLESS STEEL KIT: Brand: GLIDESHEATH SLENDER

## (undated) DEVICE — MEDI-VAC NON-CONDUCTIVE TUBING7MM X 30.5 (100FT): Brand: CARDINAL HEALTH

## (undated) DEVICE — FORCEPS BX 240CM JAW 3.2MM L CAP NDL MIC MESH TTH M00513372

## (undated) DEVICE — SOLUTION IV IRRIG POUR BRL 0.9% SODIUM CHL 2F7124

## (undated) DEVICE — STRAP ARMBRD W1.5XL32IN FOAM STR YET SFT W/ HK AND LOOP

## (undated) DEVICE — 6FR JL3.5 CORDIS DIAG CATHETER 100CM

## (undated) DEVICE — 6FR JR4 CORDIS  DIAG CATHETER 100CM